# Patient Record
Sex: MALE | Race: WHITE | NOT HISPANIC OR LATINO | Employment: OTHER | URBAN - NONMETROPOLITAN AREA
[De-identification: names, ages, dates, MRNs, and addresses within clinical notes are randomized per-mention and may not be internally consistent; named-entity substitution may affect disease eponyms.]

---

## 2017-05-10 ENCOUNTER — OFFICE VISIT (OUTPATIENT)
Dept: OTOLARYNGOLOGY | Facility: CLINIC | Age: 72
End: 2017-05-10

## 2017-05-10 VITALS
HEIGHT: 68 IN | DIASTOLIC BLOOD PRESSURE: 87 MMHG | TEMPERATURE: 97.8 F | WEIGHT: 182.13 LBS | BODY MASS INDEX: 27.6 KG/M2 | SYSTOLIC BLOOD PRESSURE: 145 MMHG | HEART RATE: 80 BPM

## 2017-05-10 DIAGNOSIS — R53.83 OTHER FATIGUE: ICD-10-CM

## 2017-05-10 DIAGNOSIS — H61.22 IMPACTED CERUMEN OF LEFT EAR: ICD-10-CM

## 2017-05-10 DIAGNOSIS — G47.33 OSA (OBSTRUCTIVE SLEEP APNEA): ICD-10-CM

## 2017-05-10 DIAGNOSIS — R06.83 SNORING: Primary | ICD-10-CM

## 2017-05-10 DIAGNOSIS — G47.10 HYPERSOMNOLENCE: ICD-10-CM

## 2017-05-10 PROCEDURE — 99213 OFFICE O/P EST LOW 20 MIN: CPT | Performed by: NURSE PRACTITIONER

## 2017-05-10 RX ORDER — FLUOXETINE HYDROCHLORIDE 20 MG/1
20 CAPSULE ORAL DAILY
Refills: 0 | COMMUNITY
Start: 2017-03-31

## 2017-05-10 RX ORDER — DESONIDE 0.5 MG/G
1 CREAM TOPICAL DAILY PRN
Refills: 11 | COMMUNITY
Start: 2017-03-22

## 2017-05-10 RX ORDER — SIMVASTATIN 40 MG
TABLET ORAL
Refills: 1 | COMMUNITY
Start: 2017-02-27 | End: 2018-05-10

## 2017-05-10 RX ORDER — NEOMYCIN SULFATE, POLYMYXIN B SULFATE AND HYDROCORTISONE 10; 3.5; 1 MG/ML; MG/ML; [USP'U]/ML
SUSPENSION/ DROPS AURICULAR (OTIC)
Refills: 0 | COMMUNITY
Start: 2017-02-17 | End: 2018-05-10

## 2017-06-14 ENCOUNTER — HOSPITAL ENCOUNTER (OUTPATIENT)
Dept: SLEEP MEDICINE | Facility: HOSPITAL | Age: 72
Discharge: HOME | End: 2017-06-14
Admitting: NURSE PRACTITIONER

## 2017-06-14 DIAGNOSIS — R53.83 OTHER FATIGUE: ICD-10-CM

## 2017-06-14 DIAGNOSIS — G47.33 OSA (OBSTRUCTIVE SLEEP APNEA): ICD-10-CM

## 2017-06-14 DIAGNOSIS — G47.10 HYPERSOMNOLENCE: ICD-10-CM

## 2017-06-14 DIAGNOSIS — R06.83 SNORING: ICD-10-CM

## 2017-06-14 PROCEDURE — G0398 HOME SLEEP TEST/TYPE 2 PORTA: HCPCS

## 2017-06-14 PROCEDURE — 95806 SLEEP STUDY UNATT&RESP EFFT: CPT | Performed by: PSYCHIATRY & NEUROLOGY

## 2017-07-25 ENCOUNTER — OFFICE VISIT (OUTPATIENT)
Dept: NEUROLOGY | Facility: CLINIC | Age: 72
End: 2017-07-25

## 2017-07-25 VITALS
HEIGHT: 68 IN | SYSTOLIC BLOOD PRESSURE: 122 MMHG | HEART RATE: 78 BPM | DIASTOLIC BLOOD PRESSURE: 82 MMHG | BODY MASS INDEX: 27.28 KG/M2 | WEIGHT: 180 LBS

## 2017-07-25 DIAGNOSIS — G47.33 OSA (OBSTRUCTIVE SLEEP APNEA): Primary | ICD-10-CM

## 2017-07-25 DIAGNOSIS — R06.83 SNORING: ICD-10-CM

## 2017-07-25 PROCEDURE — 99213 OFFICE O/P EST LOW 20 MIN: CPT | Performed by: CLINICAL NURSE SPECIALIST

## 2017-07-25 NOTE — PATIENT INSTRUCTIONS
Sleep Apnea  Sleep apnea is a condition that affects breathing. People with sleep apnea have moments during sleep when their breathing pauses briefly or gets shallow. Sleep apnea can cause these symptoms:  · Trouble staying asleep.  · Sleepiness or tiredness during the day.  · Irritability.  · Loud snoring.  · Morning headaches.  · Trouble concentrating.  · Forgetting things.  · Less interest in sex.  · Being sleepy for no reason.  · Mood swings.  · Personality changes.  · Depression.  · Waking up a lot during the night to pee (urinate).  · Dry mouth.  · Sore throat.  HOME CARE  · Make any changes in your routine that your doctor recommends.  · Eat a healthy, well-balanced diet.  · Take over-the-counter and prescription medicines only as told by your doctor.  · Avoid using alcohol, calming medicines (sedatives), and narcotic medicines.  · Take steps to lose weight if you are overweight.  · If you were given a machine (device) to use while you sleep, use it only as told by your doctor.  · Do not use any tobacco products, such as cigarettes, chewing tobacco, and e-cigarettes. If you need help quitting, ask your doctor.  · Keep all follow-up visits as told by your doctor. This is important.  GET HELF IF:  · The machine that you were given to use during sleep is uncomfortable or does not seem to be working.  · Your symptoms do not get better.  · Your symptoms get worse.  GET HELP RIGHT AWAY IF:  · Your chest hurts.  · You have trouble breathing in enough air (shortness of breath).  · You have an uncomfortable feeling in your back, arms, or stomach.  · You have trouble talking.  · One side of your body feels weak.  · A part of your face is hanging down (drooping).  These symptoms may be an emergency. Do not wait to see if the symptoms will go away. Get medical help right away. Call your local emergency services (911 in the [place] Do not drive yourself to the hospital.     This information is not intended to replace  advice given to you by your health care provider. Make sure you discuss any questions you have with your health care provider.     Document Released: 09/26/2009 Document Revised: 04/10/2017 Document Reviewed: 09/26/2016  [employer] Interactive Patient Education ©2017 [employer]  BMI for Adults  Body mass index (BMI) is a number that is calculated from a person's weight and height. In most adults, the number is used to find how much of an adult's weight is made up of fat. BMI is not as accurate as a direct measure of body fat.  HOW IS BMI CALCULATED?  BMI is calculated by dividing weight in kilograms by height in meters squared. It can also be calculated by dividing weight in pounds by height in inches squared, then multiplying the resulting number by 703. Charts are available to help you find your BMI quickly and easily without doing this calculation.   HOW IS BMI INTERPRETED?  Health care professionals use BMI charts to identify whether an adult is underweight, at a normal weight, or overweight based on the following guidelines:  · Underweight: BMI less than 18.5.  · Normal weight: BMI between 18.5 and 24.9.  · Overweight: BMI between 25 and 29.9.  · Obese: BMI of 30 and above.  BMI is usually interpreted the same for males and females.  Weight includes both fat and muscle, so someone with a muscular build, such as an athlete, may have a BMI that is higher than 24.9. In cases like these, BMI may not accurately depict body fat. To determine if excess body fat is the cause of a BMI of 25 or higher, further assessments may need to be done by a health care provider.  WHY IS BMI A USEFUL TOOL?  BMI is used to identify a possible weight problem that may be related to a medical problem or may increase the risk for medical problems. BMI can also be used to promote changes to reach a healthy weight.     This information is not intended to replace advice given to you by your health care provider. Make sure you discuss any  questions you have with your health care provider.     Document Released: 08/29/2005 Document Revised: 01/08/2016 Document Reviewed: 05/15/2015  [employer] Interactive Patient Education ©2017 [employer]

## 2017-07-25 NOTE — PROGRESS NOTES
Subjective     Chief Complaint   Patient presents with   • Sleep Apnea     In house sleep study in Sept.        Justin Szymanski is a 72 y.o. male right handed  in [place], [place]. He is here today for follow up for ADITHYA afte in home sleep study. He has history of ADITHYA and has used CPAP in the past but stopped about 6 years ago as he would have problems with the mask as he is a side sleeper and would be awakened frequently because of leaks and then stopped wearing all together. He did have a home sleep study that did show ADITHYA and he is for titration study in September 2017. He does snore and also has daytime sleepiness. [place] and BANG score below.    Sleep Apnea   This is a chronic problem. The current episode started more than 1 year ago. The problem has been gradually worsening. Associated symptoms include fatigue. Pertinent negatives include no arthralgias, chest pain, myalgias, nausea, sore throat, vomiting or weakness. Associated symptoms comments: Hx of ADITHYA and wore CPAP in the past but stopped about 6 years ago as he is a side sleeper and could not tolerate mask, snoring, daytime sleepiness. Exacerbated by: allergies. Treatments tried: CPAP.        Current Outpatient Prescriptions   Medication Sig Dispense Refill   • desonide (DESOWEN) 0.05 % cream APPLY TO FACE QD  11   • dicyclomine (BENTYL) 20 MG tablet Take 20 mg by mouth Every 6 (Six) Hours As Needed.     • FLUoxetine (PROzac) 20 MG capsule TK 1 C PO QD  0   • fluticasone (FLONASE) 50 MCG/ACT nasal spray 2 sprays into each nostril Daily.     • levothyroxine (SYNTHROID, LEVOTHROID) 75 MCG tablet Take 75 mcg by mouth Daily.     • lisinopril-hydrochlorothiazide (PRINZIDE,ZESTORETIC) 20-12.5 MG per tablet Take 1 tablet by mouth Daily.     • neomycin-polymyxin-hydrocortisone (CORTISPORIN) 3.5-33850-3 otic suspension INSTILL 4 DROPS INTO BOTH EARS TID FOR 7 DAYS  0   • simvastatin (ZOCOR) 40 MG tablet TK 1 T PO QHS  1   • tadalafil (CIALIS) 20 MG tablet  "Take 20 mg by mouth Daily As Needed for erectile dysfunction.     • valACYclovir (VALTREX) 1000 MG tablet Take 1,000 mg by mouth 2 (Two) Times a Day As Needed.       No current facility-administered medications for this visit.        Past Medical History:   Diagnosis Date   • Allergic rhinitis    • GERD (gastroesophageal reflux disease)    • Hypertension    • Hypothyroidism    • Nasal polyposis    • Seasonal allergic rhinitis    • Snoring        Past Surgical History:   Procedure Laterality Date   • SINUS SURGERY      Dr. Griffith   • ULNAR NERVE REPAIR         family history includes Cancer in his father; Hyperlipidemia in his father and mother; Hypertension in his father.    Social History   Substance Use Topics   • Smoking status: Never Smoker   • Smokeless tobacco: Never Used   • Alcohol use 0.6 oz/week     1 Glasses of wine per week      Comment: nightly       Review of Systems   Constitutional: Positive for fatigue.   HENT: Positive for postnasal drip. Negative for rhinorrhea and sore throat.    Eyes: Negative.    Respiratory: Positive for shortness of breath.         Snores   Cardiovascular: Negative.  Negative for chest pain.   Gastrointestinal: Negative.  Negative for constipation, diarrhea, nausea and vomiting.   Endocrine: Negative.    Genitourinary: Negative.  Negative for dysuria and frequency.   Musculoskeletal: Negative for arthralgias, gait problem and myalgias.   Skin: Negative.    Allergic/Immunologic: Negative.    Neurological: Negative.  Negative for dizziness, speech difficulty, weakness and light-headedness.   Hematological: Negative.  Negative for adenopathy.   Psychiatric/Behavioral: Negative.  Negative for agitation, confusion and hallucinations.   All other systems reviewed and are negative.      Objective     /82  Pulse 78  Ht 68\" (172.7 cm)  Wt 180 lb (81.6 kg)  BMI 27.37 kg/m2, Body mass index is 27.37 kg/(m^2).    Physical Exam   Constitutional: He is oriented to person, place, " and time. He appears well-developed and well-nourished.   HENT:   Head: Normocephalic and atraumatic.   Right Ear: Tympanic membrane and external ear normal.   Left Ear: Tympanic membrane and external ear normal.   Nose: Nose normal.   Mouth/Throat: Oropharynx is clear and moist.   Eyes: Conjunctivae, EOM and lids are normal. Pupils are equal, round, and reactive to light.   Neck: Normal range of motion. Neck supple. Carotid bruit is not present.   Cardiovascular: Normal rate, regular rhythm, S1 normal, S2 normal and normal heart sounds.    Pulmonary/Chest: Effort normal and breath sounds normal.   Abdominal: Soft. Bowel sounds are normal.   Musculoskeletal: Normal range of motion.   Neurological: He is alert and oriented to person, place, and time. He has normal strength and normal reflexes. Tremors: voice tremor. No cranial nerve deficit or sensory deficit. He exhibits normal muscle tone. He displays a negative Romberg sign. Coordination and gait normal.   Skin: Skin is warm and dry.   Psychiatric: He has a normal mood and affect. His speech is normal and behavior is normal. Cognition and memory are normal.   Nursing note and vitals reviewed.      Results for orders placed or performed during the hospital encounter of 07/21/16   Converted Surgical Pathology   Result Value Ref Range    CONVERTED (HISTORICAL) CASE TYPE Surgical Pathology     CONVERTED (HISTORICAL) ACCESSION NUMBER [id number]     CONVERTED (HISTORICAL) RESULT STATUS FINAL     CONVERTED (HISTORICAL) SPECIMEN DESCRIPTION Colon, polyp at 30 cm&Colon, polyp at 70 cm       HOME SLEEP STUDY: FINDINGS ON STUDY:  Patient underwent one night home sleep tests using the Libr[employer] device.  By behavioral criteria patient slept for approximately 6.6 hours with sleep latency of 5 minutes and sleep efficiency of 94%.  AHI is 15.  RDI is 28.  Patient slept supine 70.9%.  Snoring occurred 35.8% of the time was 33.9% greater than left.  He pulse rate 68 bpm.      IMPRESSION:    Axis A 1: Obstructive sleep apnea G 47.33  Axis B 1: CPAP or BiPAP titration with oxygen protocol as indicated.  An in  attended study is preferred.  Axis C: Underlying medical problems or medication effects could be contributory.  Close follow-up with patient's family physician/ENT and emphasis on safety to be accomplished.       ASSESSMENT/PLAN    Diagnoses and all orders for this visit:    ADITHYA (obstructive sleep apnea)    Snoring      MEDICAL DECISION MAKIN. Patient to have in lab polysomnogram titration study in 2017.  [place] = 7, BANG= high risk.  2. Does not use tobacco.  3. elevatbed BMI      Patient given printed education with AVS for diet/exerise with AVS and encouraged to include 30 minutes of exercise 3-4 times weekly.  4. Counseled on strategies for compliance.       allergies and all known medications/prescriptions have been reviewed using resources available on this encounter.    Return in about 4 months (around 2017).        Isabella Felipe, APRN

## 2017-08-29 ENCOUNTER — TRANSCRIBE ORDERS (OUTPATIENT)
Dept: SLEEP MEDICINE | Facility: HOSPITAL | Age: 72
End: 2017-08-29

## 2017-08-29 DIAGNOSIS — G47.33 OBSTRUCTIVE SLEEP APNEA, ADULT: Primary | ICD-10-CM

## 2017-09-07 ENCOUNTER — HOSPITAL ENCOUNTER (OUTPATIENT)
Dept: SLEEP MEDICINE | Facility: HOSPITAL | Age: 72
Discharge: HOME | End: 2017-09-07
Admitting: PSYCHIATRY & NEUROLOGY

## 2017-09-07 VITALS
HEART RATE: 77 BPM | HEIGHT: 67 IN | SYSTOLIC BLOOD PRESSURE: 140 MMHG | BODY MASS INDEX: 28.25 KG/M2 | WEIGHT: 180 LBS | OXYGEN SATURATION: 97 % | DIASTOLIC BLOOD PRESSURE: 82 MMHG

## 2017-09-07 DIAGNOSIS — G47.33 OBSTRUCTIVE SLEEP APNEA, ADULT: ICD-10-CM

## 2017-09-07 PROCEDURE — 95811 POLYSOM 6/>YRS CPAP 4/> PARM: CPT | Performed by: PSYCHIATRY & NEUROLOGY

## 2017-09-07 PROCEDURE — 95811 POLYSOM 6/>YRS CPAP 4/> PARM: CPT

## 2017-11-13 ENCOUNTER — OFFICE VISIT (OUTPATIENT)
Dept: NEUROLOGY | Facility: CLINIC | Age: 72
End: 2017-11-13

## 2017-11-13 VITALS
WEIGHT: 181 LBS | HEART RATE: 72 BPM | DIASTOLIC BLOOD PRESSURE: 64 MMHG | BODY MASS INDEX: 28.41 KG/M2 | SYSTOLIC BLOOD PRESSURE: 118 MMHG | HEIGHT: 67 IN

## 2017-11-13 DIAGNOSIS — G47.33 OSA ON CPAP: Primary | ICD-10-CM

## 2017-11-13 DIAGNOSIS — Z99.89 OSA ON CPAP: Primary | ICD-10-CM

## 2017-11-13 PROCEDURE — 99213 OFFICE O/P EST LOW 20 MIN: CPT | Performed by: CLINICAL NURSE SPECIALIST

## 2017-11-13 RX ORDER — ZOLPIDEM TARTRATE 10 MG/1
10 TABLET ORAL NIGHTLY PRN
COMMUNITY
End: 2020-11-23

## 2017-11-13 NOTE — PROGRESS NOTES
Subjective     Chief Complaint   Patient presents with   • Sleep Apnea     Still has good days and bad days. He is becoming more used to the sleep device.          Justin Szymanski is a 72 y.o. male right handed  in [place], [place]. He is here today for follow up for ADITHYA . He was last seen 7/2017 and had titration study 9/2017 and has been using CPAP from sometime September to early October 2017. He does report restored sleep and improved daytime alertness. He wears full face mask. He is uncertain of DME company. [place] = 9, [place]BANG=HIGH. Patient has history of thyroid disorder, IBS, HTN.   As you recall,  He has history of ADITHYA and has used CPAP in the past but stopped about 6 years ago as he would have problems with the mask as he is a side sleeper and would be awakened frequently because of leaks and then stopped wearing all together. He did have a home sleep study that did show ADITHYA.    Sleep Apnea   This is a chronic problem. The current episode started more than 1 year ago. The problem has been gradually worsening. Associated symptoms include fatigue. Pertinent negatives include no arthralgias, chest pain, myalgias, nausea, sore throat, vomiting or weakness. Associated symptoms comments: Hx of ADITHYA and wore CPAP in the past but stopped about 6 years ago as he is a side sleeper and could not tolerate mask, snoring, daytime sleepiness. Exacerbated by: allergies. Treatments tried: CPAP.        Current Outpatient Prescriptions   Medication Sig Dispense Refill   • desonide (DESOWEN) 0.05 % cream APPLY TO FACE QD  11   • dicyclomine (BENTYL) 20 MG tablet Take 20 mg by mouth Every 6 (Six) Hours As Needed.     • FLUoxetine (PROzac) 20 MG capsule TK 1 C PO QD  0   • fluticasone (FLONASE) 50 MCG/ACT nasal spray 2 sprays into each nostril Daily.     • levothyroxine (SYNTHROID, LEVOTHROID) 75 MCG tablet Take 75 mcg by mouth Daily.     • lisinopril-hydrochlorothiazide (PRINZIDE,ZESTORETIC) 20-12.5 MG per tablet Take 1  tablet by mouth Daily.     • neomycin-polymyxin-hydrocortisone (CORTISPORIN) 3.5-55803-2 otic suspension INSTILL 4 DROPS INTO BOTH EARS TID FOR 7 DAYS  0   • simvastatin (ZOCOR) 40 MG tablet TK 1 T PO QHS  1   • tadalafil (CIALIS) 20 MG tablet Take 20 mg by mouth Daily As Needed for erectile dysfunction.     • valACYclovir (VALTREX) 1000 MG tablet Take 1,000 mg by mouth 2 (Two) Times a Day As Needed.     • zolpidem (AMBIEN) 10 MG tablet Take 10 mg by mouth At Night As Needed for Sleep.       No current facility-administered medications for this visit.        Past Medical History:   Diagnosis Date   • Allergic rhinitis    • GERD (gastroesophageal reflux disease)    • Hypertension    • Hypothyroidism    • Nasal polyposis    • Seasonal allergic rhinitis    • Snoring        Past Surgical History:   Procedure Laterality Date   • SINUS SURGERY      Dr. Griffith   • ULNAR NERVE REPAIR         family history includes Cancer in his father; Hyperlipidemia in his father and mother; Hypertension in his father.    Social History   Substance Use Topics   • Smoking status: Never Smoker   • Smokeless tobacco: Never Used   • Alcohol use 0.6 oz/week     1 Glasses of wine per week      Comment: nightly       Review of Systems   Constitutional: Positive for fatigue.   HENT: Positive for postnasal drip. Negative for rhinorrhea and sore throat.    Eyes: Negative.    Respiratory: Positive for shortness of breath.         Snores   Cardiovascular: Negative.  Negative for chest pain.   Gastrointestinal: Negative.  Negative for constipation, diarrhea, nausea and vomiting.   Endocrine: Negative.    Genitourinary: Negative.  Negative for dysuria and frequency.   Musculoskeletal: Negative for arthralgias, gait problem and myalgias.   Skin: Negative.    Allergic/Immunologic: Negative.    Neurological: Negative.  Negative for dizziness, speech difficulty, weakness and light-headedness.   Hematological: Negative.  Negative for adenopathy.  "  Psychiatric/Behavioral: Negative.  Negative for agitation, confusion and hallucinations.   All other systems reviewed and are negative.      Objective     /64  Pulse 72  Ht 67\" (170.2 cm)  Wt 181 lb (82.1 kg)  BMI 28.35 kg/m2, Body mass index is 28.35 kg/(m^2).    Physical Exam   Constitutional: He is oriented to person, place, and time. He appears well-developed and well-nourished.   HENT:   Head: Normocephalic and atraumatic.   Right Ear: Tympanic membrane and external ear normal.   Left Ear: Tympanic membrane and external ear normal.   Nose: Nose normal.   Mouth/Throat: Oropharynx is clear and moist.   Eyes: Conjunctivae, EOM and lids are normal. Pupils are equal, round, and reactive to light.   Neck: Normal range of motion. Neck supple. Carotid bruit is not present.   Cardiovascular: Normal rate, regular rhythm, S1 normal, S2 normal and normal heart sounds.    Pulmonary/Chest: Effort normal and breath sounds normal.   Abdominal: Soft. Bowel sounds are normal.   Musculoskeletal: Normal range of motion.   Neurological: He is alert and oriented to person, place, and time. He has normal strength and normal reflexes. Tremors: voice tremor. No cranial nerve deficit or sensory deficit. He exhibits normal muscle tone. He displays a negative Romberg sign. Coordination and gait normal.   Skin: Skin is warm and dry.   Psychiatric: He has a normal mood and affect. His speech is normal and behavior is normal. Cognition and memory are normal.   Nursing note and vitals reviewed.      Results for orders placed or performed during the hospital encounter of 07/21/16   Converted Surgical Pathology   Result Value Ref Range    CONVERTED (HISTORICAL) CASE TYPE Surgical Pathology     CONVERTED (HISTORICAL) ACCESSION NUMBER [id number]     CONVERTED (HISTORICAL) RESULT STATUS FINAL     CONVERTED (HISTORICAL) SPECIMEN DESCRIPTION Colon, polyp at 30 cm&Colon, polyp at 70 cm       Polysomnogram with titration: FINDINGS ON STUDY:  " Patient underwent one night home sleep tests using the [employer] device.  By behavioral criteria patient slept for approximately 6.6 hours with sleep latency of 5 minutes and sleep efficiency of 94%.  AHI is 15.  RDI is 28.  Patient slept supine 70.9%.  Snoring occurred 35.8% of the time was 33.9% greater than left.  He pulse rate 68 bpm.     IMPRESSION:    Axis A 1: Obstructive sleep apnea G 47.33  Axis B 1: CPAP or BiPAP titration with oxygen protocol as indicated.  An in  attended study is preferred.  Axis C: Underlying medical problems or medication effects could be contributory.  Close follow-up with patient's family physician/ENT and emphasis on safety to be accomplished.             ASSESSMENT/PLAN    Diagnoses and all orders for this visit:    ADITHYA on CPAP    Other orders  -     zolpidem (AMBIEN) 10 MG tablet; Take 10 mg by mouth At Night As Needed for Sleep.    MEDICAL DECISION MAKIN. Obtain compliance download  2. Counseled on strategies for compliance.  3. Does not use tobacco.  4. Elevated BMI -      Patient given printed education with AVS for diet/exerise with AVS and encouraged to include 30 minutes of exercise 3-4 times weekly.  5. If download does not show compliance will see patient in 60 days.      allergies and all known medications/prescriptions have been reviewed using resources available on this encounter.    Return in about 1 year (around 2018).        EFRA Lyman

## 2017-11-13 NOTE — PATIENT INSTRUCTIONS
Sleep Apnea  Sleep apnea is a condition that affects breathing. People with sleep apnea have moments during sleep when their breathing pauses briefly or gets shallow. Sleep apnea can cause these symptoms:  · Trouble staying asleep.  · Sleepiness or tiredness during the day.  · Irritability.  · Loud snoring.  · Morning headaches.  · Trouble concentrating.  · Forgetting things.  · Less interest in sex.  · Being sleepy for no reason.  · Mood swings.  · Personality changes.  · Depression.  · Waking up a lot during the night to pee (urinate).  · Dry mouth.  · Sore throat.  HOME CARE  · Make any changes in your routine that your doctor recommends.  · Eat a healthy, well-balanced diet.  · Take over-the-counter and prescription medicines only as told by your doctor.  · Avoid using alcohol, calming medicines (sedatives), and narcotic medicines.  · Take steps to lose weight if you are overweight.  · If you were given a machine (device) to use while you sleep, use it only as told by your doctor.  · Do not use any tobacco products, such as cigarettes, chewing tobacco, and e-cigarettes. If you need help quitting, ask your doctor.  · Keep all follow-up visits as told by your doctor. This is important.  GET HELF IF:  · The machine that you were given to use during sleep is uncomfortable or does not seem to be working.  · Your symptoms do not get better.  · Your symptoms get worse.  GET HELP RIGHT AWAY IF:  · Your chest hurts.  · You have trouble breathing in enough air (shortness of breath).  · You have an uncomfortable feeling in your back, arms, or stomach.  · You have trouble talking.  · One side of your body feels weak.  · A part of your face is hanging down (drooping).  These symptoms may be an emergency. Do not wait to see if the symptoms will go away. Get medical help right away. Call your local emergency services (911 in the [place] Do not drive yourself to the hospital.     This information is not intended to replace  advice given to you by your health care provider. Make sure you discuss any questions you have with your health care provider.     Document Released: 09/26/2009 Document Revised: 04/10/2017 Document Reviewed: 09/26/2016  [employer] Interactive Patient Education ©2017 [employer]  BMI for Adults  Body mass index (BMI) is a number that is calculated from a person's weight and height. In most adults, the number is used to find how much of an adult's weight is made up of fat. BMI is not as accurate as a direct measure of body fat.  HOW IS BMI CALCULATED?  BMI is calculated by dividing weight in kilograms by height in meters squared. It can also be calculated by dividing weight in pounds by height in inches squared, then multiplying the resulting number by 703. Charts are available to help you find your BMI quickly and easily without doing this calculation.   HOW IS BMI INTERPRETED?  Health care professionals use BMI charts to identify whether an adult is underweight, at a normal weight, or overweight based on the following guidelines:  · Underweight: BMI less than 18.5.  · Normal weight: BMI between 18.5 and 24.9.  · Overweight: BMI between 25 and 29.9.  · Obese: BMI of 30 and above.  BMI is usually interpreted the same for males and females.  Weight includes both fat and muscle, so someone with a muscular build, such as an athlete, may have a BMI that is higher than 24.9. In cases like these, BMI may not accurately depict body fat. To determine if excess body fat is the cause of a BMI of 25 or higher, further assessments may need to be done by a health care provider.  WHY IS BMI A USEFUL TOOL?  BMI is used to identify a possible weight problem that may be related to a medical problem or may increase the risk for medical problems. BMI can also be used to promote changes to reach a healthy weight.     This information is not intended to replace advice given to you by your health care provider. Make sure you discuss any  questions you have with your health care provider.     Document Released: 08/29/2005 Document Revised: 01/08/2016 Document Reviewed: 05/15/2015  [employer] Interactive Patient Education ©2017 [employer]

## 2017-12-15 ENCOUNTER — TELEPHONE (OUTPATIENT)
Dept: NEUROLOGY | Facility: CLINIC | Age: 72
End: 2017-12-15

## 2017-12-15 DIAGNOSIS — Z99.89 OSA ON CPAP: Primary | ICD-10-CM

## 2017-12-15 DIAGNOSIS — G47.33 OSA ON CPAP: Primary | ICD-10-CM

## 2017-12-15 NOTE — TELEPHONE ENCOUNTER
"Mr Szymanski called to report that he feels his CPAP is too high because he has a \"lot of excess gas\". He went 2 nights without his CPAP and that subsided. He would like to have an order to decrease his CPAP settings if possible.   "

## 2018-01-25 ENCOUNTER — OFFICE VISIT (OUTPATIENT)
Dept: GASTROENTEROLOGY | Facility: CLINIC | Age: 73
End: 2018-01-25

## 2018-01-25 VITALS
HEART RATE: 79 BPM | SYSTOLIC BLOOD PRESSURE: 120 MMHG | DIASTOLIC BLOOD PRESSURE: 74 MMHG | OXYGEN SATURATION: 98 % | TEMPERATURE: 98 F | BODY MASS INDEX: 28.88 KG/M2 | WEIGHT: 184 LBS | HEIGHT: 67 IN

## 2018-01-25 DIAGNOSIS — K30 INDIGESTION: Primary | ICD-10-CM

## 2018-01-25 PROCEDURE — 99214 OFFICE O/P EST MOD 30 MIN: CPT | Performed by: NURSE PRACTITIONER

## 2018-01-25 RX ORDER — SUCRALFATE 1 G/1
1 TABLET ORAL
COMMUNITY
End: 2018-05-10

## 2018-01-25 RX ORDER — OMEPRAZOLE 40 MG/1
20 CAPSULE, DELAYED RELEASE ORAL DAILY
COMMUNITY
End: 2021-03-18

## 2018-01-25 NOTE — PROGRESS NOTES
Chief Complaint   Patient presents with   • GI Problem     for about 1 month been having lots of problems burping       Subjective     HPI     Hx of GERD related sx over 5 yrs.  Increased indigestion over past month.  Worse at night when lying down. Associated periumbilical pressure type pain.  Bentyl provided relief to abdominal pain but did not stop indigestion.  Pepto Bismol provided some relief with indigestion.  No difficulty with bowels.  No diarrhea or constipation.  No BRBPR or melena.    CScope (Dr Mart) 2017 hyperplastic polyp 30 cm, tubular adenoma 70 cm  Endoscopy (Dr Mart) 2013 normal-neg SB bx    Past Medical History:   Diagnosis Date   • Allergic rhinitis    • GERD (gastroesophageal reflux disease)    • Hypertension    • Hypothyroidism    • Nasal polyposis    • Seasonal allergic rhinitis    • Snoring        Past Surgical History:   Procedure Laterality Date   • COLONOSCOPY  07/21/2016    two polyps ,both removed   • ENDOSCOPY  04/18/2013    normal   • SINUS SURGERY      Dr. Griffith   • ULNAR NERVE REPAIR         Outpatient Prescriptions Marked as Taking for the 1/25/18 encounter (Office Visit) with EFRA Dalton   Medication Sig Dispense Refill   • desonide (DESOWEN) 0.05 % cream APPLY TO FACE QD  11   • dicyclomine (BENTYL) 20 MG tablet Take 20 mg by mouth Every 6 (Six) Hours As Needed.     • FLUoxetine (PROzac) 20 MG capsule TK 1 C PO QD  0   • fluticasone (FLONASE) 50 MCG/ACT nasal spray 2 sprays into each nostril Daily.     • levothyroxine (SYNTHROID, LEVOTHROID) 75 MCG tablet Take 75 mcg by mouth Daily.     • lisinopril-hydrochlorothiazide (PRINZIDE,ZESTORETIC) 20-12.5 MG per tablet Take 1 tablet by mouth Daily.     • neomycin-polymyxin-hydrocortisone (CORTISPORIN) 3.5-98597-2 otic suspension INSTILL 4 DROPS INTO BOTH EARS TID FOR 7 DAYS  0   • omeprazole (priLOSEC) 40 MG capsule Take 40 mg by mouth Daily.     • simvastatin (ZOCOR) 40 MG tablet TK 1 T PO QHS  1   • sucralfate  (CARAFATE) 1 g tablet Take 1 g by mouth 2 (Two) Times a Day.     • tadalafil (CIALIS) 20 MG tablet Take 20 mg by mouth Daily As Needed for erectile dysfunction.     • valACYclovir (VALTREX) 1000 MG tablet Take 1,000 mg by mouth 2 (Two) Times a Day As Needed.     • zolpidem (AMBIEN) 10 MG tablet Take 10 mg by mouth At Night As Needed for Sleep.         No Known Allergies    Social History     Social History   • Marital status:      Spouse name: N/A   • Number of children: N/A   • Years of education: N/A     Occupational History   • Not on file.     Social History Main Topics   • Smoking status: Never Smoker   • Smokeless tobacco: Never Used   • Alcohol use 0.6 oz/week     1 Glasses of wine per week      Comment: nightly   • Drug use: No   • Sexual activity: Not on file     Other Topics Concern   • Not on file     Social History Narrative       Family History   Problem Relation Age of Onset   • Hyperlipidemia Mother    • Cancer Father    • Hypertension Father    • Hyperlipidemia Father    • Colon cancer Maternal Grandfather        Review of Systems   Constitutional: Negative for fatigue, fever and unexpected weight change.   HENT: Negative for hearing loss, sore throat and voice change.    Eyes: Negative for visual disturbance.   Respiratory: Negative for cough, shortness of breath and wheezing.    Cardiovascular: Negative for chest pain and palpitations.   Gastrointestinal: Negative for abdominal pain, blood in stool and vomiting.   Endocrine: Negative for polydipsia and polyuria.   Genitourinary: Negative for difficulty urinating, dysuria, hematuria and urgency.   Musculoskeletal: Negative for joint swelling and myalgias.   Skin: Negative for color change, rash and wound.   Neurological: Negative for dizziness, tremors, seizures and syncope.   Hematological: Does not bruise/bleed easily.   Psychiatric/Behavioral: Negative for agitation and confusion. The patient is not nervous/anxious.        Objective  "    Vitals:    01/25/18 1254   BP: 120/74   Pulse: 79   Temp: 98 °F (36.7 °C)   SpO2: 98%   Weight: 83.5 kg (184 lb)   Height: 170.2 cm (67\")     Body mass index is 28.82 kg/(m^2).    Physical Exam   Constitutional: He is oriented to person, place, and time. He appears well-developed and well-nourished.   HENT:   Head: Normocephalic and atraumatic.   Eyes:   Pink, Nonicteric   Neck:   Global Assessment- supple. No JVD or lymphadenopathy   Cardiovascular: Normal rate, regular rhythm and normal heart sounds.  Exam reveals no gallop and no friction rub.    No murmur heard.  Pulmonary/Chest: Effort normal and breath sounds normal. No respiratory distress. He has no wheezes. He has no rales.   Inspection: Movements-Symmetrical   Abdominal: Soft. Bowel sounds are normal. He exhibits no distension and no mass. There is no tenderness. There is no rebound and no guarding.   Neurological: He is alert and oriented to person, place, and time.   General Exam-Deemed a reliable historian, able to converse without difficulty and Able to move all extremities without difficulty       Imaging Results (most recent)     None          Assessment/Plan     Justin was seen today for gi problem.    Diagnoses and all orders for this visit:    Indigestion  -     Case Request; Standing  -     Implement Anesthesia Orders Day of Procedure; Standing  -     Obtain Informed Consent; Standing  -     Case Request        ESOPHAGOGASTRODUODENOSCOPY WITH ANESTHESIA (N/A)    Anticipate EGD tomorrow, will hold off on medication adjustment until after eval by   Dr Mart    There are no Patient Instructions on file for this visit.  The risk of the endoscopy were discussed in detail.  We discussed the risk of perforation (one out of 0401-4214, riskier with dilation), bleeding (one out of 500), and the rare risks of infection, adverse reaction to anesthesia, respiratory failure, cardiac failure including MI and adverse reaction to medications, etc.  We " discussed consequences that could occur if a risk were to develop such as the need for hospitalization, blood transfusion, surgical intervention, medications, pain and disability and death.  Alternatives include not doing anything, or pursuing an UGI series which only offers a diagnosis with potential less accuracy compared to egd.  The patient verbalizes understanding and agrees to proceed.

## 2018-01-26 ENCOUNTER — ANESTHESIA (OUTPATIENT)
Dept: GASTROENTEROLOGY | Facility: HOSPITAL | Age: 73
End: 2018-01-26

## 2018-01-26 ENCOUNTER — ANESTHESIA EVENT (OUTPATIENT)
Dept: GASTROENTEROLOGY | Facility: HOSPITAL | Age: 73
End: 2018-01-26

## 2018-01-26 ENCOUNTER — HOSPITAL ENCOUNTER (OUTPATIENT)
Facility: HOSPITAL | Age: 73
Setting detail: HOSPITAL OUTPATIENT SURGERY
Discharge: HOME | End: 2018-01-26
Attending: INTERNAL MEDICINE | Admitting: INTERNAL MEDICINE

## 2018-01-26 VITALS
BODY MASS INDEX: 28.19 KG/M2 | WEIGHT: 186 LBS | OXYGEN SATURATION: 97 % | DIASTOLIC BLOOD PRESSURE: 84 MMHG | HEART RATE: 66 BPM | HEIGHT: 68 IN | SYSTOLIC BLOOD PRESSURE: 127 MMHG | RESPIRATION RATE: 15 BRPM | TEMPERATURE: 97.8 F

## 2018-01-26 DIAGNOSIS — K30 INDIGESTION: ICD-10-CM

## 2018-01-26 PROCEDURE — 87081 CULTURE SCREEN ONLY: CPT | Performed by: INTERNAL MEDICINE

## 2018-01-26 PROCEDURE — 25010000002 PROPOFOL 10 MG/ML EMULSION: Performed by: NURSE ANESTHETIST, CERTIFIED REGISTERED

## 2018-01-26 PROCEDURE — 43239 EGD BIOPSY SINGLE/MULTIPLE: CPT | Performed by: INTERNAL MEDICINE

## 2018-01-26 RX ORDER — SODIUM CHLORIDE 0.9 % (FLUSH) 0.9 %
3 SYRINGE (ML) INJECTION AS NEEDED
Status: DISCONTINUED | OUTPATIENT
Start: 2018-01-26 | End: 2018-01-26 | Stop reason: HOSPADM

## 2018-01-26 RX ORDER — LIDOCAINE HYDROCHLORIDE 20 MG/ML
INJECTION, SOLUTION INFILTRATION; PERINEURAL AS NEEDED
Status: DISCONTINUED | OUTPATIENT
Start: 2018-01-26 | End: 2018-01-26 | Stop reason: SURG

## 2018-01-26 RX ORDER — SODIUM CHLORIDE 9 MG/ML
100 INJECTION, SOLUTION INTRAVENOUS CONTINUOUS
Status: CANCELLED | OUTPATIENT
Start: 2018-01-26

## 2018-01-26 RX ORDER — SODIUM CHLORIDE 0.9 % (FLUSH) 0.9 %
1-10 SYRINGE (ML) INJECTION AS NEEDED
Status: CANCELLED | OUTPATIENT
Start: 2018-01-26

## 2018-01-26 RX ORDER — SODIUM CHLORIDE 9 MG/ML
500 INJECTION, SOLUTION INTRAVENOUS CONTINUOUS PRN
Status: DISCONTINUED | OUTPATIENT
Start: 2018-01-26 | End: 2018-01-26 | Stop reason: HOSPADM

## 2018-01-26 RX ORDER — PROPOFOL 10 MG/ML
VIAL (ML) INTRAVENOUS AS NEEDED
Status: DISCONTINUED | OUTPATIENT
Start: 2018-01-26 | End: 2018-01-26 | Stop reason: SURG

## 2018-01-26 RX ADMIN — SODIUM CHLORIDE 500 ML: 0.9 INJECTION, SOLUTION INTRAVENOUS at 07:38

## 2018-01-26 RX ADMIN — LIDOCAINE HYDROCHLORIDE 100 MG: 20 INJECTION, SOLUTION INFILTRATION; PERINEURAL at 08:04

## 2018-01-26 RX ADMIN — PROPOFOL 20 MG: 10 INJECTION, EMULSION INTRAVENOUS at 08:06

## 2018-01-26 RX ADMIN — LIDOCAINE HYDROCHLORIDE 0.5 ML: 10 INJECTION, SOLUTION EPIDURAL; INFILTRATION; INTRACAUDAL; PERINEURAL at 07:39

## 2018-01-26 RX ADMIN — PROPOFOL 100 MG: 10 INJECTION, EMULSION INTRAVENOUS at 08:04

## 2018-01-26 NOTE — ANESTHESIA PREPROCEDURE EVALUATION
Anesthesia Evaluation     no history of anesthetic complications:  NPO Solid Status: > 8 hours  NPO Liquid Status: > 8 hours     Airway   Mallampati: III  TM distance: >3 FB  Neck ROM: full  Dental - normal exam     Pulmonary - normal exam    breath sounds clear to auscultation  (+) sleep apnea (Has not used it for past month),   (-) COPD, asthma, recent URI, not a smoker  Cardiovascular   Exercise tolerance: excellent (>7 METS)    Rhythm: regular  Rate: normal    (+) hypertension,   (-) pacemaker, past MI, angina, cardiac stents, CABG      Neuro/Psych  (-) seizures, TIA, CVA  GI/Hepatic/Renal/Endo    (+)  GERD, hypothyroidism,   (-) liver disease, no renal disease, diabetes, hyperthyroidism    Musculoskeletal     Abdominal    Substance History      OB/GYN          Other                                                Anesthesia Plan    ASA 2     general   total IV anesthesia  intravenous induction   Anesthetic plan and risks discussed with patient.

## 2018-01-26 NOTE — PLAN OF CARE
Problem: Patient Care Overview (Adult)  Goal: Plan of Care Review  Outcome: Outcome(s) achieved Date Met: 01/26/18 01/26/18 0813   Patient Care Overview   Progress improving   Coping/Psychosocial Response Interventions   Plan Of Care Reviewed With patient;family   Outcome Evaluation   Outcome Summary/Follow up Plan discharge criteria met

## 2018-01-26 NOTE — H&P (VIEW-ONLY)
Chief Complaint   Patient presents with   • GI Problem     for about 1 month been having lots of problems burping       Subjective     HPI     Hx of GERD related sx over 5 yrs.  Increased indigestion over past month.  Worse at night when lying down. Associated periumbilical pressure type pain.  Bentyl provided relief to abdominal pain but did not stop indigestion.  Pepto Bismol provided some relief with indigestion.  No difficulty with bowels.  No diarrhea or constipation.  No BRBPR or melena.    CScope (Dr Mart) 2017 hyperplastic polyp 30 cm, tubular adenoma 70 cm  Endoscopy (Dr Mart) 2013 normal-neg SB bx    Past Medical History:   Diagnosis Date   • Allergic rhinitis    • GERD (gastroesophageal reflux disease)    • Hypertension    • Hypothyroidism    • Nasal polyposis    • Seasonal allergic rhinitis    • Snoring        Past Surgical History:   Procedure Laterality Date   • COLONOSCOPY  07/21/2016    two polyps ,both removed   • ENDOSCOPY  04/18/2013    normal   • SINUS SURGERY      Dr. Griffith   • ULNAR NERVE REPAIR         Outpatient Prescriptions Marked as Taking for the 1/25/18 encounter (Office Visit) with EFRA Dalton   Medication Sig Dispense Refill   • desonide (DESOWEN) 0.05 % cream APPLY TO FACE QD  11   • dicyclomine (BENTYL) 20 MG tablet Take 20 mg by mouth Every 6 (Six) Hours As Needed.     • FLUoxetine (PROzac) 20 MG capsule TK 1 C PO QD  0   • fluticasone (FLONASE) 50 MCG/ACT nasal spray 2 sprays into each nostril Daily.     • levothyroxine (SYNTHROID, LEVOTHROID) 75 MCG tablet Take 75 mcg by mouth Daily.     • lisinopril-hydrochlorothiazide (PRINZIDE,ZESTORETIC) 20-12.5 MG per tablet Take 1 tablet by mouth Daily.     • neomycin-polymyxin-hydrocortisone (CORTISPORIN) 3.5-45962-7 otic suspension INSTILL 4 DROPS INTO BOTH EARS TID FOR 7 DAYS  0   • omeprazole (priLOSEC) 40 MG capsule Take 40 mg by mouth Daily.     • simvastatin (ZOCOR) 40 MG tablet TK 1 T PO QHS  1   • sucralfate  (CARAFATE) 1 g tablet Take 1 g by mouth 2 (Two) Times a Day.     • tadalafil (CIALIS) 20 MG tablet Take 20 mg by mouth Daily As Needed for erectile dysfunction.     • valACYclovir (VALTREX) 1000 MG tablet Take 1,000 mg by mouth 2 (Two) Times a Day As Needed.     • zolpidem (AMBIEN) 10 MG tablet Take 10 mg by mouth At Night As Needed for Sleep.         No Known Allergies    Social History     Social History   • Marital status:      Spouse name: N/A   • Number of children: N/A   • Years of education: N/A     Occupational History   • Not on file.     Social History Main Topics   • Smoking status: Never Smoker   • Smokeless tobacco: Never Used   • Alcohol use 0.6 oz/week     1 Glasses of wine per week      Comment: nightly   • Drug use: No   • Sexual activity: Not on file     Other Topics Concern   • Not on file     Social History Narrative       Family History   Problem Relation Age of Onset   • Hyperlipidemia Mother    • Cancer Father    • Hypertension Father    • Hyperlipidemia Father    • Colon cancer Maternal Grandfather        Review of Systems   Constitutional: Negative for fatigue, fever and unexpected weight change.   HENT: Negative for hearing loss, sore throat and voice change.    Eyes: Negative for visual disturbance.   Respiratory: Negative for cough, shortness of breath and wheezing.    Cardiovascular: Negative for chest pain and palpitations.   Gastrointestinal: Negative for abdominal pain, blood in stool and vomiting.   Endocrine: Negative for polydipsia and polyuria.   Genitourinary: Negative for difficulty urinating, dysuria, hematuria and urgency.   Musculoskeletal: Negative for joint swelling and myalgias.   Skin: Negative for color change, rash and wound.   Neurological: Negative for dizziness, tremors, seizures and syncope.   Hematological: Does not bruise/bleed easily.   Psychiatric/Behavioral: Negative for agitation and confusion. The patient is not nervous/anxious.        Objective  "    Vitals:    01/25/18 1254   BP: 120/74   Pulse: 79   Temp: 98 °F (36.7 °C)   SpO2: 98%   Weight: 83.5 kg (184 lb)   Height: 170.2 cm (67\")     Body mass index is 28.82 kg/(m^2).    Physical Exam   Constitutional: He is oriented to person, place, and time. He appears well-developed and well-nourished.   HENT:   Head: Normocephalic and atraumatic.   Eyes:   Pink, Nonicteric   Neck:   Global Assessment- supple. No JVD or lymphadenopathy   Cardiovascular: Normal rate, regular rhythm and normal heart sounds.  Exam reveals no gallop and no friction rub.    No murmur heard.  Pulmonary/Chest: Effort normal and breath sounds normal. No respiratory distress. He has no wheezes. He has no rales.   Inspection: Movements-Symmetrical   Abdominal: Soft. Bowel sounds are normal. He exhibits no distension and no mass. There is no tenderness. There is no rebound and no guarding.   Neurological: He is alert and oriented to person, place, and time.   General Exam-Deemed a reliable historian, able to converse without difficulty and Able to move all extremities without difficulty       Imaging Results (most recent)     None          Assessment/Plan     Justin was seen today for gi problem.    Diagnoses and all orders for this visit:    Indigestion  -     Case Request; Standing  -     Implement Anesthesia Orders Day of Procedure; Standing  -     Obtain Informed Consent; Standing  -     Case Request        ESOPHAGOGASTRODUODENOSCOPY WITH ANESTHESIA (N/A)    Anticipate EGD tomorrow, will hold off on medication adjustment until after eval by   Dr Mart    There are no Patient Instructions on file for this visit.  The risk of the endoscopy were discussed in detail.  We discussed the risk of perforation (one out of 5369-6660, riskier with dilation), bleeding (one out of 500), and the rare risks of infection, adverse reaction to anesthesia, respiratory failure, cardiac failure including MI and adverse reaction to medications, etc.  We " discussed consequences that could occur if a risk were to develop such as the need for hospitalization, blood transfusion, surgical intervention, medications, pain and disability and death.  Alternatives include not doing anything, or pursuing an UGI series which only offers a diagnosis with potential less accuracy compared to egd.  The patient verbalizes understanding and agrees to proceed.

## 2018-01-26 NOTE — ANESTHESIA POSTPROCEDURE EVALUATION
Patient: Justin Szymanski    Procedure Summary     Date Anesthesia Start Anesthesia Stop Room / Location    01/26/18 0757 0812 [facility] ENDOSCOPY 5 / [facility] ENDOSCOPY       Procedure Diagnosis Surgeon Provider    ESOPHAGOGASTRODUODENOSCOPY WITH ANESTHESIA (N/A Esophagus) Indigestion  (Indigestion [K30]) DO Orlando Barton CRNA          Anesthesia Type: general  Last vitals  BP   130/73 (01/26/18 0726)   Temp   97.8 °F (36.6 °C) (01/26/18 0726)   Pulse   72 (01/26/18 0726)   Resp   20 (01/26/18 0726)     SpO2   99 % (01/26/18 0726)     Post Anesthesia Care and Evaluation    Patient location during evaluation: PHASE II  Patient participation: complete - patient participated  Level of consciousness: sleepy but conscious  Pain score: 0  Pain management: adequate  Airway patency: patent  Anesthetic complications: No anesthetic complications    Cardiovascular status: acceptable and blood pressure returned to baseline  Respiratory status: acceptable  Hydration status: acceptable

## 2018-01-26 NOTE — PLAN OF CARE
Problem: Patient Care Overview (Adult)  Goal: Plan of Care Review  Outcome: Ongoing (interventions implemented as appropriate)   01/26/18 0806   Patient Care Overview   Progress improving   Coping/Psychosocial Response Interventions   Plan Of Care Reviewed With patient   Outcome Evaluation   Outcome Summary/Follow up Plan no noted problems

## 2018-01-26 NOTE — PLAN OF CARE
Problem: GI Endoscopy (Adult)  Goal: Signs and Symptoms of Listed Potential Problems Will be Absent or Manageable (GI Endoscopy)  Outcome: Outcome(s) achieved Date Met: 01/26/18 01/26/18 0813   GI Endoscopy   Problems Assessed (GI Endoscopy) all   Problems Present (GI Endoscopy) none

## 2018-01-27 LAB — UREASE TISS QL: NEGATIVE

## 2018-05-10 ENCOUNTER — OFFICE VISIT (OUTPATIENT)
Dept: CARDIOLOGY | Facility: CLINIC | Age: 73
End: 2018-05-10

## 2018-05-10 VITALS
HEART RATE: 88 BPM | WEIGHT: 187 LBS | HEIGHT: 68 IN | SYSTOLIC BLOOD PRESSURE: 118 MMHG | BODY MASS INDEX: 28.34 KG/M2 | DIASTOLIC BLOOD PRESSURE: 70 MMHG

## 2018-05-10 DIAGNOSIS — Z99.89 OSA ON CPAP: ICD-10-CM

## 2018-05-10 DIAGNOSIS — G47.33 OSA ON CPAP: ICD-10-CM

## 2018-05-10 DIAGNOSIS — R01.1 HEART MURMUR: Primary | ICD-10-CM

## 2018-05-10 DIAGNOSIS — I10 ESSENTIAL HYPERTENSION: ICD-10-CM

## 2018-05-10 PROCEDURE — 99204 OFFICE O/P NEW MOD 45 MIN: CPT | Performed by: INTERNAL MEDICINE

## 2018-05-10 PROCEDURE — 93000 ELECTROCARDIOGRAM COMPLETE: CPT | Performed by: INTERNAL MEDICINE

## 2018-05-10 RX ORDER — LISINOPRIL 20 MG/1
20 TABLET ORAL DAILY
Status: ON HOLD | COMMUNITY
End: 2020-04-30 | Stop reason: SDUPTHER

## 2018-05-10 NOTE — PROGRESS NOTES
P - CARDIOLOGY  New Patient Initial Outpatient Evaulation    Primary Care Physician: EFRA Vallejo    Subjective     Chief Complaint: murmur      Mr. Szymanski is a pleasant 72-year-old male kindly referred to me by EFRA Salinas, for evaluation of a murmur.  Duration-  decades.  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  Aggravating/alleviating factors: None.  Symptom progression: stable. No associated symptoms other than fatigue (which he attributes to either allergies or sleep apnea).  Characterized by lack of energy. No associated edema, orthopnea, or PND. No chest pain or syncope (other than one episode many years ago that sounds vasovagal in nature).           Review of Systems   Constitution: Positive for malaise/fatigue.   HENT: Negative.  Negative for nosebleeds.    Eyes: Negative.    Cardiovascular: Negative.  Negative for chest pain, claudication, dyspnea on exertion, irregular heartbeat, leg swelling, near-syncope, orthopnea, palpitations, paroxysmal nocturnal dyspnea and syncope.   Respiratory: Negative.  Negative for cough, shortness of breath and wheezing.    Endocrine: Negative.    Hematologic/Lymphatic: Negative.  Negative for bleeding problem. Does not bruise/bleed easily.   Skin: Negative.    Musculoskeletal: Negative.    Gastrointestinal: Negative.  Negative for dysphagia, hematemesis, hematochezia and melena.   Genitourinary: Negative.  Negative for hematuria and non-menstrual bleeding.   Neurological: Negative.    Psychiatric/Behavioral: Negative.    Allergic/Immunologic: Negative.         Otherwise complete ROS reviewed and negative except as mentioned in the HPI.      Past Medical History:   Past Medical History:   Diagnosis Date   • Allergic rhinitis    • GERD (gastroesophageal reflux disease)    • Heart murmur    •  Hyperlipidemia    • Hypertension    • Hypothyroidism    • Nasal polyposis    • Seasonal allergic rhinitis    • Sleep apnea    • Snoring    • Valvular disease        Past Surgical History:  Past Surgical History:   Procedure Laterality Date   • COLONOSCOPY  07/21/2016    two polyps ,both removed   • ENDOSCOPY  04/18/2013    normal   • ENDOSCOPY N/A 1/26/2018    Procedure: ESOPHAGOGASTRODUODENOSCOPY WITH ANESTHESIA;  Surgeon: Marc Mart DO;  Location: [facility] ENDOSCOPY;  Service:    • SINUS SURGERY      Dr. Griffith   • ULNAR NERVE REPAIR         Family History: family history includes Cancer in his father; Colon cancer in his maternal grandfather; Hyperlipidemia in his father and mother; Hypertension in his father.    Social History:  reports that he has never smoked. He has never used smokeless tobacco. He reports that he drinks about 0.6 oz of alcohol per week . He reports that he does not use drugs.    Medications:  Prior to Admission medications    Medication Sig Start Date End Date Taking? Authorizing Provider   desonide (DESOWEN) 0.05 % cream APPLY TO FACE QD 3/22/17   Historical Provider, MD   dicyclomine (BENTYL) 20 MG tablet Take 20 mg by mouth Every 6 (Six) Hours As Needed.    Historical Provider, MD   FLUoxetine (PROzac) 20 MG capsule TK 1 C PO QD 3/31/17   Historical Provider, MD   fluticasone (FLONASE) 50 MCG/ACT nasal spray 2 sprays into each nostril Daily.    Historical Provider, MD   levothyroxine (SYNTHROID, LEVOTHROID) 75 MCG tablet Take 75 mcg by mouth Daily.    Historical Provider, MD   lisinopril-hydrochlorothiazide (PRINZIDE,ZESTORETIC) 20-12.5 MG per tablet Take 1 tablet by mouth Daily.    Historical Provider, MD   neomycin-polymyxin-hydrocortisone (CORTISPORIN) 3.5-91878-0 otic suspension INSTILL 4 DROPS INTO BOTH EARS TID FOR 7 DAYS 2/17/17   Historical Provider, MD   omeprazole (priLOSEC) 40 MG capsule Take 40 mg by mouth Daily.    Historical Provider, MD   simvastatin (ZOCOR) 40 MG  "tablet TK 1 T PO QHS 2/27/17   Historical Provider, MD   sucralfate (CARAFATE) 1 g tablet Take 1 g by mouth 2 (Two) Times a Day.    Historical Provider, MD   tadalafil (CIALIS) 20 MG tablet Take 20 mg by mouth Daily As Needed for erectile dysfunction.    Historical Provider, MD   valACYclovir (VALTREX) 1000 MG tablet Take 1,000 mg by mouth 2 (Two) Times a Day As Needed.    Historical Provider, MD   zolpidem (AMBIEN) 10 MG tablet Take 10 mg by mouth At Night As Needed for Sleep.    Historical Provider, MD     Allergies:  No Known Allergies    Objective     Vital Signs: /70 (BP Location: Right arm, Patient Position: Sitting)   Pulse 88   Ht 172.7 cm (68\")   Wt 84.8 kg (187 lb)   BMI 28.43 kg/m²     Physical Exam   Constitutional: No distress.   HENT:   Mouth/Throat: Oropharynx is clear. Pharynx is normal.   Neck: Normal range of motion and thyroid normal. Neck supple. No JVD present. No thyromegaly present.   Cardiovascular: Normal rate, regular rhythm, S1 normal, S2 normal, intact distal pulses and normal pulses.   No extrasystoles are present. PMI is not displaced.    Murmur heard.   Blowing systolic murmur is present with a grade of 2/6  at the upper right sternal border, upper left sternal border, lower left sternal border The intensity does not change with valsalva and intensity does not change with respiration.  Pulmonary/Chest: Effort normal and breath sounds normal.   Abdominal: Soft. Bowel sounds are normal. He exhibits no distension. There is no splenomegaly or hepatomegaly. There is no tenderness.   Musculoskeletal: He exhibits no edema or tenderness.   Neurological: He is alert and oriented to person, place, and time.   Skin: Skin is warm and dry.       Results Reviewed:      ECG 12 Lead  Date/Time: 5/10/2018 4:56 PM  Performed by: KATHERYN ALBERTS  Authorized by: KATHERYN ALBERTS   Rhythm: sinus rhythm  BPM: 88  QRS axis: right  Clinical impression: non-specific ECG              Assessment / Plan "        Problem List Items Addressed This Visit        Cardiovascular and Mediastinum    Heart murmur - Primary    Relevant Orders    Adult Transthoracic Echo Complete W/ Cont if Necessary Per Protocol    Hypertension    Current Assessment & Plan     Well-controlled; continue lisinopril 20 mg daily.         Relevant Medications    lisinopril (PRINIVIL,ZESTRIL) 20 MG tablet       Respiratory    ADITHYA on CPAP     F/u TBD on basis of echo results      Amarjit Patterson MD   05/10/18   4:57 PM

## 2018-05-23 ENCOUNTER — HOSPITAL ENCOUNTER (OUTPATIENT)
Dept: CARDIOLOGY | Facility: HOSPITAL | Age: 73
Discharge: HOME | End: 2018-05-23
Attending: INTERNAL MEDICINE | Admitting: INTERNAL MEDICINE

## 2018-05-23 VITALS
HEIGHT: 68 IN | SYSTOLIC BLOOD PRESSURE: 129 MMHG | DIASTOLIC BLOOD PRESSURE: 73 MMHG | BODY MASS INDEX: 28.34 KG/M2 | WEIGHT: 187 LBS

## 2018-05-23 DIAGNOSIS — R01.1 HEART MURMUR: ICD-10-CM

## 2018-05-23 PROCEDURE — 93306 TTE W/DOPPLER COMPLETE: CPT

## 2018-05-23 PROCEDURE — 93306 TTE W/DOPPLER COMPLETE: CPT | Performed by: INTERNAL MEDICINE

## 2018-05-25 LAB
BH CV ECHO MEAS - AO MAX PG (FULL): 0.4 MMHG
BH CV ECHO MEAS - AO MAX PG: 6.4 MMHG
BH CV ECHO MEAS - AO MEAN PG (FULL): 1 MMHG
BH CV ECHO MEAS - AO MEAN PG: 4 MMHG
BH CV ECHO MEAS - AO ROOT AREA (BSA CORRECTED): 1.8
BH CV ECHO MEAS - AO ROOT AREA: 10.2 CM^2
BH CV ECHO MEAS - AO ROOT DIAM: 3.6 CM
BH CV ECHO MEAS - AO V2 MAX: 126 CM/SEC
BH CV ECHO MEAS - AO V2 MEAN: 98.9 CM/SEC
BH CV ECHO MEAS - AO V2 VTI: 24 CM
BH CV ECHO MEAS - AVA(I,A): 2.9 CM^2
BH CV ECHO MEAS - AVA(I,D): 2.9 CM^2
BH CV ECHO MEAS - AVA(V,A): 3 CM^2
BH CV ECHO MEAS - AVA(V,D): 3 CM^2
BH CV ECHO MEAS - BSA(HAYCOCK): 2 M^2
BH CV ECHO MEAS - BSA: 2 M^2
BH CV ECHO MEAS - BZI_BMI: 28.4 KILOGRAMS/M^2
BH CV ECHO MEAS - BZI_METRIC_HEIGHT: 172.7 CM
BH CV ECHO MEAS - BZI_METRIC_WEIGHT: 84.8 KG
BH CV ECHO MEAS - CONTRAST EF 4CH: 65.7 ML/M^2
BH CV ECHO MEAS - EDV(CUBED): 58.4 ML
BH CV ECHO MEAS - EDV(MOD-SP4): 53 ML
BH CV ECHO MEAS - EDV(TEICH): 65.1 ML
BH CV ECHO MEAS - EF(CUBED): 73.2 %
BH CV ECHO MEAS - EF(MOD-SP4): 65.7 %
BH CV ECHO MEAS - EF(TEICH): 65.7 %
BH CV ECHO MEAS - ESV(CUBED): 15.6 ML
BH CV ECHO MEAS - ESV(MOD-SP4): 18.2 ML
BH CV ECHO MEAS - ESV(TEICH): 22.3 ML
BH CV ECHO MEAS - FS: 35.6 %
BH CV ECHO MEAS - IVS/LVPW: 1.2
BH CV ECHO MEAS - IVSD: 1.1 CM
BH CV ECHO MEAS - LA DIMENSION: 2.9 CM
BH CV ECHO MEAS - LA/AO: 0.81
BH CV ECHO MEAS - LAT PEAK E' VEL: 9 CM/SEC
BH CV ECHO MEAS - LV DIASTOLIC VOL/BSA (35-75): 26.7 ML/M^2
BH CV ECHO MEAS - LV MASS(C)D: 116.9 GRAMS
BH CV ECHO MEAS - LV MASS(C)DI: 58.8 GRAMS/M^2
BH CV ECHO MEAS - LV MAX PG: 6 MMHG
BH CV ECHO MEAS - LV MEAN PG: 3 MMHG
BH CV ECHO MEAS - LV SYSTOLIC VOL/BSA (12-30): 9.2 ML/M^2
BH CV ECHO MEAS - LV V1 MAX: 122 CM/SEC
BH CV ECHO MEAS - LV V1 MEAN: 81.2 CM/SEC
BH CV ECHO MEAS - LV V1 VTI: 22.5 CM
BH CV ECHO MEAS - LVIDD: 3.9 CM
BH CV ECHO MEAS - LVIDS: 2.5 CM
BH CV ECHO MEAS - LVLD AP4: 7.2 CM
BH CV ECHO MEAS - LVLS AP4: 5.8 CM
BH CV ECHO MEAS - LVOT AREA (M): 3.1 CM^2
BH CV ECHO MEAS - LVOT AREA: 3.1 CM^2
BH CV ECHO MEAS - LVOT DIAM: 2 CM
BH CV ECHO MEAS - LVPWD: 0.87 CM
BH CV ECHO MEAS - MED PEAK E' VEL: 4.46 CM/SEC
BH CV ECHO MEAS - MR MAX PG: 89.9 MMHG
BH CV ECHO MEAS - MR MAX VEL: 474 CM/SEC
BH CV ECHO MEAS - MR MEAN PG: 63 MMHG
BH CV ECHO MEAS - MR MEAN VEL: 380 CM/SEC
BH CV ECHO MEAS - MR VTI: 111 CM
BH CV ECHO MEAS - MV A MAX VEL: 116 CM/SEC
BH CV ECHO MEAS - MV DEC TIME: 0.23 SEC
BH CV ECHO MEAS - MV E MAX VEL: 79.1 CM/SEC
BH CV ECHO MEAS - MV E/A: 0.68
BH CV ECHO MEAS - PA MAX PG: 4.8 MMHG
BH CV ECHO MEAS - PA V2 MAX: 110 CM/SEC
BH CV ECHO MEAS - PI END-D VEL: 128 CM/SEC
BH CV ECHO MEAS - RAP SYSTOLE: 5 MMHG
BH CV ECHO MEAS - RVSP: 25.6 MMHG
BH CV ECHO MEAS - SI(AO): 123 ML/M^2
BH CV ECHO MEAS - SI(CUBED): 21.5 ML/M^2
BH CV ECHO MEAS - SI(LVOT): 35.6 ML/M^2
BH CV ECHO MEAS - SI(MOD-SP4): 17.5 ML/M^2
BH CV ECHO MEAS - SI(TEICH): 21.5 ML/M^2
BH CV ECHO MEAS - SV(AO): 244.3 ML
BH CV ECHO MEAS - SV(CUBED): 42.8 ML
BH CV ECHO MEAS - SV(LVOT): 70.7 ML
BH CV ECHO MEAS - SV(MOD-SP4): 34.8 ML
BH CV ECHO MEAS - SV(TEICH): 42.8 ML
BH CV ECHO MEAS - TR MAX VEL: 227 CM/SEC
BH CV ECHO MEASUREMENTS AVERAGE E/E' RATIO: 11.75
LEFT ATRIUM VOLUME INDEX: 28.7 ML/M2
LEFT ATRIUM VOLUME: 57.1 CM3
MAXIMAL PREDICTED HEART RATE: 150 BPM
STRESS TARGET HR: 128 BPM

## 2018-08-30 ENCOUNTER — OFFICE VISIT (OUTPATIENT)
Dept: VASCULAR SURGERY | Facility: CLINIC | Age: 73
End: 2018-08-30

## 2018-08-30 VITALS
HEART RATE: 85 BPM | OXYGEN SATURATION: 96 % | DIASTOLIC BLOOD PRESSURE: 76 MMHG | BODY MASS INDEX: 27.28 KG/M2 | SYSTOLIC BLOOD PRESSURE: 134 MMHG | HEIGHT: 68 IN | WEIGHT: 180 LBS

## 2018-08-30 DIAGNOSIS — E78.5 HYPERLIPIDEMIA, UNSPECIFIED HYPERLIPIDEMIA TYPE: ICD-10-CM

## 2018-08-30 DIAGNOSIS — I10 ESSENTIAL HYPERTENSION: ICD-10-CM

## 2018-08-30 DIAGNOSIS — I65.23 BILATERAL CAROTID ARTERY STENOSIS: Primary | ICD-10-CM

## 2018-08-30 PROCEDURE — 99213 OFFICE O/P EST LOW 20 MIN: CPT | Performed by: NURSE PRACTITIONER

## 2018-08-30 RX ORDER — ASPIRIN 81 MG/1
81 TABLET, CHEWABLE ORAL DAILY
COMMUNITY

## 2018-09-12 ENCOUNTER — TELEPHONE (OUTPATIENT)
Dept: VASCULAR SURGERY | Facility: CLINIC | Age: 73
End: 2018-09-12

## 2018-09-12 NOTE — TELEPHONE ENCOUNTER
Spoke with Mrs Szymanski reminding her of Mr Szymanski's appointments for Thursday, September 13th, 2018. Reminded Mrs Szymanski to have Mr Szymanski to arrive at the [facility] [place] at 915 am for a CTA and then follow up afterwards at 1030 am with Ana KRAUS. Mrs Szymanski confirmed Mr Szymanski would be here.

## 2018-09-13 ENCOUNTER — HOSPITAL ENCOUNTER (OUTPATIENT)
Dept: CT IMAGING | Facility: HOSPITAL | Age: 73
Discharge: HOME | End: 2018-09-13
Admitting: NURSE PRACTITIONER

## 2018-09-13 ENCOUNTER — OFFICE VISIT (OUTPATIENT)
Dept: VASCULAR SURGERY | Facility: CLINIC | Age: 73
End: 2018-09-13

## 2018-09-13 VITALS
DIASTOLIC BLOOD PRESSURE: 78 MMHG | WEIGHT: 180 LBS | OXYGEN SATURATION: 98 % | SYSTOLIC BLOOD PRESSURE: 126 MMHG | HEART RATE: 73 BPM | HEIGHT: 68 IN | BODY MASS INDEX: 27.28 KG/M2

## 2018-09-13 DIAGNOSIS — I10 ESSENTIAL HYPERTENSION: ICD-10-CM

## 2018-09-13 DIAGNOSIS — I65.23 BILATERAL CAROTID ARTERY STENOSIS: Primary | ICD-10-CM

## 2018-09-13 DIAGNOSIS — E78.5 HYPERLIPIDEMIA, UNSPECIFIED HYPERLIPIDEMIA TYPE: ICD-10-CM

## 2018-09-13 DIAGNOSIS — I65.23 BILATERAL CAROTID ARTERY STENOSIS: ICD-10-CM

## 2018-09-13 LAB — CREAT BLDA-MCNC: 1.1 MG/DL (ref 0.6–1.3)

## 2018-09-13 PROCEDURE — 99213 OFFICE O/P EST LOW 20 MIN: CPT | Performed by: NURSE PRACTITIONER

## 2018-09-13 PROCEDURE — 70498 CT ANGIOGRAPHY NECK: CPT

## 2018-09-13 PROCEDURE — 82565 ASSAY OF CREATININE: CPT

## 2018-09-13 PROCEDURE — 0 IOPAMIDOL PER 1 ML: Performed by: NURSE PRACTITIONER

## 2018-09-13 RX ORDER — ATORVASTATIN CALCIUM 10 MG/1
10 TABLET, FILM COATED ORAL DAILY
COMMUNITY
End: 2020-04-30 | Stop reason: HOSPADM

## 2018-09-13 RX ADMIN — IOPAMIDOL 100 ML: 755 INJECTION, SOLUTION INTRAVENOUS at 09:39

## 2018-11-13 ENCOUNTER — TELEPHONE (OUTPATIENT)
Dept: NEUROLOGY | Facility: CLINIC | Age: 73
End: 2018-11-13

## 2019-03-12 ENCOUNTER — TELEPHONE (OUTPATIENT)
Dept: VASCULAR SURGERY | Facility: CLINIC | Age: 74
End: 2019-03-12

## 2019-03-13 ENCOUNTER — OFFICE VISIT (OUTPATIENT)
Dept: VASCULAR SURGERY | Facility: CLINIC | Age: 74
End: 2019-03-13

## 2019-03-13 ENCOUNTER — HOSPITAL ENCOUNTER (OUTPATIENT)
Dept: ULTRASOUND IMAGING | Facility: HOSPITAL | Age: 74
Discharge: HOME | End: 2019-03-13
Admitting: NURSE PRACTITIONER

## 2019-03-13 VITALS
HEIGHT: 68 IN | HEART RATE: 80 BPM | SYSTOLIC BLOOD PRESSURE: 112 MMHG | OXYGEN SATURATION: 98 % | WEIGHT: 180 LBS | DIASTOLIC BLOOD PRESSURE: 70 MMHG | BODY MASS INDEX: 27.28 KG/M2

## 2019-03-13 DIAGNOSIS — I65.23 BILATERAL CAROTID ARTERY STENOSIS: Primary | ICD-10-CM

## 2019-03-13 DIAGNOSIS — I65.23 BILATERAL CAROTID ARTERY STENOSIS: ICD-10-CM

## 2019-03-13 DIAGNOSIS — E78.5 HYPERLIPIDEMIA, UNSPECIFIED HYPERLIPIDEMIA TYPE: ICD-10-CM

## 2019-03-13 DIAGNOSIS — I10 ESSENTIAL HYPERTENSION: ICD-10-CM

## 2019-03-13 PROCEDURE — 99214 OFFICE O/P EST MOD 30 MIN: CPT | Performed by: NURSE PRACTITIONER

## 2019-03-13 PROCEDURE — 93880 EXTRACRANIAL BILAT STUDY: CPT | Performed by: SURGERY

## 2019-03-13 PROCEDURE — 93880 EXTRACRANIAL BILAT STUDY: CPT

## 2019-03-13 NOTE — PROGRESS NOTES
"3/13/2019       Alma Mathias, APRN  [address] [place] [address]    Justin Szymanski  1945    Chief Complaint   Patient presents with   • Follow-up     6 Month Follow Up For Bilateral Carotid Artery Stenosis. Test 03/13/2019 US pad carotid bilateral. Patient denies any stroke like symptoms.        Dear Alma Mathias, APRN       HPI  I had the pleasure of seeing your patient Justin Szymanski in the office today.    As you recall, Justin Szymanski is a 73 y.o.  male who you are currently following for routine health maintenance.  He did recently have a lifeline screening, showing significant stenosis on the left carotid.  His ABIs and us aorta was normal.  He denies any strokelike symptoms.  He is maintained on aspirin and Lipitor.  He did have noninvasive testing performed today,which I did review in office.        Review of Systems   Constitutional: Negative.    HENT: Negative.    Eyes: Negative.    Respiratory: Negative.    Cardiovascular: Negative.    Gastrointestinal: Negative.    Endocrine: Negative.    Genitourinary: Negative.    Musculoskeletal: Negative.    Skin: Negative.    Allergic/Immunologic: Negative.    Neurological: Negative.    Hematological: Negative.    Psychiatric/Behavioral: Negative.    All other systems reviewed and are negative.      /70 (BP Location: Right arm, Patient Position: Sitting, Cuff Size: Adult)   Pulse 80   Ht 172.7 cm (68\")   Wt 81.6 kg (180 lb)   SpO2 98%   BMI 27.37 kg/m²   Physical Exam   Constitutional: He is oriented to person, place, and time. He appears well-developed and well-nourished.   HENT:   Head: Normocephalic and atraumatic.   Eyes: Pupils are equal, round, and reactive to light. No scleral icterus.   Neck: Normal range of motion. Neck supple. No thyromegaly present.   Cardiovascular: Normal rate, regular rhythm and intact distal pulses.   Murmur heard.   Systolic murmur is present.  Pulmonary/Chest: Effort normal and breath sounds normal.   Abdominal: " Soft. Bowel sounds are normal.   Musculoskeletal: Normal range of motion.   Neurological: He is alert and oriented to person, place, and time.   Skin: Skin is warm and dry.   Psychiatric: He has a normal mood and affect. His behavior is normal. Judgment and thought content normal.   Nursing note and vitals reviewed.         Us Carotid Bilateral    Result Date: 3/13/2019  Narrative: History: Carotid occlusive disease      Impression: Impression: 1. There is less than 50% stenosis of the right internal carotid artery. 2. There is 50-69% stenosis of the left internal carotid artery. 3. Antegrade flow is demonstrated in bilateral vertebral arteries.  Comments: Bilateral carotid vertebral arterial duplex scan was performed.  Grayscale imaging shows intimal thickening and calcified elements at the carotid bifurcation. The right internal carotid artery peak systolic velocity is 83.8 cm/sec. The end-diastolic velocity is 31.1 cm/sec. The right ICA/CCA ratio is approximately 0.75 . These findings correlate with less than 50% stenosis of the right internal carotid artery.  Grayscale imaging shows intimal thickening and calcified elements at the carotid bifurcation. The left internal carotid artery peak systolic velocity is 267 cm/sec. The end-diastolic velocity is 70.7 cm/sec. The left ICA/CCA ratio is approximately 2.85 . These findings correlate with 50-69% stenosis of the left internal carotid artery.  Antegrade flow is demonstrated in bilateral vertebral arteries. Greater than 50% stenosis of the proximal left external carotid artery. This report was finalized on 03/13/2019 14:47 by Dr. Tobias Vieira MD.      Patient Active Problem List   Diagnosis   • Allergic rhinitis due to pollen   • Snoring   • Nasal polyposis - hx of   • ADITHYA (obstructive sleep apnea)   • ADITHYA on CPAP   • Indigestion   • Heart murmur   • Hypertension   • Hyperlipidemia         ICD-10-CM ICD-9-CM   1. Bilateral carotid artery stenosis I65.23 433.10      433.30   2. Essential hypertension I10 401.9   3. Hyperlipidemia, unspecified hyperlipidemia type E78.5 272.4       Plan: After thoroughly evaluating Justin Szymanski, I believe the best course of action is to remain conservative from a vascular standpoint.  I did review his testing and is essentially unchanged from previous.  We will see him back in 6 months with repeat noninvasive testing for continued surveillance, including a carotid duplex.  His lifeline screening showed no evidence of arterial disease or abdominal aortic aneurysm.  Body mass index is 27.37 kg/m².  The patient can continue taking her current medication regimen as previously planned.  This was all discussed in full with complete understanding.    Thank you for allowing me to participate in the care of your patient.  Please do not hesitate with any questions or concerns.  I will keep you aware of any further encounters with Justin Szymanski.        Sincerely yours,         EFRA Ellington Mariah L, APRN

## 2019-03-25 NOTE — PROGRESS NOTES
"09/13/2018       Alma Mathias, APRN  [address] [place] [address]    Justin Szymanski  1945    Chief Complaint   Patient presents with   • Follow-up     2 Week Follow Up. Test 09/13/18 CT pad angiogram neck w wo. Patient denies any stroke like symptoms.    • other     Patient states he can feel the artery there some on left side of the neck but besides that all is good.        Dear Alma Mathias, APRN       HPI  I had the pleasure of seeing your patient Justin Szymanski in the office today.    As you recall, Justin Szymanski is a 73 y.o.  male who you are currently following for routine health maintenance.  He did recently have a lifeline screening, showing significant stenosis on the left carotid.  His ABIs and us aorta was normal.  He denies any strokelike symptoms.  He is maintained on aspirin and Lipitor.  He did have noninvasive testing performed today,which I did review in office.      Review of Systems   Constitutional: Negative.    HENT: Negative.    Eyes: Negative.    Respiratory: Negative.    Cardiovascular: Negative.    Gastrointestinal: Negative.    Endocrine: Negative.    Genitourinary: Negative.    Musculoskeletal: Negative.    Skin: Negative.    Allergic/Immunologic: Negative.    Neurological: Negative.    Hematological: Negative.    Psychiatric/Behavioral: Negative.    All other systems reviewed and are negative.      /78 (BP Location: Left arm, Patient Position: Sitting, Cuff Size: Adult)   Pulse 73   Ht 172.7 cm (68\")   Wt 81.6 kg (180 lb)   SpO2 98%   BMI 27.37 kg/m²   Physical Exam   Constitutional: He is oriented to person, place, and time. He appears well-developed and well-nourished.   HENT:   Head: Normocephalic and atraumatic.   Eyes: Pupils are equal, round, and reactive to light. No scleral icterus.   Neck: Normal range of motion. Neck supple. No thyromegaly present.   Cardiovascular: Normal rate, regular rhythm and intact distal pulses.    Murmur heard.   Systolic murmur is " present   Pulmonary/Chest: Effort normal and breath sounds normal.   Abdominal: Soft. Bowel sounds are normal.   Musculoskeletal: Normal range of motion.   Neurological: He is alert and oriented to person, place, and time.   Skin: Skin is warm and dry.   Psychiatric: He has a normal mood and affect. His behavior is normal. Judgment and thought content normal.   Nursing note and vitals reviewed.         Ct Angiogram Neck With & Without Contrast    Result Date: 9/13/2018  Narrative: EXAMINATION: CT angiogram of the neck with contrast 9/13/2018  HISTORY: Bilateral carotid artery stenosis.  DOSE: 321 mGycm. Automated exposure control was utilized to diminish patient radiation dose..  FINDINGS: Multiple contiguous axial images are obtained from the aortic arch through the skull base at 2 mm intervals findings contrast administration with reformatted images obtained in sagittal and coronal projections from the original dataset as well as MIPS.  The lung apices are clear. The thyroid gland is mildly heterogeneous but without evidence of focal mass. No evidence of lymphadenopathy within the supraclavicular fossa. The level of the true and false cords is unremarkable. No enlarged cervical chain lymphadenopathy is present. The submandibular and parotid glands are unremarkable. The nasopharynx and parapharyngeal spaces are symmetric with no mass or mass effect. Mucoperiosteal thickening is noted of the right half of the sphenoid sinus as well as of the ethmoid air cells posteriorly.  The aortic arch is ectatic with the proximal aortic arch measuring 3.7 cm in transverse dimension. There is mild calcific plaquing. There is mixed plaquing involving the proximal segment of the left subclavian artery with mild associated stenosis. The vertebral artery origins are unremarkable.  Both vertebral arteries are widely patent to the level of the skull base. There is minimal plaquing involving the proximal intracranial aspect of both  vertebral arteries without evidence of associated rate limiting stenosis. The proximal basilar artery is unremarkable.  The right common carotid artery is remarkable for some mild calcific plaquing within its midportion without evidence of rate limiting stenosis. There is mixed plaquing involving the carotid bulb and proximal aspect of the ICA involving the posterior wall. This results in mild stenosis at the level of the proximal right ICA with a less than 25% cross-sectional stenosis. The more distal right ICA is widely patent.  Examination of the left common carotid artery demonstrates the origin of the left common carotid as well as the remainder of the common carotid artery to be remarkable for only minimal mixed plaquing involving the distal CCA just below the bifurcation. There is dense calcific plaquing at the level of the left carotid bifurcation extending along the posterior wall of the proximal ICA. This results in approximate 45% cross-sectional stenosis at the origin of the left ICA. The more distal left ICA is widely patent.      Impression: 1.. The origin of the great vessels are unremarkable except for some mild calcific plaquing and stenosis involving the proximal left subclavian artery. Both vertebral artery origins are widely patent. 2. Both vertebral arteries are widely patent throughout their course to the level of the basilar artery. There is minimal calcific plaquing involving the proximal intracranial aspect of both vertebral arteries with no associated stenosis. 3. There is calcific plaquing involving the carotid bulb and proximal aspect of the ICA bilaterally (left greater than right). Eccentric calcific plaquing within the left carotid bulb and proximal aspect of the ICA does result in a slightly less than 50% cross-sectional stenosis. There is a less than 25% cross-sectional stenosis on the right. The more distal ICAs are widely patent. This report was finalized on 09/13/2018 11:05 by   Rico Zacarias MD.      Patient Active Problem List   Diagnosis   • Allergic rhinitis due to pollen   • Snoring   • Nasal polyposis - hx of   • ADITHYA (obstructive sleep apnea)   • ADITHYA on CPAP   • Indigestion   • Heart murmur   • Hypertension   • Hyperlipidemia         ICD-10-CM ICD-9-CM   1. Bilateral carotid artery stenosis I65.23 433.10     433.30   2. Essential hypertension I10 401.9   3. Hyperlipidemia, unspecified hyperlipidemia type E78.5 272.4       Plan: After thoroughly evaluating Justin Szymanski, I believe the best course of action is to remain conservative from a vascular standpoint.  I did review his CTA of the neck.  We will see him back in 6 months with repeat noninvasive testing, including a CTA of the neck< for continued surveillance.    His lifeline screening showed no evidence of arterial disease or abdominal aortic aneurysm.  Body mass index is 27.37 kg/m².  The patient can continue taking her current medication regimen as previously planned.  This was all discussed in full with complete understanding.    Thank you for allowing me to participate in the care of your patient.  Please do not hesitate with any questions or concerns.  I will keep you aware of any further encounters with Justin Szymanski.        Sincerely yours,         Ana Lerma, EFRA Mathias, EFRA Hassan                No

## 2019-09-11 ENCOUNTER — TELEPHONE (OUTPATIENT)
Dept: VASCULAR SURGERY | Facility: CLINIC | Age: 74
End: 2019-09-11

## 2019-09-11 NOTE — TELEPHONE ENCOUNTER
Left message reminding Mr Szymanski of his appointments for Thursday, September 12th, 2019. Reminded Mr Szymanski to arrive at the [facility] at 730 am for testing and follow up at 945 am with Dr Hunter. Also advised if he had any questions or needed to reschedule to please call the office at [phone].   No Exposure

## 2019-09-12 ENCOUNTER — HOSPITAL ENCOUNTER (OUTPATIENT)
Dept: ULTRASOUND IMAGING | Facility: HOSPITAL | Age: 74
Discharge: HOME | End: 2019-09-12
Admitting: NURSE PRACTITIONER

## 2019-09-12 ENCOUNTER — OFFICE VISIT (OUTPATIENT)
Dept: VASCULAR SURGERY | Facility: CLINIC | Age: 74
End: 2019-09-12

## 2019-09-12 VITALS
OXYGEN SATURATION: 98 % | WEIGHT: 179.6 LBS | BODY MASS INDEX: 27.22 KG/M2 | HEIGHT: 68 IN | SYSTOLIC BLOOD PRESSURE: 130 MMHG | DIASTOLIC BLOOD PRESSURE: 76 MMHG | HEART RATE: 79 BPM

## 2019-09-12 DIAGNOSIS — I10 ESSENTIAL HYPERTENSION: ICD-10-CM

## 2019-09-12 DIAGNOSIS — E78.5 HYPERLIPIDEMIA, UNSPECIFIED HYPERLIPIDEMIA TYPE: ICD-10-CM

## 2019-09-12 DIAGNOSIS — I65.23 BILATERAL CAROTID ARTERY STENOSIS: ICD-10-CM

## 2019-09-12 DIAGNOSIS — I65.23 BILATERAL CAROTID ARTERY STENOSIS: Primary | ICD-10-CM

## 2019-09-12 PROCEDURE — 93880 EXTRACRANIAL BILAT STUDY: CPT

## 2019-09-12 PROCEDURE — 99214 OFFICE O/P EST MOD 30 MIN: CPT | Performed by: NURSE PRACTITIONER

## 2019-09-12 PROCEDURE — 93880 EXTRACRANIAL BILAT STUDY: CPT | Performed by: SURGERY

## 2019-09-12 RX ORDER — ROPINIROLE 2 MG/1
2 TABLET, FILM COATED ORAL DAILY
Refills: 1 | COMMUNITY
Start: 2019-09-04 | End: 2020-03-10

## 2019-09-12 RX ORDER — SIMVASTATIN 40 MG
40 TABLET ORAL DAILY
COMMUNITY
End: 2020-03-10

## 2019-09-12 NOTE — PROGRESS NOTES
"9/12/2019       Alma Mathias, APRN  [address] [place] [address]    Justin Szymanski  1945    Chief Complaint   Patient presents with   • Follow-up     6 Month Follow UP For Bilateral Carotid Artery Stenosis. Test 197047 US pad carotid bilateral. Patient denies any stroke like symptoms.        Dear Alma Mathias, APRN       HPI  I had the pleasure of seeing your patient Justin Szymanski in the office today.    As you recall, Justin Szymanski is a 74 y.o.  male who you are currently following for routine health maintenance.  He did recently have a lifeline screening, showing significant stenosis on the left carotid.  His ABIs and us aorta were normal.  He denies any strokelike symptoms.  He is maintained on aspirin and Lipitor.  He did have noninvasive testing performed today,which I did review in office.        Review of Systems   Constitutional: Negative.    HENT: Negative.    Eyes: Negative.    Respiratory: Negative.    Cardiovascular: Negative.    Gastrointestinal: Negative.    Endocrine: Negative.    Genitourinary: Negative.    Musculoskeletal: Negative.    Skin: Negative.    Allergic/Immunologic: Negative.    Neurological: Negative.    Hematological: Negative.    Psychiatric/Behavioral: Negative.    All other systems reviewed and are negative.      /76 (BP Location: Left arm, Patient Position: Sitting, Cuff Size: Adult)   Pulse 79   Ht 172.7 cm (68\")   Wt 81.5 kg (179 lb 9.6 oz)   SpO2 98%   BMI 27.31 kg/m²   Physical Exam   Constitutional: He is oriented to person, place, and time. Vital signs are normal. He appears well-developed and well-nourished.   HENT:   Head: Normocephalic and atraumatic.   Eyes: Pupils are equal, round, and reactive to light. No scleral icterus.   Neck: Normal range of motion. Neck supple. No thyromegaly present.   Cardiovascular: Normal rate, regular rhythm and intact distal pulses.   Murmur heard.   Systolic murmur is present.  Pulmonary/Chest: Effort normal and breath " sounds normal.   Abdominal: Soft. Bowel sounds are normal.   Musculoskeletal: Normal range of motion.   Neurological: He is alert and oriented to person, place, and time.   Skin: Skin is warm and dry.   Psychiatric: He has a normal mood and affect. His behavior is normal. Judgment and thought content normal.   Nursing note and vitals reviewed.       Diagnostic Data:  Noninvasive testing including a carotid duplex shows less than 50% stenosis bilaterally with significant plaque burden on the left carotid.      Patient Active Problem List   Diagnosis   • Allergic rhinitis due to pollen   • Snoring   • Nasal polyposis - hx of   • ADITHYA (obstructive sleep apnea)   • ADITHYA on CPAP   • Indigestion   • Heart murmur   • Hypertension   • Hyperlipidemia         ICD-10-CM ICD-9-CM   1. Bilateral carotid artery stenosis I65.23 433.10     433.30   2. Essential hypertension I10 401.9   3. Hyperlipidemia, unspecified hyperlipidemia type E78.5 272.4       Plan: After thoroughly evaluating Justin Szymanski, I believe the best course of action is to remain conservative from a vascular standpoint.  I did review his testing and is essentially unchanged from previous.  He does have significant plaque burden on the left carotid.    We will see him back in 6 months with repeat noninvasive testing for continued surveillance, including a CTA of the neck.  His lifeline screening showed no evidence of arterial disease or abdominal aortic aneurysm.  Body mass index is 27.31 kg/m².  I did discuss vascular risk factors as they pertain to the progression of vascular disease including controlling his hypertension and hyperlipidemia, which are stable. The patient can continue taking her current medication regimen as previously planned.  This was all discussed in full with complete understanding.    Thank you for allowing me to participate in the care of your patient.  Please do not hesitate with any questions or concerns.  I will keep you aware of any  further encounters with Justin Szymanski.        Sincerely yours,         Ana Lerma, Alma Gillis APRN

## 2020-03-09 ENCOUNTER — TELEPHONE (OUTPATIENT)
Dept: VASCULAR SURGERY | Facility: CLINIC | Age: 75
End: 2020-03-09

## 2020-03-10 ENCOUNTER — OFFICE VISIT (OUTPATIENT)
Dept: VASCULAR SURGERY | Facility: CLINIC | Age: 75
End: 2020-03-10

## 2020-03-10 ENCOUNTER — HOSPITAL ENCOUNTER (OUTPATIENT)
Dept: ULTRASOUND IMAGING | Facility: HOSPITAL | Age: 75
Discharge: HOME | End: 2020-03-10
Admitting: NURSE PRACTITIONER

## 2020-03-10 VITALS
HEIGHT: 68 IN | DIASTOLIC BLOOD PRESSURE: 80 MMHG | OXYGEN SATURATION: 96 % | HEART RATE: 73 BPM | WEIGHT: 182 LBS | SYSTOLIC BLOOD PRESSURE: 130 MMHG | BODY MASS INDEX: 27.58 KG/M2

## 2020-03-10 DIAGNOSIS — I10 ESSENTIAL HYPERTENSION: ICD-10-CM

## 2020-03-10 DIAGNOSIS — E78.5 HYPERLIPIDEMIA, UNSPECIFIED HYPERLIPIDEMIA TYPE: ICD-10-CM

## 2020-03-10 DIAGNOSIS — I65.23 BILATERAL CAROTID ARTERY STENOSIS: Primary | ICD-10-CM

## 2020-03-10 DIAGNOSIS — I65.23 BILATERAL CAROTID ARTERY STENOSIS: ICD-10-CM

## 2020-03-10 PROCEDURE — 93880 EXTRACRANIAL BILAT STUDY: CPT | Performed by: SURGERY

## 2020-03-10 PROCEDURE — 93880 EXTRACRANIAL BILAT STUDY: CPT

## 2020-03-10 PROCEDURE — 99214 OFFICE O/P EST MOD 30 MIN: CPT | Performed by: SURGERY

## 2020-03-10 NOTE — PROGRESS NOTES
"3/10/2020       Alma Mathias, APRN  [address] [place] [address]    Justin Szymanski  1945    Chief Complaint   Patient presents with   • Follow-up     6 Month Follow Up For Bilateral Carotid Artery Stenosis. Test [id number] US pad carotid bilateral. Patient denies any stroke like symptoms.        Dear Alma Mathias, APRN       HPI  I had the pleasure of seeing your patient Justin Szymanski in the office today.    As you recall, Justin Szymanski is a 74 y.o.  male who we are following for asymptomatic carotid disease.  He was initially sent following a lifeline screening. His ABIs and us aorta were normal.  He denies any strokelike symptoms.  He is maintained on aspirin and Lipitor.  He did have noninvasive testing performed today,which I did review in office.        Review of Systems   Constitutional: Negative.    HENT: Negative.         Allergies   Eyes: Negative.    Respiratory: Negative.    Cardiovascular: Negative.    Gastrointestinal: Negative.    Endocrine: Negative.    Genitourinary: Negative.    Musculoskeletal: Negative.    Skin: Negative.    Allergic/Immunologic: Negative.    Neurological: Negative.    Hematological: Negative.    Psychiatric/Behavioral: Negative.    All other systems reviewed and are negative.      /80 (BP Location: Left arm, Patient Position: Sitting, Cuff Size: Adult)   Pulse 73   Ht 172.7 cm (68\")   Wt 82.6 kg (182 lb)   SpO2 96%   BMI 27.67 kg/m²   Physical Exam   Constitutional: He is oriented to person, place, and time. Vital signs are normal. He appears well-developed and well-nourished.   HENT:   Head: Normocephalic and atraumatic.   Eyes: Pupils are equal, round, and reactive to light. No scleral icterus.   Neck: Normal range of motion. Neck supple. No thyromegaly present.   Cardiovascular: Normal rate, regular rhythm and intact distal pulses.   Murmur heard.   Systolic murmur is present.  Pulmonary/Chest: Effort normal and breath sounds normal.   Abdominal: Soft. Bowel " sounds are normal.   Musculoskeletal: Normal range of motion.   Neurological: He is alert and oriented to person, place, and time.   Skin: Skin is warm and dry.   Psychiatric: He has a normal mood and affect. His behavior is normal. Judgment and thought content normal.   Nursing note and vitals reviewed.       Diagnostic Data:  Noninvasive testing including a carotid duplex shows less than 50% right carotid stenosis and 70-99% left carotid stenosis with bilateral antegrade vertebral flow.  There is significant left carotid plaque.       Patient Active Problem List   Diagnosis   • Allergic rhinitis due to pollen   • Snoring   • Nasal polyposis - hx of   • ADITHYA (obstructive sleep apnea)   • ADITHYA on CPAP   • Indigestion   • Heart murmur   • Hypertension   • Hyperlipidemia         ICD-10-CM ICD-9-CM   1. Bilateral carotid artery stenosis I65.23 433.10     433.30   2. Essential hypertension I10 401.9   3. Hyperlipidemia, unspecified hyperlipidemia type E78.5 272.4       Plan: After thoroughly evaluating Justin Szymanski, I believe the best course of action is to proceed with further imaging including a CTA of the neck to further evaluate his carotid disease.  I did review his testing showing heavy plaque burden to the left carotid.  We will see him back in 1-2 weeks to review his testing and discuss if anything further is needed.    Body mass index is 27.67 kg/m².  I did discuss vascular risk factors as they pertain to the progression of vascular disease including controlling his hypertension and hyperlipidemia, which are stable. The patient can continue taking her current medication regimen as previously planned.  This was all discussed in full with complete understanding.    Thank you for allowing me to participate in the care of your patient.  Please do not hesitate with any questions or concerns.  I will keep you aware of any further encounters with Justin Szymanski.        Sincerely yours,         Ana Lerma,  EFRA Mathias, EFRA Hassan

## 2020-03-23 ENCOUNTER — TELEPHONE (OUTPATIENT)
Dept: VASCULAR SURGERY | Facility: CLINIC | Age: 75
End: 2020-03-23

## 2020-03-23 NOTE — TELEPHONE ENCOUNTER
Spoke to pt about appt 915am and testing at [facility] at 8am. Pt expressed understanding. Informed pt npo after midnight pt expressed understanding.

## 2020-03-24 ENCOUNTER — HOSPITAL ENCOUNTER (OUTPATIENT)
Dept: CT IMAGING | Facility: HOSPITAL | Age: 75
Discharge: HOME | End: 2020-03-24
Admitting: NURSE PRACTITIONER

## 2020-03-24 ENCOUNTER — OFFICE VISIT (OUTPATIENT)
Dept: VASCULAR SURGERY | Facility: CLINIC | Age: 75
End: 2020-03-24

## 2020-03-24 ENCOUNTER — APPOINTMENT (OUTPATIENT)
Dept: CT IMAGING | Facility: HOSPITAL | Age: 75
End: 2020-03-24

## 2020-03-24 VITALS
SYSTOLIC BLOOD PRESSURE: 136 MMHG | BODY MASS INDEX: 26.83 KG/M2 | DIASTOLIC BLOOD PRESSURE: 86 MMHG | OXYGEN SATURATION: 97 % | HEART RATE: 83 BPM | WEIGHT: 177 LBS | HEIGHT: 68 IN

## 2020-03-24 DIAGNOSIS — I71.21 ASCENDING AORTIC ANEURYSM (HCC): ICD-10-CM

## 2020-03-24 DIAGNOSIS — I65.23 BILATERAL CAROTID ARTERY STENOSIS: Primary | ICD-10-CM

## 2020-03-24 DIAGNOSIS — I65.23 BILATERAL CAROTID ARTERY STENOSIS: ICD-10-CM

## 2020-03-24 DIAGNOSIS — E78.5 HYPERLIPIDEMIA, UNSPECIFIED HYPERLIPIDEMIA TYPE: ICD-10-CM

## 2020-03-24 DIAGNOSIS — I10 ESSENTIAL HYPERTENSION: ICD-10-CM

## 2020-03-24 LAB — CREAT BLDA-MCNC: 1.3 MG/DL (ref 0.6–1.3)

## 2020-03-24 PROCEDURE — 99214 OFFICE O/P EST MOD 30 MIN: CPT | Performed by: SURGERY

## 2020-03-24 PROCEDURE — 70498 CT ANGIOGRAPHY NECK: CPT

## 2020-03-24 PROCEDURE — 82565 ASSAY OF CREATININE: CPT

## 2020-03-24 PROCEDURE — 0 IOPAMIDOL PER 1 ML: Performed by: NURSE PRACTITIONER

## 2020-03-24 RX ADMIN — IOPAMIDOL 100 ML: 755 INJECTION, SOLUTION INTRAVENOUS at 08:36

## 2020-03-24 NOTE — PATIENT INSTRUCTIONS
Carotid Endarterectomy  A carotid endarterectomy is a surgery to remove a blockage in the carotid arteries. The carotid arteries are the large blood vessels on both sides of the neck that supply blood to the brain. Carotid artery disease, also called carotid artery stenosis, is the narrowing or blockage of one or both carotid arteries. Carotid artery disease is usually caused by atherosclerosis, which is a buildup of fat and plaque in the arteries. Some buildup of plaque normally occurs with aging. The plaque may partially or totally block blood flow or cause a clot to form in the carotid arteries. This may cause a stroke.  Tell a health care provider about:  · Any allergies you have.  · All medicines you are taking, including vitamins, herbs, eye drops, creams, and over-the-counter medicines.  · Any problems you or family members have had with anesthetic medicines.  · Any blood disorders you have.  · Any surgeries you have had.  · Any medical conditions you have, or have had, including diabetes, kidney problems, and infections.  · Whether you are pregnant or may be pregnant.  What are the risks?  Generally, this is a safe procedure. However, problems may occur, including:  · Infection.  · Bleeding.  · Blood clots.  · Allergic reactions to medicines.  · Damage to nerves near the carotid arteries. This can cause a hoarse voice or weakness of muscles in your face.  · Stroke.  · Seizures.  · Heart attack (myocardial infarction).  · Narrowing of the opened blood vessel (restenosis). This may require another surgery.  What happens before the procedure?  Staying hydrated  Follow instructions from your health care provider about hydration, which may include:  · Up to 2 hours before the procedure - you may continue to drink clear liquids, such as water, clear fruit juice, black coffee, and plain tea.    Eating and drinking restrictions  Follow instructions from your health care provider about eating and drinking, which may  include:  · 8 hours before the procedure - stop eating heavy meals or foods, such as meat, fried foods, or fatty foods.  · 6 hours before the procedure - stop eating light meals or foods, such as toast or cereal.  · 6 hours before the procedure - stop drinking milk or drinks that contain milk.  · 2 hours before the procedure - stop drinking clear liquids.  Medicines  · Ask your health care provider about:  ? Changing or stopping your regular medicines. This is especially important if you are taking diabetes medicines or blood thinners.  ? Taking medicines such as aspirin and ibuprofen. These medicines can thin your blood. Do not take these medicines unless your health care provider tells you to take them.  ? Taking over-the-counter medicines, vitamins, herbs, and supplements.  General instructions  · Do not use any products that contain nicotine or tobacco for at least 4-6 weeks, or as soon as possible, before the procedure. These products include cigarettes, e-cigarettes, and chewing tobacco. If you need help quitting, ask your health care provider.  · You may need to have blood tests, a test to check heart rhythm (electrocardiogram), or a test to check blood flow (angiogram).  · Plan to have someone take you home from the hospital or clinic.  · Ask your health care provider:  ? How your surgery site will be marked.  ? What steps will be taken to help prevent infection. These may include:  § Removing hair at the surgery site.  § Washing skin with a germ-killing soap.  § Taking antibiotic medicine.  What happens during the procedure?    · An IV will be inserted into one of your veins.  · You will be given one or more of the following:  ? A medicine to help you relax (sedative).  ? A medicine to make you fall asleep (general anesthetic).  · The surgeon will make a small incision in your neck to expose the carotid artery.  · A tube may be inserted into the carotid artery above and below the blockage. This tube will  allow blood to flow around the blockage during the surgery.  · An incision will be made in the carotid artery at the location of the blockage.  · The blockage will be removed. In some cases, a section of the carotid artery may be removed and a graft patch may be used to repair the artery.  · The carotid artery will be closed with stitches (sutures).  · If a tube was inserted into the artery to allow blood flow around the blockage during surgery, the tube will be removed. Once the tube is removed, blood flow through the carotid artery will be restored.  · The incision in the neck will be closed with sutures.  · A bandage (dressing) will be placed over your incision.  The procedure may vary among health care providers and hospitals.  What happens after the procedure?  · Your blood pressure, heart rate, breathing rate, and blood oxygen level will be monitored until you leave the hospital or clinic.  · You may have some pain or an ache in your neck for about 2 weeks. This is normal.  · Do not drive for 24 hours if you were given a sedative during your procedure.  Summary  · A carotid endarterectomy is a surgery to remove a blockage in the carotid arteries.  · The carotid arteries are the large blood vessels on both sides of the neck that supply blood to the brain.  · Before the procedure, ask your health care provider about changing or stopping your regular medicines.  · Follow instructions from your health care provider about eating and drinking before the procedure.  · After the procedure, do not drive for 24 hours if you were given a sedative.  This information is not intended to replace advice given to you by your health care provider. Make sure you discuss any questions you have with your health care provider.  Document Released: 08/20/2014 Document Revised: 08/27/2019 Document Reviewed: 08/27/2019  [employer] Interactive Patient Education © 2020 [employer]

## 2020-03-24 NOTE — PROGRESS NOTES
"3/24/2020       Alma Mathias, APRN  [address] [place] [address]    Justin Szymanski  1945    Chief Complaint   Patient presents with   • Follow-up     2 Week Follow Up For Bilateral Carotid Artery Stenosis. Test [id number] CT pad angiogram neck w wo. Patient denies any stroke like symptoms.        Dear Alma Mathias, APRN       HPI  I had the pleasure of seeing your patient Justin Szymanski in the office today.    As you recall, Justin Szymanski is a 74 y.o.  male who we are following for asymptomatic carotid disease.  He was initially sent following a [facility] screening. His ABIs and us aorta were normal.  He denies any strokelike symptoms.  He is maintained on aspirin and Lipitor.  Testing does show he has aneurysm of the ascending aorta 4.3 cm.  He did have noninvasive testing performed today, which I did review in office.          Review of Systems   Constitutional: Negative.    HENT: Negative.         Allergies   Eyes: Negative.    Respiratory: Negative.    Cardiovascular: Negative.    Gastrointestinal: Negative.    Endocrine: Negative.    Genitourinary: Negative.    Musculoskeletal: Negative.    Skin: Negative.    Allergic/Immunologic: Negative.    Neurological: Negative.    Hematological: Negative.    Psychiatric/Behavioral: Negative.    All other systems reviewed and are negative.      /86   Pulse 83   Ht 172.7 cm (68\")   Wt 80.3 kg (177 lb)   SpO2 97%   BMI 26.91 kg/m²   Physical Exam   Constitutional: He is oriented to person, place, and time. Vital signs are normal. He appears well-developed and well-nourished.   HENT:   Head: Normocephalic and atraumatic.   Eyes: Pupils are equal, round, and reactive to light. No scleral icterus.   Neck: Normal range of motion. Neck supple. No thyromegaly present.   Cardiovascular: Normal rate, regular rhythm and intact distal pulses.   Murmur heard.   Systolic murmur is present.  Pulmonary/Chest: Effort normal and breath sounds normal.   Abdominal: Soft. " Bowel sounds are normal.   Musculoskeletal: Normal range of motion.   Neurological: He is alert and oriented to person, place, and time.   Skin: Skin is warm and dry.   Psychiatric: He has a normal mood and affect. His behavior is normal. Judgment and thought content normal.   Nursing note and vitals reviewed.       Diagnostic Data:     Ct Angiogram Neck    Result Date: 3/24/2020  Narrative: EXAMINATION: CT ANGIOGRAM NECK- 3/24/2020 9:03 AM CDT  HISTORY: Carotid stenosis, known or suspected; I65.23-Occlusion and stenosis of bilateral carotid arteries  DOSE: 240 mGycm (Automatic exposure control technique was implemented in an effort to keep the radiation dose as low as possible without compromising image quality)  REPORT: Spiral CT of the neck was performed after administration of intravenous contrast using CTA protocol, which includes reconstructed coronal, sagittal, oblique and 3-D images.  COMPARISON: CTA neck 09/13/2018.  Review of lung windows demonstrates nonconfluent densities in the upper lung zones most likely related to atelectasis. The visualized ascending aorta is dilated up to 4.3 cm, compared with 4.2 cm before. No aortic dissection is seen. There is a small amount calcified plaque within the aortic arch. There is a small amount calcified plaque at the origins of the subclavian arteries and left common carotid artery.  The right common carotid artery is patent, normal caliber, without evidence of dissection. There is a small amount of calcified atherosclerotic plaque at the carotid bulb and proximal right ICA, with less than 50% stenosis based on 2-D lumen measurements. The smallest right ICA caliber measures 4.5 mm, more distally, the right ICA caliber measures 4.6 mm. The distal right ICA is patent, there is a small amount calcified plaque within the cavernous portion of the vessel.  The left common carotid artery is patent, normal caliber, without evidence of dissection. There is extensive calcified  plaque at the carotid bulb and a small amount of torsion of the left ICA. The smallest patent caliber of the proximal left ICA measures 4.2 mm, more distally, the ICA on the left has a diameter of 4 mm. This corresponds with less than 50% vessel stenosis. The distal left ICA is patent, there is a small amount calcified plaque within the cavernous portion of the vessel. This is stable.  Both vertebral arteries are patent, the right vertebral artery is dominant compared to the left. No vertebral dissection is identified. Intracranially, the basilar artery and branch vessels in the posterior circulation appear patent. At the [facility], the middle and anterior cerebral arteries appear patent.  Soft tissue windows show no evidence of cervical lymphadenopathy. The airway is patent. The thyroid gland is small and slightly heterogeneous. There are surgical defects in the medial wall of both maxillary sinuses. There is mucosal thickening at the base of both frontal sinuses compatible with chronic sinusitis.      Impression: 1. Calcified atherosclerotic plaque seen within both proximal internal carotid arteries and at the carotid bifurcations, left greater than right, with less than 50% stenosis of the carotid arteries bilaterally. There is no carotid dissection. 2. Patency of the vertebral arteries, with no significant stenosis and no evidence of dissection. 3. Aneurysmal dilation of the distal descending aorta, with a maximum diameter of 4.3 cm, compared with 4.2 cm on the previous CTA. 4. Chronic sinusitis at the bases of the frontal sinuses and evidence of previous sinonasal surgery involving the medial walls of both maxillary sinuses.   This report was finalized on 03/24/2020 09:16 by Dr. Hugo Fernandez MD.    Us Carotid Bilateral    Result Date: 3/10/2020  Narrative: History: Carotid occlusive disease      Impression: Impression: 1. There is less than 50% stenosis of the right internal carotid artery. 2. There  is 50-69% stenosis of the left internal carotid artery. 3. Antegrade flow is demonstrated in bilateral vertebral arteries.  Comments: Bilateral carotid vertebral arterial duplex scan was performed.  Grayscale imaging shows intimal thickening and calcified elements at the carotid bifurcation. The right internal carotid artery peak systolic velocity is 87.4 cm/sec. The end-diastolic velocity is 31.1 cm/sec. The right ICA/CCA ratio is approximately 0.93 . These findings correlate with less than 50% stenosis of the right internal carotid artery.  Grayscale imaging shows intimal thickening and calcified elements at the carotid bifurcation. The left internal carotid artery peak systolic velocity is 283 cm/sec. The end-diastolic velocity is 67.6 cm/sec. The left ICA/CCA ratio is approximately 2.62 . These findings correlate with 50-69% stenosis of the left internal carotid artery.  Antegrade flow is demonstrated in bilateral vertebral arteries. Greater than 50% stenosis of the proximal left external carotid artery. This report was finalized on 03/10/2020 10:01 by Dr. Tobias Vieira MD.       Patient Active Problem List   Diagnosis   • Allergic rhinitis due to pollen   • Snoring   • Nasal polyposis - hx of   • ADITHYA (obstructive sleep apnea)   • ADITHYA on CPAP   • Indigestion   • Heart murmur   • Hypertension   • Hyperlipidemia   • Ascending aortic aneurysm (CMS/HCC)         ICD-10-CM ICD-9-CM   1. Bilateral carotid artery stenosis I65.23 433.10     433.30   2. Ascending aortic aneurysm (CMS/HCC) I71.2 441.2   3. Essential hypertension I10 401.9   4. Hyperlipidemia, unspecified hyperlipidemia type E78.5 272.4       Plan: After thoroughly evaluating Justin Szymanski, I believe the best course of action is to proceed with a left carotid endarterectomy for stroke risk reduction.  Risks of carotid endarterectomy include, but are not limited to, bleeding, infection, vessel damage, nerve damage, MI, stroke, and death.  The patient  understands these risks and would like to proceed with the procedure.  I did review his testing which shows about 90% left carotid stenosis.  We will arrange for cardiac clearance.  Once we receive clearance, we will schedule his procedure accordingly.  He also has an ascending aortic aneurysm.  We will continue to follow.  Body mass index is 26.91 kg/m².  I did discuss vascular risk factors as they pertain to the progression of vascular disease including controlling his hypertension and hyperlipidemia, which are stable. The patient can continue taking her current medication regimen as previously planned.  This was all discussed in full with complete understanding.    Thank you for allowing me to participate in the care of your patient.  Please do not hesitate with any questions or concerns.  I will keep you aware of any further encounters with Justin Szymanski.        Sincerely yours,         EFRA Ellington Mariah L, APRN

## 2020-04-01 ENCOUNTER — OFFICE VISIT (OUTPATIENT)
Dept: CARDIOLOGY | Facility: CLINIC | Age: 75
End: 2020-04-01

## 2020-04-01 VITALS
DIASTOLIC BLOOD PRESSURE: 80 MMHG | BODY MASS INDEX: 26.98 KG/M2 | HEART RATE: 87 BPM | OXYGEN SATURATION: 98 % | SYSTOLIC BLOOD PRESSURE: 130 MMHG | HEIGHT: 68 IN | TEMPERATURE: 97.4 F | WEIGHT: 178 LBS

## 2020-04-01 DIAGNOSIS — E78.5 HYPERLIPIDEMIA, UNSPECIFIED HYPERLIPIDEMIA TYPE: ICD-10-CM

## 2020-04-01 DIAGNOSIS — R01.1 HEART MURMUR: ICD-10-CM

## 2020-04-01 DIAGNOSIS — I71.21 ASCENDING AORTIC ANEURYSM (HCC): Primary | ICD-10-CM

## 2020-04-01 DIAGNOSIS — I73.9 PAD (PERIPHERAL ARTERY DISEASE) (HCC): ICD-10-CM

## 2020-04-01 DIAGNOSIS — Z99.89 OSA ON CPAP: ICD-10-CM

## 2020-04-01 DIAGNOSIS — I10 ESSENTIAL HYPERTENSION: ICD-10-CM

## 2020-04-01 DIAGNOSIS — G47.33 OSA ON CPAP: ICD-10-CM

## 2020-04-01 PROCEDURE — 93000 ELECTROCARDIOGRAM COMPLETE: CPT | Performed by: NURSE PRACTITIONER

## 2020-04-01 PROCEDURE — 99213 OFFICE O/P EST LOW 20 MIN: CPT | Performed by: NURSE PRACTITIONER

## 2020-04-01 NOTE — PATIENT INSTRUCTIONS
Advance Care Planning and Advance Directives     You make decisions on a daily basis - decisions about where you want to live, your career, your home, your life. Perhaps one of the most important decisions you face is your choice for future medical care. Take time to talk with your family and your healthcare team and start planning today.  Advance Care Planning is a process that can help you:  · Understand possible future healthcare decisions in light of your own experiences  · Reflect on those decision in light of your goals and values  · Discuss your decisions with those closest to you and the healthcare professionals that care for you  · Make a plan by creating a document that reflects your wishes    Surrogate Decision Maker  In the event of a medical emergency, which has left you unable to communicate or to make your own decisions, you would need someone to make decisions for you.  It is important to discuss your preferences for medical treatment with this person while you are in good health.     Qualities of a surrogate decision maker:  • Willing to take on this role and responsibility  • Knows what you want for future medical care  • Willing to follow your wishes even if they don't agree with them  • Able to make difficult medical decisions under stressful circumstances    Advance Directives  These are legal documents you can create that will guide your healthcare team and decision maker(s) when needed. These documents can be stored in the electronic medical record.    · Living Will - a legal document to guide your care if you have a terminal condition or a serious illness and are unable to communicate. States vary by statute in document names/types, but most forms may include one or more of the following:        -  Directions regarding life-prolonging treatments        -  Directions regarding artificially provided nutrition/hydration        -  Choosing a healthcare decision maker        -  Direction  regarding organ/tissue donation    · Durable Power of  for Healthcare - this document names an -in-fact to make medical decisions for you, but it may also allow this person to make personal and financial decisions for you. Please seek the advice of an  if you need this type of document.    **Advance Directives are not required and no one may discriminate against you if you do not sign one.    Medical Orders  Many states allow specific forms/orders signed by your physician to record your wishes for medical treatment in your current state of health. This form, signed in personal communication with your physician, addresses resuscitation and other medical interventions that you may or may not want.      For more information or to schedule a time with a [facility] Advance Care Planning Facilitator contact: [email or web address] or call [phone] and someone will contact you directly.

## 2020-04-01 NOTE — PROGRESS NOTES
Subjective:     Encounter Date:04/01/2020      Patient ID: Justin Szymanski is a 74 y.o. male. He presents today requesting cardiac risk assessment for planned left carotid endarterectomy.  He has a history of a heart murmur, hypertension, hyperlipidemia a sending aortic aneurysm, obstructive sleep apnea on CPAP and peripheral vascular disease.  Patient denies chest pain, shortness of breath, palpitations, dizziness, syncope, orthopnea, PND, edema or decreased stamina.  Patient denies any signs of bleeding.    Chief Complaint: pt needs cardiac risk assessment for left carotid endarterectomy  Hypertension   This is a chronic problem. The current episode started more than 1 year ago. The problem is controlled. Pertinent negatives include no anxiety, blurred vision, chest pain, headaches, malaise/fatigue, neck pain, orthopnea, palpitations, peripheral edema, PND, shortness of breath or sweats. Risk factors for coronary artery disease include male gender. Current antihypertension treatment includes ACE inhibitors. The current treatment provides significant improvement. Hypertensive end-organ damage includes PVD.   Hyperlipidemia   This is a chronic problem. The current episode started more than 1 year ago. Pertinent negatives include no chest pain, myalgias or shortness of breath. Current antihyperlipidemic treatment includes statins. Risk factors for coronary artery disease include dyslipidemia, hypertension and male sex.   Heart Murmur   This is a chronic problem. The current episode started more than 1 year ago. The problem occurs constantly. The problem has been unchanged. Pertinent negatives include no abdominal pain, anorexia, arthralgias, change in bowel habit, chest pain, chills, congestion, coughing, diaphoresis, fatigue, fever, headaches, joint swelling, myalgias, nausea, neck pain, numbness, rash, sore throat, swollen glands, urinary symptoms, vertigo, visual change, vomiting or weakness. Nothing aggravates  the symptoms. He has tried nothing for the symptoms.       The following portions of the patient's history were reviewed and updated as appropriate: allergies, current medications, past family history, past medical history, past social history, past surgical history and problem list.     No Known Allergies    Current Outpatient Medications:   •  aspirin 81 MG chewable tablet, Chew 81 mg Daily., Disp: , Rfl:   •  atorvastatin (LIPITOR) 10 MG tablet, Take 10 mg by mouth Daily., Disp: , Rfl:   •  desonide (DESOWEN) 0.05 % cream, APPLY TO FACE QD, Disp: , Rfl: 11  •  dicyclomine (BENTYL) 20 MG tablet, Take 20 mg by mouth Every 6 (Six) Hours As Needed., Disp: , Rfl:   •  FLUoxetine (PROzac) 20 MG capsule, TK 1 C PO QD, Disp: , Rfl: 0  •  fluticasone (FLONASE) 50 MCG/ACT nasal spray, 2 sprays into each nostril Daily., Disp: , Rfl:   •  levothyroxine (SYNTHROID, LEVOTHROID) 75 MCG tablet, Take 75 mcg by mouth Daily., Disp: , Rfl:   •  lisinopril (PRINIVIL,ZESTRIL) 20 MG tablet, Take 20 mg by mouth Daily., Disp: , Rfl:   •  omeprazole (priLOSEC) 40 MG capsule, Take 40 mg by mouth Daily., Disp: , Rfl:   •  tadalafil (CIALIS) 20 MG tablet, Take 20 mg by mouth Daily As Needed for erectile dysfunction., Disp: , Rfl:   •  valACYclovir (VALTREX) 1000 MG tablet, Take 1,000 mg by mouth 2 (Two) Times a Day As Needed., Disp: , Rfl:   •  zolpidem (AMBIEN) 10 MG tablet, Take 10 mg by mouth At Night As Needed for Sleep., Disp: , Rfl:   Past Medical History:   Diagnosis Date   • Allergic rhinitis    • Carotid artery disease (CMS/HCC)    • GERD (gastroesophageal reflux disease)    • Heart murmur    • Hyperlipidemia    • Hypertension    • Hypothyroidism    • Nasal polyposis    • Seasonal allergic rhinitis    • Sleep apnea    • Snoring    • Valvular disease      Past Surgical History:   Procedure Laterality Date   • COLONOSCOPY  07/21/2016    two polyps ,both removed   • ENDOSCOPY  04/18/2013    normal   • ENDOSCOPY N/A 1/26/2018     Procedure: ESOPHAGOGASTRODUODENOSCOPY WITH ANESTHESIA;  Surgeon: Marc Mart DO;  Location: [facility] ENDOSCOPY;  Service:    • SINUS SURGERY      Dr. Griffith   • ULNAR NERVE REPAIR       Family History   Problem Relation Age of Onset   • Hyperlipidemia Mother    • Cancer Father    • Hypertension Father    • Hyperlipidemia Father    • Colon cancer Maternal Grandfather      Social History     Socioeconomic History   • Marital status:      Spouse name: Not on file   • Number of children: Not on file   • Years of education: Not on file   • Highest education level: Not on file   Tobacco Use   • Smoking status: Never Smoker   • Smokeless tobacco: Never Used   Substance and Sexual Activity   • Alcohol use: Yes     Alcohol/week: 1.0 standard drinks     Types: 1 Glasses of wine per week     Comment: nightly - red wine    • Drug use: No   • Sexual activity: Yes     Partners: Female         Review of Systems   Constitution: Negative for chills, decreased appetite, diaphoresis, fatigue, fever, malaise/fatigue, night sweats, weight gain and weight loss.   HENT: Negative for congestion, nosebleeds and sore throat.    Eyes: Negative for blurred vision and visual disturbance.   Cardiovascular: Negative for chest pain, dyspnea on exertion, leg swelling, near-syncope, orthopnea, palpitations, paroxysmal nocturnal dyspnea and syncope.   Respiratory: Negative for cough, hemoptysis, shortness of breath, snoring and wheezing.    Endocrine: Negative for cold intolerance and heat intolerance.   Hematologic/Lymphatic: Does not bruise/bleed easily.   Skin: Negative for rash.   Musculoskeletal: Negative for arthralgias, back pain, falls, joint swelling, myalgias and neck pain.   Gastrointestinal: Negative for abdominal pain, anorexia, change in bowel habit, constipation, diarrhea, dysphagia, heartburn, nausea and vomiting.   Genitourinary: Negative for hematuria.   Neurological: Negative for dizziness, headaches, light-headedness,  numbness, vertigo and weakness.   Psychiatric/Behavioral: Negative for altered mental status.   Allergic/Immunologic: Negative for persistent infections.         ECG 12 Lead  Date/Time: 4/1/2020 12:38 PM  Performed by: Balbina Landaverde APRN  Authorized by: Balbina Landaverde APRN   Comparison: compared with previous ECG from 5/10/2018  Similar to previous ECG  Rhythm: sinus rhythm  Rate: normal  BPM: 87  QRS axis: normal    Clinical impression: normal ECG               Objective:     Physical Exam   Constitutional: He is oriented to person, place, and time. Vital signs are normal. He appears well-developed and well-nourished. No distress.   HENT:   Head: Normocephalic and atraumatic.   Right Ear: External ear normal.   Left Ear: External ear normal.   Eyes: Pupils are equal, round, and reactive to light. Conjunctivae are normal. Right eye exhibits no discharge. Left eye exhibits no discharge.   Neck: Normal range of motion. Neck supple. No JVD present. Carotid bruit is not present. No thyromegaly present.   Cardiovascular: Normal rate, regular rhythm and intact distal pulses. PMI is not displaced. Exam reveals no gallop, no friction rub and no decreased pulses.   Murmur heard.  Pulses:       Radial pulses are 2+ on the right side, and 2+ on the left side.        Dorsalis pedis pulses are 2+ on the right side, and 2+ on the left side.        Posterior tibial pulses are 2+ on the right side, and 2+ on the left side.   Pulmonary/Chest: Effort normal and breath sounds normal. No respiratory distress. He has no decreased breath sounds. He has no wheezes. He has no rhonchi. He has no rales. He exhibits no tenderness.   Abdominal: Soft. Bowel sounds are normal. He exhibits no distension. There is no tenderness.   Musculoskeletal: Normal range of motion. He exhibits no edema.   Neurological: He is alert and oriented to person, place, and time.   Skin: Skin is warm and dry. No rash noted. He is not diaphoretic. No erythema. No  "pallor.   Psychiatric: He has a normal mood and affect. His behavior is normal. Judgment and thought content normal.   Vitals reviewed.  /80   Pulse 87   Temp 97.4 °F (36.3 °C)   Ht 172.7 cm (68\")   Wt 80.7 kg (178 lb)   SpO2 98%   BMI 27.06 kg/m²         Assessment:          Diagnosis Plan   1. Ascending aortic aneurysm (CMS/[facility])  Follows with Dr. Hunter with vascular surgery.    2. PAD (peripheral artery disease) (CMS/[facility])  Plan for left carotid endarterectomy.    3. Essential hypertension  Managed by PCP. Well controlled.    4. Hyperlipidemia, unspecified hyperlipidemia type  Managed by PCP. On statin. Reported to be well controlled.    5. Heart murmur  Chronic. No significant valvular disease on echo 5/23/18.   6. ADITHYA on CPAP  Pt reports intermittent compliance. Stressed routine compliance with CPAP to prevent future adverse cardiac effects.           Plan:       1. Continue medications as previously prescribed.  2. Report any worsening symptoms.  3. Report any signs of bleeding.  4. Continue heart healthy diet and regular exercise as tolerated.   5. Follow up with PCP for blood pressure and cholesterol management and routine lab work.  6. Follow up with Dr. Patterson as needed.     Risk assessment- from a revised cardiac risk index standpoint, patient is low risk for a major cardiac event with carotid endarterectomy surgery. This is primarily because he has no known history of coronary/ischemic heart disease, congestive heart failure, cerebrovascular disease, insulin-dependent diabetes mellitus, or creatinine greater than 2. Though the risk is low (0.9%), it is not zero. However, this is non-modifiable because he has no signs or symptoms of the following \"active cardiac conditions\"- acute coronary syndrome, acutely decompensated congestive heart failure, uncontrolled arrhythmias, or significant valvular disease. Therefore, recommend proceeding with the planned surgery without further " delay.        Advance Care Planning   ACP discussion was held with the patient during this visit. Patient has an advance directive (not in EMR), copy requested.

## 2020-04-29 ENCOUNTER — HOSPITAL ENCOUNTER (OUTPATIENT)
Facility: HOSPITAL | Age: 75
Setting detail: OBSERVATION
Discharge: HOME | End: 2020-04-30
Attending: FAMILY MEDICINE | Admitting: INTERNAL MEDICINE

## 2020-04-29 ENCOUNTER — APPOINTMENT (OUTPATIENT)
Dept: CT IMAGING | Facility: HOSPITAL | Age: 75
End: 2020-04-29

## 2020-04-29 ENCOUNTER — APPOINTMENT (OUTPATIENT)
Dept: GENERAL RADIOLOGY | Facility: HOSPITAL | Age: 75
End: 2020-04-29

## 2020-04-29 DIAGNOSIS — R20.0 RIGHT ARM NUMBNESS: ICD-10-CM

## 2020-04-29 DIAGNOSIS — R25.3 FASCICULATIONS: ICD-10-CM

## 2020-04-29 DIAGNOSIS — I65.23 BILATERAL CAROTID ARTERY STENOSIS: ICD-10-CM

## 2020-04-29 DIAGNOSIS — R29.898 UPPER EXTREMITY WEAKNESS: Primary | ICD-10-CM

## 2020-04-29 PROBLEM — I63.9 STROKE (CEREBRUM): Status: ACTIVE | Noted: 2020-04-29

## 2020-04-29 LAB
ABO GROUP BLD: NORMAL
ALBUMIN SERPL-MCNC: 4.6 G/DL (ref 3.5–5.2)
ALBUMIN/GLOB SERPL: 1.6 G/DL
ALP SERPL-CCNC: 66 U/L (ref 39–117)
ALT SERPL W P-5'-P-CCNC: 16 U/L (ref 1–41)
ANION GAP SERPL CALCULATED.3IONS-SCNC: 14 MMOL/L (ref 5–15)
AST SERPL-CCNC: 20 U/L (ref 1–40)
BASOPHILS # BLD AUTO: 0.06 10*3/MM3 (ref 0–0.2)
BASOPHILS NFR BLD AUTO: 0.8 % (ref 0–1.5)
BILIRUB SERPL-MCNC: 0.2 MG/DL (ref 0.2–1.2)
BLD GP AB SCN SERPL QL: NEGATIVE
BUN BLD-MCNC: 21 MG/DL (ref 8–23)
BUN/CREAT SERPL: 20.2 (ref 7–25)
CALCIUM SPEC-SCNC: 9.1 MG/DL (ref 8.6–10.5)
CHLORIDE SERPL-SCNC: 100 MMOL/L (ref 98–107)
CO2 SERPL-SCNC: 27 MMOL/L (ref 22–29)
CREAT BLD-MCNC: 1.04 MG/DL (ref 0.76–1.27)
DEPRECATED RDW RBC AUTO: 44.7 FL (ref 37–54)
EOSINOPHIL # BLD AUTO: 0.26 10*3/MM3 (ref 0–0.4)
EOSINOPHIL NFR BLD AUTO: 3.6 % (ref 0.3–6.2)
ERYTHROCYTE [DISTWIDTH] IN BLOOD BY AUTOMATED COUNT: 13 % (ref 12.3–15.4)
GFR SERPL CREATININE-BSD FRML MDRD: 70 ML/MIN/1.73
GLOBULIN UR ELPH-MCNC: 2.8 GM/DL
GLUCOSE BLD-MCNC: 97 MG/DL (ref 65–99)
GLUCOSE BLDC GLUCOMTR-MCNC: 88 MG/DL (ref 70–130)
GLUCOSE BLDC GLUCOMTR-MCNC: 98 MG/DL (ref 70–130)
HCT VFR BLD AUTO: 42.2 % (ref 37.5–51)
HGB BLD-MCNC: 14.1 G/DL (ref 13–17.7)
HOLD SPECIMEN: NORMAL
HOLD SPECIMEN: NORMAL
IMM GRANULOCYTES # BLD AUTO: 0.02 10*3/MM3 (ref 0–0.05)
IMM GRANULOCYTES NFR BLD AUTO: 0.3 % (ref 0–0.5)
INR PPP: 0.99 (ref 0.91–1.09)
LYMPHOCYTES # BLD AUTO: 1.11 10*3/MM3 (ref 0.7–3.1)
LYMPHOCYTES NFR BLD AUTO: 15.2 % (ref 19.6–45.3)
MCH RBC QN AUTO: 31.5 PG (ref 26.6–33)
MCHC RBC AUTO-ENTMCNC: 33.4 G/DL (ref 31.5–35.7)
MCV RBC AUTO: 94.4 FL (ref 79–97)
MONOCYTES # BLD AUTO: 0.59 10*3/MM3 (ref 0.1–0.9)
MONOCYTES NFR BLD AUTO: 8.1 % (ref 5–12)
NEUTROPHILS # BLD AUTO: 5.27 10*3/MM3 (ref 1.7–7)
NEUTROPHILS NFR BLD AUTO: 72 % (ref 42.7–76)
NRBC BLD AUTO-RTO: 0 /100 WBC (ref 0–0.2)
PLATELET # BLD AUTO: 244 10*3/MM3 (ref 140–450)
PMV BLD AUTO: 9.3 FL (ref 6–12)
POTASSIUM BLD-SCNC: 4 MMOL/L (ref 3.5–5.2)
PROT SERPL-MCNC: 7.4 G/DL (ref 6–8.5)
PROTHROMBIN TIME: 12.7 SECONDS (ref 11.9–14.6)
RBC # BLD AUTO: 4.47 10*6/MM3 (ref 4.14–5.8)
RH BLD: POSITIVE
SODIUM BLD-SCNC: 141 MMOL/L (ref 136–145)
T&S EXPIRATION DATE: NORMAL
WBC NRBC COR # BLD: 7.31 10*3/MM3 (ref 3.4–10.8)
WHOLE BLOOD HOLD SPECIMEN: NORMAL
WHOLE BLOOD HOLD SPECIMEN: NORMAL

## 2020-04-29 PROCEDURE — 70450 CT HEAD/BRAIN W/O DYE: CPT

## 2020-04-29 PROCEDURE — 99284 EMERGENCY DEPT VISIT MOD MDM: CPT

## 2020-04-29 PROCEDURE — 93010 ELECTROCARDIOGRAM REPORT: CPT | Performed by: INTERNAL MEDICINE

## 2020-04-29 PROCEDURE — 82607 VITAMIN B-12: CPT | Performed by: CLINICAL NURSE SPECIALIST

## 2020-04-29 PROCEDURE — 86900 BLOOD TYPING SEROLOGIC ABO: CPT | Performed by: FAMILY MEDICINE

## 2020-04-29 PROCEDURE — 82962 GLUCOSE BLOOD TEST: CPT

## 2020-04-29 PROCEDURE — 82746 ASSAY OF FOLIC ACID SERUM: CPT | Performed by: CLINICAL NURSE SPECIALIST

## 2020-04-29 PROCEDURE — 86901 BLOOD TYPING SEROLOGIC RH(D): CPT | Performed by: FAMILY MEDICINE

## 2020-04-29 PROCEDURE — 93005 ELECTROCARDIOGRAM TRACING: CPT | Performed by: FAMILY MEDICINE

## 2020-04-29 PROCEDURE — 85025 COMPLETE CBC W/AUTO DIFF WBC: CPT | Performed by: FAMILY MEDICINE

## 2020-04-29 PROCEDURE — G0378 HOSPITAL OBSERVATION PER HR: HCPCS

## 2020-04-29 PROCEDURE — 80053 COMPREHEN METABOLIC PANEL: CPT | Performed by: FAMILY MEDICINE

## 2020-04-29 PROCEDURE — 86850 RBC ANTIBODY SCREEN: CPT | Performed by: FAMILY MEDICINE

## 2020-04-29 PROCEDURE — 71045 X-RAY EXAM CHEST 1 VIEW: CPT

## 2020-04-29 PROCEDURE — 85610 PROTHROMBIN TIME: CPT | Performed by: FAMILY MEDICINE

## 2020-04-29 RX ORDER — LISINOPRIL 20 MG/1
20 TABLET ORAL DAILY
Status: DISCONTINUED | OUTPATIENT
Start: 2020-04-29 | End: 2020-04-29

## 2020-04-29 RX ORDER — ASPIRIN 81 MG/1
81 TABLET, CHEWABLE ORAL DAILY
Status: DISCONTINUED | OUTPATIENT
Start: 2020-04-29 | End: 2020-04-30 | Stop reason: HOSPADM

## 2020-04-29 RX ORDER — FLUTICASONE PROPIONATE 50 MCG
2 SPRAY, SUSPENSION (ML) NASAL DAILY
Status: DISCONTINUED | OUTPATIENT
Start: 2020-04-29 | End: 2020-04-30 | Stop reason: HOSPADM

## 2020-04-29 RX ORDER — SODIUM CHLORIDE 0.9 % (FLUSH) 0.9 %
10 SYRINGE (ML) INJECTION AS NEEDED
Status: DISCONTINUED | OUTPATIENT
Start: 2020-04-29 | End: 2020-04-30 | Stop reason: HOSPADM

## 2020-04-29 RX ORDER — SODIUM CHLORIDE 0.9 % (FLUSH) 0.9 %
10 SYRINGE (ML) INJECTION EVERY 12 HOURS SCHEDULED
Status: DISCONTINUED | OUTPATIENT
Start: 2020-04-29 | End: 2020-04-30 | Stop reason: HOSPADM

## 2020-04-29 RX ORDER — LEVOTHYROXINE SODIUM 0.07 MG/1
75 TABLET ORAL
Status: DISCONTINUED | OUTPATIENT
Start: 2020-04-30 | End: 2020-04-30 | Stop reason: HOSPADM

## 2020-04-29 RX ORDER — ACETAMINOPHEN 650 MG/1
650 SUPPOSITORY RECTAL EVERY 4 HOURS PRN
Status: DISCONTINUED | OUTPATIENT
Start: 2020-04-29 | End: 2020-04-30 | Stop reason: HOSPADM

## 2020-04-29 RX ORDER — ONDANSETRON 2 MG/ML
4 INJECTION INTRAMUSCULAR; INTRAVENOUS EVERY 6 HOURS PRN
Status: DISCONTINUED | OUTPATIENT
Start: 2020-04-29 | End: 2020-04-30 | Stop reason: HOSPADM

## 2020-04-29 RX ORDER — ALPRAZOLAM 0.25 MG/1
0.25 TABLET ORAL 2 TIMES DAILY PRN
Status: DISCONTINUED | OUTPATIENT
Start: 2020-04-29 | End: 2020-04-30 | Stop reason: HOSPADM

## 2020-04-29 RX ORDER — PANTOPRAZOLE SODIUM 40 MG/1
40 TABLET, DELAYED RELEASE ORAL
Status: DISCONTINUED | OUTPATIENT
Start: 2020-04-30 | End: 2020-04-30 | Stop reason: HOSPADM

## 2020-04-29 RX ORDER — LABETALOL HYDROCHLORIDE 5 MG/ML
10 INJECTION, SOLUTION INTRAVENOUS ONCE
Status: DISCONTINUED | OUTPATIENT
Start: 2020-04-29 | End: 2020-04-29

## 2020-04-29 RX ORDER — ZOLPIDEM TARTRATE 5 MG/1
10 TABLET ORAL NIGHTLY PRN
Status: DISCONTINUED | OUTPATIENT
Start: 2020-04-29 | End: 2020-04-30 | Stop reason: HOSPADM

## 2020-04-29 RX ORDER — FLUOXETINE HYDROCHLORIDE 20 MG/1
20 CAPSULE ORAL DAILY
Status: DISCONTINUED | OUTPATIENT
Start: 2020-04-29 | End: 2020-04-30 | Stop reason: HOSPADM

## 2020-04-29 RX ORDER — ACETAMINOPHEN 160 MG/5ML
650 SOLUTION ORAL EVERY 4 HOURS PRN
Status: DISCONTINUED | OUTPATIENT
Start: 2020-04-29 | End: 2020-04-30 | Stop reason: HOSPADM

## 2020-04-29 RX ORDER — LABETALOL HYDROCHLORIDE 5 MG/ML
10 INJECTION, SOLUTION INTRAVENOUS EVERY 4 HOURS PRN
Status: DISCONTINUED | OUTPATIENT
Start: 2020-04-29 | End: 2020-04-30 | Stop reason: HOSPADM

## 2020-04-29 RX ORDER — ATORVASTATIN CALCIUM 40 MG/1
80 TABLET, FILM COATED ORAL NIGHTLY
Status: DISCONTINUED | OUTPATIENT
Start: 2020-04-29 | End: 2020-04-30 | Stop reason: HOSPADM

## 2020-04-29 RX ORDER — ALPRAZOLAM 0.5 MG/1
0.25 TABLET ORAL 2 TIMES DAILY PRN
COMMUNITY
End: 2020-11-23

## 2020-04-29 RX ORDER — ACETAMINOPHEN 325 MG/1
650 TABLET ORAL EVERY 4 HOURS PRN
Status: DISCONTINUED | OUTPATIENT
Start: 2020-04-29 | End: 2020-04-30 | Stop reason: HOSPADM

## 2020-04-29 RX ORDER — ONDANSETRON 4 MG/1
4 TABLET, FILM COATED ORAL EVERY 6 HOURS PRN
Status: DISCONTINUED | OUTPATIENT
Start: 2020-04-29 | End: 2020-04-30 | Stop reason: HOSPADM

## 2020-04-29 RX ADMIN — SODIUM CHLORIDE, PRESERVATIVE FREE 10 ML: 5 INJECTION INTRAVENOUS at 22:37

## 2020-04-30 ENCOUNTER — APPOINTMENT (OUTPATIENT)
Dept: MRI IMAGING | Facility: HOSPITAL | Age: 75
End: 2020-04-30

## 2020-04-30 ENCOUNTER — TELEPHONE (OUTPATIENT)
Dept: NEUROLOGY | Facility: CLINIC | Age: 75
End: 2020-04-30

## 2020-04-30 ENCOUNTER — APPOINTMENT (OUTPATIENT)
Dept: CARDIOLOGY | Facility: HOSPITAL | Age: 75
End: 2020-04-30

## 2020-04-30 VITALS
HEART RATE: 74 BPM | SYSTOLIC BLOOD PRESSURE: 129 MMHG | BODY MASS INDEX: 26.67 KG/M2 | OXYGEN SATURATION: 97 % | RESPIRATION RATE: 16 BRPM | DIASTOLIC BLOOD PRESSURE: 78 MMHG | WEIGHT: 176 LBS | TEMPERATURE: 98.4 F | HEIGHT: 68 IN

## 2020-04-30 PROBLEM — R20.2 NUMBNESS AND TINGLING OF RIGHT UPPER EXTREMITY: Status: ACTIVE | Noted: 2020-04-30

## 2020-04-30 PROBLEM — R20.0 NUMBNESS AND TINGLING OF RIGHT UPPER EXTREMITY: Status: ACTIVE | Noted: 2020-04-30

## 2020-04-30 PROBLEM — R25.3 MUSCLE FASCICULATION: Status: ACTIVE | Noted: 2020-04-30

## 2020-04-30 LAB
ANION GAP SERPL CALCULATED.3IONS-SCNC: 11 MMOL/L (ref 5–15)
BH CV ECHO MEAS - AO MAX PG (FULL): 6.5 MMHG
BH CV ECHO MEAS - AO MAX PG: 10.8 MMHG
BH CV ECHO MEAS - AO MEAN PG (FULL): 4 MMHG
BH CV ECHO MEAS - AO MEAN PG: 7 MMHG
BH CV ECHO MEAS - AO ROOT AREA (BSA CORRECTED): 2
BH CV ECHO MEAS - AO ROOT AREA: 11.9 CM^2
BH CV ECHO MEAS - AO ROOT DIAM: 3.9 CM
BH CV ECHO MEAS - AO V2 MAX: 164 CM/SEC
BH CV ECHO MEAS - AO V2 MEAN: 127 CM/SEC
BH CV ECHO MEAS - AO V2 VTI: 35.8 CM
BH CV ECHO MEAS - AVA(I,A): 2.4 CM^2
BH CV ECHO MEAS - AVA(I,D): 2.4 CM^2
BH CV ECHO MEAS - AVA(V,A): 2 CM^2
BH CV ECHO MEAS - AVA(V,D): 2 CM^2
BH CV ECHO MEAS - BSA(HAYCOCK): 2 M^2
BH CV ECHO MEAS - BSA: 1.9 M^2
BH CV ECHO MEAS - BZI_BMI: 26.8 KILOGRAMS/M^2
BH CV ECHO MEAS - BZI_METRIC_HEIGHT: 172.7 CM
BH CV ECHO MEAS - BZI_METRIC_WEIGHT: 79.8 KG
BH CV ECHO MEAS - EDV(CUBED): 64 ML
BH CV ECHO MEAS - EDV(MOD-SP4): 75.4 ML
BH CV ECHO MEAS - EDV(TEICH): 70 ML
BH CV ECHO MEAS - EF(CUBED): 75 %
BH CV ECHO MEAS - EF(MOD-SP4): 60.5 %
BH CV ECHO MEAS - EF(TEICH): 67.5 %
BH CV ECHO MEAS - ESV(CUBED): 16 ML
BH CV ECHO MEAS - ESV(MOD-SP4): 29.8 ML
BH CV ECHO MEAS - ESV(TEICH): 22.8 ML
BH CV ECHO MEAS - FS: 37 %
BH CV ECHO MEAS - IVS/LVPW: 0.9
BH CV ECHO MEAS - IVSD: 1 CM
BH CV ECHO MEAS - LA DIMENSION: 3.9 CM
BH CV ECHO MEAS - LA/AO: 1
BH CV ECHO MEAS - LAT PEAK E' VEL: 6 CM/SEC
BH CV ECHO MEAS - LV DIASTOLIC VOL/BSA (35-75): 38.9 ML/M^2
BH CV ECHO MEAS - LV MASS(C)D: 139 GRAMS
BH CV ECHO MEAS - LV MASS(C)DI: 71.8 GRAMS/M^2
BH CV ECHO MEAS - LV MAX PG: 4.2 MMHG
BH CV ECHO MEAS - LV MEAN PG: 3 MMHG
BH CV ECHO MEAS - LV SYSTOLIC VOL/BSA (12-30): 15.4 ML/M^2
BH CV ECHO MEAS - LV V1 MAX: 103 CM/SEC
BH CV ECHO MEAS - LV V1 MEAN: 82.3 CM/SEC
BH CV ECHO MEAS - LV V1 VTI: 26.9 CM
BH CV ECHO MEAS - LVIDD: 4 CM
BH CV ECHO MEAS - LVIDS: 2.5 CM
BH CV ECHO MEAS - LVLD AP4: 7.3 CM
BH CV ECHO MEAS - LVLS AP4: 5.9 CM
BH CV ECHO MEAS - LVOT AREA (M): 3.1 CM^2
BH CV ECHO MEAS - LVOT AREA: 3.1 CM^2
BH CV ECHO MEAS - LVOT DIAM: 2 CM
BH CV ECHO MEAS - LVPWD: 1.1 CM
BH CV ECHO MEAS - MED PEAK E' VEL: 4.5 CM/SEC
BH CV ECHO MEAS - MV A MAX VEL: 115 CM/SEC
BH CV ECHO MEAS - MV DEC SLOPE: 313 CM/SEC^2
BH CV ECHO MEAS - MV DEC TIME: 0.3 SEC
BH CV ECHO MEAS - MV E MAX VEL: 87.8 CM/SEC
BH CV ECHO MEAS - MV E/A: 0.76
BH CV ECHO MEAS - MV P1/2T MAX VEL: 100 CM/SEC
BH CV ECHO MEAS - MV P1/2T: 93.6 MSEC
BH CV ECHO MEAS - MVA P1/2T LCG: 2.2 CM^2
BH CV ECHO MEAS - MVA(P1/2T): 2.4 CM^2
BH CV ECHO MEAS - RAP SYSTOLE: 5 MMHG
BH CV ECHO MEAS - RVSP: 31 MMHG
BH CV ECHO MEAS - SI(AO): 220.9 ML/M^2
BH CV ECHO MEAS - SI(CUBED): 24.8 ML/M^2
BH CV ECHO MEAS - SI(LVOT): 43.7 ML/M^2
BH CV ECHO MEAS - SI(MOD-SP4): 23.6 ML/M^2
BH CV ECHO MEAS - SI(TEICH): 24.4 ML/M^2
BH CV ECHO MEAS - SV(AO): 427.7 ML
BH CV ECHO MEAS - SV(CUBED): 48 ML
BH CV ECHO MEAS - SV(LVOT): 84.5 ML
BH CV ECHO MEAS - SV(MOD-SP4): 45.6 ML
BH CV ECHO MEAS - SV(TEICH): 47.2 ML
BH CV ECHO MEAS - TR MAX VEL: 255 CM/SEC
BH CV ECHO MEASUREMENTS AVERAGE E/E' RATIO: 16.72
BUN BLD-MCNC: 21 MG/DL (ref 8–23)
BUN/CREAT SERPL: 18.8 (ref 7–25)
CALCIUM SPEC-SCNC: 9.1 MG/DL (ref 8.6–10.5)
CHLORIDE SERPL-SCNC: 102 MMOL/L (ref 98–107)
CHOLEST SERPL-MCNC: 185 MG/DL (ref 0–200)
CO2 SERPL-SCNC: 27 MMOL/L (ref 22–29)
CREAT BLD-MCNC: 1.12 MG/DL (ref 0.76–1.27)
DEPRECATED RDW RBC AUTO: 44.9 FL (ref 37–54)
ERYTHROCYTE [DISTWIDTH] IN BLOOD BY AUTOMATED COUNT: 13.1 % (ref 12.3–15.4)
FOLATE SERPL-MCNC: >20 NG/ML (ref 4.78–24.2)
GFR SERPL CREATININE-BSD FRML MDRD: 64 ML/MIN/1.73
GLUCOSE BLD-MCNC: 95 MG/DL (ref 65–99)
GLUCOSE BLDC GLUCOMTR-MCNC: 89 MG/DL (ref 70–130)
HBA1C MFR BLD: 6 % (ref 4.8–5.6)
HCT VFR BLD AUTO: 40.1 % (ref 37.5–51)
HDLC SERPL-MCNC: 50 MG/DL (ref 40–60)
HGB BLD-MCNC: 13.4 G/DL (ref 13–17.7)
LDLC SERPL CALC-MCNC: 100 MG/DL (ref 0–100)
LDLC/HDLC SERPL: 2.01 {RATIO}
LEFT ATRIUM VOLUME INDEX: 28.8 ML/M2
MAGNESIUM SERPL-MCNC: 2.2 MG/DL (ref 1.6–2.4)
MAXIMAL PREDICTED HEART RATE: 148 BPM
MCH RBC QN AUTO: 31.5 PG (ref 26.6–33)
MCHC RBC AUTO-ENTMCNC: 33.4 G/DL (ref 31.5–35.7)
MCV RBC AUTO: 94.1 FL (ref 79–97)
PLATELET # BLD AUTO: 237 10*3/MM3 (ref 140–450)
PMV BLD AUTO: 9.5 FL (ref 6–12)
POTASSIUM BLD-SCNC: 4.2 MMOL/L (ref 3.5–5.2)
RBC # BLD AUTO: 4.26 10*6/MM3 (ref 4.14–5.8)
SODIUM BLD-SCNC: 140 MMOL/L (ref 136–145)
STRESS TARGET HR: 126 BPM
TRIGL SERPL-MCNC: 173 MG/DL (ref 0–150)
TSH SERPL DL<=0.05 MIU/L-ACNC: 2.94 UIU/ML (ref 0.27–4.2)
VIT B12 BLD-MCNC: 435 PG/ML (ref 211–946)
VLDLC SERPL-MCNC: 34.6 MG/DL
WBC NRBC COR # BLD: 6.24 10*3/MM3 (ref 3.4–10.8)

## 2020-04-30 PROCEDURE — 84443 ASSAY THYROID STIM HORMONE: CPT | Performed by: NURSE PRACTITIONER

## 2020-04-30 PROCEDURE — 72141 MRI NECK SPINE W/O DYE: CPT

## 2020-04-30 PROCEDURE — 85027 COMPLETE CBC AUTOMATED: CPT | Performed by: NURSE PRACTITIONER

## 2020-04-30 PROCEDURE — 83735 ASSAY OF MAGNESIUM: CPT | Performed by: CLINICAL NURSE SPECIALIST

## 2020-04-30 PROCEDURE — 80061 LIPID PANEL: CPT | Performed by: NURSE PRACTITIONER

## 2020-04-30 PROCEDURE — 82962 GLUCOSE BLOOD TEST: CPT

## 2020-04-30 PROCEDURE — 97161 PT EVAL LOW COMPLEX 20 MIN: CPT

## 2020-04-30 PROCEDURE — 70551 MRI BRAIN STEM W/O DYE: CPT

## 2020-04-30 PROCEDURE — 97165 OT EVAL LOW COMPLEX 30 MIN: CPT

## 2020-04-30 PROCEDURE — G0378 HOSPITAL OBSERVATION PER HR: HCPCS

## 2020-04-30 PROCEDURE — 83036 HEMOGLOBIN GLYCOSYLATED A1C: CPT | Performed by: NURSE PRACTITIONER

## 2020-04-30 PROCEDURE — 93306 TTE W/DOPPLER COMPLETE: CPT

## 2020-04-30 PROCEDURE — 99215 OFFICE O/P EST HI 40 MIN: CPT | Performed by: PSYCHIATRY & NEUROLOGY

## 2020-04-30 PROCEDURE — 99214 OFFICE O/P EST MOD 30 MIN: CPT | Performed by: SURGERY

## 2020-04-30 PROCEDURE — 93306 TTE W/DOPPLER COMPLETE: CPT | Performed by: INTERNAL MEDICINE

## 2020-04-30 PROCEDURE — 80048 BASIC METABOLIC PNL TOTAL CA: CPT | Performed by: NURSE PRACTITIONER

## 2020-04-30 RX ORDER — ATORVASTATIN CALCIUM 80 MG/1
80 TABLET, FILM COATED ORAL NIGHTLY
Qty: 30 TABLET | Refills: 0 | Status: SHIPPED | OUTPATIENT
Start: 2020-04-30 | End: 2023-04-05

## 2020-04-30 RX ORDER — LISINOPRIL 5 MG/1
5 TABLET ORAL DAILY
Qty: 30 TABLET | Refills: 0 | Status: SHIPPED | OUTPATIENT
Start: 2020-04-30 | End: 2020-05-22

## 2020-04-30 RX ADMIN — ASPIRIN 81 MG: 81 TABLET, CHEWABLE ORAL at 10:45

## 2020-04-30 RX ADMIN — LEVOTHYROXINE SODIUM 75 MCG: 75 TABLET ORAL at 06:59

## 2020-04-30 RX ADMIN — FLUOXETINE HYDROCHLORIDE 20 MG: 20 CAPSULE ORAL at 10:45

## 2020-04-30 RX ADMIN — PANTOPRAZOLE SODIUM 40 MG: 40 TABLET, DELAYED RELEASE ORAL at 06:59

## 2020-04-30 RX ADMIN — SODIUM CHLORIDE, PRESERVATIVE FREE 10 ML: 5 INJECTION INTRAVENOUS at 11:11

## 2020-05-01 ENCOUNTER — TELEPHONE (OUTPATIENT)
Dept: VASCULAR SURGERY | Facility: CLINIC | Age: 75
End: 2020-05-01

## 2020-05-01 ENCOUNTER — TRANSCRIBE ORDERS (OUTPATIENT)
Dept: ADMINISTRATIVE | Facility: HOSPITAL | Age: 75
End: 2020-05-01

## 2020-05-01 ENCOUNTER — PREP FOR SURGERY (OUTPATIENT)
Dept: OTHER | Facility: HOSPITAL | Age: 75
End: 2020-05-01

## 2020-05-01 DIAGNOSIS — Z51.81 ENCOUNTER FOR MONITORING ANTIPLATELET THERAPY: ICD-10-CM

## 2020-05-01 DIAGNOSIS — I65.23 BILATERAL CAROTID ARTERY STENOSIS: Primary | ICD-10-CM

## 2020-05-01 DIAGNOSIS — Z01.818 PREOP TESTING: ICD-10-CM

## 2020-05-01 DIAGNOSIS — Z79.02 ENCOUNTER FOR MONITORING ANTIPLATELET THERAPY: ICD-10-CM

## 2020-05-01 DIAGNOSIS — Z01.818 PRE-OP TESTING: Primary | ICD-10-CM

## 2020-05-01 RX ORDER — CEFAZOLIN SODIUM 2 G/50ML
2 SOLUTION INTRAVENOUS ONCE
Status: CANCELLED | OUTPATIENT
Start: 2020-05-01 | End: 2020-05-01

## 2020-05-01 NOTE — TELEPHONE ENCOUNTER
Spoke with patient and advised of upcoming procedure.  Patient pre work is scheduled for 5/4/2020 at 145 pm.  Patient procedure is scheduled for 5/6/2020 at 700 am.  Patient advised of mask and visitor protocol.  Advised of Covid 19 testing and the quarantine after the fact.  Patient advised of location time and prep.  Patient expressed understanding for all that was discussed.

## 2020-05-04 ENCOUNTER — APPOINTMENT (OUTPATIENT)
Dept: PREADMISSION TESTING | Facility: HOSPITAL | Age: 75
End: 2020-05-04

## 2020-05-04 ENCOUNTER — APPOINTMENT (OUTPATIENT)
Dept: LAB | Facility: HOSPITAL | Age: 75
End: 2020-05-04

## 2020-05-04 ENCOUNTER — TELEPHONE (OUTPATIENT)
Dept: VASCULAR SURGERY | Facility: CLINIC | Age: 75
End: 2020-05-04

## 2020-05-04 VITALS
RESPIRATION RATE: 16 BRPM | HEIGHT: 68 IN | BODY MASS INDEX: 26.7 KG/M2 | WEIGHT: 176.15 LBS | HEART RATE: 74 BPM | SYSTOLIC BLOOD PRESSURE: 132 MMHG | OXYGEN SATURATION: 95 % | DIASTOLIC BLOOD PRESSURE: 73 MMHG

## 2020-05-04 DIAGNOSIS — Z79.02 ENCOUNTER FOR MONITORING ANTIPLATELET THERAPY: ICD-10-CM

## 2020-05-04 DIAGNOSIS — Z51.81 ENCOUNTER FOR MONITORING ANTIPLATELET THERAPY: ICD-10-CM

## 2020-05-04 DIAGNOSIS — I65.23 BILATERAL CAROTID ARTERY STENOSIS: ICD-10-CM

## 2020-05-04 DIAGNOSIS — Z01.818 PREOP TESTING: ICD-10-CM

## 2020-05-04 LAB
ANION GAP SERPL CALCULATED.3IONS-SCNC: 11 MMOL/L (ref 5–15)
APTT PPP: 31.1 SECONDS (ref 24.1–35)
BASOPHILS # BLD AUTO: 0.04 10*3/MM3 (ref 0–0.2)
BASOPHILS NFR BLD AUTO: 0.6 % (ref 0–1.5)
BUN BLD-MCNC: 21 MG/DL (ref 8–23)
BUN/CREAT SERPL: 21.2 (ref 7–25)
CALCIUM SPEC-SCNC: 9.3 MG/DL (ref 8.6–10.5)
CHLORIDE SERPL-SCNC: 104 MMOL/L (ref 98–107)
CO2 SERPL-SCNC: 25 MMOL/L (ref 22–29)
CREAT BLD-MCNC: 0.99 MG/DL (ref 0.76–1.27)
DEPRECATED RDW RBC AUTO: 44 FL (ref 37–54)
EOSINOPHIL # BLD AUTO: 0.33 10*3/MM3 (ref 0–0.4)
EOSINOPHIL NFR BLD AUTO: 5.2 % (ref 0.3–6.2)
ERYTHROCYTE [DISTWIDTH] IN BLOOD BY AUTOMATED COUNT: 13.1 % (ref 12.3–15.4)
GFR SERPL CREATININE-BSD FRML MDRD: 74 ML/MIN/1.73
GLUCOSE BLD-MCNC: 112 MG/DL (ref 65–99)
HCT VFR BLD AUTO: 39.6 % (ref 37.5–51)
HGB BLD-MCNC: 13.5 G/DL (ref 13–17.7)
IMM GRANULOCYTES # BLD AUTO: 0.01 10*3/MM3 (ref 0–0.05)
IMM GRANULOCYTES NFR BLD AUTO: 0.2 % (ref 0–0.5)
INR PPP: 0.96 (ref 0.91–1.09)
LYMPHOCYTES # BLD AUTO: 0.91 10*3/MM3 (ref 0.7–3.1)
LYMPHOCYTES NFR BLD AUTO: 14.4 % (ref 19.6–45.3)
MCH RBC QN AUTO: 31.7 PG (ref 26.6–33)
MCHC RBC AUTO-ENTMCNC: 34.1 G/DL (ref 31.5–35.7)
MCV RBC AUTO: 93 FL (ref 79–97)
MONOCYTES # BLD AUTO: 0.6 10*3/MM3 (ref 0.1–0.9)
MONOCYTES NFR BLD AUTO: 9.5 % (ref 5–12)
NEUTROPHILS # BLD AUTO: 4.41 10*3/MM3 (ref 1.7–7)
NEUTROPHILS NFR BLD AUTO: 70.1 % (ref 42.7–76)
NRBC BLD AUTO-RTO: 0 /100 WBC (ref 0–0.2)
PLATELET # BLD AUTO: 231 10*3/MM3 (ref 140–450)
PMV BLD AUTO: 9.3 FL (ref 6–12)
POTASSIUM BLD-SCNC: 4.8 MMOL/L (ref 3.5–5.2)
PROTHROMBIN TIME: 12.4 SECONDS (ref 11.9–14.6)
RBC # BLD AUTO: 4.26 10*6/MM3 (ref 4.14–5.8)
SARS-COV-2 RNA RESP QL NAA+PROBE: NOT DETECTED
SODIUM BLD-SCNC: 140 MMOL/L (ref 136–145)
WBC NRBC COR # BLD: 6.3 10*3/MM3 (ref 3.4–10.8)

## 2020-05-04 PROCEDURE — 80048 BASIC METABOLIC PNL TOTAL CA: CPT | Performed by: NURSE PRACTITIONER

## 2020-05-04 PROCEDURE — 36415 COLL VENOUS BLD VENIPUNCTURE: CPT

## 2020-05-04 PROCEDURE — 85730 THROMBOPLASTIN TIME PARTIAL: CPT | Performed by: NURSE PRACTITIONER

## 2020-05-04 PROCEDURE — 85025 COMPLETE CBC W/AUTO DIFF WBC: CPT | Performed by: NURSE PRACTITIONER

## 2020-05-04 PROCEDURE — 85610 PROTHROMBIN TIME: CPT | Performed by: NURSE PRACTITIONER

## 2020-05-04 PROCEDURE — 87635 SARS-COV-2 COVID-19 AMP PRB: CPT | Performed by: SURGERY

## 2020-05-04 RX ORDER — MULTIPLE VITAMINS W/ MINERALS TAB 9MG-400MCG
1 TAB ORAL DAILY
COMMUNITY

## 2020-05-04 NOTE — TELEPHONE ENCOUNTER
Spoke with patient and advised that his arrival time had been moved to 600 am due to a cancellation. Patient expressed understanding for all that had been discussed.

## 2020-05-04 NOTE — H&P
5/3/2020         Alma Mathias, APRN  [address] [place] [address]     Justin Szymanski  1945          Chief Complaint   Patient presents with   • Follow-up       2 Week Follow Up For Bilateral Carotid Artery Stenosis. Test [id number] CT pad angiogram neck w wo. Patient denies any stroke like symptoms.          Dear Alma Mathias, APRN         HPI  I had the pleasure of seeing your patient Justin Szymanski in the office today.    As you recall, Justin Szymanski is a 74 y.o.  male who we are following for asymptomatic carotid disease.  He was initially sent following a [facility] screening. His ABIs and us aorta were normal.  He denies any strokelike symptoms.  He is maintained on aspirin and Lipitor.  Testing does show he has aneurysm of the ascending aorta 4.3 cm.  He was recently hospitalized with complaints of right arm numbness and increased tremor.        Past Medical History:   Diagnosis Date   • Allergic rhinitis    • Carotid artery disease (CMS/HCC)    • GERD (gastroesophageal reflux disease)    • Heart murmur    • Hyperlipidemia    • Hypertension    • Hypothyroidism    • Nasal polyposis    • Seasonal allergic rhinitis    • Sleep apnea    • Snoring    • Stroke (cerebrum) (CMS/HCC) 4/29/2020   • Valvular disease      Past Surgical History:   Procedure Laterality Date   • COLONOSCOPY  07/21/2016    two polyps ,both removed   • ENDOSCOPY  04/18/2013    normal   • ENDOSCOPY N/A 1/26/2018    Procedure: ESOPHAGOGASTRODUODENOSCOPY WITH ANESTHESIA;  Surgeon: Marc Mart DO;  Location: [facility] ENDOSCOPY;  Service:    • SINUS SURGERY      Dr. Griffith   • ULNAR NERVE REPAIR       Family History   Problem Relation Age of Onset   • Hyperlipidemia Mother    • Cancer Father    • Hypertension Father    • Hyperlipidemia Father    • Colon cancer Maternal Grandfather      Social History     Tobacco Use   • Smoking status: Never Smoker   • Smokeless tobacco: Never Used   Substance Use Topics   • Alcohol use: Yes      Alcohol/week: 1.0 standard drinks     Types: 1 Glasses of wine per week     Comment: nightly - red wine    • Drug use: No     No Known Allergies  No current facility-administered medications for this encounter.     Current Outpatient Medications:   •  ALPRAZolam (XANAX) 0.5 MG tablet, Take 0.25 mg by mouth 2 (Two) Times a Day As Needed for Anxiety., Disp: , Rfl:   •  aspirin 81 MG chewable tablet, Chew 81 mg Daily., Disp: , Rfl:   •  atorvastatin (LIPITOR) 80 MG tablet, Take 1 tablet by mouth Every Night., Disp: 30 tablet, Rfl: 0  •  desonide (DESOWEN) 0.05 % cream, APPLY TO FACE QD, Disp: , Rfl: 11  •  dicyclomine (BENTYL) 20 MG tablet, Take 20 mg by mouth Every 6 (Six) Hours As Needed., Disp: , Rfl:   •  FLUoxetine (PROzac) 20 MG capsule, TK 1 C PO QD, Disp: , Rfl: 0  •  fluticasone (FLONASE) 50 MCG/ACT nasal spray, 2 sprays into each nostril Daily., Disp: , Rfl:   •  levothyroxine (SYNTHROID, LEVOTHROID) 75 MCG tablet, Take 75 mcg by mouth Daily., Disp: , Rfl:   •  lisinopril (PRINIVIL,ZESTRIL) 5 MG tablet, Take 1 tablet by mouth Daily., Disp: 30 tablet, Rfl: 0  •  omeprazole (priLOSEC) 40 MG capsule, Take 40 mg by mouth Daily., Disp: , Rfl:   •  tadalafil (CIALIS) 20 MG tablet, Take 20 mg by mouth Daily As Needed for erectile dysfunction., Disp: , Rfl:   •  valACYclovir (VALTREX) 1000 MG tablet, Take 1,000 mg by mouth 2 (Two) Times a Day As Needed., Disp: , Rfl:   •  zolpidem (AMBIEN) 10 MG tablet, Take 10 mg by mouth At Night As Needed for Sleep., Disp: , Rfl:         Review of Systems   Constitutional: Negative.    HENT: Negative.         Allergies   Eyes: Negative.    Respiratory: Negative.    Cardiovascular: Negative.    Gastrointestinal: Negative.    Endocrine: Negative.    Genitourinary: Negative.    Musculoskeletal: Negative.    Skin: Negative.    Allergic/Immunologic: Negative.    Neurological: Negative.    Hematological: Negative.    Psychiatric/Behavioral: Negative.    All other systems reviewed and  "are negative.        /86   Pulse 83   Ht 172.7 cm (68\")   Wt 80.3 kg (177 lb)   SpO2 97%   BMI 26.91 kg/m²   Physical Exam   Constitutional: He is oriented to person, place, and time. Vital signs are normal. He appears well-developed and well-nourished.   HENT:   Head: Normocephalic and atraumatic.   Eyes: Pupils are equal, round, and reactive to light. No scleral icterus.   Neck: Normal range of motion. Neck supple. No thyromegaly present.   Cardiovascular: Normal rate, regular rhythm and intact distal pulses.   Murmur heard.   Systolic murmur is present.  Pulmonary/Chest: Effort normal and breath sounds normal.   Abdominal: Soft. Bowel sounds are normal.   Musculoskeletal: Normal range of motion.   Neurological: He is alert and oriented to person, place, and time.   Skin: Skin is warm and dry.   Psychiatric: He has a normal mood and affect. His behavior is normal. Judgment and thought content normal.   Nursing note and vitals reviewed.        Diagnostic Data:      Ct Angiogram Neck     Result Date: 3/24/2020  Narrative: EXAMINATION: CT ANGIOGRAM NECK- 3/24/2020 9:03 AM CDT  HISTORY: Carotid stenosis, known or suspected; I65.23-Occlusion and stenosis of bilateral carotid arteries  DOSE: 240 mGycm (Automatic exposure control technique was implemented in an effort to keep the radiation dose as low as possible without compromising image quality)  REPORT: Spiral CT of the neck was performed after administration of intravenous contrast using CTA protocol, which includes reconstructed coronal, sagittal, oblique and 3-D images.  COMPARISON: CTA neck 09/13/2018.  Review of lung windows demonstrates nonconfluent densities in the upper lung zones most likely related to atelectasis. The visualized ascending aorta is dilated up to 4.3 cm, compared with 4.2 cm before. No aortic dissection is seen. There is a small amount calcified plaque within the aortic arch. There is a small amount calcified plaque at the origins " of the subclavian arteries and left common carotid artery.  The right common carotid artery is patent, normal caliber, without evidence of dissection. There is a small amount of calcified atherosclerotic plaque at the carotid bulb and proximal right ICA, with less than 50% stenosis based on 2-D lumen measurements. The smallest right ICA caliber measures 4.5 mm, more distally, the right ICA caliber measures 4.6 mm. The distal right ICA is patent, there is a small amount calcified plaque within the cavernous portion of the vessel.  The left common carotid artery is patent, normal caliber, without evidence of dissection. There is extensive calcified plaque at the carotid bulb and a small amount of torsion of the left ICA. The smallest patent caliber of the proximal left ICA measures 4.2 mm, more distally, the ICA on the left has a diameter of 4 mm. This corresponds with less than 50% vessel stenosis. The distal left ICA is patent, there is a small amount calcified plaque within the cavernous portion of the vessel. This is stable.  Both vertebral arteries are patent, the right vertebral artery is dominant compared to the left. No vertebral dissection is identified. Intracranially, the basilar artery and branch vessels in the posterior circulation appear patent. At the [place], the middle and anterior cerebral arteries appear patent.  Soft tissue windows show no evidence of cervical lymphadenopathy. The airway is patent. The thyroid gland is small and slightly heterogeneous. There are surgical defects in the medial wall of both maxillary sinuses. There is mucosal thickening at the base of both frontal sinuses compatible with chronic sinusitis.       Impression: 1. Calcified atherosclerotic plaque seen within both proximal internal carotid arteries and at the carotid bifurcations, left greater than right, with less than 50% stenosis of the carotid arteries bilaterally. There is no carotid dissection. 2. Patency  of the vertebral arteries, with no significant stenosis and no evidence of dissection. 3. Aneurysmal dilation of the distal descending aorta, with a maximum diameter of 4.3 cm, compared with 4.2 cm on the previous CTA. 4. Chronic sinusitis at the bases of the frontal sinuses and evidence of previous sinonasal surgery involving the medial walls of both maxillary sinuses.   This report was finalized on 03/24/2020 09:16 by Dr. Hugo Fernandez MD.     Us Carotid Bilateral     Result Date: 3/10/2020  Narrative: History: Carotid occlusive disease       Impression: Impression: 1. There is less than 50% stenosis of the right internal carotid artery. 2. There is 50-69% stenosis of the left internal carotid artery. 3. Antegrade flow is demonstrated in bilateral vertebral arteries.  Comments: Bilateral carotid vertebral arterial duplex scan was performed.  Grayscale imaging shows intimal thickening and calcified elements at the carotid bifurcation. The right internal carotid artery peak systolic velocity is 87.4 cm/sec. The end-diastolic velocity is 31.1 cm/sec. The right ICA/CCA ratio is approximately 0.93 . These findings correlate with less than 50% stenosis of the right internal carotid artery.  Grayscale imaging shows intimal thickening and calcified elements at the carotid bifurcation. The left internal carotid artery peak systolic velocity is 283 cm/sec. The end-diastolic velocity is 67.6 cm/sec. The left ICA/CCA ratio is approximately 2.62 . These findings correlate with 50-69% stenosis of the left internal carotid artery.  Antegrade flow is demonstrated in bilateral vertebral arteries. Greater than 50% stenosis of the proximal left external carotid artery. This report was finalized on 03/10/2020 10:01 by Dr. Tobias Vieira MD.            Patient Active Problem List   Diagnosis   • Allergic rhinitis due to pollen   • Snoring   • Nasal polyposis - hx of   • ADITHYA (obstructive sleep apnea)   • ADITHYA on CPAP   • Indigestion    • Heart murmur   • Hypertension   • Hyperlipidemia   • Ascending aortic aneurysm (CMS/HCC)             ICD-10-CM ICD-9-CM   1. Bilateral carotid artery stenosis I65.23 433.10       433.30   2. Ascending aortic aneurysm (CMS/HCC) I71.2 441.2   3. Essential hypertension I10 401.9   4. Hyperlipidemia, unspecified hyperlipidemia type E78.5 272.4         Plan: After thoroughly evaluating Justin Szymanski, I believe the best course of action is to proceed with a left carotid endarterectomy for stroke risk reduction.  Risks of carotid endarterectomy include, but are not limited to, bleeding, infection, vessel damage, nerve damage, MI, stroke, and death.  The patient understands these risks and would like to proceed with the procedure.  I did review his testing which shows about 90% left carotid stenosis.  We will arrange for cardiac clearance.  Once we receive clearance, we will schedule his procedure accordingly.  He also has an ascending aortic aneurysm.  We will continue to follow.  Body mass index is 26.91 kg/m².  I did discuss vascular risk factors as they pertain to the progression of vascular disease including controlling his hypertension and hyperlipidemia, which are stable. The patient can continue taking her current medication regimen as previously planned.  This was all discussed in full with complete understanding.     Thank you for allowing me to participate in the care of your patient.  Please do not hesitate with any questions or concerns.  I will keep you aware of any further encounters with Justin Szymanski.           Sincerely yours,           DO Mey Regalado Mariah L, APRN

## 2020-05-04 NOTE — DISCHARGE INSTRUCTIONS
DAY OF SURGERY INSTRUCTIONS        YOUR SURGEON: Sb Hunter     PROCEDURE: Left Carotid Endarterectomy with Electroencephalogram     DATE OF SURGERY: May 6, 2020     ARRIVAL TIME: AS DIRECTED BY OFFICE    YOU MAY TAKE THE FOLLOWING MEDICATION(S) THE MORNING OF SURGERY WITH A SIP OF WATER: none       ALL OTHER HOME MEDICATION CHECK WITH YOUR PHYSICIAN      DO NOT TAKE ANY ERECTILE DYSFUNCTION MEDICATIONS (EX: CIALIS, VIAGRA) 24 HOURS PRIOR TO SURGERY                      MANAGING PAIN AFTER SURGERY    We know you are probably wondering what your pain will be like after surgery.  Following surgery it is unrealistic to expect you will not have pain.   Pain is how our bodies let us know that something is wrong or cautions us to be careful.  That said, our goal is to make your pain tolerable.    Methods we may use to treat your pain include (oral or IV medications, PCAs, epidurals, nerve blocks, etc.)   While some procedures require IV pain medications for a short time after surgery, transitioning to pain medications by mouth allows for better management of pain.   Your nurse will encourage you to take oral pain medications whenever possible.  IV medications work almost immediately, but only last a short while.  Taking medications by mouth allows for a more constant level of medication in your blood stream for a longer period of time.      Once your pain is out of control it is harder to get back under control.  It is important you are aware when your next dose of pain medication is due.  If you are admitted, your nurse may write the time of your next dose on the white board in your room to help you remember.      We are interested in your pain and encourage you to inform us about aggravating factors during your visit.   Many times a simple repositioning every few hours can make a big difference.    If your physician says it is okay, do not let your pain prevent you from getting out of bed. Be sure to call your  nurse for assistance prior to getting up so you do not fall.      Before surgery, please decide your tolerable pain goal.  These faces help describe the pain ratings we use on a 0-10 scale.   Be prepared to tell us your goal and whether or not you take pain or anxiety medications at home.          BEFORE YOU COME TO THE HOSPITAL  (Pre-op instructions)  • Do not eat, drink, smoke or chew gum after midnight the night before surgery.  This also includes no mints.  • Morning of surgery take only the medicines you have been instructed with a sip of water unless otherwise instructed  by your physician.  • Do not shave, wear makeup or dark nail polish.  • Remove all jewelry including rings.  • Leave anything you consider valuable at home.  • Leave your suitcase in the car until after your surgery.  • Bring the following with you if applicable:  o Picture ID and insurance, Medicare or Medicaid cards  o Co-pay/deductible required by insurance (cash, check, credit card)  o Copy of advance directive, living will or power-of- documents if not brought to PAT  o CPAP or BIPAP mask and tubing  o Relaxation aids ( book, magazine), etc.  o Hearing aids                        ON THE DAY OF SURGERY  · On the day of surgery check in at registration located at the main entrance of the hospital.   ? You will be registered and given a beeper with instructions where to wait in the main lobby.  ? When your beeper lights up and vibrates a member of the Outpatient Surgery staff will meet you at the double doors under the stair steps and escort you to your preoperative room.   · You may have cloth compression devices placed on your legs. These help to prevent blood clots and reduce swelling in your legs.  · An IV may be inserted into one of your veins.  · In the operating room, you may be given one or more of the following:  ? A medicine to help you relax (sedative).  ? A medicine to numb the area (local anesthetic).  ? A medicine to  "make you fall asleep (general anesthetic).  ? A medicine that is injected into an area of your body to numb everything below the injection site (regional anesthetic).  · Your surgical site will be marked or identified.  · You may be given an antibiotic through your IV to help prevent infection.  Contact a health care provider if you:  · Develop a fever of more than 100.4°F (38°C) or other feelings of illness during the 48 hours before your surgery.  · Have symptoms that get worse.  Have questions or concerns about your surgery    General Anesthesia/Surgery, Adult  General anesthesia is the use of medicines to make a person \"go to sleep\" (unconscious) for a medical procedure. General anesthesia must be used for certain procedures, and is often recommended for procedures that:  · Last a long time.  · Require you to be still or in an unusual position.  · Are major and can cause blood loss.  The medicines used for general anesthesia are called general anesthetics. As well as making you unconscious for a certain amount of time, these medicines:  · Prevent pain.  · Control your blood pressure.  · Relax your muscles.  Tell a health care provider about:  · Any allergies you have.  · All medicines you are taking, including vitamins, herbs, eye drops, creams, and over-the-counter medicines.  · Any problems you or family members have had with anesthetic medicines.  · Types of anesthetics you have had in the past.  · Any blood disorders you have.  · Any surgeries you have had.  · Any medical conditions you have.  · Any recent upper respiratory, chest, or ear infections.  · Any history of:  ? Heart or lung conditions, such as heart failure, sleep apnea, asthma, or chronic obstructive pulmonary disease (COPD).  ?  service.  ? Depression or anxiety.  · Any tobacco or drug use, including marijuana or alcohol use.  · Whether you are pregnant or may be pregnant.  What are the risks?  Generally, this is a safe procedure. " However, problems may occur, including:  · Allergic reaction.  · Lung and heart problems.  · Inhaling food or liquid from the stomach into the lungs (aspiration).  · Nerve injury.  · Air in the bloodstream, which can lead to stroke.  · Extreme agitation or confusion (delirium) when you wake up from the anesthetic.  · Waking up during your procedure and being unable to move. This is rare.  These problems are more likely to develop if you are having a major surgery or if you have an advanced or serious medical condition. You can prevent some of these complications by answering all of your health care provider's questions thoroughly and by following all instructions before your procedure.  General anesthesia can cause side effects, including:  · Nausea or vomiting.  · A sore throat from the breathing tube.  · Hoarseness.  · Wheezing or coughing.  · Shaking chills.  · Tiredness.  · Body aches.  · Anxiety.  · Sleepiness or drowsiness.  · Confusion or agitation.  RISKS AND COMPLICATIONS OF SURGERY  Your health care provider will discuss possible risks and complications with you before surgery. Common risks and complications include:    · Problems due to the use of anesthetics.  · Blood loss and replacement (does not apply to minor surgical procedures).  · Temporary increase in pain due to surgery.  · Uncorrected pain or problems that the surgery was meant to correct.  · Infection.  · New damage.    What happens before the procedure?    Medicines  Ask your health care provider about:  · Changing or stopping your regular medicines. This is especially important if you are taking diabetes medicines or blood thinners.  · Taking medicines such as aspirin and ibuprofen. These medicines can thin your blood. Do not take these medicines unless your health care provider tells you to take them.  · Taking over-the-counter medicines, vitamins, herbs, and supplements. Do not take these during the week before your procedure unless your  health care provider approves them.  General instructions  · Starting 3-6 weeks before the procedure, do not use any products that contain nicotine or tobacco, such as cigarettes and e-cigarettes. If you need help quitting, ask your health care provider.  · If you brush your teeth on the morning of the procedure, make sure to spit out all of the toothpaste.  · Tell your health care provider if you become ill or develop a cold, cough, or fever.  · If instructed by your health care provider, bring your sleep apnea device with you on the day of your surgery (if applicable).  · Ask your health care provider if you will be going home the same day, the following day, or after a longer hospital stay.  ? Plan to have someone take you home from the hospital or clinic.  ? Plan to have a responsible adult care for you for at least 24 hours after you leave the hospital or clinic. This is important.  What happens during the procedure?  · You will be given anesthetics through both of the following:  ? A mask placed over your nose and mouth.  ? An IV in one of your veins.  · You may receive a medicine to help you relax (sedative).  · After you are unconscious, a breathing tube may be inserted down your throat to help you breathe. This will be removed before you wake up.  · An anesthesia specialist will stay with you throughout your procedure. He or she will:  ? Keep you comfortable and safe by continuing to give you medicines and adjusting the amount of medicine that you get.  ? Monitor your blood pressure, pulse, and oxygen levels to make sure that the anesthetics do not cause any problems.  The procedure may vary among health care providers and hospitals.  What happens after the procedure?  · Your blood pressure, temperature, heart rate, breathing rate, and blood oxygen level will be monitored until the medicines you were given have worn off.  · You will wake up in a recovery area. You may wake up slowly.  · If you feel anxious  or agitated, you may be given medicine to help you calm down.  · If you will be going home the same day, your health care provider may check to make sure you can walk, drink, and urinate.  · Your health care provider will treat any pain or side effects you have before you go home.  · Do not drive for 24 hours if you were given a sedative.  Summary  · General anesthesia is used to keep you still and prevent pain during a procedure.  · It is important to tell your healthcare provider about your medical history and any surgeries you have had, and previous experience with anesthesia.  · Follow your healthcare provider’s instructions about when to stop eating, drinking, or taking certain medicines before your procedure.  · Plan to have someone take you home from the hospital or clinic.  This information is not intended to replace advice given to you by your health care provider. Make sure you discuss any questions you have with your health care provider.  Document Released: 03/26/2009 Document Revised: 08/03/2018 Document Reviewed: 08/03/2018  [employer] Interactive Patient Education © 2019 [employer]       Fall Prevention in Hospitals, Adult  As a hospital patient, your condition and the treatments you receive can increase your risk for falls. Some additional risk factors for falls in a hospital include:  · Being in an unfamiliar environment.  · Being on bed rest.  · Your surgery.  · Taking certain medicines.  · Your tubing requirements, such as intravenous (IV) therapy or catheters.  It is important that you learn how to decrease fall risks while at the hospital. Below are important tips that can help prevent falls.  SAFETY TIPS FOR PREVENTING FALLS  Talk about your risk of falling.  · Ask your health care provider why you are at risk for falling. Is it your medicine, illness, tubing placement, or something else?  · Make a plan with your health care provider to keep you safe from falls.  · Ask your health care  provider or pharmacist about side effects of your medicines. Some medicines can make you dizzy or affect your coordination.  Ask for help.  · Ask for help before getting out of bed. You may need to press your call button.  · Ask for assistance in getting safely to the toilet.  · Ask for a walker or cane to be put at your bedside. Ask that most of the side rails on your bed be placed up before your health care provider leaves the room.  · Ask family or friends to sit with you.  · Ask for things that are out of your reach, such as your glasses, hearing aids, telephone, bedside table, or call button.  Follow these tips to avoid falling:  · Stay lying or seated, rather than standing, while waiting for help.  · Wear rubber-soled slippers or shoes whenever you walk in the hospital.  · Avoid quick, sudden movements.  ¨ Change positions slowly.  ¨ Sit on the side of your bed before standing.  ¨ Stand up slowly and wait before you start to walk.  · Let your health care provider know if there is a spill on the floor.  · Pay careful attention to the medical equipment, electrical cords, and tubes around you.  · When you need help, use your call button by your bed or in the bathroom. Wait for one of your health care providers to help you.  · If you feel dizzy or unsure of your footing, return to bed and wait for assistance.  · Avoid being distracted by the TV, telephone, or another person in your room.  · Do not lean or support yourself on rolling objects, such as IV poles or bedside tables.     This information is not intended to replace advice given to you by your health care provider. Make sure you discuss any questions you have with your health care provider.     Document Released: 12/15/2001 Document Revised: 01/08/2016 Document Reviewed: 08/25/2013  [employer] Interactive Patient Education ©2016 [employer]       [facility] 4% Patient Instruction Sheet    Chlorhexidine Before Surgery  Chlorhexidine  gluconate (CHG) is a germ-killing (antiseptic) solution that is used to clean the skin. It gets rid of the bacteria that normally live on the skin. Cleaning your skin with CHG before surgery helps lower the risk for infection after surgery.    How to use CHG solution  · You will take 2 showers, one shower the night before surgery, the second shower the morning of surgery before coming to the hospital.  · Use CHG only as told by your health care provider, and follow the instructions on the label.  · Use CHG solution while taking a shower. Follow these steps when using CHG solution (unless your health care provider gives you different instructions):  1. Start the shower.  2. Use your normal soap and shampoo to wash your face and hair.  3. Turn off the shower or move out of the shower stream.  4. Pour the CHG onto a clean washcloth. Do not use any type of brush or rough-edged sponge.  5. Starting at your neck, lather your body down to your toes. Make sure you:  6. Pay special attention to the part of your body where you will be having surgery. Scrub this area for at least 1 minute.  7. Use the full amount of CHG as directed. Usually, this is one half bottle for each shower.  8. Do not use CHG on your head or face. If the solution gets into your ears or eyes, rinse them well with water.  9. Avoid your genital area.  10. Avoid any areas of skin that have broken skin, cuts, or scrapes.  11. Scrub your back and under your arms. Make sure to wash skin folds.  12. Let the lather sit on your skin for 1-2 minutes or as long as told by your health care  provider.  13. Thoroughly rinse your entire body in the shower. Make sure that all body creases and crevices are rinsed well.  14. Dry off with a clean towel. Do not put any substances on your body afterward, such as powder, lotion, or perfume.  15. Put on clean clothes or pajamas.  16. If it is the night before your surgery, sleep in clean sheets.    What are the risks?  Risks  of using CHG include:  · A skin reaction.  · Hearing loss, if CHG gets in your ears.  · Eye injury, if CHG gets in your eyes and is not rinsed out.  · The CHG product catching fire.  Make sure that you avoid smoking and flames after applying CHG to your skin.  Do not use CHG:  · If you have a chlorhexidine allergy or have previously reacted to chlorhexidine.  · On babies younger than 2 months of age.      On the day of surgery, when you are taken to your room in Outpatient Surgery you will be given a CHG prepackaged cloth to wipe the site for your surgery.  How to use CHG prepackaged cloths  · Follow the instructions on the label.  · Use the CHG cloth on clean, dry skin. Follow these steps when using a CHG cloth (unless your health care provider gives you different instructions):  1. Using the CHG cloth, vigorously scrub the part of your body where you will be having surgery. Scrub using a back-and-forth motion for 3 minutes. The area on your body should be completely wet with CHG when you are finished scrubbing.  2. Do not rinse. Discard the cloth and let the area air-dry for 1 minute. Do not put any substances on your body afterward, such as powder, lotion, or perfume.  Contact a health care provider if:  · Your skin gets irritated after scrubbing.  · You have questions about using your solution or cloth.  Get help right away if:  · Your eyes become very red or swollen.  · Your eyes itch badly.  · Your skin itches badly and is red or swollen.  · Your hearing changes.  · You have trouble seeing.  · You have swelling or tingling in your mouth or throat.  · You have trouble breathing.  · You swallow any chlorhexidine.  Summary  · Chlorhexidine gluconate (CHG) is a germ-killing (antiseptic) solution that is used to clean the skin. Cleaning your skin with CHG before surgery helps lower the risk for infection after surgery.  · You may be given CHG to use at home. It may be in a bottle or in a prepackaged cloth to use on  your skin. Carefully follow your health care provider's instructions and the instructions on the product label.  · Do not use CHG if you have a chlorhexidine allergy.  · Contact your health care provider if your skin gets irritated after scrubbing.  This information is not intended to replace advice given to you by your health care provider. Make sure you discuss any questions you have with your health care provider.  Document Released: 09/11/2013 Document Revised: 11/15/2018 Document Reviewed: 11/15/2018  [employer] Interactive Patient Education © 2019 [employer]          PATIENT/FAMILY/RESPONSIBLE PARTY VERBALIZES UNDERSTANDING OF ABOVE EDUCATION.  COPY OF PAIN SCALE GIVEN AND REVIEWED WITH VERBALIZED UNDERSTANDING.

## 2020-05-05 ENCOUNTER — TELEPHONE (OUTPATIENT)
Dept: VASCULAR SURGERY | Facility: CLINIC | Age: 75
End: 2020-05-05

## 2020-05-05 NOTE — TELEPHONE ENCOUNTER
Patient called and confirmed his arrival time tomorrow of 600 am for his procedure with Dr. Hunter.

## 2020-05-06 ENCOUNTER — HOSPITAL ENCOUNTER (INPATIENT)
Facility: HOSPITAL | Age: 75
LOS: 1 days | Discharge: HOME | End: 2020-05-07
Attending: SURGERY | Admitting: SURGERY

## 2020-05-06 ENCOUNTER — ANESTHESIA (OUTPATIENT)
Dept: PERIOP | Facility: HOSPITAL | Age: 75
End: 2020-05-06

## 2020-05-06 ENCOUNTER — ANESTHESIA EVENT (OUTPATIENT)
Dept: PERIOP | Facility: HOSPITAL | Age: 75
End: 2020-05-06

## 2020-05-06 ENCOUNTER — APPOINTMENT (OUTPATIENT)
Dept: ULTRASOUND IMAGING | Facility: HOSPITAL | Age: 75
End: 2020-05-06

## 2020-05-06 DIAGNOSIS — Z98.890 S/P CAROTID ENDARTERECTOMY: Primary | ICD-10-CM

## 2020-05-06 DIAGNOSIS — Z01.818 PREOP TESTING: ICD-10-CM

## 2020-05-06 DIAGNOSIS — I65.23 BILATERAL CAROTID ARTERY STENOSIS: ICD-10-CM

## 2020-05-06 PROBLEM — I65.29 CAROTID STENOSIS: Status: ACTIVE | Noted: 2020-05-06

## 2020-05-06 LAB
ABO GROUP BLD: NORMAL
BLD GP AB SCN SERPL QL: NEGATIVE
RH BLD: POSITIVE
T&S EXPIRATION DATE: NORMAL

## 2020-05-06 PROCEDURE — 25010000002 NEOSTIGMINE 10 MG/10ML SOLUTION: Performed by: NURSE ANESTHETIST, CERTIFIED REGISTERED

## 2020-05-06 PROCEDURE — 93882 EXTRACRANIAL UNI/LTD STUDY: CPT

## 2020-05-06 PROCEDURE — 86900 BLOOD TYPING SEROLOGIC ABO: CPT | Performed by: NURSE PRACTITIONER

## 2020-05-06 PROCEDURE — 03UL0KZ SUPPLEMENT LEFT INTERNAL CAROTID ARTERY WITH NONAUTOLOGOUS TISSUE SUBSTITUTE, OPEN APPROACH: ICD-10-PCS | Performed by: SURGERY

## 2020-05-06 PROCEDURE — 25010000002 PROTAMINE SULFATE PER 10 MG: Performed by: NURSE ANESTHETIST, CERTIFIED REGISTERED

## 2020-05-06 PROCEDURE — 25010000003 CEFAZOLIN SODIUM-DEXTROSE 2-3 GM-%(50ML) RECONSTITUTED SOLUTION: Performed by: NURSE PRACTITIONER

## 2020-05-06 PROCEDURE — 25010000002 FENTANYL CITRATE (PF) 100 MCG/2ML SOLUTION: Performed by: ANESTHESIOLOGY

## 2020-05-06 PROCEDURE — 94799 UNLISTED PULMONARY SVC/PX: CPT

## 2020-05-06 PROCEDURE — 86850 RBC ANTIBODY SCREEN: CPT | Performed by: NURSE PRACTITIONER

## 2020-05-06 PROCEDURE — 93882 EXTRACRANIAL UNI/LTD STUDY: CPT | Performed by: SURGERY

## 2020-05-06 PROCEDURE — 25010000003 LIDOCAINE 1 % SOLUTION: Performed by: SURGERY

## 2020-05-06 PROCEDURE — 86901 BLOOD TYPING SEROLOGIC RH(D): CPT | Performed by: NURSE PRACTITIONER

## 2020-05-06 PROCEDURE — 25010000002 FENTANYL CITRATE (PF) 100 MCG/2ML SOLUTION: Performed by: NURSE ANESTHETIST, CERTIFIED REGISTERED

## 2020-05-06 PROCEDURE — 25010000002 PROPOFOL 10 MG/ML EMULSION: Performed by: NURSE ANESTHETIST, CERTIFIED REGISTERED

## 2020-05-06 PROCEDURE — 25010000002 HEPARIN (PORCINE) PER 1000 UNITS: Performed by: NURSE ANESTHETIST, CERTIFIED REGISTERED

## 2020-05-06 PROCEDURE — 25010000002 ONDANSETRON PER 1 MG: Performed by: NURSE ANESTHETIST, CERTIFIED REGISTERED

## 2020-05-06 PROCEDURE — 88304 TISSUE EXAM BY PATHOLOGIST: CPT | Performed by: SURGERY

## 2020-05-06 PROCEDURE — 25010000003 CEFAZOLIN SODIUM-DEXTROSE 2-3 GM-%(50ML) RECONSTITUTED SOLUTION: Performed by: SURGERY

## 2020-05-06 PROCEDURE — 03CL0ZZ EXTIRPATION OF MATTER FROM LEFT INTERNAL CAROTID ARTERY, OPEN APPROACH: ICD-10-PCS | Performed by: SURGERY

## 2020-05-06 PROCEDURE — C1768 GRAFT, VASCULAR: HCPCS | Performed by: SURGERY

## 2020-05-06 PROCEDURE — 35301 RECHANNELING OF ARTERY: CPT | Performed by: SURGERY

## 2020-05-06 PROCEDURE — 25010000002 DEXAMETHASONE PER 1 MG: Performed by: NURSE ANESTHETIST, CERTIFIED REGISTERED

## 2020-05-06 PROCEDURE — 88311 DECALCIFY TISSUE: CPT | Performed by: SURGERY

## 2020-05-06 PROCEDURE — 25010000002 SUCCINYLCHOLINE PER 20 MG: Performed by: NURSE ANESTHETIST, CERTIFIED REGISTERED

## 2020-05-06 PROCEDURE — 25010000002 PHENYLEPHRINE 10 MG/ML SOLUTION 5 ML VIAL: Performed by: NURSE ANESTHETIST, CERTIFIED REGISTERED

## 2020-05-06 PROCEDURE — 25010000003 CEFAZOLIN PER 500 MG: Performed by: SURGERY

## 2020-05-06 DEVICE — PTCH VASC XENOSURE BIO 0.8X8CM: Type: IMPLANTABLE DEVICE | Site: CAROTID | Status: FUNCTIONAL

## 2020-05-06 DEVICE — IMPLANTABLE DEVICE: Type: IMPLANTABLE DEVICE | Site: CAROTID | Status: FUNCTIONAL

## 2020-05-06 RX ORDER — ALPRAZOLAM 0.25 MG/1
0.25 TABLET ORAL 2 TIMES DAILY PRN
Status: DISCONTINUED | OUTPATIENT
Start: 2020-05-06 | End: 2020-05-07 | Stop reason: HOSPADM

## 2020-05-06 RX ORDER — LEVOTHYROXINE SODIUM 0.07 MG/1
75 TABLET ORAL
Status: DISCONTINUED | OUTPATIENT
Start: 2020-05-07 | End: 2020-05-07 | Stop reason: HOSPADM

## 2020-05-06 RX ORDER — DICYCLOMINE HCL 20 MG
20 TABLET ORAL EVERY 6 HOURS PRN
Status: DISCONTINUED | OUTPATIENT
Start: 2020-05-06 | End: 2020-05-07 | Stop reason: HOSPADM

## 2020-05-06 RX ORDER — LISINOPRIL 5 MG/1
5 TABLET ORAL DAILY
Status: DISCONTINUED | OUTPATIENT
Start: 2020-05-07 | End: 2020-05-07 | Stop reason: HOSPADM

## 2020-05-06 RX ORDER — CEFAZOLIN SODIUM 2 G/50ML
2 SOLUTION INTRAVENOUS EVERY 8 HOURS
Status: COMPLETED | OUTPATIENT
Start: 2020-05-06 | End: 2020-05-07

## 2020-05-06 RX ORDER — FENTANYL CITRATE 50 UG/ML
INJECTION, SOLUTION INTRAMUSCULAR; INTRAVENOUS AS NEEDED
Status: DISCONTINUED | OUTPATIENT
Start: 2020-05-06 | End: 2020-05-06 | Stop reason: SURG

## 2020-05-06 RX ORDER — OXYCODONE AND ACETAMINOPHEN 7.5; 325 MG/1; MG/1
2 TABLET ORAL ONCE AS NEEDED
Status: DISCONTINUED | OUTPATIENT
Start: 2020-05-06 | End: 2020-05-06 | Stop reason: HOSPADM

## 2020-05-06 RX ORDER — MORPHINE SULFATE 2 MG/ML
2 INJECTION, SOLUTION INTRAMUSCULAR; INTRAVENOUS
Status: DISCONTINUED | OUTPATIENT
Start: 2020-05-06 | End: 2020-05-06 | Stop reason: HOSPADM

## 2020-05-06 RX ORDER — FENTANYL CITRATE 50 UG/ML
25 INJECTION, SOLUTION INTRAMUSCULAR; INTRAVENOUS
Status: DISCONTINUED | OUTPATIENT
Start: 2020-05-06 | End: 2020-05-06 | Stop reason: HOSPADM

## 2020-05-06 RX ORDER — ONDANSETRON 4 MG/1
4 TABLET, FILM COATED ORAL EVERY 6 HOURS PRN
Status: DISCONTINUED | OUTPATIENT
Start: 2020-05-06 | End: 2020-05-07 | Stop reason: HOSPADM

## 2020-05-06 RX ORDER — NICARDIPINE HYDROCHLORIDE 2.5 MG/ML
INJECTION INTRAVENOUS AS NEEDED
Status: DISCONTINUED | OUTPATIENT
Start: 2020-05-06 | End: 2020-05-06 | Stop reason: SURG

## 2020-05-06 RX ORDER — ONDANSETRON 2 MG/ML
4 INJECTION INTRAMUSCULAR; INTRAVENOUS AS NEEDED
Status: DISCONTINUED | OUTPATIENT
Start: 2020-05-06 | End: 2020-05-06 | Stop reason: HOSPADM

## 2020-05-06 RX ORDER — PANTOPRAZOLE SODIUM 40 MG/1
40 TABLET, DELAYED RELEASE ORAL EVERY MORNING
Status: DISCONTINUED | OUTPATIENT
Start: 2020-05-07 | End: 2020-05-07 | Stop reason: HOSPADM

## 2020-05-06 RX ORDER — FLUOXETINE 10 MG/1
10 CAPSULE ORAL DAILY
Status: DISCONTINUED | OUTPATIENT
Start: 2020-05-06 | End: 2020-05-07 | Stop reason: HOSPADM

## 2020-05-06 RX ORDER — MORPHINE SULFATE 2 MG/ML
2 INJECTION, SOLUTION INTRAMUSCULAR; INTRAVENOUS
Status: DISCONTINUED | OUTPATIENT
Start: 2020-05-06 | End: 2020-05-07 | Stop reason: HOSPADM

## 2020-05-06 RX ORDER — ONDANSETRON 2 MG/ML
INJECTION INTRAMUSCULAR; INTRAVENOUS AS NEEDED
Status: DISCONTINUED | OUTPATIENT
Start: 2020-05-06 | End: 2020-05-06 | Stop reason: SURG

## 2020-05-06 RX ORDER — FLUTICASONE PROPIONATE 50 MCG
2 SPRAY, SUSPENSION (ML) NASAL DAILY PRN
Status: DISCONTINUED | OUTPATIENT
Start: 2020-05-06 | End: 2020-05-07 | Stop reason: HOSPADM

## 2020-05-06 RX ORDER — SODIUM CHLORIDE 0.9 % (FLUSH) 0.9 %
10 SYRINGE (ML) INJECTION AS NEEDED
Status: DISCONTINUED | OUTPATIENT
Start: 2020-05-06 | End: 2020-05-07 | Stop reason: HOSPADM

## 2020-05-06 RX ORDER — SODIUM CHLORIDE 0.9 % (FLUSH) 0.9 %
3 SYRINGE (ML) INJECTION AS NEEDED
Status: DISCONTINUED | OUTPATIENT
Start: 2020-05-06 | End: 2020-05-06 | Stop reason: HOSPADM

## 2020-05-06 RX ORDER — LABETALOL HYDROCHLORIDE 5 MG/ML
5 INJECTION, SOLUTION INTRAVENOUS
Status: DISCONTINUED | OUTPATIENT
Start: 2020-05-06 | End: 2020-05-06 | Stop reason: HOSPADM

## 2020-05-06 RX ORDER — DEXTROSE MONOHYDRATE 25 G/50ML
12.5 INJECTION, SOLUTION INTRAVENOUS AS NEEDED
Status: DISCONTINUED | OUTPATIENT
Start: 2020-05-06 | End: 2020-05-06 | Stop reason: HOSPADM

## 2020-05-06 RX ORDER — BUPIVACAINE HYDROCHLORIDE 5 MG/ML
INJECTION, SOLUTION PERINEURAL AS NEEDED
Status: DISCONTINUED | OUTPATIENT
Start: 2020-05-06 | End: 2020-05-06 | Stop reason: HOSPADM

## 2020-05-06 RX ORDER — CLOPIDOGREL BISULFATE 75 MG/1
75 TABLET ORAL DAILY
Status: DISCONTINUED | OUTPATIENT
Start: 2020-05-06 | End: 2020-05-07 | Stop reason: HOSPADM

## 2020-05-06 RX ORDER — ONDANSETRON 2 MG/ML
4 INJECTION INTRAMUSCULAR; INTRAVENOUS EVERY 6 HOURS PRN
Status: DISCONTINUED | OUTPATIENT
Start: 2020-05-06 | End: 2020-05-07 | Stop reason: HOSPADM

## 2020-05-06 RX ORDER — SODIUM CHLORIDE, SODIUM LACTATE, POTASSIUM CHLORIDE, CALCIUM CHLORIDE 600; 310; 30; 20 MG/100ML; MG/100ML; MG/100ML; MG/100ML
1000 INJECTION, SOLUTION INTRAVENOUS CONTINUOUS
Status: DISCONTINUED | OUTPATIENT
Start: 2020-05-06 | End: 2020-05-06 | Stop reason: HOSPADM

## 2020-05-06 RX ORDER — FLUMAZENIL 0.1 MG/ML
0.2 INJECTION INTRAVENOUS AS NEEDED
Status: DISCONTINUED | OUTPATIENT
Start: 2020-05-06 | End: 2020-05-06 | Stop reason: HOSPADM

## 2020-05-06 RX ORDER — HEPARIN SODIUM 1000 [USP'U]/ML
INJECTION, SOLUTION INTRAVENOUS; SUBCUTANEOUS AS NEEDED
Status: DISCONTINUED | OUTPATIENT
Start: 2020-05-06 | End: 2020-05-06 | Stop reason: SURG

## 2020-05-06 RX ORDER — SODIUM CHLORIDE 0.9 % (FLUSH) 0.9 %
10 SYRINGE (ML) INJECTION EVERY 12 HOURS SCHEDULED
Status: DISCONTINUED | OUTPATIENT
Start: 2020-05-06 | End: 2020-05-06 | Stop reason: HOSPADM

## 2020-05-06 RX ORDER — SUCCINYLCHOLINE CHLORIDE 20 MG/ML
INJECTION INTRAMUSCULAR; INTRAVENOUS AS NEEDED
Status: DISCONTINUED | OUTPATIENT
Start: 2020-05-06 | End: 2020-05-06 | Stop reason: SURG

## 2020-05-06 RX ORDER — OXYCODONE AND ACETAMINOPHEN 10; 325 MG/1; MG/1
1 TABLET ORAL ONCE AS NEEDED
Status: DISCONTINUED | OUTPATIENT
Start: 2020-05-06 | End: 2020-05-06 | Stop reason: HOSPADM

## 2020-05-06 RX ORDER — ATORVASTATIN CALCIUM 40 MG/1
80 TABLET, FILM COATED ORAL NIGHTLY
Status: DISCONTINUED | OUTPATIENT
Start: 2020-05-06 | End: 2020-05-07 | Stop reason: HOSPADM

## 2020-05-06 RX ORDER — LIDOCAINE HYDROCHLORIDE 10 MG/ML
INJECTION, SOLUTION INFILTRATION; PERINEURAL AS NEEDED
Status: DISCONTINUED | OUTPATIENT
Start: 2020-05-06 | End: 2020-05-06 | Stop reason: HOSPADM

## 2020-05-06 RX ORDER — SODIUM CHLORIDE 0.9 % (FLUSH) 0.9 %
10 SYRINGE (ML) INJECTION AS NEEDED
Status: DISCONTINUED | OUTPATIENT
Start: 2020-05-06 | End: 2020-05-06 | Stop reason: HOSPADM

## 2020-05-06 RX ORDER — PROTAMINE SULFATE 10 MG/ML
INJECTION, SOLUTION INTRAVENOUS AS NEEDED
Status: DISCONTINUED | OUTPATIENT
Start: 2020-05-06 | End: 2020-05-06 | Stop reason: SURG

## 2020-05-06 RX ORDER — SODIUM CHLORIDE 9 MG/ML
75 INJECTION, SOLUTION INTRAVENOUS CONTINUOUS
Status: DISCONTINUED | OUTPATIENT
Start: 2020-05-06 | End: 2020-05-07 | Stop reason: HOSPADM

## 2020-05-06 RX ORDER — PROPOFOL 10 MG/ML
VIAL (ML) INTRAVENOUS AS NEEDED
Status: DISCONTINUED | OUTPATIENT
Start: 2020-05-06 | End: 2020-05-06 | Stop reason: SURG

## 2020-05-06 RX ORDER — CEFAZOLIN SODIUM 2 G/50ML
2 SOLUTION INTRAVENOUS ONCE
Status: COMPLETED | OUTPATIENT
Start: 2020-05-06 | End: 2020-05-06

## 2020-05-06 RX ORDER — SODIUM CHLORIDE, SODIUM LACTATE, POTASSIUM CHLORIDE, CALCIUM CHLORIDE 600; 310; 30; 20 MG/100ML; MG/100ML; MG/100ML; MG/100ML
9 INJECTION, SOLUTION INTRAVENOUS CONTINUOUS
Status: DISCONTINUED | OUTPATIENT
Start: 2020-05-06 | End: 2020-05-06 | Stop reason: HOSPADM

## 2020-05-06 RX ORDER — ACETAMINOPHEN 325 MG/1
650 TABLET ORAL EVERY 4 HOURS PRN
Status: DISCONTINUED | OUTPATIENT
Start: 2020-05-06 | End: 2020-05-07 | Stop reason: HOSPADM

## 2020-05-06 RX ORDER — ASPIRIN 81 MG/1
81 TABLET, CHEWABLE ORAL DAILY
Status: DISCONTINUED | OUTPATIENT
Start: 2020-05-06 | End: 2020-05-07 | Stop reason: HOSPADM

## 2020-05-06 RX ORDER — ROCURONIUM BROMIDE 10 MG/ML
INJECTION, SOLUTION INTRAVENOUS AS NEEDED
Status: DISCONTINUED | OUTPATIENT
Start: 2020-05-06 | End: 2020-05-06 | Stop reason: SURG

## 2020-05-06 RX ORDER — LIDOCAINE HYDROCHLORIDE 10 MG/ML
0.5 INJECTION, SOLUTION EPIDURAL; INFILTRATION; INTRACAUDAL; PERINEURAL ONCE AS NEEDED
Status: DISCONTINUED | OUTPATIENT
Start: 2020-05-06 | End: 2020-05-06 | Stop reason: HOSPADM

## 2020-05-06 RX ORDER — NALOXONE HCL 0.4 MG/ML
0.04 VIAL (ML) INJECTION AS NEEDED
Status: DISCONTINUED | OUTPATIENT
Start: 2020-05-06 | End: 2020-05-06 | Stop reason: HOSPADM

## 2020-05-06 RX ORDER — NEOSTIGMINE METHYLSULFATE 1 MG/ML
INJECTION, SOLUTION INTRAVENOUS AS NEEDED
Status: DISCONTINUED | OUTPATIENT
Start: 2020-05-06 | End: 2020-05-06 | Stop reason: SURG

## 2020-05-06 RX ORDER — CALCIUM CHLORIDE 100 MG/ML
INJECTION INTRAVENOUS; INTRAVENTRICULAR AS NEEDED
Status: DISCONTINUED | OUTPATIENT
Start: 2020-05-06 | End: 2020-05-06 | Stop reason: SURG

## 2020-05-06 RX ORDER — SODIUM CHLORIDE 0.9 % (FLUSH) 0.9 %
3 SYRINGE (ML) INJECTION EVERY 12 HOURS SCHEDULED
Status: DISCONTINUED | OUTPATIENT
Start: 2020-05-06 | End: 2020-05-07 | Stop reason: HOSPADM

## 2020-05-06 RX ORDER — LABETALOL HYDROCHLORIDE 5 MG/ML
20 INJECTION, SOLUTION INTRAVENOUS
Status: DISCONTINUED | OUTPATIENT
Start: 2020-05-06 | End: 2020-05-07 | Stop reason: HOSPADM

## 2020-05-06 RX ORDER — VITS A,C,E/LUTEIN/MINERALS 300MCG-200
1 TABLET ORAL DAILY
Status: DISCONTINUED | OUTPATIENT
Start: 2020-05-06 | End: 2020-05-07 | Stop reason: HOSPADM

## 2020-05-06 RX ORDER — HYDROCODONE BITARTRATE AND ACETAMINOPHEN 5; 325 MG/1; MG/1
1 TABLET ORAL EVERY 4 HOURS PRN
Status: DISCONTINUED | OUTPATIENT
Start: 2020-05-06 | End: 2020-05-07 | Stop reason: HOSPADM

## 2020-05-06 RX ORDER — DEXAMETHASONE SODIUM PHOSPHATE 4 MG/ML
INJECTION, SOLUTION INTRA-ARTICULAR; INTRALESIONAL; INTRAMUSCULAR; INTRAVENOUS; SOFT TISSUE AS NEEDED
Status: DISCONTINUED | OUTPATIENT
Start: 2020-05-06 | End: 2020-05-06 | Stop reason: SURG

## 2020-05-06 RX ORDER — ZOLPIDEM TARTRATE 5 MG/1
10 TABLET ORAL NIGHTLY PRN
Status: DISCONTINUED | OUTPATIENT
Start: 2020-05-06 | End: 2020-05-07 | Stop reason: HOSPADM

## 2020-05-06 RX ORDER — EPHEDRINE SULFATE 50 MG/ML
INJECTION INTRAVENOUS AS NEEDED
Status: DISCONTINUED | OUTPATIENT
Start: 2020-05-06 | End: 2020-05-06 | Stop reason: SURG

## 2020-05-06 RX ORDER — NALOXONE HCL 0.4 MG/ML
0.4 VIAL (ML) INJECTION
Status: DISCONTINUED | OUTPATIENT
Start: 2020-05-06 | End: 2020-05-07 | Stop reason: HOSPADM

## 2020-05-06 RX ADMIN — PROTAMINE SULFATE 10 MG: 10 INJECTION, SOLUTION INTRAVENOUS at 11:23

## 2020-05-06 RX ADMIN — PROPOFOL 150 MG: 10 INJECTION, EMULSION INTRAVENOUS at 09:43

## 2020-05-06 RX ADMIN — EPHEDRINE SULFATE 10 MG: 50 INJECTION INTRAVENOUS at 10:22

## 2020-05-06 RX ADMIN — CALCIUM CHLORIDE 300 MG: 100 INJECTION, SOLUTION INTRAVENOUS; INTRAVENTRICULAR at 10:17

## 2020-05-06 RX ADMIN — ROCURONIUM BROMIDE 50 MG: 10 INJECTION INTRAVENOUS at 09:43

## 2020-05-06 RX ADMIN — NEOSTIGMINE METHYLSULFATE 3 MG: 1 INJECTION INTRAVENOUS at 11:26

## 2020-05-06 RX ADMIN — VASOPRESSIN 1 ML: 20 INJECTION INTRAVENOUS at 10:17

## 2020-05-06 RX ADMIN — FENTANYL CITRATE 25 MCG: 50 INJECTION INTRAMUSCULAR; INTRAVENOUS at 12:05

## 2020-05-06 RX ADMIN — FENTANYL CITRATE 100 MCG: 50 INJECTION, SOLUTION INTRAMUSCULAR; INTRAVENOUS at 09:43

## 2020-05-06 RX ADMIN — ZOLPIDEM TARTRATE 10 MG: 5 TABLET ORAL at 22:15

## 2020-05-06 RX ADMIN — VASOPRESSIN 1 ML: 20 INJECTION INTRAVENOUS at 09:53

## 2020-05-06 RX ADMIN — ONDANSETRON HYDROCHLORIDE 4 MG: 2 SOLUTION INTRAMUSCULAR; INTRAVENOUS at 09:43

## 2020-05-06 RX ADMIN — CLOPIDOGREL 75 MG: 75 TABLET, FILM COATED ORAL at 14:11

## 2020-05-06 RX ADMIN — HEPARIN SODIUM 7000 UNITS: 1000 INJECTION, SOLUTION INTRAVENOUS; SUBCUTANEOUS at 10:16

## 2020-05-06 RX ADMIN — LIDOCAINE HYDROCHLORIDE 100 MG: 20 INJECTION, SOLUTION INTRAVENOUS at 09:43

## 2020-05-06 RX ADMIN — CALCIUM CHLORIDE 300 MG: 100 INJECTION, SOLUTION INTRAVENOUS; INTRAVENTRICULAR at 10:24

## 2020-05-06 RX ADMIN — ATORVASTATIN CALCIUM 80 MG: 40 TABLET, FILM COATED ORAL at 21:47

## 2020-05-06 RX ADMIN — VASOPRESSIN 1 ML: 20 INJECTION INTRAVENOUS at 10:11

## 2020-05-06 RX ADMIN — PHENYLEPHRINE HYDROCHLORIDE 0.4 MCG/KG/MIN: 10 INJECTION INTRAVENOUS at 10:00

## 2020-05-06 RX ADMIN — CEFAZOLIN SODIUM 2 G: 2 SOLUTION INTRAVENOUS at 09:46

## 2020-05-06 RX ADMIN — FENTANYL CITRATE 25 MCG: 50 INJECTION INTRAMUSCULAR; INTRAVENOUS at 12:10

## 2020-05-06 RX ADMIN — SODIUM CHLORIDE, POTASSIUM CHLORIDE, SODIUM LACTATE AND CALCIUM CHLORIDE: 600; 310; 30; 20 INJECTION, SOLUTION INTRAVENOUS at 10:48

## 2020-05-06 RX ADMIN — SODIUM CHLORIDE, POTASSIUM CHLORIDE, SODIUM LACTATE AND CALCIUM CHLORIDE 1000 ML: 600; 310; 30; 20 INJECTION, SOLUTION INTRAVENOUS at 06:52

## 2020-05-06 RX ADMIN — SODIUM CHLORIDE 75 ML/HR: 9 INJECTION, SOLUTION INTRAVENOUS at 17:13

## 2020-05-06 RX ADMIN — VASOPRESSIN 1 ML: 20 INJECTION INTRAVENOUS at 10:20

## 2020-05-06 RX ADMIN — NICARDIPINE HYDROCHLORIDE 250 MCG: 25 INJECTION INTRAVENOUS at 11:33

## 2020-05-06 RX ADMIN — SUCCINYLCHOLINE CHLORIDE 100 MG: 20 INJECTION, SOLUTION INTRAMUSCULAR; INTRAVENOUS at 09:43

## 2020-05-06 RX ADMIN — GLYCOPYRROLATE 0.2 MG: 0.2 INJECTION, SOLUTION INTRAMUSCULAR; INTRAVENOUS at 11:26

## 2020-05-06 RX ADMIN — DEXAMETHASONE SODIUM PHOSPHATE 8 MG: 4 INJECTION, SOLUTION INTRAMUSCULAR; INTRAVENOUS at 09:43

## 2020-05-06 RX ADMIN — CEFAZOLIN SODIUM 2 G: 2 SOLUTION INTRAVENOUS at 17:13

## 2020-05-06 RX ADMIN — GLYCOPYRROLATE 0.2 MG: 0.2 INJECTION, SOLUTION INTRAMUSCULAR; INTRAVENOUS at 09:43

## 2020-05-06 RX ADMIN — SODIUM CHLORIDE, POTASSIUM CHLORIDE, SODIUM LACTATE AND CALCIUM CHLORIDE 1000 ML: 600; 310; 30; 20 INJECTION, SOLUTION INTRAVENOUS at 06:50

## 2020-05-06 RX ADMIN — VASOPRESSIN 1 ML: 20 INJECTION INTRAVENOUS at 10:00

## 2020-05-06 RX ADMIN — ASPIRIN 81 MG: 81 TABLET, CHEWABLE ORAL at 14:11

## 2020-05-06 NOTE — ANESTHESIA PREPROCEDURE EVALUATION
Anesthesia Evaluation     Patient summary reviewed   no history of anesthetic complications:  NPO Solid Status: > 8 hours             Airway   Mallampati: II  TM distance: >3 FB  Neck ROM: full  Dental      Pulmonary    (+) sleep apnea on CPAP,   (-) COPD, asthma, not a smoker  Cardiovascular   Exercise tolerance: good (4-7 METS)    ECG reviewed    (+) hypertension, hyperlipidemia,  carotid artery disease  (-) pacemaker, past MI, angina, CHF, cardiac stents      Neuro/Psych  (-) seizures, TIA, CVA  GI/Hepatic/Renal/Endo    (+)  GERD,  thyroid problem hypothyroidism  (-) liver disease, no renal disease, diabetes    Musculoskeletal     Abdominal    Substance History      OB/GYN          Other                        Anesthesia Plan    ASA 3     general     intravenous induction     Anesthetic plan, all risks, benefits, and alternatives have been provided, discussed and informed consent has been obtained with: patient.

## 2020-05-06 NOTE — PLAN OF CARE
Problem: Patient Care Overview  Goal: Plan of Care Review  Outcome: Ongoing (interventions implemented as appropriate)  Flowsheets  Taken 5/6/2020 1225 by Bibi Sarmineto, RN  Plan of Care Reviewed With: patient  Taken 5/6/2020 1542 by Gertrude Johnson, RN  Outcome Summary: pt transferred from PACU to 361 after surgery neuros intact vss no complaints of pain at this time [id number] dressing c/d/i

## 2020-05-06 NOTE — ANESTHESIA PROCEDURE NOTES
Airway  Urgency: elective    Date/Time: 5/6/2020 9:45 AM  Airway not difficult    General Information and Staff    Patient location during procedure: OR  CRNA: Hugo Bush CRNA    Indications and Patient Condition  Indications for airway management: airway protection    Preoxygenated: yes  Mask difficulty assessment: 0 - not attempted    Final Airway Details  Final airway type: endotracheal airway      Successful airway: ETT  Cuffed: yes   Successful intubation technique: video laryngoscopy and RSI  Blade: Denis  Blade size: 4  ETT size (mm): 7.0  Cormack-Lehane Classification: grade I - full view of glottis  Placement verified by: chest auscultation and capnometry   Cuff volume (mL): 8  Measured from: lips  ETT/EBT  to lips (cm): 22  Number of attempts at approach: 1  Assessment: lips, teeth, and gum same as pre-op and atraumatic intubation

## 2020-05-06 NOTE — OP NOTE
Justin Szymanski  5/6/2020     PREOPERATIVE DIAGNOSIS: Bilateral carotid artery stenosis [I65.23]  Preop testing [Z01.818]     POSTOPERATIVE DIAGNOSIS: Post-Op Diagnosis Codes:     * Bilateral carotid artery stenosis [I65.23]     * Preop testing [Z01.818]     PROCEDURE PERFORMED:   1.  Left carotid endarterectomy with bovine patch angioplasty  2.  Continuous EEG monitoring  3.  Completion arterial duplex     SURGEON: Sb Hunter DO   Assistant: Tobias Vieira MD     ANESTHESIA: General.    PREPARATION: Routine.    STAFF: Circulator: Ngozi Coulter RN; Ron Alexander RN  Scrub Person: Blane Best Melissa K  Vendor Representative: Lamont Forrester    Estimated Blood Loss: 200ml    SPECIMENS: Carotid plaque    COMPLICATIONS: None    INDICATIONS: Justin Szymanski is a 74 y.o. male who we are following for asymptomatic carotid disease.  He was initially sent following a lifeline screening. His ABIs and us aorta were normal.  He denies any strokelike symptoms.  He is maintained on aspirin and Lipitor.  Testing does show he has aneurysm of the ascending aorta 4.3 cm.  He was recently hospitalized with complaints of right arm numbness and increased tremor.  He is here today for urgent left carotid endarterectomy for stroke risk reduction. The indications, risks, and possible complications of the procedure were explained to the patient, who voiced understanding and wished to proceed with surgery.     PROCEDURE IN DETAIL:   The patient was taken to the operating room and placed on the operating table in a supine position. After general anesthesia was obtained, the left neck and chest was prepped and draped in a sterile manner.  A longitudinal incision was then made along the anterior border the sternocleidomastoid muscle.  Careful dissection was made down through the subcutaneous tissues using the Bovie cautery to ensure hemostasis.  Any crossing veins were ligated with 3-0 silk suture and hemoclips.  The  sternocleidomastoid muscle was retracted laterally.  The carotid sheath was entered over the common carotid artery.  The Metzenbaum scissors were used to free up the common carotid artery from its local attachments.  A large vessel loop was placed for proximal vascular control.  Dissection was then carried out distally along the carotid artery encountering the facial vein which was ligated with 2-0 silk suture.  This gave rise to the bifurcation.  The superior thyroid artery was identified and encircled with a 2-0 silk suture for vascular control.  The external carotid artery was dissected free and a large vessel loop was placed for vascular control.  The very distal internal carotid artery was carefully dissected free and a small vessel loop was placed for vascular control.  At this point full exposure was established.  The ansa cervicalis, hypoglossal, vagus nerves were all identified.  The patient was given 7000 units of intravenous heparin.  After 3 minutes the distal internal carotid artery was test clamped.  After 2 minutes there were no EEG monitor changes.  The common and external were then clamped in succession.  Using 11 blade an arteriotomy was made in the common carotid artery.  It was extended up along the distal internal carotid artery with the Navarrete scissors.  There was a significant amount of heavy plaque burden in the proximal internal carotid artery.  Using the [place] elevator the standard endarterectomy was performed removing all the plaque burden.  Heparinized saline was used to irrigate the wound bed and all loose debris was picked clean.  The bovine pericardial patch was brought to the field and starting distally on the internal carotid artery the patch anastomosis was performed with a 6-0 Prolene in a running fashion.  Both sutures were brought down to the midline.  The patch was then appropriately fashioned proximally.  Starting with 5-0 Prolene the patch anastomosis was continued in a running  fashion to meet in the midline.  Prior to completion of the patch anastomosis the appropriate flushing maneuvers were performed and anastomosis was completed.  Flow was reestablished.  Hemostasis was observed.  A completion arterial duplex was performed which showed adequate flow velocities in the common, internal, and external carotid arteries.  There was no evidence of flap formation, debris, or thrombosis.  At this point I felt the result was excellent and no further intervention was warranted.  A separate stab incision was made on the inferior part of the neck and a 15 Citizen of [place] round LUIS drain was placed.  It was secured to skin with a 2-0 nylon.  Gelfoam with thrombin was used to ensure hemostasis.  Antibiotic saline was used to irrigate the wound bed.  The platysma muscle was then reapproximated using a 3-0 Vicryl in a running fashion.  The skin was then anesthetized using 18 mL of 0.5% Marcaine plain.  The skin was then reapproximated using a 4-0 Monocryl in a subcuticular fashion.  The wound was then cleaned.   Sterile dressings were applied. The patient tolerated the procedure well. Sponge and needle counts were correct. The patient was then awakened and extubated in the operating room and taken to the recovery room in good condition.The patient awoke from anesthesia neurologically intact and there were no EEG monitor changes throughout.      Sb Hunter, DO  Date: 5/6/2020 Time: 11:34    CC:Alma Mathias, EFRA

## 2020-05-06 NOTE — ANESTHESIA POSTPROCEDURE EVALUATION
Patient: Justin Szymanski    Procedure Summary     Date:  05/06/20 Room / Location:  [facility] OR  / [facility] HYBRID OR 12    Anesthesia Start:  0934 Anesthesia Stop:  1141    Procedure:  LEFT CAROTID ENDARTERECTOMY WITH BOVINE PATCH ANGIOPLASTY, EEG MONITORING, AND COMPLETION DUPLEX VESSEL ULTRASOUND (Left Neck) Diagnosis:       Bilateral carotid artery stenosis      Preop testing      (Bilateral carotid artery stenosis [I65.23])      (Preop testing [Z01.818])    Surgeon:  Sb Hunter DO Provider:  Hugo Bush CRNA    Anesthesia Type:  general ASA Status:  3          Anesthesia Type: general    Vitals  Vitals Value Taken Time   BP 97/65 5/6/2020 12:40 PM   Temp 99 °F (37.2 °C) 5/6/2020 12:40 PM   Pulse 87 5/6/2020 12:40 PM   Resp 16 5/6/2020 12:40 PM   SpO2 96 % 5/6/2020 12:40 PM           Post Anesthesia Care and Evaluation    Patient location during evaluation: PACU  Level of consciousness: awake  Pain management: adequate  Airway patency: patent  Anesthetic complications: No anesthetic complications  PONV Status: none  Cardiovascular status: acceptable  Respiratory status: acceptable  Hydration status: acceptable

## 2020-05-07 ENCOUNTER — READMISSION MANAGEMENT (OUTPATIENT)
Dept: CALL CENTER | Facility: HOSPITAL | Age: 75
End: 2020-05-07

## 2020-05-07 VITALS
TEMPERATURE: 98.1 F | HEART RATE: 72 BPM | DIASTOLIC BLOOD PRESSURE: 59 MMHG | WEIGHT: 176.15 LBS | OXYGEN SATURATION: 97 % | BODY MASS INDEX: 26.7 KG/M2 | SYSTOLIC BLOOD PRESSURE: 107 MMHG | RESPIRATION RATE: 14 BRPM | HEIGHT: 68 IN

## 2020-05-07 PROCEDURE — 25010000003 CEFAZOLIN SODIUM-DEXTROSE 2-3 GM-%(50ML) RECONSTITUTED SOLUTION: Performed by: SURGERY

## 2020-05-07 PROCEDURE — 99024 POSTOP FOLLOW-UP VISIT: CPT | Performed by: NURSE PRACTITIONER

## 2020-05-07 RX ORDER — HYDROCODONE BITARTRATE AND ACETAMINOPHEN 5; 325 MG/1; MG/1
1 TABLET ORAL EVERY 4 HOURS PRN
Qty: 20 TABLET | Refills: 0 | Status: SHIPPED | OUTPATIENT
Start: 2020-05-07 | End: 2020-05-16

## 2020-05-07 RX ORDER — CLOPIDOGREL BISULFATE 75 MG/1
75 TABLET ORAL DAILY
Qty: 30 TABLET | Refills: 5 | Status: SHIPPED | OUTPATIENT
Start: 2020-05-08

## 2020-05-07 RX ADMIN — FLUOXETINE 10 MG: 10 CAPSULE ORAL at 09:20

## 2020-05-07 RX ADMIN — CEFAZOLIN SODIUM 2 G: 2 SOLUTION INTRAVENOUS at 03:00

## 2020-05-07 RX ADMIN — ASPIRIN 81 MG: 81 TABLET, CHEWABLE ORAL at 09:20

## 2020-05-07 RX ADMIN — PANTOPRAZOLE SODIUM 40 MG: 40 TABLET, DELAYED RELEASE ORAL at 09:20

## 2020-05-07 RX ADMIN — LEVOTHYROXINE SODIUM 75 MCG: 75 TABLET ORAL at 06:40

## 2020-05-07 RX ADMIN — CLOPIDOGREL 75 MG: 75 TABLET, FILM COATED ORAL at 09:20

## 2020-05-07 RX ADMIN — Medication 1 TABLET: at 09:20

## 2020-05-07 RX ADMIN — HYDROCODONE BITARTRATE AND ACETAMINOPHEN 1 TABLET: 5; 325 TABLET ORAL at 11:54

## 2020-05-07 RX ADMIN — HYDROCODONE BITARTRATE AND ACETAMINOPHEN 1 TABLET: 5; 325 TABLET ORAL at 00:17

## 2020-05-07 NOTE — PLAN OF CARE
Problem: Patient Care Overview  Goal: Plan of Care Review  Outcome: Ongoing (interventions implemented as appropriate)  Flowsheets (Taken 5/7/2020 0335)  Progress: improving  Plan of Care Reviewed With: patient  Outcome Summary:     Pt with c/o pain x1 this shift, pt states just uncomfortably mostly; medicated with po pain meds x1. G/t remain in place to L neck c/d/i. LUIS x1 with output. IVF and IV abx infused per orders. Voiding per urinal. BP improving this shift. Neuros intact. L facial numbness improving. VSS. Resting between care. Will cont to monitor.

## 2020-05-07 NOTE — PAYOR COMM NOTE
"Srinivas Szymanski (74 y.o. Male) [id number]    [facility] phone    Fax      Plan d/c today       Date of Birth Social Security Number Address Home Phone MRN    1945  [id number] [facility] [id number] [phone] [id number]    Anabaptism Marital Status          Faith        Admission Date Admission Type Admitting Provider Attending Provider Department, Room/Bed    5/6/20 Elective Sb Hunter, Sb Head,  [facility] 3C, 361/1    Discharge Date Discharge Disposition Discharge Destination         Home or Self Care              Attending Provider:  Sb Hunter DO    Allergies:  No Known Allergies    Isolation:  None   Infection:  None   Code Status:  CPR    Ht:  173 cm (68.11\")   Wt:  79.9 kg (176 lb 2.4 oz)    Admission Cmt:  None   Principal Problem:  Bilateral carotid artery stenosis [I65.23]                 Active Insurance as of 5/6/2020     Primary Coverage     Payor Plan Insurance Group Employer/Plan Group    AETNA MEDICARE REPLACEMENT AETNA MEDICARE REPLACEMENT [id number]     Payor Plan Address Payor Plan Phone Number Payor Plan Fax Number Effective Dates    [address] [phone]  1/1/2019 - None Entered    [facility] [id number]       Subscriber Name Subscriber Birth Date Member ID       SRINIVSA SZYMANSKI 1945 [id number]                 Emergency Contacts      (Rel.) Home Phone Work Phone Mobile Phone    Chelsy Szymanski (Spouse) [phone] -- [phone]               Discharge Summary      Ana Lerma APRN at 05/07/20 1224            Date of Discharge:  5/7/2020    Discharge Diagnosis: Bilateral carotid artery stenosis [I65.23]    Presenting Problem/History of Present Illness  Bilateral carotid artery stenosis [I65.23]  Preop testing [Z01.818]  Bilateral carotid artery stenosis [I65.23]  Carotid stenosis [I65.29]       Hospital Course  Patient is a 74 y.o. male who we " are following for asymptomatic carotid disease.  He was initially sent following a lifeline screening. His ABIs and us aorta were normal.  He denies any strokelike symptoms.  He is maintained on aspirin and Lipitor.  Testing does show he has aneurysm of the ascending aorta 4.3 cm.  He was recently hospitalized with complaints of right arm numbness and increased tremor.  He did undergo a left carotid endarterectomy on 5/6/2020 without incident.   He was transferred to  for continued care.  Overnight, he has done well.  His vitals have remained stable, left neck soft without hematoma, and he remains neurologically intact.  He is stable and ready for discharge.  We will see him back in 2 weeks for post operative follow up.  I did remove his Anish Rios drain.  His medications will stay the same with the addition of Plavix and postoperative pain medication.  Written and verbal instructions were given.  This all was discussed in full with complete understanding.       Procedures Performed  Procedure(s):  LEFT CAROTID ENDARTERECTOMY WITH BOVINE PATCH ANGIOPLASTY, EEG MONITORING, AND COMPLETION DUPLEX VESSEL ULTRASOUND       Consults:   Consults     Date and Time Order Name Status Description    4/30/2020 0030 Inpatient Neurology Consult Stroke Completed     4/29/2020 1651 Inpatient Vascular Surgery Consult Completed             Condition on Discharge: Stable    Discharge Medications     Discharge Medications      New Medications      Instructions Start Date   clopidogrel 75 MG tablet  Commonly known as:  PLAVIX   75 mg, Oral, Daily   Start Date:  May 8, 2020     HYDROcodone-acetaminophen 5-325 MG per tablet  Commonly known as:  NORCO   1 tablet, Oral, Every 4 Hours PRN         Continue These Medications      Instructions Start Date   ALPRAZolam 0.5 MG tablet  Commonly known as:  XANAX   0.25 mg, Oral, 2 Times Daily PRN      Ambien 10 MG tablet  Generic drug:  zolpidem   10 mg, Oral, Nightly PRN      aspirin 81 MG  chewable tablet   81 mg, Oral, Daily      atorvastatin 80 MG tablet  Commonly known as:  LIPITOR   80 mg, Oral, Nightly      desonide 0.05 % cream  Commonly known as:  DESOWEN   1 application, Topical, Daily PRN      dicyclomine 20 MG tablet  Commonly known as:  BENTYL   20 mg, Oral, Every 6 Hours PRN      FLUoxetine 20 MG capsule  Commonly known as:  PROzac   TK 1 C PO QD      fluticasone 50 MCG/ACT nasal spray  Commonly known as:  FLONASE   2 sprays, Nasal, Daily PRN      levothyroxine 75 MCG tablet  Commonly known as:  SYNTHROID, LEVOTHROID   75 mcg, Oral, Daily      lisinopril 5 MG tablet  Commonly known as:  PRINIVIL,ZESTRIL   5 mg, Oral, Daily      multivitamin with minerals tablet tablet   1 tablet, Oral, Daily      omeprazole 40 MG capsule  Commonly known as:  priLOSEC   40 mg, Oral, Daily      tadalafil 20 MG tablet  Commonly known as:  CIALIS   20 mg, Oral, Daily PRN      Valtrex 1000 MG tablet  Generic drug:  valACYclovir   1,000 mg, Oral, 2 Times Daily PRN             Discharge Diet:   Diet Instructions     Diet: Regular; Thin      Discharge Diet:  Regular    Fluid Consistency:  Thin          Activity at Discharge:   Activity Instructions     Bathing Restrictions      Type of Restriction:  Bathing    Bathing Restrictions:  Other    Explain Bathing Restrictions:  may shower tomorrow    Driving Restrictions      Type of Restriction:  Driving    Driving Restrictions:  No Driving (Time Limited)    Length:  1 Week    Lifting Restrictions      Type of Restriction:  Lifting    Lifting Restrictions:  Lifting Restriction (Indicate Limit)    Weight Limit (Pounds):  10    Length of Lifting Restriction:  1 week    Other Activity Restrictions      Type of Restriction:  Other    Explain Other Restrictions:  no bending, stooping, or straining    Work Restrictions      Type of Restriction:  Work    May Return to Work:  After Next Appointment    With / Without Restrictions:  Without Restrictions          Follow-up  Appointments  Future Appointments   Date Time Provider Department Center   6/8/2020  1:00 PM Justin Beasley MD [facility]     Additional Instructions for the Follow-ups that You Need to Schedule     Discharge Follow-up with Specialty: Ana Jha; 2 Weeks   As directed      Specialty:  Ana Jha    Follow Up:  2 Weeks    Follow Up Details:  post op carotid                I did spend more than 30 minutes reviewing the chart, face to face encounter, and organizing discharge.    EFRA [place]  05/07/20  12:24        Electronically signed by Ana Lerma APRN at 05/07/20 1246       Discharge Order (From admission, onward)     Start     Ordered    05/07/20 1216  Discharge patient  Once     Expected Discharge Date:  05/07/20    Discharge Disposition:  Home or Self Care    Physician of Record for Attribution - Please select from Treatment Team:  MILTON DAMIAN [[id number]]    Review needed by CMO to determine Physician of Record:  No       Question Answer Comment   Physician of Record for Attribution - Please select from Treatment Team MILTON DAMIAN    Review needed by CMO to determine Physician of Record No        05/07/20 1223

## 2020-05-07 NOTE — DISCHARGE SUMMARY
Date of Discharge:  5/7/2020    Discharge Diagnosis: Bilateral carotid artery stenosis [I65.23]    Presenting Problem/History of Present Illness  Bilateral carotid artery stenosis [I65.23]  Preop testing [Z01.818]  Bilateral carotid artery stenosis [I65.23]  Carotid stenosis [I65.29]       Hospital Course  Patient is a 74 y.o. male who we are following for asymptomatic carotid disease.  He was initially sent following a lifeline screening. His ABIs and us aorta were normal.  He denies any strokelike symptoms.  He is maintained on aspirin and Lipitor.  Testing does show he has aneurysm of the ascending aorta 4.3 cm.  He was recently hospitalized with complaints of right arm numbness and increased tremor.  He did undergo a left carotid endarterectomy on 5/6/2020 without incident.   He was transferred to  for continued care.  Overnight, he has done well.  His vitals have remained stable, left neck soft without hematoma, and he remains neurologically intact.  He is stable and ready for discharge.  We will see him back in 2 weeks for post operative follow up.  I did remove his Anish Rios drain.  His medications will stay the same with the addition of Plavix and postoperative pain medication.  Written and verbal instructions were given.  This all was discussed in full with complete understanding.       Procedures Performed  Procedure(s):  LEFT CAROTID ENDARTERECTOMY WITH BOVINE PATCH ANGIOPLASTY, EEG MONITORING, AND COMPLETION DUPLEX VESSEL ULTRASOUND       Consults:   Consults     Date and Time Order Name Status Description    4/30/2020 0030 Inpatient Neurology Consult Stroke Completed     4/29/2020 1651 Inpatient Vascular Surgery Consult Completed             Condition on Discharge: Stable    Discharge Medications     Discharge Medications      New Medications      Instructions Start Date   clopidogrel 75 MG tablet  Commonly known as:  PLAVIX   75 mg, Oral, Daily   Start Date:  May 8, 2020      HYDROcodone-acetaminophen 5-325 MG per tablet  Commonly known as:  NORCO   1 tablet, Oral, Every 4 Hours PRN         Continue These Medications      Instructions Start Date   ALPRAZolam 0.5 MG tablet  Commonly known as:  XANAX   0.25 mg, Oral, 2 Times Daily PRN      Ambien 10 MG tablet  Generic drug:  zolpidem   10 mg, Oral, Nightly PRN      aspirin 81 MG chewable tablet   81 mg, Oral, Daily      atorvastatin 80 MG tablet  Commonly known as:  LIPITOR   80 mg, Oral, Nightly      desonide 0.05 % cream  Commonly known as:  DESOWEN   1 application, Topical, Daily PRN      dicyclomine 20 MG tablet  Commonly known as:  BENTYL   20 mg, Oral, Every 6 Hours PRN      FLUoxetine 20 MG capsule  Commonly known as:  PROzac   TK 1 C PO QD      fluticasone 50 MCG/ACT nasal spray  Commonly known as:  FLONASE   2 sprays, Nasal, Daily PRN      levothyroxine 75 MCG tablet  Commonly known as:  SYNTHROID, LEVOTHROID   75 mcg, Oral, Daily      lisinopril 5 MG tablet  Commonly known as:  PRINIVIL,ZESTRIL   5 mg, Oral, Daily      multivitamin with minerals tablet tablet   1 tablet, Oral, Daily      omeprazole 40 MG capsule  Commonly known as:  priLOSEC   40 mg, Oral, Daily      tadalafil 20 MG tablet  Commonly known as:  CIALIS   20 mg, Oral, Daily PRN      Valtrex 1000 MG tablet  Generic drug:  valACYclovir   1,000 mg, Oral, 2 Times Daily PRN             Discharge Diet:   Diet Instructions     Diet: Regular; Thin      Discharge Diet:  Regular    Fluid Consistency:  Thin          Activity at Discharge:   Activity Instructions     Bathing Restrictions      Type of Restriction:  Bathing    Bathing Restrictions:  Other    Explain Bathing Restrictions:  may shower tomorrow    Driving Restrictions      Type of Restriction:  Driving    Driving Restrictions:  No Driving (Time Limited)    Length:  1 Week    Lifting Restrictions      Type of Restriction:  Lifting    Lifting Restrictions:  Lifting Restriction (Indicate Limit)    Weight Limit  (Pounds):  10    Length of Lifting Restriction:  1 week    Other Activity Restrictions      Type of Restriction:  Other    Explain Other Restrictions:  no bending, stooping, or straining    Work Restrictions      Type of Restriction:  Work    May Return to Work:  After Next Appointment    With / Without Restrictions:  Without Restrictions          Follow-up Appointments  Future Appointments   Date Time Provider Department Center   6/8/2020  1:00 PM Justin Beasley MD [facility]     Additional Instructions for the Follow-ups that You Need to Schedule     Discharge Follow-up with Specialty: Ana Jha; 2 Weeks   As directed      Specialty:  Ana Jha    Follow Up:  2 Weeks    Follow Up Details:  post op carotid                I did spend more than 30 minutes reviewing the chart, face to face encounter, and organizing discharge.    EFRA [place]  05/07/20  12:24

## 2020-05-08 ENCOUNTER — READMISSION MANAGEMENT (OUTPATIENT)
Dept: CALL CENTER | Facility: HOSPITAL | Age: 75
End: 2020-05-08

## 2020-05-08 LAB
CYTO UR: NORMAL
LAB AP CASE REPORT: NORMAL
PATH REPORT.FINAL DX SPEC: NORMAL
PATH REPORT.GROSS SPEC: NORMAL

## 2020-05-08 NOTE — OUTREACH NOTE
General Surgery Week 1 Survey      Responses   [facility] patient discharged from?  [place]   Does the patient have one of the following disease processes/diagnoses(primary or secondary)?  General Surgery   Is there a successful TCM telephone encounter documented?  No   Week 1 attempt successful?  Yes   Call start time  1307   Call end time  1312   Discharge diagnosis  left carotid endarterectomy   Is patient permission given to speak with other caregiver?  Yes   List who call center can speak with  Wife   Person spoke with today (if not patient) and relationship  Wife   Meds reviewed with patient/caregiver?  Yes   Is the patient having any side effects they believe may be caused by any medication additions or changes?  Yes   Side effects comments   constipation   Does the patient have all medications related to this admission filled (includes all antibiotics, pain medications, etc.)  Yes   Is the patient taking all medications as directed (includes completed medication regime)?  Yes   Does the patient have a follow up appointment scheduled with their surgeon?  Yes   Has the patient kept scheduled appointments due by today?  N/A   Comments  Pt to ED last night with HTN--given PO and IV meds for b/p control then sent home   Has home health visited the patient within 72 hours of discharge?  N/A   Psychosocial issues?  No   Did the patient receive a copy of their discharge instructions?  Yes   Nursing interventions  Reviewed instructions with patient   What is the patient's perception of their health status since discharge?  New symptoms unrelated to diagnosis   Nursing interventions  Nurse provided patient education   Is the patient /caregiver able to teach back basic post-op care?  Drive as instructed by MD in discharge instructions, Take showers only when approved by MD-sponge bathe until then, No tub bath, swimming, or hot tub until instructed by MD, Keep incision areas clean,dry and protected, Lifting as  instructed by MD in discharge instructions   Is the patient/caregiver able to teach back signs and symptoms of incisional infection?  Increased redness, swelling or pain at the incisonal site, Increased drainage or bleeding, Incisional warmth, Pus or odor from incision, Fever   Is the patient/caregiver able to teach back steps to recovery at home?  Set small, achievable goals for return to baseline health, Rest and rebuild strength, gradually increase activity   Is the patient/caregiver able to teach back the hierarchy of who to call/visit for symptoms/problems? PCP, Specialist, Home health nurse, Urgent Care, ED, 911  Yes   Additional teach back comments  reviewed FAST with pt's wife and she is aware of HTN and what to look for regarding this.   Week 1 call completed?  Yes          Linda Coronel RN

## 2020-05-08 NOTE — OUTREACH NOTE
Prep Survey      Responses   [facility]?  [facility]   Is LACE score < 7 ?  No   Eligibility  Readm Mgmt   Discharge diagnosis  left carotid endarterectomy   COVID-19 Test Status  Negative   Does the patient have one of the following disease processes/diagnoses(primary or secondary)?  General Surgery   Does the patient have Home health ordered?  No   Is there a DME ordered?  No   Prep survey completed?  Yes          Hilda Dinh RN

## 2020-05-15 ENCOUNTER — READMISSION MANAGEMENT (OUTPATIENT)
Dept: CALL CENTER | Facility: HOSPITAL | Age: 75
End: 2020-05-15

## 2020-05-15 NOTE — OUTREACH NOTE
General Surgery Week 2 Survey      Responses   [facility] patient discharged from?  [place]   Does the patient have one of the following disease processes/diagnoses(primary or secondary)?  General Surgery   Week 2 attempt successful?  No   Unsuccessful attempts  Attempt 1          Phylicia Wu RN

## 2020-05-19 ENCOUNTER — READMISSION MANAGEMENT (OUTPATIENT)
Dept: CALL CENTER | Facility: HOSPITAL | Age: 75
End: 2020-05-19

## 2020-05-19 NOTE — OUTREACH NOTE
General Surgery Week 2 Survey      Responses   [facility] patient discharged from?  [place]   Does the patient have one of the following disease processes/diagnoses(primary or secondary)?  General Surgery   Week 2 attempt successful?  Yes   Call start time  1407   Call end time  1415   Discharge diagnosis  left carotid endarterectomy   Meds reviewed with patient/caregiver?  Yes   Is the patient having any side effects they believe may be caused by any medication additions or changes?  No   Does the patient have all medications related to this admission filled (includes all antibiotics, pain medications, etc.)  Yes   Prescription comments  BP meds  normal dosage resumed   Is the patient taking all medications as directed (includes completed medication regime)?  Yes   Does the patient have a follow up appointment scheduled with their surgeon?  Yes   Has the patient kept scheduled appointments due by today?  Yes   Has home health visited the patient within 72 hours of discharge?  N/A   Psychosocial issues?  No   Did the patient receive a copy of their discharge instructions?  Yes   Nursing interventions  Reviewed instructions with patient   What is the patient's perception of their health status since discharge?  Improving   Nursing interventions  Nurse provided patient education   Is the patient /caregiver able to teach back basic post-op care?  Drive as instructed by MD in discharge instructions, Take showers only when approved by MD-sponge bathe until then, No tub bath, swimming, or hot tub until instructed by MD, Keep incision areas clean,dry and protected, Do not remove steri-strips, Lifting as instructed by MD in discharge instructions   Is the patient/caregiver able to teach back signs and symptoms of incisional infection?  Increased redness, swelling or pain at the incisonal site, Increased drainage or bleeding, Incisional warmth, Pus or odor from incision   Is the patient/caregiver able to teach back  steps to recovery at home?  Set small, achievable goals for return to baseline health, Rest and rebuild strength, gradually increase activity   Is the patient/caregiver able to teach back the hierarchy of who to call/visit for symptoms/problems? PCP, Specialist, Home health nurse, Urgent Care, ED, 911  Yes   Additional teach back comments  pt c/o severe gas and bloating, causing constant belching, unrelieved by OTC meds, diet changes not effective, pt [facility], advised pt to contact pharmacist for more assistance on solutions to problem, pt agreed with plan   Week 2 call completed?  Yes          Prerna Montilla RN

## 2020-05-21 ENCOUNTER — TELEPHONE (OUTPATIENT)
Dept: VASCULAR SURGERY | Facility: CLINIC | Age: 75
End: 2020-05-21

## 2020-05-21 NOTE — TELEPHONE ENCOUNTER
Spoke with  Szymanski reminding him of his appointment for Friday, May 22nd, 2020 at 1045 am with Ana KRAUS. Mr Szymanski confirmed he would be here.

## 2020-05-22 ENCOUNTER — OFFICE VISIT (OUTPATIENT)
Dept: VASCULAR SURGERY | Facility: CLINIC | Age: 75
End: 2020-05-22

## 2020-05-22 VITALS
OXYGEN SATURATION: 97 % | BODY MASS INDEX: 23.64 KG/M2 | WEIGHT: 156 LBS | SYSTOLIC BLOOD PRESSURE: 126 MMHG | HEIGHT: 68 IN | HEART RATE: 84 BPM | DIASTOLIC BLOOD PRESSURE: 72 MMHG

## 2020-05-22 DIAGNOSIS — I65.23 BILATERAL CAROTID ARTERY STENOSIS: Primary | ICD-10-CM

## 2020-05-22 PROCEDURE — 99024 POSTOP FOLLOW-UP VISIT: CPT | Performed by: NURSE PRACTITIONER

## 2020-05-22 RX ORDER — LISINOPRIL AND HYDROCHLOROTHIAZIDE 12.5; 1 MG/1; MG/1
1 TABLET ORAL DAILY
COMMUNITY
Start: 2020-05-18

## 2020-05-22 RX ORDER — LORAZEPAM 1 MG/1
TABLET ORAL
COMMUNITY
Start: 2020-05-08 | End: 2020-06-15

## 2020-05-22 NOTE — PROGRESS NOTES
"5/22/2020       Alma Mathias, APRN  [address] [place] [address]    Justin Szymanski  1945    Chief Complaint   Patient presents with   • Follow-up     2 Week Post-Op Follow Up For LEFT CAROTID ENDARTERECTOMY WITH BOVINE PATCH ANGIOPLASTY, EEG MONITORING, AND COMPLETION DUPLEX VESSEL ULTRASOUND. Patient denies any stroke like symptoms.    • other     Patient states still has some numbness and bites his lip when trying to chew. Patient also states has had some trouble getting BP under control.        Dear Alma Mathias, APRAGUS       HPI  I had the pleasure of seeing your patient Justin Szymanski in the office today.    As you recall, Justin Szymanski is a 74 y.o.  male who we are following for asymptomatic carotid disease.  He was initially sent following a lif[facility] screening. His ABIs and us aorta were normal.  He denies any strokelike symptoms.  He is maintained on aspirin and Lipitor.  Testing does show he has aneurysm of the ascending aorta 4.3 cm.  He did undergo a left carotid endarterectomy on 5/6/2020 and is now back for follow-up.  Overall he is doing well.  Left neck incision has healed.  He does have some numbness which is improving.  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  He has bit his lip couple of times while eating.       Review of Systems   Constitutional: Negative.    HENT: Negative.         Allergies   Eyes: Negative.    Respiratory: Negative.    Cardiovascular: Negative.    Gastrointestinal: Negative.    Endocrine: Negative.    Genitourinary: Negative.    Musculoskeletal: Negative.    Skin: Negative.    Allergic/Immunologic: Negative.    Neurological: Negative.    Hematological: Negative.    Psychiatric/Behavioral: Negative.    All other systems reviewed and are negative.      /72 (BP Location: Right arm, Patient Position: Sitting, Cuff Size: Adult)   Pulse 84   Ht 172.7 cm (68\")   Wt 70.8 kg (156 lb)   SpO2 97%   BMI 23.72 kg/m²   Physical Exam   Constitutional: He is " oriented to person, place, and time. Vital signs are normal. He appears well-developed and well-nourished.   HENT:   Head: Normocephalic and atraumatic.   Eyes: Pupils are equal, round, and reactive to light. No scleral icterus.   Neck: Normal range of motion. Neck supple. No thyromegaly present.   Left neck incision healed   Cardiovascular: Normal rate, regular rhythm and intact distal pulses.   Murmur heard.   Systolic murmur is present.  Pulmonary/Chest: Effort normal and breath sounds normal.   Abdominal: Soft. Bowel sounds are normal.   Musculoskeletal: Normal range of motion.   Neurological: He is alert and oriented to person, place, and time.   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   Psychiatric: He has a normal mood and affect. His behavior is normal. Judgment and thought content normal.   Nursing note and vitals reviewed.       Diagnostic Data:     Mri Brain Without Contrast    Result Date: 4/30/2020  Narrative: MRI brain without contrast  Indication: Stroke; R29.898-Other symptoms and signs involving the musculoskeletal system  Comparison: CT head 4/29/2020  Findings:  No abnormal diffusion restriction. No increased susceptibility to suggest hemorrhage. Major physiologic flow voids are maintained.  Multiple bilateral T2 FLAIR hyperintense lesions in the periventricular and subcortical white matter are nonspecific but likely sequela of chronic microvascular ischemia. Tiny hyperintense lesion in the medulla is likely related to the same process. Mild global cerebral volume loss. Tiny areas encephalomalacia in the RIGHT cerebellum may also represent sequela of remote ischemia. No midline shift or mass effect. The lateral ventricles are nondilated. Sella turcica and its contents are unremarkable.  Prior cataract surgery. No acute orbital finding. Mild mucosal thickening in the LEFT maxillary sinus. Paranasal sinuses are otherwise clear. Mastoid air cells are clear.  T2 high-resolution  small field-of-view imaging through the brain stem was performed. No CP angle mass identified. The bilateral vestibulocochlear nerves, oculomotor nerves, trigeminal nerves, and abducens nerves appear unremarkable in their visualized courses.      Impression:  1.  No evidence of acute stroke. 2.  Global cerebral volume loss and presumed chronic microvascular ischemic white matter change. This report was finalized on 04/30/2020 10:13 by Dr. Dhruv Maldonado MD.    Mri Cervical Spine Without Contrast    Result Date: 4/30/2020  Narrative: MRI CERVICAL SPINE WO CONTRAST- 4/30/2020 4:38 PM CDT  HISTORY: left arm numbness, tremor; R29.898-Other symptoms and signs involving the musculoskeletal system; I65.23-Occlusion and stenosis of bilateral carotid arteries; R20.0-Anesthesia of skin; R25.3-Fasciculation  COMPARISON: None  TECHNIQUE: Multiplanar, multisequence MRI of the cervical spine was obtained without contrast.  FINDINGS: Imaged portions of the cerebellum and brainstem are unremarkable.  Alignment: Spine is imaged from the skull base through T3. Normal cervical lordosis is maintained. There is no evidence of listhesis or subluxation.  Marrow signal: No pathologic marrow infiltrate is demonstrated. The vertebral body heights and posterior elements are maintained.  Cord: The spinal cord is normal in signal and morphology.  Soft tissues: The surrounding soft tissues are unremarkable. Synovitis with capsular distention at the right C3-C4 and left C5-C6 facet joints.  Levels: C2-C3: No significant disc bulge or focal herniation. No significant spinal stenosis. There is advanced facet arthropathy on the left which results in a moderate neuroforaminal narrowing. No significant narrowing in the right.  C3-C4: No significant disc bulge or focal herniation. No significant spinal stenosis. Bilateral facet arthropathy resulting in a moderate bilateral neuroforaminal narrowing.  C4-C5: No significant disc bulge or focal herniation.  No significant spinal stenosis. Bilateral advanced facet arthropathy resulting in a moderate bilateral neuroforaminal narrowing.  C5-C6: Minimal disc bulging. No significant spinal stenosis. There is bilateral facet arthropathy resulting in a mild bilateral neuroforaminal narrowing.  C6-C7: Loss of intervertebral disc height with mild disc bulging. There is a tiny right paracentral disc protrusion. No significant spinal stenosis. Facet arthropathy results in a mild bilateral neuroforaminal narrowing.  C7-T1: No significant disc bulge or focal herniation. No significant spinal stenosis. Bilateral facet arthropathy resulting in a mild bilateral neuroforaminal narrowing.      Impression: 1. Cervical lordosis is maintained. No listhesis. 2. No significant spinal stenosis. The cervical cord is normal in signal and caliber. 3. Multilevel facet arthropathy with resultant neuroforaminal narrowing, as described above. There is synovitis with capsular distention at the right C3-C4 and left C5-C6 facet joints, thought secondary to osteoarthritis.  This report was finalized on 04/30/2020 17:20 by Dr Javid Schilling, .    Xr Chest 1 View    Result Date: 4/29/2020  Narrative: Exam:   XR CHEST 1 VW-   Date:  4/29/2020  History:  Male, age  74 years;Stroke Protocol (Onset > 12 hrs)  COMPARISON:  None.  Findings :  The heart and mediastinum are normal in size. Lungs are without focal infiltrate, mass or effusions.  The bones show no acute pathology.       Impression: Impression:  No acute cardiopulmonary disease.  This report was finalized on 04/29/2020 15:55 by Dr. Pili Mares MD.     Carotid Limited Intraop    Result Date: 5/6/2020  Narrative: History: Left carotid stenosis.  Comments: Gray scale imaging as well as color flow duplex were used to evaluate the left carotid system intraoperatively during carotid endarterectomy.  The peak systolic velocity in the common carotid artery measured 69.1 cm/s. In the internal carotid  artery measured 47.9 cm/s. In the external carotid artery measured 67 cm/s. There is no evidence of flap formation, debris, or thrombosis.      Impression: Successful left carotid endarterectomy. This report was finalized on 05/06/2020 13:39 by Dr. Sb Hunter MD.    Ct Head Without Contrast Stroke Protocol    Result Date: 4/29/2020  Narrative: EXAM: CT OF THE HEAD WITHOUT IV CONTRAST 4/29/2020  COMPARISON: None  INDICATION: Male, 74 years-old. Stroke  PROCEDURE: Non contrast enhanced head CT was performed.  Radiation dose equals  mGy-cm.  Automated exposure control dose reduction technique was implemented.  FINDINGS: Ventricles and cerebrospinal fluid spaces are normal in size and configuration for the patient's age. There is no evidence of mass-effect or midline shift. The gray-white differentiation is preserved.  There is no evidence of intracranial contusion, hemorrhage, or skull fracture. Mastoid air cells are clear. The paranasal sinuses are free of obstructive mucosal disease.      Impression: 1.   No acute intracranial abnormality. This report was finalized on 04/29/2020 15:17 by Dr. Pili Mares MD.       Patient Active Problem List   Diagnosis   • Allergic rhinitis due to pollen   • Snoring   • Nasal polyposis - hx of   • ADITHYA (obstructive sleep apnea)   • ADITHYA on CPAP   • Indigestion   • Heart murmur   • Hypertension   • Hyperlipidemia   • Ascending aortic aneurysm (CMS/HCC)   • PAD (peripheral artery disease) (CMS/[facility])   • Upper extremity weakness   • Bilateral carotid artery stenosis   • Numbness and tingling of right upper extremity   • Muscle fasciculation   • Preop testing   • Carotid stenosis         ICD-10-CM ICD-9-CM   1. Bilateral carotid artery stenosis I65.23 433.10     433.30       Plan: After thoroughly evaluating Justin Szymanski, I am pleased to report he is doing well status post left carotid endarterectomy.  His left neck incision has healed.  He does have some numbness with a  left lower lip neuropraxia.  I did explain that this should continue to improve with time but that there is a chance that it may not.  I did give him a list of exercises to complete and encourage as normal function as possible.  If he continues to have difficulties, I asked him to let me know and could possibly refer to Dr. Rodírguez.  He also has an ascending aortic aneurysm.  We will continue to follow.  Body mass index is 23.72 kg/m².  I did discuss vascular risk factors as they pertain to the progression of vascular disease including controlling his hypertension and hyperlipidemia, which are stable on his current medication regimen. The patient can continue taking her current medication regimen as previously planned.  This was all discussed in full with complete understanding.    Thank you for allowing me to participate in the care of your patient.  Please do not hesitate with any questions or concerns.  I will keep you aware of any further encounters with Justin Szymanski.        Sincerely yours,         EFRA Ellington Mariah L, APRN

## 2020-05-26 ENCOUNTER — READMISSION MANAGEMENT (OUTPATIENT)
Dept: CALL CENTER | Facility: HOSPITAL | Age: 75
End: 2020-05-26

## 2020-05-26 NOTE — OUTREACH NOTE
General Surgery Week 3 Survey      Responses   [facility] [place] patient discharged from?  [place]   Does the patient have one of the following disease processes/diagnoses(primary or secondary)?  General Surgery   Week 3 attempt successful?  Yes   Call start time  1643   Call end time  1645   Discharge diagnosis  left carotid endarterectomy   Meds reviewed with patient/caregiver?  Yes   Is the patient taking all medications as directed (includes completed medication regime)?  Yes   Has the patient kept scheduled appointments due by today?  Yes   What is the patient's perception of their health status since discharge?  Improving   Nursing interventions  Nurse provided patient education   Is the patient /caregiver able to teach back basic post-op care?  Keep incision areas clean,dry and protected   Is the patient/caregiver able to teach back signs and symptoms of incisional infection?  Increased redness, swelling or pain at the incisonal site   Is the patient/caregiver able to teach back steps to recovery at home?  Rest and rebuild strength, gradually increase activity, Eat a well-balance diet   Week 3 call completed?  Yes          Phylicia Wu RN

## 2020-05-29 ENCOUNTER — HOSPITAL ENCOUNTER (OUTPATIENT)
Dept: GENERAL RADIOLOGY | Facility: HOSPITAL | Age: 75
Discharge: HOME | End: 2020-05-29

## 2020-05-29 ENCOUNTER — TRANSCRIBE ORDERS (OUTPATIENT)
Dept: ADMINISTRATIVE | Facility: HOSPITAL | Age: 75
End: 2020-05-29

## 2020-05-29 DIAGNOSIS — R10.84 GENERALIZED ABDOMINAL PAIN: Primary | ICD-10-CM

## 2020-06-01 ENCOUNTER — HOSPITAL ENCOUNTER (OUTPATIENT)
Dept: ULTRASOUND IMAGING | Facility: HOSPITAL | Age: 75
Discharge: HOME | End: 2020-06-01
Admitting: NURSE PRACTITIONER

## 2020-06-01 PROCEDURE — 76705 ECHO EXAM OF ABDOMEN: CPT

## 2020-06-02 ENCOUNTER — TRANSCRIBE ORDERS (OUTPATIENT)
Dept: ADMINISTRATIVE | Facility: HOSPITAL | Age: 75
End: 2020-06-02

## 2020-06-02 DIAGNOSIS — R10.9 ABDOMINAL PAIN, UNSPECIFIED ABDOMINAL LOCATION: Primary | ICD-10-CM

## 2020-06-04 ENCOUNTER — READMISSION MANAGEMENT (OUTPATIENT)
Dept: CALL CENTER | Facility: HOSPITAL | Age: 75
End: 2020-06-04

## 2020-06-04 NOTE — OUTREACH NOTE
General Surgery Week 4 Survey      Responses   [facility] [place] patient discharged from?  [place]   Does the patient have one of the following disease processes/diagnoses(primary or secondary)?  General Surgery   Week 4 attempt successful?  Yes   Call start time  1605   Call end time  1609   Discharge diagnosis  left carotid endarterectomy   Is the patient taking all medications as directed (includes completed medication regime)?  Yes   Has the patient kept scheduled appointments due by today?  Yes   Is the patient still receiving Home Health Services?  N/A   What is the patient's perception of their health status since discharge?  Improving   Week 4 call completed?  Yes   Would the patient like one additional call?  No   Graduated  Yes   Did the patient feel the follow up calls were helpful during their recovery period?  Yes   Was the number of calls appropriate?  Yes          Phylicia Wu RN

## 2020-06-05 ENCOUNTER — HOSPITAL ENCOUNTER (OUTPATIENT)
Dept: NUCLEAR MEDICINE | Facility: HOSPITAL | Age: 75
Discharge: HOME | End: 2020-06-05

## 2020-06-05 DIAGNOSIS — R10.9 ABDOMINAL PAIN, UNSPECIFIED ABDOMINAL LOCATION: ICD-10-CM

## 2020-06-05 PROCEDURE — 0 TECHNETIUM TC 99M MEBROFENIN KIT: Performed by: NURSE PRACTITIONER

## 2020-06-05 PROCEDURE — 78226 HEPATOBILIARY SYSTEM IMAGING: CPT

## 2020-06-05 PROCEDURE — A9537 TC99M MEBROFENIN: HCPCS | Performed by: NURSE PRACTITIONER

## 2020-06-05 RX ORDER — KIT FOR THE PREPARATION OF TECHNETIUM TC 99M MEBROFENIN 45 MG/10ML
1 INJECTION, POWDER, LYOPHILIZED, FOR SOLUTION INTRAVENOUS
Status: COMPLETED | OUTPATIENT
Start: 2020-06-05 | End: 2020-06-05

## 2020-06-05 RX ADMIN — MEBROFENIN 1 DOSE: 45 INJECTION, POWDER, LYOPHILIZED, FOR SOLUTION INTRAVENOUS at 09:34

## 2020-06-08 ENCOUNTER — OFFICE VISIT (OUTPATIENT)
Dept: NEUROLOGY | Facility: CLINIC | Age: 75
End: 2020-06-08

## 2020-06-08 VITALS
OXYGEN SATURATION: 99 % | SYSTOLIC BLOOD PRESSURE: 117 MMHG | WEIGHT: 156 LBS | HEART RATE: 77 BPM | BODY MASS INDEX: 23.64 KG/M2 | DIASTOLIC BLOOD PRESSURE: 68 MMHG | HEIGHT: 68 IN

## 2020-06-08 DIAGNOSIS — G47.33 OBSTRUCTIVE SLEEP APNEA: ICD-10-CM

## 2020-06-08 DIAGNOSIS — M47.812 CERVICAL SPONDYLOSIS: ICD-10-CM

## 2020-06-08 DIAGNOSIS — G45.9 TIA (TRANSIENT ISCHEMIC ATTACK): Primary | ICD-10-CM

## 2020-06-08 DIAGNOSIS — R25.1 TREMOR: ICD-10-CM

## 2020-06-08 PROCEDURE — 99215 OFFICE O/P EST HI 40 MIN: CPT | Performed by: PSYCHIATRY & NEUROLOGY

## 2020-06-08 NOTE — PROGRESS NOTES
Subjective   Justin Szymanski, 1945, is a male who is being seen today for   Chief Complaint   Patient presents with   • Sleep Apnea       HISTORY OF PRESENT ILLNESS: Extended follow-up.  Patient seen for episodes of right arm numbness and weakness and tremor.  Patient went in the hospital had left carotid surgery planned and subsequently did have left carotid endarterectomy.  These symptoms have improved since the left carotid endarterectomy except for the fact that he has bitten his lip a few times because it is numb and feels left lower part of the face somewhat weak.  Patient is not having trouble swallowing and no significant numbness of the tongue.  Patient has long history of tremor with positive family history.  He also has had some family history of head and neck tremor.  Patient has ADITHYA and is trying to use his CPAP again.  He uses a full facemask and uses [email or web address] and we are trying to get a compliance report.  Patient says he was having about a lot of burping with the Pap device but thinks this newest mask that he has somewhat improved.  Patient is supposed to have gallbladder surgery per Dr. Oconnell and is to get with Dr. Anish Hunter about the Plavix situation and whether he is going to need Lovenox injections.  Patient had previous left ulnar transposition.  Patient had MRI brain noncontributory for last hospitalization but MRI of the cervical spine show synovitis and capsular distention right C3/C4 and left C5/C6.  There was a question of deltoid fasciculation on the right and he is getting EMG set up.  Patient has stop bang of 5.  Patient has Perry Sleepiness Scale 8.  Echocardiogram showed mild dilation of the aortic root and aortic valve regurgitation    REVIEW OF SYSTEMS:   GENERAL: Blood pressure today 117/68 sitting in same standing with pulse 77  PULMONARY: As above  CVS: No acute chest pain or palpitation  GASTROINTESTINAL: As above  GENITOURINARY: No acute  distress  GYN: Not  applicable  MUSCULOSKELETAL: As above.  Patient has had a left ulnar transposition  HEENT: No acute vision or hearing change  ENDOCRINE: Not diabetic  PSYCHIATRIC: No acute psychiatric symptoms  HEMATOLOGY: Not anemic  SKIN: No acute skin changes  Family history as above  Social history: Patient denies smoking or drug use.  Patient does use alcohol      PHYSICAL EXAMINATION:    GENERAL: No acute distress.  Patient has obvious intentional tremor and some cogwheeling and rigidity but no rest tremor  CRANIUM: Normal cephalic/atraumatic  HEENT:       EYES: No acute fundic abnormalities.  Pupils equal round reactive to light.  Fields full to confrontation.  EOMs intact without nystagmus.       EARS: Tympanic membranes normal and hears tuning fork bilaterally       THROAT: I was small ulcer on the left lower lip where he had bitten it.  Tongue questionably deviates slightly to the right but difficult as this is inconsistent.       NECK: No bruits/no lymphadenopathy  CHEST: No acute cardiopulmonary abnormalities by auscultation  ABDOMEN: Nondistended  EXTREMITIES: Negative Tinel's wrist and elbow negative Adson's maneuvers  NEURO: Patient alert and follows commands without difficulty  SPEECH: Normal    CRANIAL NERVES: Question of slight weakness in the mandibular face on the left and definite decrease in pin sensation left lower maxillary and mandibular versus right as well as decreased pin sensation over the area of the previous parotid surgery  EXTREMITIES: Dorsalis pedis pulse symmetrical  MOTOR STRENGTH: Motor strength upper and lower extremities normal.  I could not see any definite fasciculations in the muscles upper or lower extremity  STATION AND GAIT: Gait normal/Romberg negative  CEREBELLAR: Finger-nose and heel-to-shin normal  SENSORY: Patient may have slight hyperesthesia in the left pinky finger versus the right and decrease in pin and vibration distal proximal lower extremities and ankles  bilaterally  REFLEXES: Present reflexes knee jerk and biceps and decreased to the ankle jerk bilaterally without clonus or Babinski      ASSESSMENT AND PLAN: Patient with a history of TIA and right arm numbness with also cervical spondylosis and intentional tremor with some cogwheeling or rigidity.  Patient is to get EMG and nerve conduction and is to continue to attempt improvement in use of his Pap device.  Patient is to get with his general surgeon and vascular surgeon about the Plavix/Lovenox as above.  Patient to get to emergency room immediately if stroke symptoms occur.  I spent 40 minutes with this patient with 30 minutes counseling.      Justin was seen today for sleep apnea.    Diagnoses and all orders for this visit:    TIA (transient ischemic attack)    Tremor    Cervical spondylosis    Obstructive sleep apnea        Dictated utilizing Dragon voice recognition software

## 2020-06-08 NOTE — PATIENT INSTRUCTIONS
Patient to get with Dr. Hunter and Dr. Oconnell about Plavix situation around the time of the patient's gallbladder surgery.  Patient to get to emergency room immediately if stroke symptoms occur.

## 2020-06-09 ENCOUNTER — HOSPITAL ENCOUNTER (OUTPATIENT)
Dept: NEUROLOGY | Facility: HOSPITAL | Age: 75
Discharge: HOME | End: 2020-06-09
Admitting: CLINICAL NURSE SPECIALIST

## 2020-06-09 DIAGNOSIS — R25.3 FASCICULATIONS: ICD-10-CM

## 2020-06-09 DIAGNOSIS — R20.0 RIGHT ARM NUMBNESS: ICD-10-CM

## 2020-06-09 PROCEDURE — 95910 NRV CNDJ TEST 7-8 STUDIES: CPT

## 2020-06-09 PROCEDURE — 95886 MUSC TEST DONE W/N TEST COMP: CPT | Performed by: PSYCHIATRY & NEUROLOGY

## 2020-06-09 PROCEDURE — 95910 NRV CNDJ TEST 7-8 STUDIES: CPT | Performed by: PSYCHIATRY & NEUROLOGY

## 2020-06-09 PROCEDURE — 95886 MUSC TEST DONE W/N TEST COMP: CPT

## 2020-06-11 ENCOUNTER — TRANSCRIBE ORDERS (OUTPATIENT)
Dept: ADMINISTRATIVE | Facility: HOSPITAL | Age: 75
End: 2020-06-11

## 2020-06-11 DIAGNOSIS — Z01.818 PRE-OP TESTING: Primary | ICD-10-CM

## 2020-06-15 ENCOUNTER — LAB (OUTPATIENT)
Dept: LAB | Facility: HOSPITAL | Age: 75
End: 2020-06-15

## 2020-06-15 ENCOUNTER — APPOINTMENT (OUTPATIENT)
Dept: PREADMISSION TESTING | Facility: HOSPITAL | Age: 75
End: 2020-06-15

## 2020-06-15 VITALS
BODY MASS INDEX: 25.53 KG/M2 | SYSTOLIC BLOOD PRESSURE: 123 MMHG | HEART RATE: 84 BPM | DIASTOLIC BLOOD PRESSURE: 72 MMHG | OXYGEN SATURATION: 98 % | WEIGHT: 168.43 LBS | HEIGHT: 68 IN

## 2020-06-15 LAB
ALBUMIN SERPL-MCNC: 4.5 G/DL (ref 3.5–5.2)
ALBUMIN/GLOB SERPL: 1.7 G/DL
ALP SERPL-CCNC: 70 U/L (ref 39–117)
ALT SERPL W P-5'-P-CCNC: 16 U/L (ref 1–41)
AMYLASE SERPL-CCNC: 82 U/L (ref 28–100)
ANION GAP SERPL CALCULATED.3IONS-SCNC: 11 MMOL/L (ref 5–15)
AST SERPL-CCNC: 26 U/L (ref 1–40)
BASOPHILS # BLD AUTO: 0.08 10*3/MM3 (ref 0–0.2)
BASOPHILS NFR BLD AUTO: 1.1 % (ref 0–1.5)
BILIRUB SERPL-MCNC: 0.4 MG/DL (ref 0.2–1.2)
BUN BLD-MCNC: 21 MG/DL (ref 8–23)
BUN/CREAT SERPL: 19.6 (ref 7–25)
CALCIUM SPEC-SCNC: 9 MG/DL (ref 8.6–10.5)
CHLORIDE SERPL-SCNC: 103 MMOL/L (ref 98–107)
CO2 SERPL-SCNC: 26 MMOL/L (ref 22–29)
CREAT BLD-MCNC: 1.07 MG/DL (ref 0.76–1.27)
DEPRECATED RDW RBC AUTO: 46.9 FL (ref 37–54)
EOSINOPHIL # BLD AUTO: 0.44 10*3/MM3 (ref 0–0.4)
EOSINOPHIL NFR BLD AUTO: 6 % (ref 0.3–6.2)
ERYTHROCYTE [DISTWIDTH] IN BLOOD BY AUTOMATED COUNT: 13.2 % (ref 12.3–15.4)
GFR SERPL CREATININE-BSD FRML MDRD: 68 ML/MIN/1.73
GLOBULIN UR ELPH-MCNC: 2.6 GM/DL
GLUCOSE BLD-MCNC: 116 MG/DL (ref 65–99)
HCT VFR BLD AUTO: 38.2 % (ref 37.5–51)
HGB BLD-MCNC: 12.6 G/DL (ref 13–17.7)
IMM GRANULOCYTES # BLD AUTO: 0.01 10*3/MM3 (ref 0–0.05)
IMM GRANULOCYTES NFR BLD AUTO: 0.1 % (ref 0–0.5)
LIPASE SERPL-CCNC: 23 U/L (ref 13–60)
LYMPHOCYTES # BLD AUTO: 1.41 10*3/MM3 (ref 0.7–3.1)
LYMPHOCYTES NFR BLD AUTO: 19.1 % (ref 19.6–45.3)
MCH RBC QN AUTO: 31.7 PG (ref 26.6–33)
MCHC RBC AUTO-ENTMCNC: 33 G/DL (ref 31.5–35.7)
MCV RBC AUTO: 96.2 FL (ref 79–97)
MONOCYTES # BLD AUTO: 0.72 10*3/MM3 (ref 0.1–0.9)
MONOCYTES NFR BLD AUTO: 9.8 % (ref 5–12)
NEUTROPHILS # BLD AUTO: 4.72 10*3/MM3 (ref 1.7–7)
NEUTROPHILS NFR BLD AUTO: 63.9 % (ref 42.7–76)
NRBC BLD AUTO-RTO: 0 /100 WBC (ref 0–0.2)
PLATELET # BLD AUTO: 270 10*3/MM3 (ref 140–450)
PMV BLD AUTO: 9.3 FL (ref 6–12)
POTASSIUM BLD-SCNC: 4.4 MMOL/L (ref 3.5–5.2)
PROT SERPL-MCNC: 7.1 G/DL (ref 6–8.5)
RBC # BLD AUTO: 3.97 10*6/MM3 (ref 4.14–5.8)
SODIUM BLD-SCNC: 140 MMOL/L (ref 136–145)
WBC NRBC COR # BLD: 7.38 10*3/MM3 (ref 3.4–10.8)

## 2020-06-15 PROCEDURE — 82150 ASSAY OF AMYLASE: CPT | Performed by: SPECIALIST

## 2020-06-15 PROCEDURE — 83690 ASSAY OF LIPASE: CPT | Performed by: SPECIALIST

## 2020-06-15 PROCEDURE — C9803 HOPD COVID-19 SPEC COLLECT: HCPCS

## 2020-06-15 PROCEDURE — 36415 COLL VENOUS BLD VENIPUNCTURE: CPT

## 2020-06-15 PROCEDURE — U0003 INFECTIOUS AGENT DETECTION BY NUCLEIC ACID (DNA OR RNA); SEVERE ACUTE RESPIRATORY SYNDROME CORONAVIRUS 2 (SARS-COV-2) (CORONAVIRUS DISEASE [COVID-19]), AMPLIFIED PROBE TECHNIQUE, MAKING USE OF HIGH THROUGHPUT TECHNOLOGIES AS DESCRIBED BY CMS-2020-01-R: HCPCS | Performed by: SPECIALIST

## 2020-06-15 PROCEDURE — 80053 COMPREHEN METABOLIC PANEL: CPT | Performed by: SPECIALIST

## 2020-06-15 PROCEDURE — 85025 COMPLETE CBC W/AUTO DIFF WBC: CPT | Performed by: SPECIALIST

## 2020-06-15 NOTE — DISCHARGE INSTRUCTIONS
DAY OF SURGERY INSTRUCTIONS        YOUR SURGEON: ***    PROCEDURE: ***cholecystectomy laparoscopic with intraoperative cholangiogram    DATE OF SURGERY: ***6/18/2020    ARRIVAL TIME: AS DIRECTED BY OFFICE    YOU MAY TAKE THE FOLLOWING MEDICATION(S) THE MORNING OF SURGERY WITH A SIP OF WATER: ***as directed by your doctor (do not take lisinopril 24 hours prior to surgery per anesthesia)      ALL OTHER HOME MEDICATION CHECK WITH YOUR PHYSICIAN      DO NOT TAKE ANY ERECTILE DYSFUNCTION MEDICATIONS (EX: CIALIS, VIAGRA) 24 HOURS PRIOR TO SURGERY                      MANAGING PAIN AFTER SURGERY    We know you are probably wondering what your pain will be like after surgery.  Following surgery it is unrealistic to expect you will not have pain.   Pain is how our bodies let us know that something is wrong or cautions us to be careful.  That said, our goal is to make your pain tolerable.    Methods we may use to treat your pain include (oral or IV medications, PCAs, epidurals, nerve blocks, etc.)   While some procedures require IV pain medications for a short time after surgery, transitioning to pain medications by mouth allows for better management of pain.   Your nurse will encourage you to take oral pain medications whenever possible.  IV medications work almost immediately, but only last a short while.  Taking medications by mouth allows for a more constant level of medication in your blood stream for a longer period of time.      Once your pain is out of control it is harder to get back under control.  It is important you are aware when your next dose of pain medication is due.  If you are admitted, your nurse may write the time of your next dose on the white board in your room to help you remember.      We are interested in your pain and encourage you to inform us about aggravating factors during your visit.   Many times a simple repositioning every few hours can make a big difference.    If your physician  says it is okay, do not let your pain prevent you from getting out of bed. Be sure to call your nurse for assistance prior to getting up so you do not fall.      Before surgery, please decide your tolerable pain goal.  These faces help describe the pain ratings we use on a 0-10 scale.   Be prepared to tell us your goal and whether or not you take pain or anxiety medications at home.          BEFORE YOU COME TO THE HOSPITAL  (Pre-op instructions)  • Do not eat, drink, smoke or chew gum after midnight the night before surgery.  This also includes no mints.  • Morning of surgery take only the medicines you have been instructed with a sip of water unless otherwise instructed  by your physician.  • Do not shave, wear makeup or dark nail polish.  • Remove all jewelry including rings.  • Leave anything you consider valuable at home.  • Leave your suitcase in the car until after your surgery.  • Bring the following with you if applicable:  o Picture ID and insurance, Medicare or Medicaid cards  o Co-pay/deductible required by insurance (cash, check, credit card)  o Copy of advance directive, living will or power-of- documents if not brought to PAT  o CPAP or BIPAP mask and tubing  o Relaxation aids ( book, magazine), etc.  o Hearing aids                        ON THE DAY OF SURGERY  · On the day of surgery check in at registration located at the main entrance of the hospital.   ? You will be registered and given a beeper with instructions where to wait in the main lobby.  ? When your beeper lights up and vibrates a member of the Outpatient Surgery staff will meet you at the double doors under the stair steps and escort you to your preoperative room.   · You may have cloth compression devices placed on your legs. These help to prevent blood clots and reduce swelling in your legs.  · An IV may be inserted into one of your veins.  · In the operating room, you may be given one or more of the following:  ? A medicine to  "help you relax (sedative).  ? A medicine to numb the area (local anesthetic).  ? A medicine to make you fall asleep (general anesthetic).  ? A medicine that is injected into an area of your body to numb everything below the injection site (regional anesthetic).  · Your surgical site will be marked or identified.  · You may be given an antibiotic through your IV to help prevent infection.  Contact a health care provider if you:  · Develop a fever of more than 100.4°F (38°C) or other feelings of illness during the 48 hours before your surgery.  · Have symptoms that get worse.  Have questions or concerns about your surgery    General Anesthesia/Surgery, Adult  General anesthesia is the use of medicines to make a person \"go to sleep\" (unconscious) for a medical procedure. General anesthesia must be used for certain procedures, and is often recommended for procedures that:  · Last a long time.  · Require you to be still or in an unusual position.  · Are major and can cause blood loss.  The medicines used for general anesthesia are called general anesthetics. As well as making you unconscious for a certain amount of time, these medicines:  · Prevent pain.  · Control your blood pressure.  · Relax your muscles.  Tell a health care provider about:  · Any allergies you have.  · All medicines you are taking, including vitamins, herbs, eye drops, creams, and over-the-counter medicines.  · Any problems you or family members have had with anesthetic medicines.  · Types of anesthetics you have had in the past.  · Any blood disorders you have.  · Any surgeries you have had.  · Any medical conditions you have.  · Any recent upper respiratory, chest, or ear infections.  · Any history of:  ? Heart or lung conditions, such as heart failure, sleep apnea, asthma, or chronic obstructive pulmonary disease (COPD).  ?  service.  ? Depression or anxiety.  · Any tobacco or drug use, including marijuana or alcohol use.  · Whether you " are pregnant or may be pregnant.  What are the risks?  Generally, this is a safe procedure. However, problems may occur, including:  · Allergic reaction.  · Lung and heart problems.  · Inhaling food or liquid from the stomach into the lungs (aspiration).  · Nerve injury.  · Air in the bloodstream, which can lead to stroke.  · Extreme agitation or confusion (delirium) when you wake up from the anesthetic.  · Waking up during your procedure and being unable to move. This is rare.  These problems are more likely to develop if you are having a major surgery or if you have an advanced or serious medical condition. You can prevent some of these complications by answering all of your health care provider's questions thoroughly and by following all instructions before your procedure.  General anesthesia can cause side effects, including:  · Nausea or vomiting.  · A sore throat from the breathing tube.  · Hoarseness.  · Wheezing or coughing.  · Shaking chills.  · Tiredness.  · Body aches.  · Anxiety.  · Sleepiness or drowsiness.  · Confusion or agitation.  RISKS AND COMPLICATIONS OF SURGERY  Your health care provider will discuss possible risks and complications with you before surgery. Common risks and complications include:    · Problems due to the use of anesthetics.  · Blood loss and replacement (does not apply to minor surgical procedures).  · Temporary increase in pain due to surgery.  · Uncorrected pain or problems that the surgery was meant to correct.  · Infection.  · New damage.    What happens before the procedure?    Medicines  Ask your health care provider about:  · Changing or stopping your regular medicines. This is especially important if you are taking diabetes medicines or blood thinners.  · Taking medicines such as aspirin and ibuprofen. These medicines can thin your blood. Do not take these medicines unless your health care provider tells you to take them.  · Taking over-the-counter medicines, vitamins,  herbs, and supplements. Do not take these during the week before your procedure unless your health care provider approves them.  General instructions  · Starting 3-6 weeks before the procedure, do not use any products that contain nicotine or tobacco, such as cigarettes and e-cigarettes. If you need help quitting, ask your health care provider.  · If you brush your teeth on the morning of the procedure, make sure to spit out all of the toothpaste.  · Tell your health care provider if you become ill or develop a cold, cough, or fever.  · If instructed by your health care provider, bring your sleep apnea device with you on the day of your surgery (if applicable).  · Ask your health care provider if you will be going home the same day, the following day, or after a longer hospital stay.  ? Plan to have someone take you home from the hospital or clinic.  ? Plan to have a responsible adult care for you for at least 24 hours after you leave the hospital or clinic. This is important.  What happens during the procedure?  · You will be given anesthetics through both of the following:  ? A mask placed over your nose and mouth.  ? An IV in one of your veins.  · You may receive a medicine to help you relax (sedative).  · After you are unconscious, a breathing tube may be inserted down your throat to help you breathe. This will be removed before you wake up.  · An anesthesia specialist will stay with you throughout your procedure. He or she will:  ? Keep you comfortable and safe by continuing to give you medicines and adjusting the amount of medicine that you get.  ? Monitor your blood pressure, pulse, and oxygen levels to make sure that the anesthetics do not cause any problems.  The procedure may vary among health care providers and hospitals.  What happens after the procedure?  · Your blood pressure, temperature, heart rate, breathing rate, and blood oxygen level will be monitored until the medicines you were given have worn  off.  · You will wake up in a recovery area. You may wake up slowly.  · If you feel anxious or agitated, you may be given medicine to help you calm down.  · If you will be going home the same day, your health care provider may check to make sure you can walk, drink, and urinate.  · Your health care provider will treat any pain or side effects you have before you go home.  · Do not drive for 24 hours if you were given a sedative.  Summary  · General anesthesia is used to keep you still and prevent pain during a procedure.  · It is important to tell your healthcare provider about your medical history and any surgeries you have had, and previous experience with anesthesia.  · Follow your healthcare provider’s instructions about when to stop eating, drinking, or taking certain medicines before your procedure.  · Plan to have someone take you home from the hospital or clinic.  This information is not intended to replace advice given to you by your health care provider. Make sure you discuss any questions you have with your health care provider.  Document Released: 03/26/2009 Document Revised: 08/03/2018 Document Reviewed: 08/03/2018  [employer] Interactive Patient Education © 2019 [employer]       Fall Prevention in Hospitals, Adult  As a hospital patient, your condition and the treatments you receive can increase your risk for falls. Some additional risk factors for falls in a hospital include:  · Being in an unfamiliar environment.  · Being on bed rest.  · Your surgery.  · Taking certain medicines.  · Your tubing requirements, such as intravenous (IV) therapy or catheters.  It is important that you learn how to decrease fall risks while at the hospital. Below are important tips that can help prevent falls.  SAFETY TIPS FOR PREVENTING FALLS  Talk about your risk of falling.  · Ask your health care provider why you are at risk for falling. Is it your medicine, illness, tubing placement, or something else?  · Make a plan  with your health care provider to keep you safe from falls.  · Ask your health care provider or pharmacist about side effects of your medicines. Some medicines can make you dizzy or affect your coordination.  Ask for help.  · Ask for help before getting out of bed. You may need to press your call button.  · Ask for assistance in getting safely to the toilet.  · Ask for a walker or cane to be put at your bedside. Ask that most of the side rails on your bed be placed up before your health care provider leaves the room.  · Ask family or friends to sit with you.  · Ask for things that are out of your reach, such as your glasses, hearing aids, telephone, bedside table, or call button.  Follow these tips to avoid falling:  · Stay lying or seated, rather than standing, while waiting for help.  · Wear rubber-soled slippers or shoes whenever you walk in the hospital.  · Avoid quick, sudden movements.  ¨ Change positions slowly.  ¨ Sit on the side of your bed before standing.  ¨ Stand up slowly and wait before you start to walk.  · Let your health care provider know if there is a spill on the floor.  · Pay careful attention to the medical equipment, electrical cords, and tubes around you.  · When you need help, use your call button by your bed or in the bathroom. Wait for one of your health care providers to help you.  · If you feel dizzy or unsure of your footing, return to bed and wait for assistance.  · Avoid being distracted by the TV, telephone, or another person in your room.  · Do not lean or support yourself on rolling objects, such as IV poles or bedside tables.     This information is not intended to replace advice given to you by your health care provider. Make sure you discuss any questions you have with your health care provider.     Document Released: 12/15/2001 Document Revised: 01/08/2016 Document Reviewed: 08/25/2013  [employer] Interactive Patient Education ©2016 [employer]       [facility]  4% Patient Instruction Sheet    Chlorhexidine Before Surgery  Chlorhexidine gluconate (CHG) is a germ-killing (antiseptic) solution that is used to clean the skin. It gets rid of the bacteria that normally live on the skin. Cleaning your skin with CHG before surgery helps lower the risk for infection after surgery.    How to use CHG solution  · You will take 2 showers, one shower the night before surgery, the second shower the morning of surgery before coming to the hospital.  · Use CHG only as told by your health care provider, and follow the instructions on the label.  · Use CHG solution while taking a shower. Follow these steps when using CHG solution (unless your health care provider gives you different instructions):  1. Start the shower.  2. Use your normal soap and shampoo to wash your face and hair.  3. Turn off the shower or move out of the shower stream.  4. Pour the CHG onto a clean washcloth. Do not use any type of brush or rough-edged sponge.  5. Starting at your neck, lather your body down to your toes. Make sure you:  6. Pay special attention to the part of your body where you will be having surgery. Scrub this area for at least 1 minute.  7. Use the full amount of CHG as directed. Usually, this is one half bottle for each shower.  8. Do not use CHG on your head or face. If the solution gets into your ears or eyes, rinse them well with water.  9. Avoid your genital area.  10. Avoid any areas of skin that have broken skin, cuts, or scrapes.  11. Scrub your back and under your arms. Make sure to wash skin folds.  12. Let the lather sit on your skin for 1-2 minutes or as long as told by your health care  provider.  13. Thoroughly rinse your entire body in the shower. Make sure that all body creases and crevices are rinsed well.  14. Dry off with a clean towel. Do not put any substances on your body afterward, such as powder, lotion, or perfume.  15. Put on clean clothes or pajamas.  16. If it is the  night before your surgery, sleep in clean sheets.    What are the risks?  Risks of using CHG include:  · A skin reaction.  · Hearing loss, if CHG gets in your ears.  · Eye injury, if CHG gets in your eyes and is not rinsed out.  · The CHG product catching fire.  Make sure that you avoid smoking and flames after applying CHG to your skin.  Do not use CHG:  · If you have a chlorhexidine allergy or have previously reacted to chlorhexidine.  · On babies younger than 2 months of age.      On the day of surgery, when you are taken to your room in Outpatient Surgery you will be given a CHG prepackaged cloth to wipe the site for your surgery.  How to use CHG prepackaged cloths  · Follow the instructions on the label.  · Use the CHG cloth on clean, dry skin. Follow these steps when using a CHG cloth (unless your health care provider gives you different instructions):  1. Using the CHG cloth, vigorously scrub the part of your body where you will be having surgery. Scrub using a back-and-forth motion for 3 minutes. The area on your body should be completely wet with CHG when you are finished scrubbing.  2. Do not rinse. Discard the cloth and let the area air-dry for 1 minute. Do not put any substances on your body afterward, such as powder, lotion, or perfume.  Contact a health care provider if:  · Your skin gets irritated after scrubbing.  · You have questions about using your solution or cloth.  Get help right away if:  · Your eyes become very red or swollen.  · Your eyes itch badly.  · Your skin itches badly and is red or swollen.  · Your hearing changes.  · You have trouble seeing.  · You have swelling or tingling in your mouth or throat.  · You have trouble breathing.  · You swallow any chlorhexidine.  Summary  · Chlorhexidine gluconate (CHG) is a germ-killing (antiseptic) solution that is used to clean the skin. Cleaning your skin with CHG before surgery helps lower the risk for infection after surgery.  · You may be  given CHG to use at home. It may be in a bottle or in a prepackaged cloth to use on your skin. Carefully follow your health care provider's instructions and the instructions on the product label.  · Do not use CHG if you have a chlorhexidine allergy.  · Contact your health care provider if your skin gets irritated after scrubbing.  This information is not intended to replace advice given to you by your health care provider. Make sure you discuss any questions you have with your health care provider.  Document Released: 09/11/2013 Document Revised: 11/15/2018 Document Reviewed: 11/15/2018  [employer] Interactive Patient Education © 2019 [employer]          PATIENT/FAMILY/RESPONSIBLE PARTY VERBALIZES UNDERSTANDING OF ABOVE EDUCATION.  COPY OF PAIN SCALE GIVEN AND REVIEWED WITH VERBALIZED UNDERSTANDING.

## 2020-06-16 LAB
COVID LABCORP PRIORITY: NORMAL
SARS-COV-2 RNA RESP QL NAA+PROBE: NOT DETECTED

## 2020-06-18 ENCOUNTER — ANESTHESIA (OUTPATIENT)
Dept: PERIOP | Facility: HOSPITAL | Age: 75
End: 2020-06-18

## 2020-06-18 ENCOUNTER — HOSPITAL ENCOUNTER (OUTPATIENT)
Facility: HOSPITAL | Age: 75
Setting detail: HOSPITAL OUTPATIENT SURGERY
Discharge: HOME | End: 2020-06-18
Attending: SPECIALIST | Admitting: SPECIALIST

## 2020-06-18 ENCOUNTER — ANESTHESIA EVENT (OUTPATIENT)
Dept: PERIOP | Facility: HOSPITAL | Age: 75
End: 2020-06-18

## 2020-06-18 VITALS
TEMPERATURE: 98 F | OXYGEN SATURATION: 95 % | HEART RATE: 87 BPM | RESPIRATION RATE: 16 BRPM | SYSTOLIC BLOOD PRESSURE: 115 MMHG | DIASTOLIC BLOOD PRESSURE: 71 MMHG

## 2020-06-18 DIAGNOSIS — K81.9 CHOLECYSTITIS: ICD-10-CM

## 2020-06-18 PROCEDURE — 25010000002 PROPOFOL 10 MG/ML EMULSION: Performed by: NURSE ANESTHETIST, CERTIFIED REGISTERED

## 2020-06-18 PROCEDURE — 25010000002 ONDANSETRON PER 1 MG: Performed by: ANESTHESIOLOGY

## 2020-06-18 PROCEDURE — 88304 TISSUE EXAM BY PATHOLOGIST: CPT | Performed by: SPECIALIST

## 2020-06-18 PROCEDURE — 25010000002 DEXAMETHASONE PER 1 MG: Performed by: NURSE ANESTHETIST, CERTIFIED REGISTERED

## 2020-06-18 PROCEDURE — 25010000002 ONDANSETRON PER 1 MG: Performed by: NURSE ANESTHETIST, CERTIFIED REGISTERED

## 2020-06-18 PROCEDURE — 25010000002 FENTANYL CITRATE (PF) 100 MCG/2ML SOLUTION: Performed by: NURSE ANESTHETIST, CERTIFIED REGISTERED

## 2020-06-18 PROCEDURE — 25010000002 NEOSTIGMINE 10 MG/10ML SOLUTION: Performed by: NURSE ANESTHETIST, CERTIFIED REGISTERED

## 2020-06-18 RX ORDER — FLUMAZENIL 0.1 MG/ML
0.2 INJECTION INTRAVENOUS AS NEEDED
Status: DISCONTINUED | OUTPATIENT
Start: 2020-06-18 | End: 2020-06-18 | Stop reason: HOSPADM

## 2020-06-18 RX ORDER — DEXAMETHASONE SODIUM PHOSPHATE 4 MG/ML
INJECTION, SOLUTION INTRA-ARTICULAR; INTRALESIONAL; INTRAMUSCULAR; INTRAVENOUS; SOFT TISSUE AS NEEDED
Status: DISCONTINUED | OUTPATIENT
Start: 2020-06-18 | End: 2020-06-18 | Stop reason: SURG

## 2020-06-18 RX ORDER — FENTANYL CITRATE 50 UG/ML
25 INJECTION, SOLUTION INTRAMUSCULAR; INTRAVENOUS
Status: DISCONTINUED | OUTPATIENT
Start: 2020-06-18 | End: 2020-06-18 | Stop reason: HOSPADM

## 2020-06-18 RX ORDER — OXYCODONE AND ACETAMINOPHEN 7.5; 325 MG/1; MG/1
2 TABLET ORAL EVERY 4 HOURS PRN
Status: DISCONTINUED | OUTPATIENT
Start: 2020-06-18 | End: 2020-06-18 | Stop reason: HOSPADM

## 2020-06-18 RX ORDER — NALOXONE HCL 0.4 MG/ML
0.4 VIAL (ML) INJECTION AS NEEDED
Status: DISCONTINUED | OUTPATIENT
Start: 2020-06-18 | End: 2020-06-18 | Stop reason: HOSPADM

## 2020-06-18 RX ORDER — LIDOCAINE HYDROCHLORIDE 10 MG/ML
0.5 INJECTION, SOLUTION EPIDURAL; INFILTRATION; INTRACAUDAL; PERINEURAL ONCE AS NEEDED
Status: DISCONTINUED | OUTPATIENT
Start: 2020-06-18 | End: 2020-06-18 | Stop reason: HOSPADM

## 2020-06-18 RX ORDER — SODIUM CHLORIDE 0.9 % (FLUSH) 0.9 %
3 SYRINGE (ML) INJECTION AS NEEDED
Status: DISCONTINUED | OUTPATIENT
Start: 2020-06-18 | End: 2020-06-18 | Stop reason: HOSPADM

## 2020-06-18 RX ORDER — ONDANSETRON 2 MG/ML
INJECTION INTRAMUSCULAR; INTRAVENOUS AS NEEDED
Status: DISCONTINUED | OUTPATIENT
Start: 2020-06-18 | End: 2020-06-18 | Stop reason: SURG

## 2020-06-18 RX ORDER — SODIUM CHLORIDE 0.9 % (FLUSH) 0.9 %
10 SYRINGE (ML) INJECTION EVERY 12 HOURS SCHEDULED
Status: DISCONTINUED | OUTPATIENT
Start: 2020-06-18 | End: 2020-06-18 | Stop reason: HOSPADM

## 2020-06-18 RX ORDER — SODIUM CHLORIDE 0.9 % (FLUSH) 0.9 %
10 SYRINGE (ML) INJECTION AS NEEDED
Status: DISCONTINUED | OUTPATIENT
Start: 2020-06-18 | End: 2020-06-18 | Stop reason: HOSPADM

## 2020-06-18 RX ORDER — FENTANYL CITRATE 50 UG/ML
INJECTION, SOLUTION INTRAMUSCULAR; INTRAVENOUS AS NEEDED
Status: DISCONTINUED | OUTPATIENT
Start: 2020-06-18 | End: 2020-06-18 | Stop reason: SURG

## 2020-06-18 RX ORDER — MAGNESIUM HYDROXIDE 1200 MG/15ML
LIQUID ORAL AS NEEDED
Status: DISCONTINUED | OUTPATIENT
Start: 2020-06-18 | End: 2020-06-18 | Stop reason: HOSPADM

## 2020-06-18 RX ORDER — ROCURONIUM BROMIDE 10 MG/ML
INJECTION, SOLUTION INTRAVENOUS AS NEEDED
Status: DISCONTINUED | OUTPATIENT
Start: 2020-06-18 | End: 2020-06-18 | Stop reason: SURG

## 2020-06-18 RX ORDER — SODIUM CHLORIDE 9 MG/ML
INJECTION, SOLUTION INTRAVENOUS AS NEEDED
Status: DISCONTINUED | OUTPATIENT
Start: 2020-06-18 | End: 2020-06-18 | Stop reason: HOSPADM

## 2020-06-18 RX ORDER — NEOSTIGMINE METHYLSULFATE 1 MG/ML
INJECTION, SOLUTION INTRAVENOUS AS NEEDED
Status: DISCONTINUED | OUTPATIENT
Start: 2020-06-18 | End: 2020-06-18 | Stop reason: SURG

## 2020-06-18 RX ORDER — EPHEDRINE SULFATE 50 MG/ML
INJECTION INTRAVENOUS AS NEEDED
Status: DISCONTINUED | OUTPATIENT
Start: 2020-06-18 | End: 2020-06-18 | Stop reason: SURG

## 2020-06-18 RX ORDER — IBUPROFEN 600 MG/1
600 TABLET ORAL ONCE AS NEEDED
Status: DISCONTINUED | OUTPATIENT
Start: 2020-06-18 | End: 2020-06-18 | Stop reason: HOSPADM

## 2020-06-18 RX ORDER — OXYCODONE AND ACETAMINOPHEN 10; 325 MG/1; MG/1
1 TABLET ORAL ONCE AS NEEDED
Status: COMPLETED | OUTPATIENT
Start: 2020-06-18 | End: 2020-06-18

## 2020-06-18 RX ORDER — ONDANSETRON 2 MG/ML
4 INJECTION INTRAMUSCULAR; INTRAVENOUS ONCE AS NEEDED
Status: COMPLETED | OUTPATIENT
Start: 2020-06-18 | End: 2020-06-18

## 2020-06-18 RX ORDER — BUPIVACAINE HYDROCHLORIDE AND EPINEPHRINE 5; 5 MG/ML; UG/ML
INJECTION, SOLUTION PERINEURAL AS NEEDED
Status: DISCONTINUED | OUTPATIENT
Start: 2020-06-18 | End: 2020-06-18 | Stop reason: HOSPADM

## 2020-06-18 RX ORDER — DEXTROSE MONOHYDRATE 25 G/50ML
12.5 INJECTION, SOLUTION INTRAVENOUS AS NEEDED
Status: DISCONTINUED | OUTPATIENT
Start: 2020-06-18 | End: 2020-06-18 | Stop reason: HOSPADM

## 2020-06-18 RX ORDER — SODIUM CHLORIDE, SODIUM LACTATE, POTASSIUM CHLORIDE, CALCIUM CHLORIDE 600; 310; 30; 20 MG/100ML; MG/100ML; MG/100ML; MG/100ML
1000 INJECTION, SOLUTION INTRAVENOUS CONTINUOUS
Status: DISCONTINUED | OUTPATIENT
Start: 2020-06-18 | End: 2020-06-18 | Stop reason: HOSPADM

## 2020-06-18 RX ORDER — LIDOCAINE HYDROCHLORIDE 20 MG/ML
INJECTION, SOLUTION INFILTRATION; PERINEURAL AS NEEDED
Status: DISCONTINUED | OUTPATIENT
Start: 2020-06-18 | End: 2020-06-18 | Stop reason: SURG

## 2020-06-18 RX ORDER — PROPOFOL 10 MG/ML
VIAL (ML) INTRAVENOUS AS NEEDED
Status: DISCONTINUED | OUTPATIENT
Start: 2020-06-18 | End: 2020-06-18 | Stop reason: SURG

## 2020-06-18 RX ORDER — LABETALOL HYDROCHLORIDE 5 MG/ML
5 INJECTION, SOLUTION INTRAVENOUS
Status: DISCONTINUED | OUTPATIENT
Start: 2020-06-18 | End: 2020-06-18 | Stop reason: HOSPADM

## 2020-06-18 RX ORDER — SODIUM CHLORIDE, SODIUM LACTATE, POTASSIUM CHLORIDE, CALCIUM CHLORIDE 600; 310; 30; 20 MG/100ML; MG/100ML; MG/100ML; MG/100ML
9 INJECTION, SOLUTION INTRAVENOUS CONTINUOUS
Status: DISCONTINUED | OUTPATIENT
Start: 2020-06-18 | End: 2020-06-18 | Stop reason: HOSPADM

## 2020-06-18 RX ORDER — HYDROCODONE BITARTRATE AND ACETAMINOPHEN 7.5; 325 MG/1; MG/1
1-2 TABLET ORAL EVERY 4 HOURS PRN
Qty: 30 TABLET | Refills: 0 | Status: SHIPPED | OUTPATIENT
Start: 2020-06-18 | End: 2020-08-10

## 2020-06-18 RX ADMIN — ONDANSETRON HYDROCHLORIDE 4 MG: 2 SOLUTION INTRAMUSCULAR; INTRAVENOUS at 13:15

## 2020-06-18 RX ADMIN — SODIUM CHLORIDE, POTASSIUM CHLORIDE, SODIUM LACTATE AND CALCIUM CHLORIDE 1000 ML: 600; 310; 30; 20 INJECTION, SOLUTION INTRAVENOUS at 12:14

## 2020-06-18 RX ADMIN — ROCURONIUM BROMIDE 30 MG: 10 INJECTION INTRAVENOUS at 13:04

## 2020-06-18 RX ADMIN — EPHEDRINE SULFATE 10 MG: 50 INJECTION INTRAVENOUS at 13:28

## 2020-06-18 RX ADMIN — CEFAZOLIN 1 G: 1 INJECTION, POWDER, FOR SOLUTION INTRAMUSCULAR; INTRAVENOUS; PARENTERAL at 13:04

## 2020-06-18 RX ADMIN — PROPOFOL 150 MG: 10 INJECTION, EMULSION INTRAVENOUS at 13:04

## 2020-06-18 RX ADMIN — DEXAMETHASONE SODIUM PHOSPHATE 4 MG: 4 INJECTION, SOLUTION INTRAMUSCULAR; INTRAVENOUS at 13:15

## 2020-06-18 RX ADMIN — NEOSTIGMINE METHYLSULFATE 3 MG: 1 INJECTION INTRAVENOUS at 13:52

## 2020-06-18 RX ADMIN — FENTANYL CITRATE 100 MCG: 50 INJECTION, SOLUTION INTRAMUSCULAR; INTRAVENOUS at 13:04

## 2020-06-18 RX ADMIN — LIDOCAINE HYDROCHLORIDE 100 MG: 20 INJECTION, SOLUTION INFILTRATION; PERINEURAL at 13:04

## 2020-06-18 RX ADMIN — GLYCOPYRROLATE 0.4 MG: 0.2 INJECTION, SOLUTION INTRAMUSCULAR; INTRAVENOUS at 13:52

## 2020-06-18 RX ADMIN — OXYCODONE HYDROCHLORIDE AND ACETAMINOPHEN 1 TABLET: 10; 325 TABLET ORAL at 14:39

## 2020-06-18 RX ADMIN — FENTANYL CITRATE 100 MCG: 50 INJECTION, SOLUTION INTRAMUSCULAR; INTRAVENOUS at 13:49

## 2020-06-18 RX ADMIN — ONDANSETRON HYDROCHLORIDE 4 MG: 2 SOLUTION INTRAMUSCULAR; INTRAVENOUS at 14:39

## 2020-06-18 NOTE — OP NOTE
CHOLECYSTECTOMY LAPAROSCOPIC INTRAOPERATIVE CHOLANGIOGRAM  Procedure Note    Justin Szymanski  6/18/2020    Pre-op Diagnosis:   BILIARY DYSKINESIA    Post-op Diagnosis:     same    Procedure/CPT® Codes:  Laparoscopic cholecystectomy    Procedure(s):  CHOLECYSTECTOMY LAPAROSCOPIC INTRAOPERATIVE CHOLANGIOGRAM    Surgeon(s):  Maame Del Rio MD    Anesthesia: General    Staff:   Circulator: Hiwot Waller RN; Ngozi Coulter RN  Scrub Person: Brayan Clemens; Roma Stoddard    Estimated Blood Loss:minimal    Specimens:                Specimens     ID Source Type Tests Collected By Collected At Frozen?      A Gallbladder Tissue · TISSUE PATHOLOGY EXAM   Maame Del Rio MD 6/18/20 1238 No     Description: Gallbladder and Contents            Drains: * No LDAs found *    Findings: Chronic cholecystitis    Complications: none          Date: 6/18/2020  Time: 14:41        The patient was brought to the operating room and placed in supine position. After induction of anesthesia and infusion of antibiotics, the patient was prepped and draped in the usual sterile fashion. Versed needle technique was used. Standard 4 ports were placed. The gallbladder was grasped and retracted superiorly. The infundibulum was grasped and retracted laterally.  Adhesions of omentum were taken down from the gallbladder with cautery the cystic duct and cystic artery were identified, isolated, divided between clips.  Endoloop was placed on the duct stump the gallbladder was removed from its bed using electrocautery, placed in Endo bag and removed through the epigastric port. Irrigation was done until all clear. All ports were removed. Fascia at the 10 mm port site was closed with Vicryl. The skin was reapproximated with #4-0 subcuticular. Then 0.5% Marcaine injected for local anesthesia. Dressing was placed. The patient was awakened and transferred to the recovery room in stable condition. He tolerated the procedure well. At the end of the  procedure, all counts were correct.    Maame Del Rio MD

## 2020-06-18 NOTE — ANESTHESIA POSTPROCEDURE EVALUATION
Patient: Justin Szymanski    Procedure Summary     Date:  06/18/20 Room / Location:   [facility] OR  /  [facility]    Anesthesia Start:  1302 Anesthesia Stop:  1402    Procedure:  CHOLECYSTECTOMY LAPAROSCOPIC INTRAOPERATIVE CHOLANGIOGRAM (N/A Abdomen) Diagnosis:  (BILIARY DYSKINESIA)    Surgeon:  Maame Del Rio MD Provider:  JAMIA Bernal CRNA    Anesthesia Type:  general ASA Status:  3          Anesthesia Type: general    Vitals  Vitals Value Taken Time   /71 6/18/2020  2:45 PM   Temp 98 °F (36.7 °C) 6/18/2020  2:55 PM   Pulse 80 6/18/2020  2:55 PM   Resp 14 6/18/2020  2:55 PM   SpO2 94 % 6/18/2020  2:55 PM           Post Anesthesia Care and Evaluation    Patient location during evaluation: PACU  Patient participation: complete - patient participated  Level of consciousness: awake and alert  Pain score: 0  Pain management: adequate  Airway patency: patent  Anesthetic complications: No anesthetic complications    Cardiovascular status: acceptable and stable  Respiratory status: acceptable and unassisted  Hydration status: acceptable

## 2020-06-18 NOTE — ANESTHESIA PROCEDURE NOTES
Airway  Urgency: elective    Date/Time: 6/18/2020 1:06 PM  Airway not difficult    General Information and Staff    Patient location during procedure: OR  CRNA: JAMIA Bernal CRNA    Indications and Patient Condition  Indications for airway management: airway protection    Preoxygenated: yes  MILS maintained throughout  Mask difficulty assessment: 1 - vent by mask    Final Airway Details  Final airway type: endotracheal airway      Successful airway: ETT  Cuffed: yes   Successful intubation technique: direct laryngoscopy and video laryngoscopy  Facilitating devices/methods: intubating stylet  Endotracheal tube insertion site: oral  Blade: Denis  Blade size: 3  ETT size (mm): 7.5  Cormack-Lehane Classification: grade I - full view of glottis  Placement verified by: chest auscultation and capnometry   Cuff volume (mL): 6  Measured from: lips  ETT/EBT  to lips (cm): 21  Number of attempts at approach: 1  Assessment: lips, teeth, and gum same as pre-op and atraumatic intubation

## 2020-06-18 NOTE — DISCHARGE INSTRUCTIONS

## 2020-06-22 LAB
LAB AP CASE REPORT: NORMAL
PATH REPORT.FINAL DX SPEC: NORMAL
PATH REPORT.GROSS SPEC: NORMAL

## 2020-08-10 ENCOUNTER — OFFICE VISIT (OUTPATIENT)
Dept: NEUROLOGY | Facility: CLINIC | Age: 75
End: 2020-08-10

## 2020-08-10 VITALS
RESPIRATION RATE: 18 BRPM | DIASTOLIC BLOOD PRESSURE: 80 MMHG | WEIGHT: 173.6 LBS | BODY MASS INDEX: 26.31 KG/M2 | SYSTOLIC BLOOD PRESSURE: 130 MMHG | HEART RATE: 80 BPM | HEIGHT: 68 IN

## 2020-08-10 DIAGNOSIS — G56.22 ULNAR NEUROPATHY OF LEFT UPPER EXTREMITY: Primary | ICD-10-CM

## 2020-08-10 DIAGNOSIS — R29.810 FACIAL WEAKNESS: ICD-10-CM

## 2020-08-10 DIAGNOSIS — G47.33 OBSTRUCTIVE SLEEP APNEA: ICD-10-CM

## 2020-08-10 DIAGNOSIS — M47.812 CERVICAL SPONDYLOSIS: ICD-10-CM

## 2020-08-10 DIAGNOSIS — G45.9 TIA (TRANSIENT ISCHEMIC ATTACK): ICD-10-CM

## 2020-08-10 PROCEDURE — 99214 OFFICE O/P EST MOD 30 MIN: CPT | Performed by: PSYCHIATRY & NEUROLOGY

## 2020-08-10 NOTE — PROGRESS NOTES
Subjective   Justin Szymanski, 1945, is a male who is being seen today for   Chief Complaint   Patient presents with   • Stroke   • Sleep Apnea       HISTORY OF PRESENT ILLNESS: Extended follow-up.  Patient seen for previous TIA with no TIAs since left carotid endarterectomy.  Patient still has some left facial weakness especially prominent lower face.  Patient had EMG and nerve conduction which suggested generalized neuropathy in the lower extremities and in the upper extremity possible mild median ulnar sensory irritation at the wrist.  EMG showed possible C5/C6 nerve root irritation in the right neck area and patient does have some cervical spondylosis with osteoarthritis there.  Patient has ADITHYA and not able to use of Pap device through [employer] because of the burping problems even still after gallbladder surgery.  Patient seeing his PCP about that.  Patient stop bang is 6 and Nesbit Sleepiness Scale was 9.  Neck circumference is 17 inches.  Patient continues aspirin/Plavix and statin    REVIEW OF SYSTEMS:   GENERAL: Blood pressure 130/80 left arm seated in same standing with pulse 80  PULMONARY: As above  CVS: No acute chest pain or palpitation  GASTROINTESTINAL: As above  GENITOURINARY: No acute  distress  GYN: Not applicable  MUSCULOSKELETAL: No acute musculoskeletal symptoms  HEENT: No acute vision or hearing change  ENDOCRINE: No acute endocrine symptoms  PSYCHIATRIC: No acute psychiatric symptoms  HEMATOLOGY: Borderline anemia  SKIN: No acute skin changes  Family history reviewed and otherwise noncontributory to this problem  Social history: Patient denies smoking or drug use.  Patient does use alcohol.    PHYSICAL EXAMINATION:    GENERAL: Patient has some fasciculations noted in the ulnar innervated intrinsic hand muscles  CRANIUM: Normal cephalic/atraumatic  HEENT:       EYES: EOMs intact without nystagmus and fields full to confrontation.  Pupils equal round reactive to light.  No acute fundic  abnormalities.       EARS: Tympanic membranes obscured by wax bilaterally but hears tuning fork bilaterally       THROAT: No acute oropharynx abnormalities       NECK: No bruits/no lymphadenopathy  CHEST: No acute cardiopulmonary abnormalities by auscultation  ABDOMEN: Nondistended  EXTREMITIES: Dorsalis pedis pulses symmetrical  NEURO: Patient alert and follows commands without difficulty  SPEECH: Normal    CRANIAL NERVES: Motor shows some weakness left face especially prominent left nasolabial fold and slight left ptosis/ pin sensation also slightly decreased left face versus right especially maxillary mandibular    MOTOR STRENGTH: Motor strength shows 3-4/ 5 strength in the left intrinsic hand muscles ulnar innervated.  Otherwise symmetric motor strength  STATION AND GAIT: Gait normal/Romberg negative  CEREBELLAR: Finger-nose and heel-to-shin normal with tremor noted intentionally  SENSORY: Decreased pin sensation distal to proximal in the lower extremities ankles bilaterally and slight decrease in pin sensation right ulnar versus left  REFLEXES: Reflexes decreased at the ankle jerk versus knee jerk and biceps bilaterally without clonus or Babinski      ASSESSMENT AND PLAN: Patient with TIA in stable.  The left ulnar intrinsic hand muscle weakness and to repeat EMG and nerve conduction left upper extremity and do physical therapy on the left intrinsic hand muscles and left face.  Patient is to work on ADITHYA with Pap device.  I spent 25 minutes with this patient with 15 minutes counseling.Patient's Body mass index is 26.4 kg/m². BMI is within normal parameters. No follow-up required..      Justin was seen today for stroke and sleep apnea.    Diagnoses and all orders for this visit:    Ulnar neuropathy of left upper extremity  -     Ambulatory Referral to Physical Therapy Evaluate and treat  -     EMG & Nerve Conduction Test; Future    Cervical spondylosis  -     EMG & Nerve Conduction Test; Future    Facial  weakness  -     Ambulatory Referral to Physical Therapy Evaluate and treat        Dictated utilizing Dragon voice recognition software

## 2020-08-18 ENCOUNTER — HOSPITAL ENCOUNTER (OUTPATIENT)
Dept: NEUROLOGY | Facility: HOSPITAL | Age: 75
Discharge: HOME | End: 2020-08-18
Admitting: PSYCHIATRY & NEUROLOGY

## 2020-08-18 DIAGNOSIS — M47.812 CERVICAL SPONDYLOSIS: ICD-10-CM

## 2020-08-18 DIAGNOSIS — G56.22 ULNAR NEUROPATHY OF LEFT UPPER EXTREMITY: ICD-10-CM

## 2020-08-18 PROCEDURE — 95909 NRV CNDJ TST 5-6 STUDIES: CPT

## 2020-08-18 PROCEDURE — 95886 MUSC TEST DONE W/N TEST COMP: CPT | Performed by: PSYCHIATRY & NEUROLOGY

## 2020-08-18 PROCEDURE — 95909 NRV CNDJ TST 5-6 STUDIES: CPT | Performed by: PSYCHIATRY & NEUROLOGY

## 2020-08-18 PROCEDURE — 95886 MUSC TEST DONE W/N TEST COMP: CPT

## 2020-10-05 ENCOUNTER — TELEPHONE (OUTPATIENT)
Dept: VASCULAR SURGERY | Facility: CLINIC | Age: 75
End: 2020-10-05

## 2020-11-23 ENCOUNTER — HOSPITAL ENCOUNTER (OUTPATIENT)
Dept: ULTRASOUND IMAGING | Facility: HOSPITAL | Age: 75
Discharge: HOME | End: 2020-11-23
Admitting: NURSE PRACTITIONER

## 2020-11-23 ENCOUNTER — APPOINTMENT (OUTPATIENT)
Dept: ULTRASOUND IMAGING | Facility: HOSPITAL | Age: 75
End: 2020-11-23

## 2020-11-23 ENCOUNTER — OFFICE VISIT (OUTPATIENT)
Dept: VASCULAR SURGERY | Facility: CLINIC | Age: 75
End: 2020-11-23

## 2020-11-23 VITALS
BODY MASS INDEX: 26.67 KG/M2 | WEIGHT: 176 LBS | OXYGEN SATURATION: 96 % | HEIGHT: 68 IN | SYSTOLIC BLOOD PRESSURE: 118 MMHG | DIASTOLIC BLOOD PRESSURE: 78 MMHG | HEART RATE: 78 BPM

## 2020-11-23 DIAGNOSIS — I10 ESSENTIAL HYPERTENSION: ICD-10-CM

## 2020-11-23 DIAGNOSIS — I65.23 BILATERAL CAROTID ARTERY STENOSIS: ICD-10-CM

## 2020-11-23 DIAGNOSIS — I65.23 BILATERAL CAROTID ARTERY STENOSIS: Primary | ICD-10-CM

## 2020-11-23 DIAGNOSIS — E78.5 HYPERLIPIDEMIA, UNSPECIFIED HYPERLIPIDEMIA TYPE: ICD-10-CM

## 2020-11-23 PROCEDURE — 99214 OFFICE O/P EST MOD 30 MIN: CPT | Performed by: NURSE PRACTITIONER

## 2020-11-23 PROCEDURE — 93880 EXTRACRANIAL BILAT STUDY: CPT

## 2020-11-23 PROCEDURE — 93880 EXTRACRANIAL BILAT STUDY: CPT | Performed by: SURGERY

## 2020-11-30 ENCOUNTER — OFFICE VISIT (OUTPATIENT)
Dept: GASTROENTEROLOGY | Facility: CLINIC | Age: 75
End: 2020-11-30

## 2020-11-30 VITALS
OXYGEN SATURATION: 97 % | WEIGHT: 175 LBS | SYSTOLIC BLOOD PRESSURE: 134 MMHG | TEMPERATURE: 97.4 F | DIASTOLIC BLOOD PRESSURE: 74 MMHG | BODY MASS INDEX: 26.52 KG/M2 | HEIGHT: 68 IN | HEART RATE: 72 BPM

## 2020-11-30 DIAGNOSIS — K21.9 GASTROESOPHAGEAL REFLUX DISEASE, UNSPECIFIED WHETHER ESOPHAGITIS PRESENT: Primary | ICD-10-CM

## 2020-11-30 PROCEDURE — 99212 OFFICE O/P EST SF 10 MIN: CPT | Performed by: INTERNAL MEDICINE

## 2020-11-30 NOTE — PROGRESS NOTES
Chief Complaint   Patient presents with   • GI Problem     lots of burping and gas slight stomach pain had gallbladder surgury 5 months ago       PCP: Alma Mathias APRN  REFER: No ref. provider found    Subjective     HPI    Justin Szymanski complains of increased indigestion.  Bentyl did not provide relief.  Carafate has not provided relief.  Cholecystectomy did not provide relief.  He is compliant with daily Prilosec 40 mg.  inidgestion varies through day.  He avoids drinking through straw, no consumption of soda.  Stopping all alcohol did not provide relief.  No dysphagia.  No weight  Loss.  Bowels move without difficulty.  No bright red blood per rectum, no melena.     Past Medical History:   Diagnosis Date   • Allergic rhinitis    • Carotid artery disease (CMS/[facility])    • GERD (gastroesophageal reflux disease)    • Heart murmur    • Hyperlipidemia    • Hypertension    • Hypothyroidism    • IBS (irritable bowel syndrome)    • Nasal polyposis    • Seasonal allergic rhinitis    • Sleep apnea     intermittently uses cpap   • Snoring    • Stroke (cerebrum) (CMS/[facility]) 4/29/2020    numbness in right arm, the patinet states his symptom may be due to neck injury, following with neurology    • Valvular disease        Past Surgical History:   Procedure Laterality Date   • CAROTID ENDARTERECTOMY Left 5/6/2020    Procedure: LEFT CAROTID ENDARTERECTOMY WITH BOVINE PATCH ANGIOPLASTY, EEG MONITORING, AND COMPLETION DUPLEX VESSEL ULTRASOUND;  Surgeon: Sb Hunter DO;  Location: [facility] OR 12;  Service: Vascular;  Laterality: Left;   • CATARACT EXTRACTION Bilateral    • CHOLECYSTECTOMY     • CHOLECYSTECTOMY WITH INTRAOPERATIVE CHOLANGIOGRAM N/A 6/18/2020    Procedure: CHOLECYSTECTOMY LAPAROSCOPIC INTRAOPERATIVE CHOLANGIOGRAM;  Surgeon: Maame Del Rio MD;  Location: [facility] OR;  Service: General;  Laterality: N/A;   • COLONOSCOPY  07/21/2016    two polyps ,both removed   • ENDOSCOPY  04/18/2013    normal   • ENDOSCOPY  N/A 1/26/2018    Procedure: ESOPHAGOGASTRODUODENOSCOPY WITH ANESTHESIA;  Surgeon: Marc Mart DO;  Location: [facility] ENDOSCOPY;  Service:    • SINUS SURGERY      Dr. Griffith   • TONSILLECTOMY     • ULNAR NERVE REPAIR         Outpatient Medications Marked as Taking for the 11/30/20 encounter (Office Visit) with Marc Mart DO   Medication Sig Dispense Refill   • aspirin 81 MG chewable tablet Chew 81 mg Daily.     • atorvastatin (LIPITOR) 80 MG tablet Take 1 tablet by mouth Every Night. 30 tablet 0   • clopidogrel (PLAVIX) 75 MG tablet Take 1 tablet by mouth Daily. 30 tablet 5   • desonide (DESOWEN) 0.05 % cream Apply 1 application topically to the appropriate area as directed Daily As Needed for Irritation.  11   • FLUoxetine (PROzac) 20 MG capsule TK 1 C PO QD  0   • fluticasone (FLONASE) 50 MCG/ACT nasal spray 2 sprays into the nostril(s) as directed by provider Daily As Needed for Rhinitis or Allergies.     • levothyroxine (SYNTHROID, LEVOTHROID) 88 MCG tablet Take 88 mcg by mouth Daily.     • lisinopril-hydrochlorothiazide (PRINZIDE,ZESTORETIC) 10-12.5 MG per tablet Take 1 tablet by mouth Daily.     • Multiple Vitamins-Minerals (MULTIVITAMIN WITH MINERALS) tablet tablet Take 1 tablet by mouth Daily.     • omeprazole (priLOSEC) 40 MG capsule Take 40 mg by mouth Daily.     • tadalafil (CIALIS) 20 MG tablet Take 20 mg by mouth Daily As Needed for erectile dysfunction.     • valACYclovir (VALTREX) 1000 MG tablet Take 1,000 mg by mouth 2 (Two) Times a Day As Needed.         No Known Allergies    Social History     Socioeconomic History   • Marital status:      Spouse name: Not on file   • Number of children: Not on file   • Years of education: Not on file   • Highest education level: Not on file   Tobacco Use   • Smoking status: Never Smoker   • Smokeless tobacco: Never Used   Substance and Sexual Activity   • Alcohol use: Yes     Alcohol/week: 1.0 standard drinks     Types: 1 Glasses of wine per  "week     Comment: nightly - red wine    • Drug use: No   • Sexual activity: Yes     Partners: Female       Family History   Problem Relation Age of Onset   • Hyperlipidemia Mother    • Cancer Father    • Hypertension Father    • Hyperlipidemia Father    • Colon cancer Maternal Grandfather        Review of Systems   Constitutional: Negative for fatigue, fever and unexpected weight change.   HENT: Negative for hearing loss, sore throat and voice change.    Eyes: Negative for visual disturbance.   Respiratory: Negative for cough, shortness of breath and wheezing.    Cardiovascular: Negative for chest pain and palpitations.   Gastrointestinal: Negative for abdominal pain, blood in stool and vomiting.   Endocrine: Negative for polydipsia and polyuria.   Genitourinary: Negative for difficulty urinating, dysuria, hematuria and urgency.   Musculoskeletal: Negative for joint swelling and myalgias.   Skin: Negative for color change, rash and wound.   Neurological: Negative for dizziness, tremors, seizures and syncope.   Hematological: Does not bruise/bleed easily.   Psychiatric/Behavioral: Negative for agitation and confusion. The patient is not nervous/anxious.        Objective     Vitals:    11/30/20 1430   BP: 134/74   Pulse: 72   Temp: 97.4 °F (36.3 °C)   SpO2: 97%   Weight: 79.4 kg (175 lb)   Height: 172.7 cm (68\")     Body mass index is 26.61 kg/m².    Physical Exam  Constitutional:       Appearance: He is well-developed.   HENT:      Head: Normocephalic and atraumatic.   Eyes:      Comments: Pink, Nonicteric   Neck:      Comments: Global Assessment- supple. No JVD or lymphadenopathy  Cardiovascular:      Rate and Rhythm: Normal rate and regular rhythm.      Heart sounds: Normal heart sounds. No murmur. No friction rub. No gallop.    Pulmonary:      Effort: Pulmonary effort is normal. No respiratory distress.      Breath sounds: Normal breath sounds. No wheezing or rales.   Abdominal:      General: Bowel sounds are " normal. There is no distension.      Palpations: Abdomen is soft. There is no mass.      Tenderness: There is no abdominal tenderness. There is no guarding or rebound.   Neurological:      Mental Status: He is alert and oriented to person, place, and time.      Comments: General Exam-Deemed a reliable historian, able to converse without difficulty and Able to move all extremities without difficulty         Imaging Results (Most Recent)     None          Body mass index is 26.61 kg/m².    Assessment/Plan     Diagnoses and all orders for this visit:    1. Gastroesophageal reflux disease, unspecified whether esophagitis present (Primary)        * Surgery not found *    No plans on repeat EGD  Continue current GERD treatment  Take PPI 30 min prior to breakfast   Decrease caffeine, nicotine, etoh- all contribute to acid reflux  Do not eat 2-3 hours within lying down, elevated HOB 4-6 inches  Gaviscon prn  Colonoscopy is up to date, procedure can be performed earlier if clinically indicated    Precautions are currently being put in place due to COVID-19.  I have explained to Justin LUIS Stephon they will be required to undergo COVID testing prior to their procedure.  Justin Szymanski verbalized understanding and was willing to proceed.    Patient's Body mass index is 26.61 kg/m². BMI is above normal parameters. Recommendations include: no follow up.       Marc Mart, DO  11/30/20          There are no Patient Instructions on file for this visit.

## 2020-12-11 NOTE — PROGRESS NOTES
"11/23/2020       Alma Mathias, APRN  [address] [place] [address]    Justin Szymanski  1945    Chief Complaint   Patient presents with   • Follow-up     6 Month FOllow Up For Bilateral Carotid Artery Stenosis. Test [id number] US pad carotid bilateral. Patient denies any stroke like symptoms.    • Non-Smoker     Patient is a Non-Smoker   • Med Management     Verified medications from list patient brought in    • other     Patient states since starting on Lipitor and the surgery he has a problem with burping a lot and didnt have that before hand.        Dear Alma Mathias, APRN       HPI  I had the pleasure of seeing your patient Justin Szymanski in the office today.    As you recall, Justin Szymanski is a 75 y.o.  male who we are following for asymptomatic carotid disease.  He was initially sent following a lifeline screening. His ABIs and us aorta were normal.  He denies any strokelike symptoms.  He is maintained on aspirin and Lipitor.  Testing does show he has aneurysm of the ascending aorta 4.3 cm.  He did undergo a left carotid endarterectomy on 5/6/2020.  He is doing well and denies any strokelike symptoms.  He does report belching with Lipitor.  He did have noninvasive testing performed today, which I did review in office.      Review of Systems   Constitutional: Negative.    HENT: Negative.         Allergies   Eyes: Negative.    Respiratory: Negative.    Cardiovascular: Negative.    Gastrointestinal: Negative.    Endocrine: Negative.    Genitourinary: Negative.    Musculoskeletal: Negative.    Skin: Negative.    Allergic/Immunologic: Negative.    Neurological: Negative.    Hematological: Negative.    Psychiatric/Behavioral: Negative.    All other systems reviewed and are negative.      /78 (BP Location: Right arm, Patient Position: Sitting, Cuff Size: Adult)   Pulse 78   Ht 172.7 cm (68\")   Wt 79.8 kg (176 lb)   SpO2 96%   BMI 26.76 kg/m²       Physical Exam  Vitals signs and nursing note reviewed. "   Constitutional:       Appearance: Normal appearance. He is well-developed and normal weight.   HENT:      Head: Normocephalic and atraumatic.   Eyes:      General: No scleral icterus.     Pupils: Pupils are equal, round, and reactive to light.   Neck:      Musculoskeletal: Normal range of motion and neck supple.      Thyroid: No thyromegaly.   Cardiovascular:      Rate and Rhythm: Normal rate and regular rhythm.      Heart sounds: Murmur present. Systolic murmur present.   Pulmonary:      Effort: Pulmonary effort is normal.      Breath sounds: Normal breath sounds.   Abdominal:      General: Bowel sounds are normal.      Palpations: Abdomen is soft.   Musculoskeletal: Normal range of motion.   Skin:     General: Skin is warm and dry.   Neurological:      General: No focal deficit present.      Mental Status: He is alert and oriented to person, place, and time.      Comments: Neuropraxia to his left lower lip   Psychiatric:         Mood and Affect: Mood normal.         Behavior: Behavior normal.         Thought Content: Thought content normal.         Judgment: Judgment normal.         Diagnostic Data:     Us Carotid Bilateral    Result Date: 11/23/2020  Narrative: History: Carotid occlusive disease      Impression: Impression: 1. There is less than 50% stenosis of the right internal carotid artery. 2. There is 50-69% stenosis of the left internal carotid artery. 3. Antegrade flow is demonstrated in bilateral vertebral arteries.  Comments: Bilateral carotid vertebral arterial duplex scan was performed.  Grayscale imaging shows intimal thickening and calcified elements at the carotid bifurcation. The right internal carotid artery peak systolic velocity is 95.6 cm/sec. The end-diastolic velocity is 28.6 cm/sec. The right ICA/CCA ratio is approximately 0.98 . These findings correlate with less than 50% stenosis of the right internal carotid artery.  Grayscale imaging shows intimal thickening and calcified elements at  the carotid bifurcation. The left internal carotid artery peak systolic velocity is 158 cm/sec. The end-diastolic velocity is 54.7 cm/sec. The left ICA/CCA ratio is approximately 1.5 . These findings correlate with 50-69% stenosis of the left internal carotid artery.  Antegrade flow is demonstrated in bilateral vertebral arteries.  This report was finalized on 11/23/2020 17:34 by Dr. Sb Hunter MD.       Patient Active Problem List   Diagnosis   • Allergic rhinitis due to pollen   • Snoring   • Nasal polyposis - hx of   • ADITHYA (obstructive sleep apnea)   • ADITHYA on CPAP   • Indigestion   • Heart murmur   • Hypertension   • Hyperlipidemia   • Ascending aortic aneurysm (CMS/HCC)   • PAD (peripheral artery disease) (CMS/HCC)   • Upper extremity weakness   • Bilateral carotid artery stenosis   • Numbness and tingling of right upper extremity   • Muscle fasciculation   • Preop testing   • Carotid stenosis         ICD-10-CM ICD-9-CM   1. Bilateral carotid artery stenosis  I65.23 433.10     433.30   2. Essential hypertension  I10 401.9   3. Hyperlipidemia, unspecified hyperlipidemia type  E78.5 272.4       Plan: After thoroughly evaluating Justin Szymanski, I believe the best course of action is to remain conservative from vascular surgery standpoint.  Currently, he is doing well and denies any strokelike symptoms.  I did review his testing which showed less than 50% right carotid stenosis and 50 to 69% left carotid stenosis.  His neuropraxia is improving.  He was referred to physical therapy by Dr. Beasley.  If he continues to have difficulties, I asked him to let me know and could possibly refer to Dr. Rodríguez.  We will see him back in 6 months with repeat noninvasive testing for continued surveillance, including a carotid duplex.  He also has an ascending aortic aneurysm.  We will continue to follow.  Body mass index is 26.76 kg/m².  I did discuss vascular risk factors as they pertain to the progression of vascular  disease including controlling his hypertension and hyperlipidemia, which are stable on his current medication regimen. The patient can continue taking her current medication regimen as previously planned.  This was all discussed in full with complete understanding.    Thank you for allowing me to participate in the care of your patient.  Please do not hesitate with any questions or concerns.  I will keep you aware of any further encounters with Justin Szymanski.        Sincerely yours,         EFRA Ellington Mariah L, APRN

## 2021-01-21 ENCOUNTER — HOSPITAL ENCOUNTER (OUTPATIENT)
Dept: GENERAL RADIOLOGY | Facility: HOSPITAL | Age: 76
Discharge: HOME | End: 2021-01-21
Admitting: NURSE PRACTITIONER

## 2021-01-21 ENCOUNTER — TRANSCRIBE ORDERS (OUTPATIENT)
Dept: ADMINISTRATIVE | Facility: HOSPITAL | Age: 76
End: 2021-01-21

## 2021-01-21 DIAGNOSIS — R09.81 SINUS CONGESTION: Primary | ICD-10-CM

## 2021-01-21 DIAGNOSIS — R09.81 SINUS CONGESTION: ICD-10-CM

## 2021-01-21 PROCEDURE — 70220 X-RAY EXAM OF SINUSES: CPT

## 2021-01-29 ENCOUNTER — TRANSCRIBE ORDERS (OUTPATIENT)
Dept: ADMINISTRATIVE | Facility: HOSPITAL | Age: 76
End: 2021-01-29

## 2021-01-29 DIAGNOSIS — J34.89 SINUS PRESSURE: Primary | ICD-10-CM

## 2021-02-04 ENCOUNTER — HOSPITAL ENCOUNTER (OUTPATIENT)
Dept: CT IMAGING | Facility: HOSPITAL | Age: 76
Discharge: HOME | End: 2021-02-04
Admitting: NURSE PRACTITIONER

## 2021-02-04 DIAGNOSIS — J34.89 SINUS PRESSURE: ICD-10-CM

## 2021-02-04 LAB — CREAT BLDA-MCNC: 1.1 MG/DL (ref 0.6–1.3)

## 2021-02-04 PROCEDURE — 25010000002 IOPAMIDOL 61 % SOLUTION: Performed by: NURSE PRACTITIONER

## 2021-02-04 PROCEDURE — 82565 ASSAY OF CREATININE: CPT

## 2021-02-04 PROCEDURE — 70488 CT MAXILLOFACIAL W/O & W/DYE: CPT

## 2021-02-04 RX ADMIN — IOPAMIDOL 100 ML: 612 INJECTION, SOLUTION INTRAVENOUS at 14:02

## 2021-02-15 NOTE — ANESTHESIA PREPROCEDURE EVALUATION
Anesthesia Evaluation     Patient summary reviewed   no history of anesthetic complications:  NPO Solid Status: > 8 hours             Airway   Mallampati: II  TM distance: >3 FB  Neck ROM: full  Dental      Pulmonary    (+) sleep apnea on CPAP,   (-) COPD, asthma, not a smoker  Cardiovascular   Exercise tolerance: good (4-7 METS)    ECG reviewed    (+) hypertension, hyperlipidemia,  carotid artery disease (s/p CEA)  (-) pacemaker, past MI, angina, CHF, cardiac stents      Neuro/Psych  (-) seizures, TIA, CVA  GI/Hepatic/Renal/Endo    (+)  GERD,  thyroid problem hypothyroidism  (-) liver disease, no renal disease, diabetes    Musculoskeletal     Abdominal    Substance History      OB/GYN          Other                          Anesthesia Plan    ASA 3     general     intravenous induction     Anesthetic plan, all risks, benefits, and alternatives have been provided, discussed and informed consent has been obtained with: patient.       Phyllis Hill  : 1955  Payor: Nash Last / Plan: Cassie Joaquin / Product Type: Managed Care Medicare /  [address]. [facility], [address], [address] [facility]  [phone]   [phone]                                                          Kassie Hanson, NP      OUTPATIENT PHYSICAL THERAPY: Daily Treatment Note 2/15/2021 Visit Count:  4    Tx Diagnosis:  Pain in right hand (M79.641)  Stiffness of right hand, not elsewhere classified (M25.641)        Pre-treatment Symptoms/Complaints: soreness  Pain: Initial:0/10 Post Session: 0/10   Medications Last Reviewed:  2/15/2021     Updated Objective Findings: full MP 3rd finger extension flexion to 70. IP flexion full        TREATMENT:   THERAPEUTIC EXERCISE: (15 minutes):  Exercises per grid below to improve mobility, strength and balance. Required minimal visual, verbal and manual cues to promote proper body alignment and promote proper body posture. Progressed resistance and complexity of movement as indicated. Date:  2021 Date:  21 Date:  21 Date  2/15/21   Activity/Exercise Parameters Parameters Parameters    Education HEP, POC, PT goals, anatomy/pathology, splint use percations Review of HEP, splint wear Review HEP Update HEP tendon glides all   Finger ext hand flat 10  10 x 2 2 x 10 2 x 10   Hook fist 10 10 2 x 10 2 x 10   Short arc MP flex to 30  10 2 x 10    Finger extension rolling   3 min review   Intrinsic plus    2 x 10   Composite fist    10 x           THERAPEUTIC ACTIVITY: ( 0 minutes): Activities per gid below to improve functional movement related mobility, strength and balance to improve neuro-muscular carryover to daily functional activities for improving patient's quality of life. Required visual, verbal and manual cues to promote proper body alignment and promote proper body posture/mechanics.  Progressed resistance and complexity of movement as indicated. Date:  2/15/2021 Date:   Date:     Activity/Exercise Parameters Parameters Parameters                                                                               MANUAL THERAPY: (15 minutes): Joint mobilization, Soft tissue mobilization was utilized and necessary because of the patient's restricted joint motion and restricted motion of soft tissue mobility. Date  2/15/2021    Technique Used Grade  Level # Time(s) Effect while being performed   Scar massage     Decrease pain improve mobility                                               MODALITIES: ( 8 minutes): *  Ultrasound was used today secondary to the patient having tightened structures limiting joint motion that require an increase in extensibility. Ultrasound was used today to increase tendon flexibility. 1.0 w/cm continuous             HEP Log Date 1. Active extension palm flat 1/21/2021   2. Hook fist  1/21/2021   3. MP flexion 0/30 1/21/2021   4 finger extension rolling  2/11/21   5. [employer] Portal  Treatment/Session Summary:    Response to Treatment: Pt demonstrated understanding of POC and initial HEP. No increase in pain or adverse reactions. Communication/Consultation:  Patient to see MD next week plan DC of splint   Equipment provided today: none     Recommendations/Intent for next treatment session:   Next visit will focus on Flexion-based Exercises Extension-Based Exercises (EOTA). Per protocol in chart           Treatment Plan of Care Effective Dates: 1/21/2021 TO 3/23/2021 (60 days).   Frequency/Duration: 2 times a week for 60 Days             Total Treatment Billable Duration:  38 min Rx  PT Patient Time In/Time Out  Time In: 1030  Time Out: 115 Karen Blank, RT    Future Appointments   Date Time Provider Barrie Lynne   2/18/2021 10:30 AM Sunny Bain Notice, RT [facility]   2/22/2021 10:30 AM Sunny Bain Notice, RT [facility]   2/25/2021 10:30 AM Sunny Bain Notice, RT [facility]   3/1/2021 10:30 AM PapSunny fountain Notice, RT [facility]   3/4/2021 10:30 AM Sunny Bain Notice, RT [facility]   6/15/2021  9:50 AM Jo Trejo MD [facility]

## 2021-03-02 ENCOUNTER — OFFICE VISIT (OUTPATIENT)
Dept: NEUROLOGY | Facility: CLINIC | Age: 76
End: 2021-03-02

## 2021-03-02 VITALS
BODY MASS INDEX: 27.13 KG/M2 | SYSTOLIC BLOOD PRESSURE: 128 MMHG | WEIGHT: 179 LBS | HEIGHT: 68 IN | HEART RATE: 74 BPM | DIASTOLIC BLOOD PRESSURE: 80 MMHG | RESPIRATION RATE: 18 BRPM

## 2021-03-02 DIAGNOSIS — G47.33 OSA (OBSTRUCTIVE SLEEP APNEA): Primary | ICD-10-CM

## 2021-03-02 DIAGNOSIS — S54.02XS INJURY OF ULNAR NERVE AT LEFT FOREARM LEVEL, SEQUELA: ICD-10-CM

## 2021-03-02 DIAGNOSIS — I63.9 CEREBROVASCULAR ACCIDENT (CVA), UNSPECIFIED MECHANISM (HCC): ICD-10-CM

## 2021-03-02 DIAGNOSIS — G47.9 SLEEP DISTURBANCE: ICD-10-CM

## 2021-03-02 PROCEDURE — 99214 OFFICE O/P EST MOD 30 MIN: CPT | Performed by: PSYCHIATRY & NEUROLOGY

## 2021-03-02 NOTE — PROGRESS NOTES
Subjective   Justin Szymanski, 1945, is a male who is being seen today for   Chief Complaint   Patient presents with   • Stroke   • Sleeping Problem     ADITHYA       HISTORY OF PRESENT ILLNESS: Extended follow-up.  Patient seen for history of stroke and no new stroke symptoms on his aspirin and Plavix and statin.  Patient has history of sleep apnea but does not tolerate the Pap device.  We are going to order a overnight continuous oximetry on room air through his DME [employer].  Patient is the ulnar nerve previous transposition but continues to have some weakness in the left hand and did not want physical therapy but was advised to continue and did not.  I advised him to get with physical therapy to keep working on options for treatment.  He has not noticed any significant change since last seen with that.    REVIEW OF SYSTEMS:   GENERAL: Blood pressure today 124/80 left arm seated 128/80 left arm standing with pulse 74   PULMONARY: As above  CVS: No acute chest pain or palpitation  GASTROINTESTINAL: No acute GI distress  GENITOURINARY: No acute  distress  GYN: Not applicable  MUSCULOSKELETAL: No acute musculoskeletal symptoms.  Patient has MRI cervical spine shows multilevel cervical spondylosis  HEENT: Patient has had previous cataract surgery.  Patient denies headaches or vision changes or hearing changes  ENDOCRINE: No acute endocrine symptoms  PSYCHIATRIC: No acute psychiatric symptoms  HEMATOLOGY: Borderline anemia  SKIN: No acute skin changes  Family history reviewed and otherwise noncontributory to this problem  Social history: Patient denies smoking or drug use but does use alcohol.    PHYSICAL EXAMINATION:    GENERAL: No acute distress.  Patient does have intentional tremor without cogwheeling or rigidity or rest tremor  CRANIUM: Normal cephalic/atraumatic  HEENT:       EYES: EOMs intact without nystagmus and fields full to confrontation.  Pupils equal round reactive to light.  No acute fundic  abnormalities.       EARS: Tympanic membranes partially obscured by wax bilaterally but hears tuning fork bilaterally       THROAT: No acute oropharynx abnormalities       NECK: No bruits/no lymphadenopathy  CHEST: No acute cardiopulmonary abnormalities by auscultation  ABDOMEN: Nondistended  EXTREMITIES: Patient has dorsalis pedis pulse symmetrical  NEURO: Patient alert and follows commands without difficulty  SPEECH: Normal    CRANIAL NERVES: Motor/sensory about the face normal and symmetric    MOTOR STRENGTH: Motor strength upper and lower extremities shows 4-5/5 intrinsic ulnar innervated muscle strength on the left.  Otherwise normal strength  STATION AND GAIT: Gait normal/Romberg negative  CEREBELLAR: Finger-nose and heel-to-shin normal  SENSORY: Slight hypersensitivity of the ulnar distribution bilaterally and decreased pin and vibration distal approximately lower extremities ankles bilaterally.  REFLEXES: Decreased ankle jerk versus knee jerk and biceps without clonus or Babinski    ASSESSMENT AND PLAN: Patient with history of CVA stable but to get to emergency room immediately if further stroke symptoms occur.  Patient to start further treatment of physical therapy with his group that he has associated with [facility].  Patient is to get overnight oximetry on room air to see if he qualifies for oxygen.  Patient is possibly tried a tennis elbow padding type device in the elbow area to keep pressure off the elbow.  I spent 30 minutes with patient with chart review exam and counseling.Patient's Body mass index is 27.22 kg/m². BMI is within normal parameters. No follow-up required...      Diagnoses and all orders for this visit:    1. ADITHYA (obstructive sleep apnea) (Primary)  -     Overnight Sleep Oximetry Study; Future    2. Sleep disturbance  -     Overnight Sleep Oximetry Study; Future    3. 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

## 2021-03-02 NOTE — PATIENT INSTRUCTIONS
Patient to continue safety and driving precautions as previously discussed.  Patient to get emergency room Malays stroke symptoms occur.

## 2021-03-08 ENCOUNTER — DOCUMENTATION (OUTPATIENT)
Dept: NEUROLOGY | Facility: CLINIC | Age: 76
End: 2021-03-08

## 2021-03-08 ENCOUNTER — TELEPHONE (OUTPATIENT)
Dept: NEUROLOGY | Facility: CLINIC | Age: 76
End: 2021-03-08

## 2021-03-08 NOTE — TELEPHONE ENCOUNTER
----- Message from Justin Beasley MD sent at 3/8/2021 10:03 AM CST -----  I did write a progress note the patient does have snoring  ----- Message -----  From: Pearl Mercado LPN  Sent: 3/8/2021   9:14 AM CST  To: Justin Beasley MD    [facility] said they can't download the overnight oximetry results because his insurance requires a different diagnosis.  Snoring is covered, Justin said he snores at times.

## 2021-03-08 NOTE — TELEPHONE ENCOUNTER
Karyn at [facility] notified Dr. Beasley wrote a note stating Justin ornelas, copy has been faxed to her.

## 2021-03-09 ENCOUNTER — OFFICE VISIT (OUTPATIENT)
Dept: GASTROENTEROLOGY | Facility: CLINIC | Age: 76
End: 2021-03-09

## 2021-03-09 VITALS
SYSTOLIC BLOOD PRESSURE: 120 MMHG | HEART RATE: 70 BPM | BODY MASS INDEX: 27.13 KG/M2 | WEIGHT: 179 LBS | HEIGHT: 68 IN | TEMPERATURE: 97.4 F | DIASTOLIC BLOOD PRESSURE: 80 MMHG | OXYGEN SATURATION: 98 %

## 2021-03-09 DIAGNOSIS — D36.9 TUBULAR ADENOMA: ICD-10-CM

## 2021-03-09 DIAGNOSIS — R10.84 GENERALIZED ABDOMINAL PAIN: Primary | ICD-10-CM

## 2021-03-09 DIAGNOSIS — K21.9 GASTROESOPHAGEAL REFLUX DISEASE, UNSPECIFIED WHETHER ESOPHAGITIS PRESENT: ICD-10-CM

## 2021-03-09 PROCEDURE — 99213 OFFICE O/P EST LOW 20 MIN: CPT | Performed by: INTERNAL MEDICINE

## 2021-03-09 RX ORDER — DICYCLOMINE HCL 20 MG
20 TABLET ORAL EVERY 6 HOURS
Status: ON HOLD | COMMUNITY
End: 2021-07-22

## 2021-03-09 RX ORDER — SODIUM, POTASSIUM,MAG SULFATES 17.5-3.13G
SOLUTION, RECONSTITUTED, ORAL ORAL
Qty: 177 ML | Refills: 0 | Status: ON HOLD | OUTPATIENT
Start: 2021-03-09 | End: 2021-07-22

## 2021-03-09 NOTE — PATIENT INSTRUCTIONS
Gastroesophageal Reflux Disease, Adult  Gastroesophageal reflux (ALEX) happens when acid from the stomach flows up into the tube that connects the mouth and the stomach (esophagus). Normally, food travels down the esophagus and stays in the stomach to be digested. However, when a person has ALEX, food and stomach acid sometimes move back up into the esophagus. If this becomes a more serious problem, the person may be diagnosed with a disease called gastroesophageal reflux disease (GERD). GERD occurs when the reflux:  · Happens often.  · Causes frequent or severe symptoms.  · Causes problems such as damage to the esophagus.  When stomach acid comes in contact with the esophagus, the acid may cause soreness (inflammation) in the esophagus. Over time, GERD may create small holes (ulcers) in the lining of the esophagus.  What are the causes?  This condition is caused by a problem with the muscle between the esophagus and the stomach (lower esophageal sphincter, or LES). Normally, the LES muscle closes after food passes through the esophagus to the stomach. When the LES is weakened or abnormal, it does not close properly, and that allows food and stomach acid to go back up into the esophagus.  The LES can be weakened by certain dietary substances, medicines, and medical conditions, including:  · Tobacco use.  · Pregnancy.  · Having a hiatal hernia.  · Alcohol use.  · Certain foods and beverages, such as coffee, chocolate, onions, and peppermint.  What increases the risk?  You are more likely to develop this condition if you:  · Have an increased body weight.  · Have a connective tissue disorder.  · Use NSAID medicines.  What are the signs or symptoms?  Symptoms of this condition include:  · Heartburn.  · Difficult or painful swallowing.  · The feeling of having a lump in the throat.  · A bitter taste in the mouth.  · Bad breath.  · Having a large amount of saliva.  · Having an upset or bloated  stomach.  · Belching.  · Chest pain. Different conditions can cause chest pain. Make sure you see your health care provider if you experience chest pain.  · Shortness of breath or wheezing.  · Ongoing (chronic) cough or a night-time cough.  · Wearing away of tooth enamel.  · Weight loss.  How is this diagnosed?  Your health care provider will take a medical history and perform a physical exam. To determine if you have mild or severe GERD, your health care provider may also monitor how you respond to treatment. You may also have tests, including:  · A test to examine your stomach and esophagus with a small camera (endoscopy).  · A test that measures the acidity level in your esophagus.  · A test that measures how much pressure is on your esophagus.  · A barium swallow or modified barium swallow test to show the shape, size, and functioning of your esophagus.  How is this treated?  The goal of treatment is to help relieve your symptoms and to prevent complications. Treatment for this condition may vary depending on how severe your symptoms are. Your health care provider may recommend:  · Changes to your diet.  · Medicine.  · Surgery.  Follow these instructions at home:  Eating and drinking    · Follow a diet as recommended by your health care provider. This may involve avoiding foods and drinks such as:  ? Coffee and tea (with or without caffeine).  ? Drinks that contain alcohol.  ? Energy drinks and sports drinks.  ? Carbonated drinks or sodas.  ? Chocolate and cocoa.  ? Peppermint and mint flavorings.  ? Garlic and onions.  ? Horseradish.  ? Spicy and acidic foods, including peppers, chili powder, wade powder, vinegar, hot sauces, and barbecue sauce.  ? Citrus fruit juices and citrus fruits, such as oranges, kennedy, and limes.  ? Tomato-based foods, such as red sauce, chili, salsa, and pizza with red sauce.  ? Fried and fatty foods, such as donuts, french fries, potato chips, and high-fat dressings.  ? High-fat  meats, such as hot dogs and fatty cuts of red and white meats, such as rib eye steak, sausage, ham, and chavarria.  ? High-fat dairy items, such as whole milk, butter, and cream cheese.  · Eat small, frequent meals instead of large meals.  · Avoid drinking large amounts of liquid with your meals.  · Avoid eating meals during the 2-3 hours before bedtime.  · Avoid lying down right after you eat.  · Do not exercise right after you eat.  Lifestyle    · Do not use any products that contain nicotine or tobacco, such as cigarettes, e-cigarettes, and chewing tobacco. If you need help quitting, ask your health care provider.  · Try to reduce your stress by using methods such as yoga or meditation. If you need help reducing stress, ask your health care provider.  · If you are overweight, reduce your weight to an amount that is healthy for you. Ask your health care provider for guidance about a safe weight loss goal.  General instructions  · Pay attention to any changes in your symptoms.  · Take over-the-counter and prescription medicines only as told by your health care provider. Do not take aspirin, ibuprofen, or other NSAIDs unless your health care provider told you to do so.  · Wear loose-fitting clothing. Do not wear anything tight around your waist that causes pressure on your abdomen.  · Raise (elevate) the head of your bed about 6 inches (15 cm).  · Avoid bending over if this makes your symptoms worse.  · Keep all follow-up visits as told by your health care provider. This is important.  Contact a health care provider if:  · You have:  ? New symptoms.  ? Unexplained weight loss.  ? Difficulty swallowing or it hurts to swallow.  ? Wheezing or a persistent cough.  ? A hoarse voice.  · Your symptoms do not improve with treatment.  Get help right away if you:  · Have pain in your arms, neck, jaw, teeth, or back.  · Feel sweaty, dizzy, or light-headed.  · Have chest pain or shortness of breath.  · Vomit and your vomit looks  like blood or coffee grounds.  · Faint.  · Have stool that is bloody or black.  · Cannot swallow, drink, or eat.  Summary  · Gastroesophageal reflux happens when acid from the stomach flows up into the esophagus. GERD is a disease in which the reflux happens often, causes frequent or severe symptoms, or causes problems such as damage to the esophagus.  · Treatment for this condition may vary depending on how severe your symptoms are. Your health care provider may recommend diet and lifestyle changes, medicine, or surgery.  · Contact a health care provider if you have new or worsening symptoms.  · Take over-the-counter and prescription medicines only as told by your health care provider. Do not take aspirin, ibuprofen, or other NSAIDs unless your health care provider told you to do so.  · Keep all follow-up visits as told by your health care provider. This is important.  This information is not intended to replace advice given to you by your health care provider. Make sure you discuss any questions you have with your health care provider.  Document Revised: 06/26/2019 Document Reviewed: 06/26/2019  [employer]vier Patient Education © 2020 [employer]

## 2021-03-09 NOTE — PROGRESS NOTES
Chief Complaint   Patient presents with   • GI Problem     burping litle cramping in stomach       PCP: Letha Wilder APRN  REFER: No ref. provider found    Subjective     HPI    Experienced difficulty with abdominal cramping and indigestion.  Started taking bentyl with improvement in abdominal discomfort and indigestion.  He will often experience indigestion first thing in morning then resolve after he eats.  No weight  Loss.   He is currently maintained on 20 mg OTC omeprazole daily.       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    numbness in right arm, the patinet states his symptom may be due to neck injury, following with neurology    • Valvular disease        Past Surgical History:   Procedure Laterality Date   • CAROTID ENDARTERECTOMY Left 5/6/2020    Procedure: LEFT CAROTID ENDARTERECTOMY WITH BOVINE PATCH ANGIOPLASTY, EEG MONITORING, AND COMPLETION DUPLEX VESSEL ULTRASOUND;  Surgeon: Sb Hunter DO;  Location: [facility] HYBRID OR 12;  Service: Vascular;  Laterality: Left;   • CATARACT EXTRACTION Bilateral    • CHOLECYSTECTOMY     • CHOLECYSTECTOMY WITH INTRAOPERATIVE CHOLANGIOGRAM N/A 6/18/2020    Procedure: CHOLECYSTECTOMY LAPAROSCOPIC INTRAOPERATIVE CHOLANGIOGRAM;  Surgeon: Maame Del Rio MD;  Location: [facility] OR;  Service: General;  Laterality: N/A;   • COLONOSCOPY  07/21/2016    two polyps ,both removed   • ENDOSCOPY  04/18/2013    normal   • ENDOSCOPY N/A 1/26/2018    Procedure: ESOPHAGOGASTRODUODENOSCOPY WITH ANESTHESIA;  Surgeon: Marc Mart DO;  Location: [facility] ENDOSCOPY;  Service:    • SINUS SURGERY      Dr. Griffith   •  TONSILLECTOMY     • ULNAR NERVE REPAIR         Outpatient Medications Marked as Taking for the 3/9/21 encounter (Office Visit) with Marc Mart, DO   Medication Sig Dispense Refill   • aspirin 81 MG chewable tablet Chew 81 mg Daily.     • atorvastatin (LIPITOR) 80 MG tablet Take 1 tablet by mouth Every Night. 30 tablet 0   • clopidogrel (PLAVIX) 75 MG tablet Take 1 tablet by mouth Daily. 30 tablet 5   • desonide (DESOWEN) 0.05 % cream Apply 1 application topically to the appropriate area as directed Daily As Needed for Irritation.  11   • dicyclomine (BENTYL) 20 MG tablet Take 20 mg by mouth Every 6 (Six) Hours.     • FLUoxetine (PROzac) 20 MG capsule TK 1 C PO QD  0   • fluticasone (FLONASE) 50 MCG/ACT nasal spray 2 sprays into the nostril(s) as directed by provider Daily As Needed for Rhinitis or Allergies.     • levothyroxine (SYNTHROID, LEVOTHROID) 88 MCG tablet Take 88 mcg by mouth Daily.     • lisinopril-hydrochlorothiazide (PRINZIDE,ZESTORETIC) 10-12.5 MG per tablet Take 1 tablet by mouth Daily.     • Multiple Vitamins-Minerals (MULTIVITAMIN WITH MINERALS) tablet tablet Take 1 tablet by mouth Daily.     • omeprazole (priLOSEC) 40 MG capsule Take 20 mg by mouth Daily.     • tadalafil (CIALIS) 20 MG tablet Take 20 mg by mouth Daily As Needed for erectile dysfunction.     • valACYclovir (VALTREX) 1000 MG tablet Take 1,000 mg by mouth 2 (Two) Times a Day As Needed.         No Known Allergies    Social History     Socioeconomic History   • Marital status:      Spouse name: Not on file   • Number of children: Not on file   • Years of education: Not on file   • Highest education level: Not on file   Tobacco Use   • Smoking status: Never Smoker   • Smokeless tobacco: Never Used   Substance and Sexual Activity   • Alcohol use: Yes     Alcohol/week: 1.0 standard drinks     Types: 1 Glasses of wine per week     Comment: nightly - red wine    • Drug use: No   • Sexual activity: Yes     Partners: Female        "      Review of Systems   Constitutional: Negative for unexpected weight change.   Respiratory: Negative for shortness of breath.    Cardiovascular: Negative for chest pain.   Gastrointestinal: Negative for abdominal pain and anal bleeding.       Objective     Vitals:    03/09/21 1254   BP: 120/80   Pulse: 70   Temp: 97.4 °F (36.3 °C)   SpO2: 98%   Weight: 81.2 kg (179 lb)   Height: 172.7 cm (68\")     Body mass index is 27.22 kg/m².    Physical Exam  Constitutional:       Appearance: Normal appearance. He is well-developed.   Eyes:      General: No scleral icterus.  Cardiovascular:      Rate and Rhythm: Regular rhythm.      Heart sounds: Normal heart sounds. No murmur.   Pulmonary:      Effort: Pulmonary effort is normal. No accessory muscle usage.      Breath sounds: Normal breath sounds.   Abdominal:      General: Bowel sounds are normal. There is no distension.      Palpations: Abdomen is soft. There is no mass.      Tenderness: There is no abdominal tenderness. There is no guarding or rebound.   Skin:     General: Skin is warm and dry.      Coloration: Skin is not jaundiced.   Neurological:      Mental Status: He is alert.   Psychiatric:         Behavior: Behavior is cooperative.         Imaging Results (Most Recent)     None          Body mass index is 27.22 kg/m².    Assessment/Plan     Diagnoses and all orders for this visit:    1. Generalized abdominal pain (Primary)    2. Gastroesophageal reflux disease, unspecified whether esophagitis present    3. Tubular adenoma  -     Case Request; Standing  -     Implement Anesthesia Orders Day of Procedure; Standing  -     Obtain Informed Consent; Standing  -     Case Request    Other orders  -     sodium-potassium-magnesium sulfates (Suprep Bowel Prep Kit) 17.5-3.13-1.6 GM/177ML solution oral solution; Take as directed  Dispense: 177 mL; Refill: 0        COLONOSCOPY WITH ANESTHESIA (N/A)    Continue Bentyl, cautioned about side effects including dizziness  Continue " OTC omeprazole, take 30 min prior to eating in morning    Colonoscopy due July 2021 - earlier if clinically indicated-pt wished to go ahead and schedule procedure today    Patient's Body mass index is 27.22 kg/m². BMI is above normal parameters. Recommendations include: no follow up.       Marc Mart, DO  03/09/21          Patient Instructions   Gastroesophageal Reflux Disease, Adult  Gastroesophageal reflux (ALEX) happens when acid from the stomach flows up into the tube that connects the mouth and the stomach (esophagus). Normally, food travels down the esophagus and stays in the stomach to be digested. However, when a person has ALEX, food and stomach acid sometimes move back up into the esophagus. If this becomes a more serious problem, the person may be diagnosed with a disease called gastroesophageal reflux disease (GERD). GERD occurs when the reflux:  · Happens often.  · Causes frequent or severe symptoms.  · Causes problems such as damage to the esophagus.  When stomach acid comes in contact with the esophagus, the acid may cause soreness (inflammation) in the esophagus. Over time, GERD may create small holes (ulcers) in the lining of the esophagus.  What are the causes?  This condition is caused by a problem with the muscle between the esophagus and the stomach (lower esophageal sphincter, or LES). Normally, the LES muscle closes after food passes through the esophagus to the stomach. When the LES is weakened or abnormal, it does not close properly, and that allows food and stomach acid to go back up into the esophagus.  The LES can be weakened by certain dietary substances, medicines, and medical conditions, including:  · Tobacco use.  · Pregnancy.  · Having a hiatal hernia.  · Alcohol use.  · Certain foods and beverages, such as coffee, chocolate, onions, and peppermint.  What increases the risk?  You are more likely to develop this condition if you:  · Have an increased body weight.  · Have a  connective tissue disorder.  · Use NSAID medicines.  What are the signs or symptoms?  Symptoms of this condition include:  · Heartburn.  · Difficult or painful swallowing.  · The feeling of having a lump in the throat.  · A bitter taste in the mouth.  · Bad breath.  · Having a large amount of saliva.  · Having an upset or bloated stomach.  · Belching.  · Chest pain. Different conditions can cause chest pain. Make sure you see your health care provider if you experience chest pain.  · Shortness of breath or wheezing.  · Ongoing (chronic) cough or a night-time cough.  · Wearing away of tooth enamel.  · Weight loss.  How is this diagnosed?  Your health care provider will take a medical history and perform a physical exam. To determine if you have mild or severe GERD, your health care provider may also monitor how you respond to treatment. You may also have tests, including:  · A test to examine your stomach and esophagus with a small camera (endoscopy).  · A test that measures the acidity level in your esophagus.  · A test that measures how much pressure is on your esophagus.  · A barium swallow or modified barium swallow test to show the shape, size, and functioning of your esophagus.  How is this treated?  The goal of treatment is to help relieve your symptoms and to prevent complications. Treatment for this condition may vary depending on how severe your symptoms are. Your health care provider may recommend:  · Changes to your diet.  · Medicine.  · Surgery.  Follow these instructions at home:  Eating and drinking    · Follow a diet as recommended by your health care provider. This may involve avoiding foods and drinks such as:  ? Coffee and tea (with or without caffeine).  ? Drinks that contain alcohol.  ? Energy drinks and sports drinks.  ? Carbonated drinks or sodas.  ? Chocolate and cocoa.  ? Peppermint and mint flavorings.  ? Garlic and onions.  ? Horseradish.  ? Spicy and acidic foods, including peppers, chili  powder, wade powder, vinegar, hot sauces, and barbecue sauce.  ? Citrus fruit juices and citrus fruits, such as oranges, kennedy, and limes.  ? Tomato-based foods, such as red sauce, chili, salsa, and pizza with red sauce.  ? Fried and fatty foods, such as donuts, french fries, potato chips, and high-fat dressings.  ? High-fat meats, such as hot dogs and fatty cuts of red and white meats, such as rib eye steak, sausage, ham, and hcavarria.  ? High-fat dairy items, such as whole milk, butter, and cream cheese.  · Eat small, frequent meals instead of large meals.  · Avoid drinking large amounts of liquid with your meals.  · Avoid eating meals during the 2-3 hours before bedtime.  · Avoid lying down right after you eat.  · Do not exercise right after you eat.  Lifestyle    · Do not use any products that contain nicotine or tobacco, such as cigarettes, e-cigarettes, and chewing tobacco. If you need help quitting, ask your health care provider.  · Try to reduce your stress by using methods such as yoga or meditation. If you need help reducing stress, ask your health care provider.  · If you are overweight, reduce your weight to an amount that is healthy for you. Ask your health care provider for guidance about a safe weight loss goal.  General instructions  · Pay attention to any changes in your symptoms.  · Take over-the-counter and prescription medicines only as told by your health care provider. Do not take aspirin, ibuprofen, or other NSAIDs unless your health care provider told you to do so.  · Wear loose-fitting clothing. Do not wear anything tight around your waist that causes pressure on your abdomen.  · Raise (elevate) the head of your bed about 6 inches (15 cm).  · Avoid bending over if this makes your symptoms worse.  · Keep all follow-up visits as told by your health care provider. This is important.  Contact a health care provider if:  · You have:  ? New symptoms.  ? Unexplained weight loss.  ? Difficulty  swallowing or it hurts to swallow.  ? Wheezing or a persistent cough.  ? A hoarse voice.  · Your symptoms do not improve with treatment.  Get help right away if you:  · Have pain in your arms, neck, jaw, teeth, or back.  · Feel sweaty, dizzy, or light-headed.  · Have chest pain or shortness of breath.  · Vomit and your vomit looks like blood or coffee grounds.  · Faint.  · Have stool that is bloody or black.  · Cannot swallow, drink, or eat.  Summary  · Gastroesophageal reflux happens when acid from the stomach flows up into the esophagus. GERD is a disease in which the reflux happens often, causes frequent or severe symptoms, or causes problems such as damage to the esophagus.  · Treatment for this condition may vary depending on how severe your symptoms are. Your health care provider may recommend diet and lifestyle changes, medicine, or surgery.  · Contact a health care provider if you have new or worsening symptoms.  · Take over-the-counter and prescription medicines only as told by your health care provider. Do not take aspirin, ibuprofen, or other NSAIDs unless your health care provider told you to do so.  · Keep all follow-up visits as told by your health care provider. This is important.  This information is not intended to replace advice given to you by your health care provider. Make sure you discuss any questions you have with your health care provider.  Document Revised: 06/26/2019 Document Reviewed: 06/26/2019  [employer] Patient Education © 2020 [employer]

## 2021-03-11 ENCOUNTER — TELEPHONE (OUTPATIENT)
Dept: GASTROENTEROLOGY | Facility: CLINIC | Age: 76
End: 2021-03-11

## 2021-03-12 ENCOUNTER — TELEPHONE (OUTPATIENT)
Dept: GASTROENTEROLOGY | Facility: CLINIC | Age: 76
End: 2021-03-12

## 2021-03-12 ENCOUNTER — TRANSCRIBE ORDERS (OUTPATIENT)
Dept: ADMINISTRATIVE | Facility: HOSPITAL | Age: 76
End: 2021-03-12

## 2021-03-12 DIAGNOSIS — Z11.59 SCREENING FOR VIRAL DISEASE: Primary | ICD-10-CM

## 2021-03-15 ENCOUNTER — LAB (OUTPATIENT)
Dept: LAB | Facility: HOSPITAL | Age: 76
End: 2021-03-15

## 2021-03-15 LAB — SARS-COV-2 ORF1AB RESP QL NAA+PROBE: NOT DETECTED

## 2021-03-15 PROCEDURE — C9803 HOPD COVID-19 SPEC COLLECT: HCPCS | Performed by: OTOLARYNGOLOGY

## 2021-03-15 PROCEDURE — U0004 COV-19 TEST NON-CDC HGH THRU: HCPCS | Performed by: OTOLARYNGOLOGY

## 2021-03-17 NOTE — PROGRESS NOTES
YOB: 1945  Location: [facility]  Location Address: [address] [place] [address], [facility], [address] [place] [address]  Location Phone: [phone]    Chief Complaint   Patient presents with   • Sinus Problem     sinus pressure   • Heartburn     pt belching saw gastro and was put on reflux meds       History of Present Illness  Justin Szymanski is a 75 y.o. male.  Justin Szymanski is here for evaluation of ENT complaints. The patient has had problems with cerumen accumulation and ear itching, nasal drainage, nasal congestion, repeated sinusitis, sinus pressure, postnasal drip, previous sinus surgery, allergy, facial pain, facial pressure and headaches, acid reflux, cough, excessive mucous and bad breath. Patient denies globus sensation, hoarseness, dysphagia, and dental pain. The symptoms are localized to the bilateral ears, frontal sinus, maxillary sinus, throat and larynx. The patient has had moderate symptoms. The symptoms have been present for the last several years The symptoms are aggravated by  allergy and reflux. There have been no factors that have improved the symptoms. Patient has been on flonase and prilosec with no improvement. He has had multiple sinus surgeries with the last sinus surgery approximately 10 years ago by Dr. Guzman. He has had a CT sinus.         CT personally reviewed.    I have personally reviewed the information imported into the chart during this visit.      I have personally reviewed the review of systems.      Study Result    Narrative & Impression   EXAMINATION:  CT SINUS W WO CONTRAST-  2021 1:51 PM CST     HISTORY: SINUS PRESSURE; J34.89-Other specified disorders of nose and  nasal sinuses polyposis. Previous sinus surgery     COMPARISON: 2021 sinus radiograph     TECHNIQUE: Radiation dose equals  mGy-cm.  Automated exposure  control dose reduction technique was implemented.     Thin section axial imaging was obtained with and without intravenous  contrast. 2-D  sagittal and coronal reconstruction images were generated.     FINDINGS:      Changes from previous sinus surgery identified with only defects in the  medial walls of the maxillary sinuses. There is mild mucosal thickening  observed in the left maxillary sinus.     Postsurgical changes in the ethmoid sinuses and nasal turbinate regions  are served with mild mucosal thickening.     There is partial opacification of the superior anterior ethmoid sinuses  particularly on the left external extending into the frontal sinuses  with mucosal thickening and partial opacification. Frontal ethmoid  recess regions are partially opacified particularly on the left.     There is mild lacelike debris appreciated in the sphenoid sinuses on the  right.     There is left-sided deviation of the nasal septum with a small  full-thickness septal defect appreciated inferiorly     There is no abnormal contrast-enhancement identified this examination.     IMPRESSION:  1. Extensive postsurgical changes. Mild mucosal thickening in the  maxillary and ethmoid sinuses with mild mucosal thickening with minimal  opacification of the superior anterior ethmoid and frontal sinuses.  This report was finalized on 02/04/2021 14:44 by Dr. James Ramirez MD.            Past Medical History:   Diagnosis Date   • Allergic rhinitis    • Carotid artery disease (CMS/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    numbness in right arm, the patinet states his symptom may be due to neck injury, following with neurology    • Valvular disease        Past Surgical History:   Procedure Laterality Date   • CAROTID ENDARTERECTOMY Left 5/6/2020    Procedure: LEFT CAROTID ENDARTERECTOMY WITH BOVINE PATCH  ANGIOPLASTY, EEG MONITORING, AND COMPLETION DUPLEX VESSEL ULTRASOUND;  Surgeon: Sb Hunter DO;  Location: [facility] HYBRID OR 12;  Service: Vascular;  Laterality: Left;   • CATARACT EXTRACTION Bilateral    • CHOLECYSTECTOMY     • CHOLECYSTECTOMY WITH INTRAOPERATIVE CHOLANGIOGRAM N/A 6/18/2020    Procedure: CHOLECYSTECTOMY LAPAROSCOPIC INTRAOPERATIVE CHOLANGIOGRAM;  Surgeon: Maame Del Rio MD;  Location: [facility] OR;  Service: General;  Laterality: N/A;   • COLONOSCOPY  07/21/2016    two polyps ,both removed   • ENDOSCOPY  04/18/2013    normal   • ENDOSCOPY N/A 1/26/2018    Procedure: ESOPHAGOGASTRODUODENOSCOPY WITH ANESTHESIA;  Surgeon: Marc Mart DO;  Location: [facility] ENDOSCOPY;  Service:    • SINUS SURGERY      Dr. Griffith   • TONSILLECTOMY     • ULNAR NERVE REPAIR         Outpatient Medications Marked as Taking for the 3/18/21 encounter (Office Visit) with Cecil Gonzales MD   Medication Sig Dispense Refill   • aspirin 81 MG chewable tablet Chew 81 mg Daily.     • atorvastatin (LIPITOR) 80 MG tablet Take 1 tablet by mouth Every Night. 30 tablet 0   • clopidogrel (PLAVIX) 75 MG tablet Take 1 tablet by mouth Daily. 30 tablet 5   • desonide (DESOWEN) 0.05 % cream Apply 1 application topically to the appropriate area as directed Daily As Needed for Irritation.  11   • dicyclomine (BENTYL) 20 MG tablet Take 20 mg by mouth Every 6 (Six) Hours.     • FLUoxetine (PROzac) 20 MG capsule TK 1 C PO QD  0   • fluticasone (FLONASE) 50 MCG/ACT nasal spray 2 sprays into the nostril(s) as directed by provider Daily As Needed for Rhinitis or Allergies.     • levothyroxine (SYNTHROID, LEVOTHROID) 88 MCG tablet Take 88 mcg by mouth Daily.     • lisinopril-hydrochlorothiazide (PRINZIDE,ZESTORETIC) 10-12.5 MG per tablet Take 1 tablet by mouth Daily.     • Multiple Vitamins-Minerals (MULTIVITAMIN WITH MINERALS) tablet tablet Take 1 tablet by mouth Daily.     • tadalafil (CIALIS) 20 MG tablet Take 20 mg by mouth Daily  As Needed for erectile dysfunction.     • valACYclovir (VALTREX) 1000 MG tablet Take 1,000 mg by mouth 2 (Two) Times a Day As Needed.     • zolpidem (AMBIEN) 10 MG tablet Take 10 mg by mouth At Night As Needed.     • [DISCONTINUED] omeprazole (priLOSEC) 40 MG capsule Take 20 mg by mouth Daily.         Patient has no known allergies.    Family History   Problem Relation Age of Onset   • Hyperlipidemia Mother    • Cancer Father    • Hypertension Father    • Hyperlipidemia Father    • Colon cancer Maternal Grandfather        Social History     Socioeconomic History   • Marital status:      Spouse name: Not on file   • Number of children: Not on file   • Years of education: Not on file   • Highest education level: Not on file   Tobacco Use   • Smoking status: Never Smoker   • Smokeless tobacco: Never Used   Vaping Use   • Vaping Use: Never used   Substance and Sexual Activity   • Alcohol use: Yes     Alcohol/week: 7.0 standard drinks     Types: 7 Glasses of wine per week     Comment: nightly - red wine    • Drug use: No   • Sexual activity: Yes     Partners: Female       Review of Systems   Constitutional: Negative.    HENT: Positive for congestion, postnasal drip, rhinorrhea, sinus pressure, sinus pain and sore throat.         Itching of ears, cerumen, bad breath   Eyes: Negative.    Respiratory: Positive for cough.    Cardiovascular: Negative.    Gastrointestinal:        Reflux   Endocrine: Negative.    Genitourinary: Negative.    Musculoskeletal: Negative.    Skin: Negative.    Allergic/Immunologic: Positive for environmental allergies.   Neurological: Positive for headaches.   Hematological: Negative.    Psychiatric/Behavioral: Negative.        Vitals:    03/18/21 0915   BP: 138/76   Pulse: 90   Temp: 97.8 °F (36.6 °C)       Body mass index is 27.08 kg/m².    Objective     Physical Exam  CONSTITUTIONAL: well nourished, well-developed, alert, oriented, in no acute distress     COMMUNICATION AND VOICE: able to  communicate normally, normal voice quality    HEAD: normocephalic, no lesions, atraumatic, no tenderness, no masses     FACE: appearance normal, no lesions, no tenderness, no deformities, facial motion symmetric    EYES: ocular motility normal, eyelids normal, orbits normal, no proptosis, conjunctiva normal , pupils equal, round     EARS:  Hearing: hearing to conversational voice intact bilaterally   External Ears: normal bilaterally, no lesions  TMs clear and intact bilaterally with well ventilated middle ear spaces.-Minimal cerumen on the left TM (recommended Debrox over-the-counter)    NOSE:  External Nose: external nasal structure normal, no tenderness on palpation, no nasal discharge, no lesions, no evidence of trauma, nostrils patent   Intranasal exam: See endoscopy    ORAL:  Lips: upper and lower lips without lesion   OC/OP-clear  LARYNX: See endoscopy    NECK:  Inspection and Palpation: neck appearance normal, no masses or tenderness    CHEST/RESPIRATORY: normal respiratory effort     CARDIOVASCULAR: no cyanosis or edema     NEUROLOGICAL/PSYCHIATRIC: oriented to time, place and person, mood normal, affect appropriate, CN II-XII intact grossly      Assessment/Plan   Diagnoses and all orders for this visit:    1. Laryngopharyngeal reflux (Primary)    2. Non-seasonal allergic rhinitis due to pollen    3. ADITHYA on CPAP    4. S/P FESS (functional endoscopic sinus surgery)    5. Nasal polyposis - hx of    Other orders  -     omeprazole (priLOSEC) 40 MG capsule; Take 1 capsule by mouth 2 (Two) Times a Day for 30 days. Take 30 minutes to 1 hour before meals  Dispense: 60 capsule; Refill: 3      * Surgery not found *  No orders of the defined types were placed in this encounter.    No follow-ups on file.       Patient Instructions   Continue intranasal steroid spray  Change to omeprazole twice daily before meals  Lifestyle changes were elucidated and recommended  Consider whole food plant-based  diet      Gastroesophageal Reflux Disease (Laryngopharyngeal Reflux), Adult  Gastroesophageal reflux disease (GERD) and/or Laryngopharyngeal Reflux, (LPR) happens when acid from your stomach flows up into the esophagus and/or throat and voicebox or larynx. When acid comes in contact with the these organs, the acid can cause soreness (inflammation). Over time, GERD may create small holes (ulcers) in the lining of the esophagus and may lead to the development of hoarseness, difficulty swallowing,   feeling of something stuck in the throat, increased mucous or drainage and even predispose to the development of malignancies, (cancer).    CAUSES   · Increased body weight. This puts pressure on the stomach, making acid rise from the stomach into the esophagus.  · Smoking. This increases acid production in the stomach.  · Drinking alcohol. This causes decreased pressure in the lower esophageal sphincter (valve or ring of muscle between the esophagus and stomach), allowing acid from the stomach into the esophagus.  · Late evening meals and a full stomach. This increases pressure and acid production in the stomach.  · A malformed lower esophageal sphincter  · Diet which can include avoidance of gluten and dairy products  · Age  SYMPTOMS   · Burning pain in the lower part of the mid-chest behind the breastbone and in the mid-stomach area. This may occur twice a week or more often.  · Trouble swallowing.  · Sore throat.  · Dry cough.  · Asthma-like symptoms including chest tightness, shortness of breath, or wheezing.  · Globus sensation-something stuck in the throat/fullness  · Hoarseness  DIAGNOSIS   Your caregiver may be able to diagnose GERD based on your symptoms. In some cases, X-rays and other tests may be done to check for complications or to check the condition of your stomach and esophagus.  You may need to see another doctor.  TREATMENT   Over-the-counter or prescription medicines to help decrease acid production.    Dietary and behavioral modifications or changes may be also recommended.  HOME CARE INSTRUCTIONS   · Change the factors that you can control. Ask your caregiver for guidance concerning weight loss, quitting smoking, and alcohol consumption.  · Avoid foods and drinks that make your symptoms worse, and MAY include such as:  ¨ Caffeine or alcoholic drinks.  ¨ Chocolate.  ¨ Gluten containing foods  ¨ Dairy  ¨ Peppermint or mint flavorings.  ¨ Garlic and onions.  ¨ Spicy foods.  ¨ Citrus fruits, such as oranges, kennedy, or limes.  ¨ Tomato-based foods such as sauce, chili, salsa, and pizza.  ¨ Fried and fatty foods.  · Avoid lying down for the 3 hours prior to your bedtime or prior to taking a nap.  · Eat small, frequent meals instead of large meals.  · Wear loose-fitting clothing. Do not wear anything tight around your waist that causes pressure on your stomach.  · Raise the head of your bed 6 to 8 inches with wood blocks to help you sleep. Extra pillows will not help.  · Only take over-the-counter or prescription medicines for pain, discomfort, or fever as directed by your caregiver.  · Do not take aspirin, ibuprofen, or other nonsteroidal anti-inflammatory drugs if possible (NSAIDs).  SEEK IMMEDIATE MEDICAL CARE IF:   · You have pain in your arms, neck, jaw, teeth, or back.  · Your pain increases or changes in intensity or duration.  · You develop nausea, vomiting, or sweating (diaphoresis).  · You develop shortness of breath, or you faint.  · Your vomit is green, yellow, black, or looks like coffee grounds or blood.  · Your stool is red, bloody, or black.  These symptoms could be signs of other problems, such as heart disease, gastric bleeding, or esophageal bleeding.  MAKE SURE YOU:   · Understand these instructions.  · Will watch your condition.  · Will get help right away if you are not doing well or get worse.     This information is not intended to replace advice given to you by your physician. Make sure you  discuss any questions you have with your health care provider.     Modified by Cecil Gonzales MD, FACS 9/8/2016.  Document Released: 09/27/2006 Document Revised: 01/08/2016 Document Reviewed: 04/13/2016  [email or web address] Interactive Patient Education ©2016 [employer]

## 2021-03-18 ENCOUNTER — OFFICE VISIT (OUTPATIENT)
Dept: OTOLARYNGOLOGY | Facility: CLINIC | Age: 76
End: 2021-03-18

## 2021-03-18 VITALS
SYSTOLIC BLOOD PRESSURE: 138 MMHG | HEART RATE: 90 BPM | TEMPERATURE: 97.8 F | DIASTOLIC BLOOD PRESSURE: 76 MMHG | BODY MASS INDEX: 26.99 KG/M2 | HEIGHT: 68 IN | WEIGHT: 178.1 LBS

## 2021-03-18 DIAGNOSIS — Z99.89 OSA ON CPAP: ICD-10-CM

## 2021-03-18 DIAGNOSIS — J30.1 NON-SEASONAL ALLERGIC RHINITIS DUE TO POLLEN: ICD-10-CM

## 2021-03-18 DIAGNOSIS — G47.33 OSA ON CPAP: ICD-10-CM

## 2021-03-18 DIAGNOSIS — K21.9 LARYNGOPHARYNGEAL REFLUX: Primary | ICD-10-CM

## 2021-03-18 DIAGNOSIS — J33.9 NASAL POLYPOSIS: ICD-10-CM

## 2021-03-18 DIAGNOSIS — Z98.890 S/P FESS (FUNCTIONAL ENDOSCOPIC SINUS SURGERY): ICD-10-CM

## 2021-03-18 PROCEDURE — 99204 OFFICE O/P NEW MOD 45 MIN: CPT | Performed by: OTOLARYNGOLOGY

## 2021-03-18 PROCEDURE — 31575 DIAGNOSTIC LARYNGOSCOPY: CPT | Performed by: OTOLARYNGOLOGY

## 2021-03-18 RX ORDER — ZOLPIDEM TARTRATE 10 MG/1
10 TABLET ORAL NIGHTLY PRN
COMMUNITY
Start: 2021-01-31

## 2021-03-18 RX ORDER — OMEPRAZOLE 40 MG/1
40 CAPSULE, DELAYED RELEASE ORAL 2 TIMES DAILY
Qty: 60 CAPSULE | Refills: 3 | Status: SHIPPED | OUTPATIENT
Start: 2021-03-18 | End: 2021-07-12

## 2021-03-18 NOTE — PATIENT INSTRUCTIONS
Continue intranasal steroid spray  Change to omeprazole twice daily before meals  Lifestyle changes were elucidated and recommended  Consider whole food plant-based diet      Gastroesophageal Reflux Disease (Laryngopharyngeal Reflux), Adult  Gastroesophageal reflux disease (GERD) and/or Laryngopharyngeal Reflux, (LPR) happens when acid from your stomach flows up into the esophagus and/or throat and voicebox or larynx. When acid comes in contact with the these organs, the acid can cause soreness (inflammation). Over time, GERD may create small holes (ulcers) in the lining of the esophagus and may lead to the development of hoarseness, difficulty swallowing,   feeling of something stuck in the throat, increased mucous or drainage and even predispose to the development of malignancies, (cancer).    CAUSES   · Increased body weight. This puts pressure on the stomach, making acid rise from the stomach into the esophagus.  · Smoking. This increases acid production in the stomach.  · Drinking alcohol. This causes decreased pressure in the lower esophageal sphincter (valve or ring of muscle between the esophagus and stomach), allowing acid from the stomach into the esophagus.  · Late evening meals and a full stomach. This increases pressure and acid production in the stomach.  · A malformed lower esophageal sphincter  · Diet which can include avoidance of gluten and dairy products  · Age  SYMPTOMS   · Burning pain in the lower part of the mid-chest behind the breastbone and in the mid-stomach area. This may occur twice a week or more often.  · Trouble swallowing.  · Sore throat.  · Dry cough.  · Asthma-like symptoms including chest tightness, shortness of breath, or wheezing.  · Globus sensation-something stuck in the throat/fullness  · Hoarseness  DIAGNOSIS   Your caregiver may be able to diagnose GERD based on your symptoms. In some cases, X-rays and other tests may be done to check for complications or to check the  condition of your stomach and esophagus.  You may need to see another doctor.  TREATMENT   Over-the-counter or prescription medicines to help decrease acid production.   Dietary and behavioral modifications or changes may be also recommended.  HOME CARE INSTRUCTIONS   · Change the factors that you can control. Ask your caregiver for guidance concerning weight loss, quitting smoking, and alcohol consumption.  · Avoid foods and drinks that make your symptoms worse, and MAY include such as:  ¨ Caffeine or alcoholic drinks.  ¨ Chocolate.  ¨ Gluten containing foods  ¨ Dairy  ¨ Peppermint or mint flavorings.  ¨ Garlic and onions.  ¨ Spicy foods.  ¨ Citrus fruits, such as oranges, kennedy, or limes.  ¨ Tomato-based foods such as sauce, chili, salsa, and pizza.  ¨ Fried and fatty foods.  · Avoid lying down for the 3 hours prior to your bedtime or prior to taking a nap.  · Eat small, frequent meals instead of large meals.  · Wear loose-fitting clothing. Do not wear anything tight around your waist that causes pressure on your stomach.  · Raise the head of your bed 6 to 8 inches with wood blocks to help you sleep. Extra pillows will not help.  · Only take over-the-counter or prescription medicines for pain, discomfort, or fever as directed by your caregiver.  · Do not take aspirin, ibuprofen, or other nonsteroidal anti-inflammatory drugs if possible (NSAIDs).  SEEK IMMEDIATE MEDICAL CARE IF:   · You have pain in your arms, neck, jaw, teeth, or back.  · Your pain increases or changes in intensity or duration.  · You develop nausea, vomiting, or sweating (diaphoresis).  · You develop shortness of breath, or you faint.  · Your vomit is green, yellow, black, or looks like coffee grounds or blood.  · Your stool is red, bloody, or black.  These symptoms could be signs of other problems, such as heart disease, gastric bleeding, or esophageal bleeding.  MAKE SURE YOU:   · Understand these instructions.  · Will watch your  condition.  · Will get help right away if you are not doing well or get worse.     This information is not intended to replace advice given to you by your physician. Make sure you discuss any questions you have with your health care provider.     Modified by Cecil Gonzales MD, FACS 9/8/2016.  Document Released: 09/27/2006 Document Revised: 01/08/2016 Document Reviewed: 04/13/2016  [employer] Interactive Patient Education ©2016 [employer]

## 2021-03-18 NOTE — PROGRESS NOTES
OPERATIVE NOTE:  Justin Szymanski    DATE OF PROCEDURE: 3/18/2021    PROCEDURE:   Flexible Fiberoptic Laryngoscopy    ANESTHESIA:  None    REASON FOR PROCEDURE:  Procedure was recommend for suspicious clinical behavior, globus sensation and frequent belching associated with taste of acidity.    Risks, benefits and alternatives were discussed.      DETAILS of OPERATION:  The patient was seated in the exam chair.  A flexible fiberoptic laryngoscopy was performed through the oral cavity.  The scope was introduced into the oral cavity and directed to the level of the glottis, examining the structures of the oropharynx, base of tongue, vallecula, supraglottic larynx, glottic larynx, and hypopharynx.  Nasal cavities were also inspected    FINDINGS:  Mucosal surfaces:   The mucosal surfaces demonstrated normal mucosa surfaces with moderate inflammation    Nose: Both antrostomies were patent pale boggy turbinates inferiorly were noted bilaterally.  Antral mucosa bilaterally was normal in appearance and no evidence of recidivistic polyposis was appreciated and there was no purulent discharge.    Base of tongue:  The base of tongue was found to have no mass or lesion.    Epiglottis:  The epiglottis was found to have no mass or lesion.    Aryepligottic fold:  The AE folds were found to have no mass or lesion.    False Vocal Fold:  The false cords were found to have no mass or lesion.    True Vocal Cord:  The true vocal cords were found to have no mass or lesion. Both true vocal cords adduct and abduct normally    Arytenoid:   The arytenoids were found to have moderate inter-arytenoid edema.    Hypopharynx:  The hypopharynx was found to have no mass or lesion.    The patient tolerated procedure well.

## 2021-03-19 ENCOUNTER — TELEPHONE (OUTPATIENT)
Dept: NEUROLOGY | Facility: CLINIC | Age: 76
End: 2021-03-19

## 2021-03-19 NOTE — TELEPHONE ENCOUNTER
Provider:     Caller: PT    Relationship to Patient: SELF    Pharmacy: [facility] [address] [place] [address]    Phone Number: [phone]    Reason for Call: PT CALLED IN TODAY IN REGARDS TO HIS OVERNIGHT OXIMETRY TEST. HE SAID HE WANTED AN UPDATE ON WHAT THE INSURANCE SAID. PLEASE REVIEW AND ADVISE

## 2021-03-22 NOTE — TELEPHONE ENCOUNTER
Justin notified I called [facility], the insurance covered the overnight oximetry.

## 2021-05-18 ENCOUNTER — OFFICE VISIT (OUTPATIENT)
Dept: OTOLARYNGOLOGY | Facility: CLINIC | Age: 76
End: 2021-05-18

## 2021-05-18 VITALS
HEIGHT: 68 IN | DIASTOLIC BLOOD PRESSURE: 85 MMHG | SYSTOLIC BLOOD PRESSURE: 151 MMHG | WEIGHT: 187.4 LBS | BODY MASS INDEX: 28.4 KG/M2 | TEMPERATURE: 98.6 F | HEART RATE: 76 BPM

## 2021-05-18 DIAGNOSIS — J33.9 NASAL POLYPOSIS: ICD-10-CM

## 2021-05-18 DIAGNOSIS — K21.9 LARYNGOPHARYNGEAL REFLUX: Primary | ICD-10-CM

## 2021-05-18 DIAGNOSIS — Z99.89 OSA ON CPAP: ICD-10-CM

## 2021-05-18 DIAGNOSIS — Z98.890 S/P FESS (FUNCTIONAL ENDOSCOPIC SINUS SURGERY): ICD-10-CM

## 2021-05-18 DIAGNOSIS — G47.33 OSA ON CPAP: ICD-10-CM

## 2021-05-18 DIAGNOSIS — J30.1 NON-SEASONAL ALLERGIC RHINITIS DUE TO POLLEN: ICD-10-CM

## 2021-05-18 PROBLEM — Z87.09 HISTORY OF NASAL POLYPOSIS: Status: ACTIVE | Noted: 2021-05-18

## 2021-05-18 PROCEDURE — 99213 OFFICE O/P EST LOW 20 MIN: CPT | Performed by: OTOLARYNGOLOGY

## 2021-05-18 NOTE — PATIENT INSTRUCTIONS
Continue PPIs and intranasal steroid sprays  Call return for problems  Reassurance given-conservative management  Follow-up in 4 to 6 months or sooner for difficulties

## 2021-05-18 NOTE — PROGRESS NOTES
YOB: 1945  Location: [facility]  Location Address: [address] [place] [address], [facility], [address] [place] [address]  Location Phone: [phone]    Chief Complaint   Patient presents with   • Follow-up     reflux is better, pt having some sinus pain and pressure; sinus headache       History of Present Illness  Justin Szymanski is a 75 y.o. male.  Justin Szymanski is here for evaluation of ENT complaints. The patient has had problems with cerumen accumulation and ear itching, nasal drainage, nasal congestion, repeated sinusitis, sinus pressure, postnasal drip, previous sinus surgery, allergy, facial pain, facial pressure and headaches, acid reflux, cough, excessive mucous and bad breath. Patient denies globus sensation, hoarseness, dysphagia, and dental pain. The symptoms are localized to the bilateral ears, frontal sinus, maxillary sinus, throat and larynx. The patient has had improvement of his reflux. He states he is using his nasal sprays and finds that they have improved his sinus condition. He has only a mild frontal sinus headache and maxillary sinus tenderness.  He has had multiple sinus surgeries with the last sinus surgery approximately 10 years ago by Dr. Guzman. He has had a CT sinus.           CT personally reviewed.     I have personally reviewed the information imported into the chart during this visit.       I have personally reviewed the review of systems.        Study Result     Narrative & Impression   EXAMINATION:  CT SINUS LUIS WO CONTRAST-  2021 1:51 PM CST     HISTORY: SINUS PRESSURE; J34.89-Other specified disorders of nose and  nasal sinuses polyposis. Previous sinus surgery     COMPARISON: 2021 sinus radiograph     TECHNIQUE: Radiation dose equals  mGy-cm.  Automated exposure  control dose reduction technique was implemented.     Thin section axial imaging was obtained with and without intravenous  contrast. 2-D sagittal and coronal reconstruction images were  generated.     FINDINGS:      Changes from previous sinus surgery identified with only defects in the  medial walls of the maxillary sinuses. There is mild mucosal thickening  observed in the left maxillary sinus.     Postsurgical changes in the ethmoid sinuses and nasal turbinate regions  are served with mild mucosal thickening.     There is partial opacification of the superior anterior ethmoid sinuses  particularly on the left external extending into the frontal sinuses  with mucosal thickening and partial opacification. Frontal ethmoid  recess regions are partially opacified particularly on the left.     There is mild lacelike debris appreciated in the sphenoid sinuses on the  right.     There is left-sided deviation of the nasal septum with a small  full-thickness septal defect appreciated inferiorly     There is no abnormal contrast-enhancement identified this examination.     IMPRESSION:  1. Extensive postsurgical changes. Mild mucosal thickening in the  maxillary and ethmoid sinuses with mild mucosal thickening with minimal  opacification of the superior anterior ethmoid and frontal sinuses.  This report was finalized on 02/04/2021 14:44 by Dr. James Ramirez MD.                         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    numbness in right arm, the patinet states his symptom may be due to neck injury, following with neurology    • Valvular disease        Past Surgical History:   Procedure Laterality Date   • CAROTID ENDARTERECTOMY Left 5/6/2020    Procedure: LEFT CAROTID ENDARTERECTOMY WITH  BOVINE PATCH ANGIOPLASTY, EEG MONITORING, AND COMPLETION DUPLEX VESSEL ULTRASOUND;  Surgeon: Sb Hunter DO;  Location:  [facility];  Service: Vascular;  Laterality: Left;   • CATARACT EXTRACTION Bilateral    • CHOLECYSTECTOMY     • CHOLECYSTECTOMY WITH INTRAOPERATIVE CHOLANGIOGRAM N/A 6/18/2020    Procedure: CHOLECYSTECTOMY LAPAROSCOPIC INTRAOPERATIVE CHOLANGIOGRAM;  Surgeon: Maame Del Rio MD;  Location: [facility] OR;  Service: General;  Laterality: N/A;   • COLONOSCOPY  07/21/2016    two polyps ,both removed   • ENDOSCOPY  04/18/2013    normal   • ENDOSCOPY N/A 1/26/2018    Procedure: ESOPHAGOGASTRODUODENOSCOPY WITH ANESTHESIA;  Surgeon: Marc Mart DO;  Location: [facility] ENDOSCOPY;  Service:    • SINUS SURGERY      Dr. Griffith   • TONSILLECTOMY     • ULNAR NERVE REPAIR         Outpatient Medications Marked as Taking for the 5/18/21 encounter (Office Visit) with Cecil Gonzales MD   Medication Sig Dispense Refill   • aspirin 81 MG chewable tablet Chew 81 mg Daily.     • atorvastatin (LIPITOR) 80 MG tablet Take 1 tablet by mouth Every Night. 30 tablet 0   • clopidogrel (PLAVIX) 75 MG tablet Take 1 tablet by mouth Daily. 30 tablet 5   • desonide (DESOWEN) 0.05 % cream Apply 1 application topically to the appropriate area as directed Daily As Needed for Irritation.  11   • dicyclomine (BENTYL) 20 MG tablet Take 20 mg by mouth Every 6 (Six) Hours.     • FLUoxetine (PROzac) 20 MG capsule TK 1 C PO QD  0   • fluticasone (FLONASE) 50 MCG/ACT nasal spray 2 sprays into the nostril(s) as directed by provider Daily As Needed for Rhinitis or Allergies.     • levothyroxine (SYNTHROID, LEVOTHROID) 88 MCG tablet Take 88 mcg by mouth Daily.     • lisinopril-hydrochlorothiazide (PRINZIDE,ZESTORETIC) 10-12.5 MG per tablet Take 1 tablet by mouth Daily.     • Multiple Vitamins-Minerals (MULTIVITAMIN WITH MINERALS) tablet tablet Take 1 tablet by mouth Daily.     • sodium-potassium-magnesium sulfates (Suprep  Bowel Prep Kit) 17.5-3.13-1.6 GM/177ML solution oral solution Take as directed 177 mL 0   • tadalafil (CIALIS) 20 MG tablet Take 20 mg by mouth Daily As Needed for erectile dysfunction.     • valACYclovir (VALTREX) 1000 MG tablet Take 1,000 mg by mouth 2 (Two) Times a Day As Needed.     • zolpidem (AMBIEN) 10 MG tablet Take 10 mg by mouth At Night As Needed.         Patient has no known allergies.    Family History   Problem Relation Age of Onset   • Hyperlipidemia Mother    • Cancer Father    • Hypertension Father    • Hyperlipidemia Father    • Colon cancer Maternal Grandfather        Social History     Socioeconomic History   • Marital status:      Spouse name: Not on file   • Number of children: Not on file   • Years of education: Not on file   • Highest education level: Not on file   Tobacco Use   • Smoking status: Never Smoker   • Smokeless tobacco: Never Used   Vaping Use   • Vaping Use: Never used   Substance and Sexual Activity   • Alcohol use: Yes     Alcohol/week: 7.0 standard drinks     Types: 7 Glasses of wine per week     Comment: nightly - red wine    • Drug use: No   • Sexual activity: Yes     Partners: Female       Review of Systems   Constitutional: Negative.    HENT: Positive for sinus pain.    Eyes: Negative.    Respiratory: Negative.    Cardiovascular: Negative.    Endocrine: Negative.    Musculoskeletal: Negative.    Skin: Negative.    Allergic/Immunologic: Negative.    Neurological: Positive for headaches.   Hematological: Negative.    Psychiatric/Behavioral: Negative.        Vitals:    05/18/21 1000   BP: 151/85   Pulse: 76   Temp: 98.6 °F (37 °C)       Body mass index is 28.49 kg/m².    Objective     Physical Exam  CONSTITUTIONAL: well nourished, well-developed, alert, oriented, in no acute distress     COMMUNICATION AND VOICE: able to communicate normally, normal voice quality    HEAD: normocephalic, no lesions, atraumatic, no tenderness, no masses     FACE: appearance normal, no  lesions, no tenderness, no deformities, facial motion symmetric    EYES: ocular motility normal, eyelids normal, orbits normal, no proptosis, conjunctiva normal , pupils equal, round     EARS:  Hearing: hearing to conversational voice intact bilaterally   External Ears: normal bilaterally, no lesions    NOSE:  External Nose: external nasal structure normal, no tenderness on palpation, no nasal discharge, no lesions, no evidence of trauma, nostrils patent   Intranasal exam: Minimal thick mucus which is clear but no crusting appreciated. Antrostomies are patent bilaterally.    ORAL:  Lips: upper and lower lips without lesion   No masses or lesions intraorally    IDL:  True vocal cords abduct and abduct normally without mass or lesion. Moderate interarytenoid bar is noted.      NECK:  Inspection and Palpation: neck appearance normal, no masses or tenderness    CHEST/RESPIRATORY: normal respiratory effort     CARDIOVASCULAR: no cyanosis or edema     NEUROLOGICAL/PSYCHIATRIC: oriented to time, place and person, mood normal, affect appropriate, CN II-XII intact grossly    Assessment/Plan   Diagnoses and all orders for this visit:    1. Laryngopharyngeal reflux (Primary)    2. S/P FESS (functional endoscopic sinus surgery)    3. Non-seasonal allergic rhinitis due to pollen    4. ADITHYA on CPAP    5. Nasal polyposis - hx of      * Surgery not found *  No orders of the defined types were placed in this encounter.    Return in about 6 months (around 11/18/2021).       Patient Instructions   Continue PPIs and intranasal steroid sprays  Call return for problems  Reassurance given-conservative management  Follow-up in 4 to 6 months or sooner for difficulties

## 2021-05-24 ENCOUNTER — OFFICE VISIT (OUTPATIENT)
Dept: VASCULAR SURGERY | Facility: CLINIC | Age: 76
End: 2021-05-24

## 2021-05-24 ENCOUNTER — HOSPITAL ENCOUNTER (OUTPATIENT)
Dept: ULTRASOUND IMAGING | Facility: HOSPITAL | Age: 76
Discharge: HOME | End: 2021-05-24
Admitting: NURSE PRACTITIONER

## 2021-05-24 VITALS
OXYGEN SATURATION: 96 % | DIASTOLIC BLOOD PRESSURE: 74 MMHG | HEIGHT: 68 IN | HEART RATE: 88 BPM | WEIGHT: 195 LBS | BODY MASS INDEX: 29.55 KG/M2 | SYSTOLIC BLOOD PRESSURE: 124 MMHG

## 2021-05-24 DIAGNOSIS — I65.23 BILATERAL CAROTID ARTERY STENOSIS: ICD-10-CM

## 2021-05-24 DIAGNOSIS — I65.23 BILATERAL CAROTID ARTERY STENOSIS: Primary | ICD-10-CM

## 2021-05-24 DIAGNOSIS — E78.5 HYPERLIPIDEMIA, UNSPECIFIED HYPERLIPIDEMIA TYPE: ICD-10-CM

## 2021-05-24 DIAGNOSIS — I10 ESSENTIAL HYPERTENSION: ICD-10-CM

## 2021-05-24 PROCEDURE — 93880 EXTRACRANIAL BILAT STUDY: CPT

## 2021-05-24 PROCEDURE — 93880 EXTRACRANIAL BILAT STUDY: CPT | Performed by: SURGERY

## 2021-05-24 PROCEDURE — 99214 OFFICE O/P EST MOD 30 MIN: CPT | Performed by: NURSE PRACTITIONER

## 2021-05-24 NOTE — PROGRESS NOTES
"5/24/2021       Letha Wilder APRN  [address] [place] [address]    Justin Szymanski  1945    Chief Complaint   Patient presents with   • Follow-up     6 Month Follow Up For Bilateral Carotid Artery Stenosis. Test [id number] US pad carotid bilateral. Patient denies any stroke like symptoms.    • Non Smoker     Patient is a  Non Smoker    • Med Management     Verbally verified medications with patient        Dear Letha Wilder APRN       HPI  I had the pleasure of seeing your patient Justin Szymanski in the office today.    As you recall, Justin Szymanski is a 75 y.o.  male who we are following for asymptomatic carotid disease.  Previous testing does show he has aneurysm of the ascending aorta 4.3 cm.  He did undergo a left carotid endarterectomy on 5/6/2020.  Currently he is doing well and denies any strokelike symptoms.  He is maintained on aspirin, Plavix, and Lipitor.  He did have noninvasive testing performed today, which I did review in office.        Review of Systems   Constitutional: Negative.    HENT: Negative.         Allergies, follows with Dr. Gonzales   Eyes: Negative.    Respiratory: Negative.    Cardiovascular: Negative.    Gastrointestinal: Negative.         Belching, to have an upcoming colonoscopy   Endocrine: Negative.    Genitourinary: Negative.    Musculoskeletal: Negative.    Skin: Negative.    Allergic/Immunologic: Negative.    Neurological: Negative.    Hematological: Negative.    Psychiatric/Behavioral: Negative.    All other systems reviewed and are negative.      /74 (BP Location: Right arm, Patient Position: Sitting, Cuff Size: Adult)   Pulse 88   Ht 172.7 cm (68\")   Wt 88.5 kg (195 lb)   SpO2 96%   BMI 29.65 kg/m²       Physical Exam  Vitals and nursing note reviewed.   Constitutional:       Appearance: Normal appearance. He is well-developed and overweight.   HENT:      Head: Normocephalic and atraumatic.   Eyes:      General: No scleral icterus.     Pupils: Pupils are equal, " round, and reactive to light.   Neck:      Thyroid: No thyromegaly.   Cardiovascular:      Rate and Rhythm: Normal rate and regular rhythm.      Heart sounds: Murmur heard.   Systolic murmur is present.     Pulmonary:      Effort: Pulmonary effort is normal.      Breath sounds: Normal breath sounds.   Abdominal:      General: Bowel sounds are normal.      Palpations: Abdomen is soft.   Musculoskeletal:         General: Normal range of motion.      Cervical back: Normal range of motion and neck supple.   Skin:     General: Skin is warm and dry.   Neurological:      General: No focal deficit present.      Mental Status: He is alert and oriented to person, place, and time.   Psychiatric:         Mood and Affect: Mood normal.         Behavior: Behavior normal. Behavior is cooperative.         Thought Content: Thought content normal.         Judgment: Judgment normal.         Diagnostic Data:  Noninvasive testing including a carotid duplex shows less than 50% right carotid stenosis and 50 to 69% left carotid stenosis with bilateral antegrade vertebral flow.    Patient Active Problem List   Diagnosis   • Allergic rhinitis due to pollen   • Snoring   • Nasal polyposis - hx of   • ADITHYA (obstructive sleep apnea)   • ADITHYA on CPAP   • Indigestion   • Heart murmur   • Hypertension   • Hyperlipidemia   • Ascending aortic aneurysm (CMS/HCC)   • PAD (peripheral artery disease) (CMS/HCC)   • Upper extremity weakness   • Bilateral carotid artery stenosis   • Numbness and tingling of right upper extremity   • Muscle fasciculation   • Preop testing   • Carotid stenosis   • Tubular adenoma   • S/P FESS (functional endoscopic sinus surgery)   • Laryngopharyngeal reflux   • History of nasal polyposis         ICD-10-CM ICD-9-CM   1. Bilateral carotid artery stenosis  I65.23 433.10     433.30   2. Essential hypertension  I10 401.9   3. Hyperlipidemia, unspecified hyperlipidemia type  E78.5 272.4       Plan: After thoroughly evaluating Justin SMITH  Stephon, I believe the best course of action is to remain conservative from vascular surgery standpoint.  Currently, he is doing well and denies any strokelike symptoms.  I did review his testing which shows less than 50% right carotid stenosis and 50 to 69% left carotid stenosis with bilateral antegrade vertebral flow.  We will see him back in 6 months with repeat noninvasive testing for continued surveillance, including a carotid duplex.   He also has an ascending aortic aneurysm.  We will continue to follow.  Body mass index is 29.65 kg/m².  He should discuss with his primary care provider on ways to attain a healthy BMI.  I did discuss vascular risk factors as they pertain to the progression of vascular disease including controlling his hypertension and hyperlipidemia.  His blood pressure is stable on his current medication regimen.  He is maintained on Lipitor for his hyperlipidemia.  The patient can continue taking her current medication regimen as previously planned.  This was all discussed in full with complete understanding.    Thank you for allowing me to participate in the care of your patient.  Please do not hesitate with any questions or concerns.  I will keep you aware of any further encounters with Justin Szymanski.        Sincerely yours,         EFRA Ellington Jessie, APRN

## 2021-07-12 RX ORDER — OMEPRAZOLE 40 MG/1
CAPSULE, DELAYED RELEASE ORAL
Qty: 60 CAPSULE | Refills: 3 | Status: SHIPPED | OUTPATIENT
Start: 2021-07-12 | End: 2021-10-11

## 2021-07-22 ENCOUNTER — ANESTHESIA EVENT (OUTPATIENT)
Dept: GASTROENTEROLOGY | Facility: HOSPITAL | Age: 76
End: 2021-07-22

## 2021-07-22 ENCOUNTER — TELEPHONE (OUTPATIENT)
Dept: GASTROENTEROLOGY | Facility: CLINIC | Age: 76
End: 2021-07-22

## 2021-07-22 ENCOUNTER — ANESTHESIA (OUTPATIENT)
Dept: GASTROENTEROLOGY | Facility: HOSPITAL | Age: 76
End: 2021-07-22

## 2021-07-22 ENCOUNTER — HOSPITAL ENCOUNTER (OUTPATIENT)
Facility: HOSPITAL | Age: 76
Setting detail: HOSPITAL OUTPATIENT SURGERY
Discharge: HOME | End: 2021-07-22
Attending: INTERNAL MEDICINE | Admitting: INTERNAL MEDICINE

## 2021-07-22 VITALS
HEIGHT: 68 IN | SYSTOLIC BLOOD PRESSURE: 133 MMHG | WEIGHT: 176 LBS | OXYGEN SATURATION: 98 % | DIASTOLIC BLOOD PRESSURE: 87 MMHG | HEART RATE: 65 BPM | BODY MASS INDEX: 26.67 KG/M2 | TEMPERATURE: 97.1 F | RESPIRATION RATE: 12 BRPM

## 2021-07-22 DIAGNOSIS — D36.9 TUBULAR ADENOMA: ICD-10-CM

## 2021-07-22 PROCEDURE — 45385 COLONOSCOPY W/LESION REMOVAL: CPT | Performed by: INTERNAL MEDICINE

## 2021-07-22 PROCEDURE — 25010000002 PROPOFOL 10 MG/ML EMULSION: Performed by: NURSE ANESTHETIST, CERTIFIED REGISTERED

## 2021-07-22 PROCEDURE — 88305 TISSUE EXAM BY PATHOLOGIST: CPT | Performed by: INTERNAL MEDICINE

## 2021-07-22 RX ORDER — LIDOCAINE HYDROCHLORIDE 20 MG/ML
INJECTION, SOLUTION EPIDURAL; INFILTRATION; INTRACAUDAL; PERINEURAL AS NEEDED
Status: DISCONTINUED | OUTPATIENT
Start: 2021-07-22 | End: 2021-07-22 | Stop reason: SURG

## 2021-07-22 RX ORDER — SODIUM CHLORIDE 0.9 % (FLUSH) 0.9 %
10 SYRINGE (ML) INJECTION AS NEEDED
Status: DISCONTINUED | OUTPATIENT
Start: 2021-07-22 | End: 2021-07-22 | Stop reason: HOSPADM

## 2021-07-22 RX ORDER — PROPOFOL 10 MG/ML
VIAL (ML) INTRAVENOUS AS NEEDED
Status: DISCONTINUED | OUTPATIENT
Start: 2021-07-22 | End: 2021-07-22 | Stop reason: SURG

## 2021-07-22 RX ORDER — SODIUM CHLORIDE 9 MG/ML
500 INJECTION, SOLUTION INTRAVENOUS CONTINUOUS PRN
Status: DISCONTINUED | OUTPATIENT
Start: 2021-07-22 | End: 2021-07-22 | Stop reason: HOSPADM

## 2021-07-22 RX ADMIN — PROPOFOL 200 MG: 10 INJECTION, EMULSION INTRAVENOUS at 07:44

## 2021-07-22 RX ADMIN — SODIUM CHLORIDE 500 ML: 9 INJECTION, SOLUTION INTRAVENOUS at 07:21

## 2021-07-22 RX ADMIN — LIDOCAINE HYDROCHLORIDE 50 MG: 20 INJECTION, SOLUTION EPIDURAL; INFILTRATION; INTRACAUDAL; PERINEURAL at 07:44

## 2021-07-22 NOTE — ANESTHESIA PREPROCEDURE EVALUATION
Anesthesia Evaluation     Patient summary reviewed   no history of anesthetic complications:  NPO Solid Status: > 8 hours  NPO Liquid Status: > 8 hours           Airway   Mallampati: I  TM distance: >3 FB  Neck ROM: full  No difficulty expected  Dental      Pulmonary - normal exam   (+) sleep apnea,   Cardiovascular - normal exam  Exercise tolerance: excellent (>7 METS)    (+) hypertension well controlled less than 2 medications, valvular problems/murmurs murmur, PVD, hyperlipidemia,  carotid artery disease left carotid      Neuro/Psych  (+) numbness,     (-) CVA  GI/Hepatic/Renal/Endo    (+)  GERD poorly controlled,  thyroid problem hypothyroidism    Musculoskeletal     Abdominal  - normal exam   Substance History - negative use     OB/GYN          Other - negative ROS                       Anesthesia Plan    ASA 2     MAC     intravenous induction     Anesthetic plan, all risks, benefits, and alternatives have been provided, discussed and informed consent has been obtained with: patient.

## 2021-07-22 NOTE — H&P
[facility]  Pre Procedure History & Physical    Chief Complaint:   Polyps    Subjective     HPI:   Polyps    Past Medical History:   Past Medical History:   Diagnosis Date   • Allergic rhinitis    • Carotid artery disease (CMS/HCC)    • COVID-19 vaccine series completed     Moderna   • GERD (gastroesophageal reflux disease)    • Heart murmur    • Hyperlipidemia    • Hypertension    • Hypothyroidism    • IBS (irritable bowel syndrome)    • Nasal polyposis    • Seasonal allergic rhinitis    • Sleep apnea     intermittently uses cpap   • Snoring    • Stroke (cerebrum) (CMS/HCC) 4/29/2020    numbness in right arm, the patinet states his symptom may be due to neck injury, following with neurology    • Valvular disease        Past Surgical History:  Past Surgical History:   Procedure Laterality Date   • CAROTID ENDARTERECTOMY Left 5/6/2020    Procedure: LEFT CAROTID ENDARTERECTOMY WITH BOVINE PATCH ANGIOPLASTY, EEG MONITORING, AND COMPLETION DUPLEX VESSEL ULTRASOUND;  Surgeon: Sb Hunter DO;  Location: [facility] OR 12;  Service: Vascular;  Laterality: Left;   • CATARACT EXTRACTION Bilateral    • CHOLECYSTECTOMY     • CHOLECYSTECTOMY WITH INTRAOPERATIVE CHOLANGIOGRAM N/A 6/18/2020    Procedure: CHOLECYSTECTOMY LAPAROSCOPIC INTRAOPERATIVE CHOLANGIOGRAM;  Surgeon: Maame Del Rio MD;  Location: [facility] OR;  Service: General;  Laterality: N/A;   • COLONOSCOPY  07/21/2016    two polyps ,both removed   • ENDOSCOPY  04/18/2013    normal   • ENDOSCOPY N/A 1/26/2018    Procedure: ESOPHAGOGASTRODUODENOSCOPY WITH ANESTHESIA;  Surgeon: Marc Mart DO;  Location: [facility] ENDOSCOPY;  Service:    • SINUS SURGERY      Dr. Griffith   • TONSILLECTOMY     • ULNAR NERVE REPAIR         Family History:  Family History   Problem Relation Age of Onset   • Hyperlipidemia Mother    • Cancer Father    • Hypertension Father    • Hyperlipidemia Father    • Colon cancer Maternal Grandfather        Social History:    reports that he has never smoked. He has never used smokeless tobacco. He reports current alcohol use of about 7.0 standard drinks of alcohol per week. He reports that he does not use drugs.    Medications:   Prior to Admission medications    Medication Sig Start Date End Date Taking? Authorizing Provider   aspirin 81 MG chewable tablet Chew 81 mg Daily.   Yes Denise Mckeon MD   atorvastatin (LIPITOR) 80 MG tablet Take 1 tablet by mouth Every Night. 4/30/20  Yes Zaheer Lr MD   fluticasone (FLONASE) 50 MCG/ACT nasal spray 2 sprays into the nostril(s) as directed by provider Daily As Needed for Rhinitis or Allergies.   Yes Denise Mckeon MD   levothyroxine (SYNTHROID, LEVOTHROID) 88 MCG tablet Take 88 mcg by mouth Daily.   Yes Denise Mckeon MD   lisinopril-hydrochlorothiazide (PRINZIDE,ZESTORETIC) 10-12.5 MG per tablet Take 1 tablet by mouth Daily. 5/18/20  Yes Denise Mckeon MD   Multiple Vitamins-Minerals (MULTIVITAMIN WITH MINERALS) tablet tablet Take 1 tablet by mouth Daily.   Yes Denise Mckeon MD   omeprazole (priLOSEC) 40 MG capsule TAKE ONE CAPSULE BY MOUTH TWICE DAILY, TAKE 30 MINUTES TO 1 HOURS BEFORE MEALS 7/12/21  Yes Savanna Valerio APRN   zolpidem (AMBIEN) 10 MG tablet Take 10 mg by mouth At Night As Needed. 1/31/21  Yes Denise Mckeon MD   clopidogrel (PLAVIX) 75 MG tablet Take 1 tablet by mouth Daily. 5/8/20   Ana Lerma APRN   desonide (DESOWEN) 0.05 % cream Apply 1 application topically to the appropriate area as directed Daily As Needed for Irritation. 3/22/17   Denise Mckeon MD   FLUoxetine (PROzac) 20 MG capsule TK 1 C PO QD 3/31/17   Denise Mckeon MD   tadalafil (CIALIS) 20 MG tablet Take 20 mg by mouth Daily As Needed for erectile dysfunction.    Denise Mckeon MD   valACYclovir (VALTREX) 1000 MG tablet Take 1,000 mg by mouth 2 (Two) Times a Day As Needed.    Denise Mckeon MD   dicyclomine  "(BENTYL) 20 MG tablet Take 20 mg by mouth Every 6 (Six) Hours.  7/22/21  Provider, MD Denise   sodium-potassium-magnesium sulfates (Suprep Bowel Prep Kit) 17.5-3.13-1.6 GM/177ML solution oral solution Take as directed 3/9/21 7/22/21  Marc Mart, DO       Allergies:  Patient has no known allergies.    ROS:    General: Weight stable  Resp: No SOA  Cardiovascular: No CP    Objective     Blood pressure 151/82, pulse 75, temperature 97.1 °F (36.2 °C), temperature source Temporal, resp. rate 20, height 172.7 cm (68\"), weight 79.8 kg (176 lb), SpO2 98 %.    Physical Exam   Constitutional: Pt is oriented to person, place, and in no distress.   HENT: Mouth/Throat: Oropharynx is clear.   Cardiovascular: Normal rate, regular rhythm.    Pulmonary/Chest: Effort normal. No respiratory distress. No  wheezes.   Abdominal: Soft. Non-distended.  Skin: Skin is warm and dry.   Psychiatric: Mood, memory, affect and judgment appear normal.     Assessment/Plan     Diagnosis:  Polyps    Anticipated Surgical Procedure:  C-scope    The risks, benefits, and alternatives of this procedure have been discussed with the patient or the responsible party- the patient understands and agrees to proceed.        "

## 2021-09-13 ENCOUNTER — TELEPHONE (OUTPATIENT)
Dept: NEUROLOGY | Facility: CLINIC | Age: 76
End: 2021-09-13

## 2021-09-13 NOTE — TELEPHONE ENCOUNTER
Caller: ALBERTO    Relationship: [facility]    Best call back number: [phone]    What was the call regarding: ALBERTO CALLED- ASKED TO SPEAK TO MINOO, SHE SAID SHE WAS JUST TALKING TO HER- NO ONE AT OFFICE PICKED UP WHEN I RANG OVER.    Do you require a callback: YES

## 2021-09-14 ENCOUNTER — OFFICE VISIT (OUTPATIENT)
Dept: NEUROLOGY | Facility: CLINIC | Age: 76
End: 2021-09-14

## 2021-09-14 VITALS
BODY MASS INDEX: 27.74 KG/M2 | WEIGHT: 183 LBS | HEART RATE: 68 BPM | HEIGHT: 68 IN | DIASTOLIC BLOOD PRESSURE: 80 MMHG | SYSTOLIC BLOOD PRESSURE: 124 MMHG | RESPIRATION RATE: 18 BRPM

## 2021-09-14 DIAGNOSIS — M47.812 CERVICAL SPONDYLOSIS: ICD-10-CM

## 2021-09-14 DIAGNOSIS — R20.8 DYSESTHESIA: ICD-10-CM

## 2021-09-14 DIAGNOSIS — G45.9 TIA (TRANSIENT ISCHEMIC ATTACK): Primary | ICD-10-CM

## 2021-09-14 DIAGNOSIS — G62.9 POLYNEUROPATHY: ICD-10-CM

## 2021-09-14 DIAGNOSIS — R09.89 OTHER SPECIFIED SYMPTOMS AND SIGNS INVOLVING THE CIRCULATORY AND RESPIRATORY SYSTEMS: ICD-10-CM

## 2021-09-14 DIAGNOSIS — I65.22 STENOSIS OF LEFT CAROTID ARTERY: ICD-10-CM

## 2021-09-14 DIAGNOSIS — R29.898 OTHER SYMPTOMS AND SIGNS INVOLVING THE MUSCULOSKELETAL SYSTEM: ICD-10-CM

## 2021-09-14 PROCEDURE — 99215 OFFICE O/P EST HI 40 MIN: CPT | Performed by: PSYCHIATRY & NEUROLOGY

## 2021-09-14 RX ORDER — FERROUS SULFATE 325(65) MG
TABLET ORAL
COMMUNITY
Start: 2021-06-24 | End: 2021-11-23

## 2021-09-14 NOTE — PATIENT INSTRUCTIONS
Patient to get to emergency room immediately if stroke symptoms occur.  Patient to add over-the-counter B12 at 500 mcg 1 p.o. daily.  Patient not to have pressure on the left elbow

## 2021-09-14 NOTE — PROGRESS NOTES
Subjective   Justin Szymanski, 1945, is a male who is being seen today for   Chief Complaint   Patient presents with   • Sleeping Problem   • Stroke     TIA       HISTORY OF PRESENT ILLNESS: Extended follow-up for previous history of TIA/CVA continuing on Plavix and aspirin and statin.  Patient does use regular blood work through [facility] with internal medicine and apparently stable blood work except for iron deficiency and he is on iron supplement.  Patient follows with Dr. Hunter regarding his carotid stenosis left greater than right which apparently is stable.  Also mentions is ascending aortic aneurysm.  Patient having new symptoms with dysesthesia in the feet.  His last exam did show some early generalized neuropathy.  His hemoglobin A1c has been slightly elevated and he denies any back pain.  Patient has history of the ulnar nerve transposition with still some weakness in the left hand  but saw physical therapy and was told that he did not need any physical therapy for that.  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  Patient's [place] Sleepiness Scale is 7  CVS: No chest pain or palpitation  GASTROINTESTINAL: No acute GI distress  GENITOURINARY: No acute  distress  GYN: Not applicable   MUSCULOSKELETAL: As above.  Patient denies any back pain  HEENT: No acute vision or hearing change  ENDOCRINE: No acute endocrine symptoms  PSYCHIATRIC: No acute psychiatric symptoms  HEMATOLOGY: No recent CBC for review  SKIN: No acute skin changes  Family history reviewed and otherwise noncontributory to this problem    Social history: Patient denies smoking or drug use.  Patient  does use alcohol.  PHYSICAL EXAMINATION:    GENERAL: Patient has negative Tinel's wrist and elbow.  CRANIUM: Normal cephalic/atraumatic  HEENT:       EYES: EOMs intact without nystagmus and fields full to confrontation.  No acute fundic abnormalities.  Pupils equal round reactive to light.       EARS: Left tympanic membrane obscured by wax but hears tuning fork bilaterally       THROAT: No acute oropharynx abnormalities no swallowing difficulties by history       NECK: No bruits/no lymphadenopathy  CHEST: No acute cardiopulmonary abnormalities by auscultation  ABDOMEN: Nondistended  EXTREMITIES: Ankle-brachial indices 1.2 bilaterally  NEURO: Patient alert and follows commands without difficulty  SPEECH: Normal    CRANIAL NERVES: Motor/sensory about the face normal and symmetric  EXTREMITIES: Patient has some mild ulnar innervated intrinsic hand muscle atrophy in the left upper extremity  MOTOR STRENGTH: Ulnar innervated muscle strength left upper extremity 4-5 over 5 otherwise normal strength in the upper extremities.  Patient has intentional tremor bilaterally.  Patient has normal strength in the lower extremities  STATION AND GAIT: Gait normal/Romberg negative  CEREBELLAR: Finger-nose and heel-to-shin normal  SENSORY: Decreased pin/vibration sensation in the ulnar distribution left upper extremity and decreased pin vibration distal to proximal in the lower extremities to the ankle bilaterally.  REFLEXES: Decreased ankle jerk versus knee jerk and biceps bilaterally without clonus or Babinski    ASSESSMENT AND PLAN: Patient with TIA/CVA stable.  Patient has polyneuropathy and is to get noninvasive arterial vascular studies of the lower extremities and nerve conductions only and flow both lower extremities.  I spent 40 minutes with this patient with counseling and exam and review of records.  Patient is to start B12 supplement over-the-counter with last B12 being low normal range.  Patient to get to emergency room  mainly for TIA/stroke symptoms.      Diagnoses and all orders for this visit:    1. TIA (transient ischemic attack) (Primary)    2. Stenosis of left carotid artery    3. Polyneuropathy    4. Cervical spondylosis    Other orders  -     US Arterial Doppler Lower Extremity Bilateral; Future  -     Nerve Conduction Test; Future        Dictated utilizing Dragon voice recognition software

## 2021-10-04 ENCOUNTER — HOSPITAL ENCOUNTER (OUTPATIENT)
Dept: ULTRASOUND IMAGING | Facility: HOSPITAL | Age: 76
Discharge: HOME | End: 2021-10-04
Admitting: PSYCHIATRY & NEUROLOGY

## 2021-10-04 DIAGNOSIS — R20.8 DYSESTHESIA: ICD-10-CM

## 2021-10-04 DIAGNOSIS — R09.89 OTHER SPECIFIED SYMPTOMS AND SIGNS INVOLVING THE CIRCULATORY AND RESPIRATORY SYSTEMS: ICD-10-CM

## 2021-10-04 PROCEDURE — 93923 UPR/LXTR ART STDY 3+ LVLS: CPT

## 2021-10-04 PROCEDURE — 93923 UPR/LXTR ART STDY 3+ LVLS: CPT | Performed by: SURGERY

## 2021-10-11 RX ORDER — OMEPRAZOLE 40 MG/1
CAPSULE, DELAYED RELEASE ORAL
Qty: 60 CAPSULE | Refills: 3 | Status: SHIPPED | OUTPATIENT
Start: 2021-10-11 | End: 2022-01-10

## 2021-10-21 ENCOUNTER — HOSPITAL ENCOUNTER (OUTPATIENT)
Dept: NEUROLOGY | Facility: HOSPITAL | Age: 76
Discharge: HOME | End: 2021-10-21
Admitting: PSYCHIATRY & NEUROLOGY

## 2021-10-21 DIAGNOSIS — G62.9 POLYNEUROPATHY: ICD-10-CM

## 2021-10-21 DIAGNOSIS — R29.898 OTHER SYMPTOMS AND SIGNS INVOLVING THE MUSCULOSKELETAL SYSTEM: ICD-10-CM

## 2021-10-21 PROCEDURE — 95909 NRV CNDJ TST 5-6 STUDIES: CPT

## 2021-10-21 PROCEDURE — 95909 NRV CNDJ TST 5-6 STUDIES: CPT | Performed by: PSYCHIATRY & NEUROLOGY

## 2021-11-08 ENCOUNTER — TELEPHONE (OUTPATIENT)
Dept: VASCULAR SURGERY | Facility: CLINIC | Age: 76
End: 2021-11-08

## 2021-11-23 ENCOUNTER — OFFICE VISIT (OUTPATIENT)
Dept: GASTROENTEROLOGY | Facility: CLINIC | Age: 76
End: 2021-11-23

## 2021-11-23 VITALS
TEMPERATURE: 97.7 F | BODY MASS INDEX: 27.13 KG/M2 | HEIGHT: 68 IN | SYSTOLIC BLOOD PRESSURE: 110 MMHG | OXYGEN SATURATION: 97 % | WEIGHT: 179 LBS | DIASTOLIC BLOOD PRESSURE: 72 MMHG | HEART RATE: 82 BPM

## 2021-11-23 DIAGNOSIS — R14.2 BELCHING: Primary | ICD-10-CM

## 2021-11-23 DIAGNOSIS — K58.9 IRRITABLE BOWEL SYNDROME, UNSPECIFIED TYPE: ICD-10-CM

## 2021-11-23 DIAGNOSIS — R19.8 ALTERED BOWEL FUNCTION: ICD-10-CM

## 2021-11-23 PROCEDURE — 99213 OFFICE O/P EST LOW 20 MIN: CPT | Performed by: NURSE PRACTITIONER

## 2021-11-23 NOTE — PROGRESS NOTES
"Chief Complaint   Patient presents with   • Follow-up     Pt states he continues to have \"stomach trouble\"-states at times after he eats he will start burping and getting gassy-will also feel very full quickly-has tried OTC Mylanta and gas-x but doesn't get relief; Pt also states he still has issues with his bowels-is still having diarrhea after eating-feels an urge to use the bathroom and will only pass a small amount of stool; Pt states he has used Dicyclomine in the past and it does help but PCP told him to hold it for now        PCP: Kaila Gonzales APRN  REFER: No ref. provider found    Subjective     HPI     Justin Szymanski presents today with complain of continued belching.  Belching is not daily.  No aggravating factors.  pepto bismol has provided some relief.  No abdominal pain.  No weight loss.  No heartburn, no indigestion.  Compliant with daily omeprazole.  He stopped omeprazole without improvement in belching.  He increased omeprazole to bid without improvement in belching.   He has made dietary changes without improvement in belching.  Does not chew gum, no soda.  Reports he does not belch at night.  No weight loss.  He does have days where he feels urge to have BM and only passes small amount of stool.  No bright red blood per rectum, no melena.    He complain of having loud audible stomach noises, this is noted more if his wife has company at the house.    CScope (Dr Mart) 7/2021-tubular adenoma      Endoscopy (Dr Mart) 2018     Negative stool study by pcp    Wt Readings from Last 3 Encounters:   11/23/21 81.2 kg (179 lb)   09/14/21 83 kg (183 lb)   07/22/21 79.8 kg (176 lb)         Past Medical History:   Diagnosis Date   • Allergic rhinitis    • Carotid artery disease (HCC)    • COVID-19 vaccine series completed     Moderna   • GERD (gastroesophageal reflux disease)    • Heart murmur    • Hyperlipidemia    • Hypertension    • Hypothyroidism    • IBS (irritable bowel syndrome)    • Nasal polyposis "    • Seasonal allergic rhinitis    • Sleep apnea     intermittently uses cpap   • Snoring    • Stroke (cerebrum) ([facility]) 4/29/2020    numbness in right arm, the patinet states his symptom may be due to neck injury, following with neurology    • Valvular disease        Past Surgical History:   Procedure Laterality Date   • CAROTID ENDARTERECTOMY Left 5/6/2020    Procedure: LEFT CAROTID ENDARTERECTOMY WITH BOVINE PATCH ANGIOPLASTY, EEG MONITORING, AND COMPLETION DUPLEX VESSEL ULTRASOUND;  Surgeon: Sb Hunter DO;  Location: [facility] HYBRID OR 12;  Service: Vascular;  Laterality: Left;   • CATARACT EXTRACTION Bilateral    • CHOLECYSTECTOMY     • CHOLECYSTECTOMY WITH INTRAOPERATIVE CHOLANGIOGRAM N/A 6/18/2020    Procedure: CHOLECYSTECTOMY LAPAROSCOPIC INTRAOPERATIVE CHOLANGIOGRAM;  Surgeon: Maame Del Rio MD;  Location: [facility] OR;  Service: General;  Laterality: N/A;   • COLONOSCOPY  07/21/2016    two polyps ,both removed   • COLONOSCOPY N/A 7/22/2021    Procedure: COLONOSCOPY WITH ANESTHESIA;  Surgeon: Marc Mart DO;  Location: [facility] ENDOSCOPY;  Service: Gastroenterology;  Laterality: N/A;  pre op: tubular adenoma  post op: diverticulosis, polyps  pcp: Kaila Gonzales APRN   • ENDOSCOPY  04/18/2013    normal   • ENDOSCOPY N/A 1/26/2018    Procedure: ESOPHAGOGASTRODUODENOSCOPY WITH ANESTHESIA;  Surgeon: Marc Mart DO;  Location: [facility] ENDOSCOPY;  Service:    • SINUS SURGERY      Dr. Griffith   • TONSILLECTOMY     • ULNAR NERVE REPAIR         Outpatient Medications Marked as Taking for the 11/23/21 encounter (Office Visit) with Rojas Frazier APRN   Medication Sig Dispense Refill   • aspirin 81 MG chewable tablet Chew 81 mg Daily.     • atorvastatin (LIPITOR) 80 MG tablet Take 1 tablet by mouth Every Night. (Patient taking differently: Take 40 mg by mouth Every Night.) 30 tablet 0   • clopidogrel (PLAVIX) 75 MG tablet Take 1 tablet by mouth Daily. 30 tablet 5   • desonide (DESOWEN) 0.05 %  cream Apply 1 application topically to the appropriate area as directed Daily As Needed for Irritation.  11   • FLUoxetine (PROzac) 20 MG capsule Take 20 mg by mouth Daily.  0   • fluticasone (FLONASE) 50 MCG/ACT nasal spray 2 sprays into the nostril(s) as directed by provider Daily As Needed for Rhinitis or Allergies.     • levothyroxine (SYNTHROID, LEVOTHROID) 88 MCG tablet Take 88 mcg by mouth Daily.     • lisinopril-hydrochlorothiazide (PRINZIDE,ZESTORETIC) 10-12.5 MG per tablet Take 1 tablet by mouth Daily.     • Multiple Vitamins-Minerals (MULTIVITAMIN WITH MINERALS) tablet tablet Take 1 tablet by mouth Daily.     • omeprazole (priLOSEC) 40 MG capsule TAKE 1 CAPSULE BY MOUTH TWICE DAILY; TAKE 30 MINUTES TO 1 HOUR BEFORE MEALS 60 capsule 3   • tadalafil (CIALIS) 20 MG tablet Take 20 mg by mouth Daily As Needed for erectile dysfunction.     • valACYclovir (VALTREX) 1000 MG tablet Take 1,000 mg by mouth 2 (Two) Times a Day As Needed.     • zolpidem (AMBIEN) 10 MG tablet Take 10 mg by mouth At Night As Needed.         No Known Allergies    Social History     Socioeconomic History   • Marital status:    Tobacco Use   • Smoking status: Never Smoker   • Smokeless tobacco: Never Used   Vaping Use   • Vaping Use: Never used   Substance and Sexual Activity   • Alcohol use: Yes     Alcohol/week: 7.0 standard drinks     Types: 7 Glasses of wine per week     Comment: nightly - red wine    • Drug use: No   • Sexual activity: Yes     Partners: Female       Review of Systems   Constitutional: Negative for unexpected weight change.   Respiratory: Negative for shortness of breath.    Cardiovascular: Negative for chest pain.   Gastrointestinal: Positive for diarrhea. Negative for abdominal pain and anal bleeding.       Objective     Vitals:    11/23/21 1357   BP: 110/72   BP Location: Left arm   Patient Position: Sitting   Cuff Size: Adult   Pulse: 82   Temp: 97.7 °F (36.5 °C)   TempSrc: Infrared   SpO2: 97%   Weight:  "81.2 kg (179 lb)   Height: 172.7 cm (68\")     Body mass index is 27.22 kg/m².    Physical Exam  Constitutional:       Appearance: Normal appearance. He is well-developed.   Eyes:      General: No scleral icterus.  Cardiovascular:      Rate and Rhythm: Regular rhythm.      Heart sounds: Normal heart sounds. No murmur heard.      Pulmonary:      Effort: Pulmonary effort is normal. No accessory muscle usage.      Breath sounds: Normal breath sounds.   Abdominal:      General: Bowel sounds are normal. There is no distension.      Palpations: Abdomen is soft. There is no mass.      Tenderness: There is no abdominal tenderness. There is no guarding or rebound.   Skin:     General: Skin is warm and dry.      Coloration: Skin is not jaundiced.   Neurological:      Mental Status: He is alert.   Psychiatric:         Behavior: Behavior is cooperative.         Imaging Results (Most Recent)     None          Body mass index is 27.22 kg/m².    Assessment/Plan     Diagnoses and all orders for this visit:    1. Belching (Primary)    2. Altered bowel function    3. Irritable bowel syndrome, unspecified type        * Surgery not found *    Suspect symptoms related to IBS  Ok to use pepto bismol if that provides relief  offered to schedule EGD, however, do not feel procedure would provide different information as he underwent EGD in 2018 for same symptoms.  Belching not worse now compared to 2018.    Assured him I do not feel he has abnormality present in colon due to recent colonoscopy  Discussed miralax  needed to prevent constipation and to help facilitate BM when he does not feel stool passing easily.    He will call office with questions or concnerns        Rojas Frazier, APRN  11/23/21          There are no Patient Instructions on file for this visit.    "

## 2021-11-24 ENCOUNTER — TELEPHONE (OUTPATIENT)
Dept: VASCULAR SURGERY | Facility: CLINIC | Age: 76
End: 2021-11-24

## 2021-11-24 ENCOUNTER — APPOINTMENT (OUTPATIENT)
Dept: ULTRASOUND IMAGING | Facility: HOSPITAL | Age: 76
End: 2021-11-24

## 2021-11-29 ENCOUNTER — HOSPITAL ENCOUNTER (OUTPATIENT)
Dept: ULTRASOUND IMAGING | Facility: HOSPITAL | Age: 76
Discharge: HOME | End: 2021-11-29
Admitting: NURSE PRACTITIONER

## 2021-11-29 ENCOUNTER — OFFICE VISIT (OUTPATIENT)
Dept: VASCULAR SURGERY | Facility: CLINIC | Age: 76
End: 2021-11-29

## 2021-11-29 VITALS
HEIGHT: 68 IN | WEIGHT: 180 LBS | OXYGEN SATURATION: 97 % | DIASTOLIC BLOOD PRESSURE: 66 MMHG | HEART RATE: 84 BPM | RESPIRATION RATE: 18 BRPM | BODY MASS INDEX: 27.28 KG/M2 | SYSTOLIC BLOOD PRESSURE: 112 MMHG

## 2021-11-29 DIAGNOSIS — I71.21 ASCENDING AORTIC ANEURYSM (HCC): ICD-10-CM

## 2021-11-29 DIAGNOSIS — I65.23 BILATERAL CAROTID ARTERY STENOSIS: Primary | ICD-10-CM

## 2021-11-29 DIAGNOSIS — I10 ESSENTIAL HYPERTENSION: ICD-10-CM

## 2021-11-29 DIAGNOSIS — E78.5 HYPERLIPIDEMIA, UNSPECIFIED HYPERLIPIDEMIA TYPE: ICD-10-CM

## 2021-11-29 DIAGNOSIS — Z13.6 SCREENING FOR AAA (ABDOMINAL AORTIC ANEURYSM): ICD-10-CM

## 2021-11-29 DIAGNOSIS — I65.23 BILATERAL CAROTID ARTERY STENOSIS: ICD-10-CM

## 2021-11-29 PROCEDURE — 93880 EXTRACRANIAL BILAT STUDY: CPT | Performed by: SURGERY

## 2021-11-29 PROCEDURE — 99214 OFFICE O/P EST MOD 30 MIN: CPT | Performed by: NURSE PRACTITIONER

## 2021-11-29 PROCEDURE — 93880 EXTRACRANIAL BILAT STUDY: CPT

## 2021-12-17 ENCOUNTER — OFFICE VISIT (OUTPATIENT)
Dept: OTOLARYNGOLOGY | Facility: CLINIC | Age: 76
End: 2021-12-17

## 2021-12-17 VITALS
HEART RATE: 70 BPM | HEIGHT: 68 IN | WEIGHT: 183.2 LBS | TEMPERATURE: 97.4 F | DIASTOLIC BLOOD PRESSURE: 74 MMHG | BODY MASS INDEX: 27.77 KG/M2 | SYSTOLIC BLOOD PRESSURE: 124 MMHG

## 2021-12-17 DIAGNOSIS — K21.9 LARYNGOPHARYNGEAL REFLUX: Primary | ICD-10-CM

## 2021-12-17 DIAGNOSIS — J33.9 NASAL POLYPOSIS: ICD-10-CM

## 2021-12-17 DIAGNOSIS — H61.22 IMPACTED CERUMEN OF LEFT EAR: ICD-10-CM

## 2021-12-17 DIAGNOSIS — Z99.89 OSA ON CPAP: ICD-10-CM

## 2021-12-17 DIAGNOSIS — Z98.890 S/P FESS (FUNCTIONAL ENDOSCOPIC SINUS SURGERY): ICD-10-CM

## 2021-12-17 DIAGNOSIS — J30.1 NON-SEASONAL ALLERGIC RHINITIS DUE TO POLLEN: ICD-10-CM

## 2021-12-17 DIAGNOSIS — G47.33 OSA ON CPAP: ICD-10-CM

## 2021-12-17 PROCEDURE — 99213 OFFICE O/P EST LOW 20 MIN: CPT | Performed by: NURSE PRACTITIONER

## 2021-12-17 NOTE — PATIENT INSTRUCTIONS
CONTACT INFORMATION:  The main office phone number is [phone]. For emergencies after hours and on weekends, this number will convert over to our answering service and the on call provider will answer. Please try to keep non emergent phone calls/ questions to office hours 9am-5pm Monday through Friday.      ETHERA  As an alternative, you can sign up and use the Epic MyChart system for more direct and quicker access for non emergent questions/ problems.  [employer] allows you to send messages to your doctor, view your test results, renew your prescriptions, schedule appointments, and more. To sign up, go to invi and click on the Sign Up Now link in the New User? box. Enter your ETHERA Activation Code exactly as it appears below along with the last four digits of your Social Security Number and your Date of Birth () to complete the sign-up process. If you do not sign up before the expiration date, you must request a new code.     ETHERA Activation Code: Activation code not generated  Current ETHERA Status: Active     If you have questions, you can email [email or web address] or call [phone] to talk to our ETHERA staff. Remember, ETHERA is NOT to be used for urgent needs. For medical emergencies, dial 911.     IF YOU SMOKE OR USE TOBACCO PLEASE READ THE FOLLOWING:  Why is smoking bad for me?  Smoking increases the risk of heart disease, lung disease, vascular disease, stroke, and cancer. If you smoke, STOP!        IF YOU SMOKE OR USE TOBACCO PLEASE READ THE FOLLOWING:  Why is smoking bad for me?  Smoking increases the risk of heart disease, lung disease, vascular disease, stroke, and cancer. If you smoke, STOP!     For more information:  Quit Now [place]  -QUIT-NOW  [email or web address]    Gastroesophageal Reflux Disease (Laryngopharyngeal Reflux), Adult  Gastroesophageal reflux disease (GERD) and/or Laryngopharyngeal Reflux, (LPR) happens  when acid from your stomach flows up into the esophagus and/or throat and voicebox or larynx. When acid comes in contact with the these organs, the acid can cause soreness (inflammation). Over time, GERD may create small holes (ulcers) in the lining of the esophagus and may lead to the development of hoarseness, difficulty swallowing,   feeling of something stuck in the throat, increased mucous or drainage and even predispose to the development of malignancies, (cancer).    CAUSES   · Increased body weight. This puts pressure on the stomach, making acid rise from the stomach into the esophagus.  · Smoking. This increases acid production in the stomach.  · Drinking alcohol. This causes decreased pressure in the lower esophageal sphincter (valve or ring of muscle between the esophagus and stomach), allowing acid from the stomach into the esophagus.  · Late evening meals and a full stomach. This increases pressure and acid production in the stomach.  · A malformed lower esophageal sphincter  · Diet which can include avoidance of gluten and dairy products  · Age  SYMPTOMS   · Burning pain in the lower part of the mid-chest behind the breastbone and in the mid-stomach area. This may occur twice a week or more often.  · Trouble swallowing.  · Sore throat.  · Dry cough.  · Asthma-like symptoms including chest tightness, shortness of breath, or wheezing.  · Globus sensation-something stuck in the throat/fullness  · Hoarseness  DIAGNOSIS   Your caregiver may be able to diagnose GERD based on your symptoms. In some cases, X-rays and other tests may be done to check for complications or to check the condition of your stomach and esophagus.  You may need to see another doctor.  TREATMENT   Over-the-counter or prescription medicines to help decrease acid production.   Dietary and behavioral modifications or changes may be also recommended.  HOME CARE INSTRUCTIONS   · Change the factors that you can control. Ask your caregiver  for guidance concerning weight loss, quitting smoking, and alcohol consumption.  · Avoid foods and drinks that make your symptoms worse, and MAY include such as:  ¨ Caffeine or alcoholic drinks.  ¨ Chocolate.  ¨ Gluten containing foods  ¨ Dairy  ¨ Peppermint or mint flavorings.  ¨ Garlic and onions.  ¨ Spicy foods.  ¨ Citrus fruits, such as oranges, kennedy, or limes.  ¨ Tomato-based foods such as sauce, chili, salsa, and pizza.  ¨ Fried and fatty foods.  · Avoid lying down for the 3 hours prior to your bedtime or prior to taking a nap.  · Eat small, frequent meals instead of large meals.  · Wear loose-fitting clothing. Do not wear anything tight around your waist that causes pressure on your stomach.  · Raise the head of your bed 6 to 8 inches with wood blocks to help you sleep. Extra pillows will not help.  · Only take over-the-counter or prescription medicines for pain, discomfort, or fever as directed by your caregiver.  · Do not take aspirin, ibuprofen, or other nonsteroidal anti-inflammatory drugs if possible (NSAIDs).  SEEK IMMEDIATE MEDICAL CARE IF:   · You have pain in your arms, neck, jaw, teeth, or back.  · Your pain increases or changes in intensity or duration.  · You develop nausea, vomiting, or sweating (diaphoresis).  · You develop shortness of breath, or you faint.  · Your vomit is green, yellow, black, or looks like coffee grounds or blood.  · Your stool is red, bloody, or black.  These symptoms could be signs of other problems, such as heart disease, gastric bleeding, or esophageal bleeding.  MAKE SURE YOU:   · Understand these instructions.  · Will watch your condition.  · Will get help right away if you are not doing well or get worse.     This information is not intended to replace advice given to you by your physician. Make sure you discuss any questions you have with your health care provider.     Modified by Cecil Gonzales MD, FACS 9/8/2016.  Document Released: 09/27/2006 Document Revised:  01/08/2016 Document Reviewed: 04/13/2016  [employer] Interactive Patient Education ©2016 [employer]    For the best response, use your nasal sprays every day without skipping doses. It may take several weeks before the full effect is acheived.

## 2021-12-17 NOTE — PROGRESS NOTES
YOB: 1945  Location: [facility]  Location Address: [address] [place] [address], [facility], [address] [place] [address]  Location Phone: [phone]    Chief Complaint   Patient presents with   • Follow-up     6 month reflux, ADITHYA snoring   • Sinus Problem     pressure    • Ear Problem     left ear pain, decreased hearing       History of Present Illness  Justin Szymanski is a 76 y.o. male.  Justin Szymanski is here for follow up of ENT complaints.  The patient has had problems with cerumen accumulation and ear itching, nasal drainage, nasal congestion, repeated sinusitis, sinus pressure, postnasal drip, previous sinus surgery, allergy, facial pain, facial pressure and headaches, acid reflux, cough, excessive mucous and bad breath. Patient denies globus sensation, hoarseness, dysphagia, and dental pain. The symptoms are localized to the bilateral ears, frontal sinus, maxillary sinus, throat and larynx. he states symptoms have been improved since using the prilosec twice per day. The patient has had improvement of his reflux. He states he is using his nasal sprays and finds that they have improved his sinus condition. He has only a mild frontal sinus headache and maxillary sinus tenderness.  He has had multiple sinus surgeries with the last sinus surgery approximately 10 years ago by Dr. Guzman      Past Medical History:   Diagnosis Date   • Allergic rhinitis    • Carotid artery disease (Formerly [facility])    • COVID-19 vaccine series completed     Moderna   • GERD (gastroesophageal reflux disease)    • Heart murmur    • Hyperlipidemia    • Hypertension    • Hypothyroidism    • IBS (irritable bowel syndrome)    • Nasal polyposis    • Seasonal allergic rhinitis    • Sleep apnea     intermittently uses cpap   • Snoring    • Stroke (cerebrum) (Formerly [facility]) 2020    numbness in right arm, the patinet states his symptom may be due to neck injury, following with neurology    • Valvular disease        Past Surgical History:   Procedure Laterality  Date   • CAROTID ENDARTERECTOMY Left 5/6/2020    Procedure: LEFT CAROTID ENDARTERECTOMY WITH BOVINE PATCH ANGIOPLASTY, EEG MONITORING, AND COMPLETION DUPLEX VESSEL ULTRASOUND;  Surgeon: Sb Hunter DO;  Location: [facility] HYBRID OR 12;  Service: Vascular;  Laterality: Left;   • CATARACT EXTRACTION Bilateral    • CHOLECYSTECTOMY     • CHOLECYSTECTOMY WITH INTRAOPERATIVE CHOLANGIOGRAM N/A 6/18/2020    Procedure: CHOLECYSTECTOMY LAPAROSCOPIC INTRAOPERATIVE CHOLANGIOGRAM;  Surgeon: Maame Del Rio MD;  Location: [facility] OR;  Service: General;  Laterality: N/A;   • COLONOSCOPY  07/21/2016    two polyps ,both removed   • COLONOSCOPY N/A 7/22/2021    Procedure: COLONOSCOPY WITH ANESTHESIA;  Surgeon: Marc Mart DO;  Location: [facility] ENDOSCOPY;  Service: Gastroenterology;  Laterality: N/A;  pre op: tubular adenoma  post op: diverticulosis, polyps  pcp: Kaila Gonzales APRN   • ENDOSCOPY  04/18/2013    normal   • ENDOSCOPY N/A 1/26/2018    Procedure: ESOPHAGOGASTRODUODENOSCOPY WITH ANESTHESIA;  Surgeon: Marc Mart DO;  Location: [facility] ENDOSCOPY;  Service:    • SINUS SURGERY      Dr. Griffith   • TONSILLECTOMY     • ULNAR NERVE REPAIR         Outpatient Medications Marked as Taking for the 12/17/21 encounter (Office Visit) with Letha Wilder APRN   Medication Sig Dispense Refill   • aspirin 81 MG chewable tablet Chew 81 mg Daily.     • atorvastatin (LIPITOR) 80 MG tablet Take 1 tablet by mouth Every Night. (Patient taking differently: Take 80 mg by mouth Every Night. Takes 1/2 tablet nightly.) 30 tablet 0   • clopidogrel (PLAVIX) 75 MG tablet Take 1 tablet by mouth Daily. 30 tablet 5   • desonide (DESOWEN) 0.05 % cream Apply 1 application topically to the appropriate area as directed Daily As Needed for Irritation.  11   • FLUoxetine (PROzac) 20 MG capsule Take 20 mg by mouth Daily.  0   • fluticasone (FLONASE) 50 MCG/ACT nasal spray 2 sprays into the nostril(s) as directed by provider Daily As Needed  for Rhinitis or Allergies.     • levothyroxine (SYNTHROID, LEVOTHROID) 88 MCG tablet Take 88 mcg by mouth Daily.     • lisinopril-hydrochlorothiazide (PRINZIDE,ZESTORETIC) 10-12.5 MG per tablet Take 1 tablet by mouth Daily.     • Multiple Vitamins-Minerals (MULTIVITAMIN WITH MINERALS) tablet tablet Take 1 tablet by mouth Daily.     • omeprazole (priLOSEC) 40 MG capsule TAKE 1 CAPSULE BY MOUTH TWICE DAILY; TAKE 30 MINUTES TO 1 HOUR BEFORE MEALS 60 capsule 3   • tadalafil (CIALIS) 20 MG tablet Take 20 mg by mouth Daily As Needed for erectile dysfunction.     • valACYclovir (VALTREX) 1000 MG tablet Take 1,000 mg by mouth 2 (Two) Times a Day As Needed.     • zolpidem (AMBIEN) 10 MG tablet Take 10 mg by mouth At Night As Needed.         Patient has no known allergies.    Family History   Problem Relation Age of Onset   • Hyperlipidemia Mother    • Cancer Father    • Hypertension Father    • Hyperlipidemia Father    • Colon cancer Maternal Grandfather    • Colon polyps Neg Hx    • Esophageal cancer Neg Hx    • Liver cancer Neg Hx    • Liver disease Neg Hx    • Rectal cancer Neg Hx    • Stomach cancer Neg Hx        Social History     Socioeconomic History   • Marital status:    Tobacco Use   • Smoking status: Never Smoker   • Smokeless tobacco: Never Used   Vaping Use   • Vaping Use: Never used   Substance and Sexual Activity   • Alcohol use: Yes     Alcohol/week: 7.0 standard drinks     Types: 7 Glasses of wine per week     Comment: nightly - red wine    • Drug use: No   • Sexual activity: Yes     Partners: Female       Review of Systems   Constitutional: Negative.    HENT: Positive for postnasal drip.         Admits cerumen accumulation      Eyes: Negative.    Respiratory: Negative.    Cardiovascular: Negative.    Gastrointestinal: Negative.    Genitourinary: Negative.    Musculoskeletal: Negative.    Allergic/Immunologic: Positive for environmental allergies.   Neurological: Negative.        Vitals:    12/17/21  1043   BP: 124/74   Pulse: 70   Temp: 97.4 °F (36.3 °C)       Body mass index is 27.86 kg/m².    Objective     Physical Exam  Vitals reviewed.   Constitutional:       Appearance: He is normal weight.   HENT:      Head: Normocephalic.      Right Ear: Hearing, tympanic membrane, ear canal and external ear normal.      Left Ear: Tympanic membrane and external ear normal. There is impacted cerumen (cleared with curette ).      Nose: Rhinorrhea present. Rhinorrhea is clear.      Mouth/Throat:      Lips: Pink.      Mouth: Mucous membranes are moist.      Pharynx: Uvula midline.   Musculoskeletal:      Cervical back: Full passive range of motion without pain and normal range of motion.   Lymphadenopathy:      Cervical: No cervical adenopathy.   Neurological:      Mental Status: He is alert.         Assessment/Plan   Diagnoses and all orders for this visit:    1. Laryngopharyngeal reflux (Primary)    2. S/P FESS (functional endoscopic sinus surgery)    3. Non-seasonal allergic rhinitis due to pollen    4. ADITHYA on CPAP    5. Nasal polyposis - hx of    6. Impacted cerumen of left ear    continue medications as ordered   Will follow up and recheck ears for possible cleanout   Discussed dietary changes for reflux     * Surgery not found *  No orders of the defined types were placed in this encounter.    Return in about 3 months (around 3/17/2022) for Recheck ears.       Patient Instructions   CONTACT INFORMATION:  The main office phone number is [phone]. For emergencies after hours and on weekends, this number will convert over to our answering service and the on call provider will answer. Please try to keep non emergent phone calls/ questions to office hours 9am-5pm Monday through Friday.      [employer]  As an alternative, you can sign up and use the Epic MyChart system for more direct and quicker access for non emergent questions/ problems.  [facility] [employer] allows you to send messages to your doctor, view your test  results, renew your prescriptions, schedule appointments, and more. To sign up, go to [email or web address] and click on the Sign Up Now link in the New User? box. Enter your [email or web address] Activation Code exactly as it appears below along with the last four digits of your Social Security Number and your Date of Birth () to complete the sign-up process. If you do not sign up before the expiration date, you must request a new code.     [email or web address] Activation Code: Activation code not generated  Current [email or web address] Status: Active     If you have questions, you can email [email or web address] or call [phone] to talk to our [email or web address] staff. Remember, [email or web address] is NOT to be used for urgent needs. For medical emergencies, dial 911.     IF YOU SMOKE OR USE TOBACCO PLEASE READ THE FOLLOWING:  Why is smoking bad for me?  Smoking increases the risk of heart disease, lung disease, vascular disease, stroke, and cancer. If you smoke, STOP!        IF YOU SMOKE OR USE TOBACCO PLEASE READ THE FOLLOWING:  Why is smoking bad for me?  Smoking increases the risk of heart disease, lung disease, vascular disease, stroke, and cancer. If you smoke, STOP!     For more information:  Quit Now [place]  -QUIT-NOW  [email or web address]    Gastroesophageal Reflux Disease (Laryngopharyngeal Reflux), Adult  Gastroesophageal reflux disease (GERD) and/or Laryngopharyngeal Reflux, (LPR) happens when acid from your stomach flows up into the esophagus and/or throat and voicebox or larynx. When acid comes in contact with the these organs, the acid can cause soreness (inflammation). Over time, GERD may create small holes (ulcers) in the lining of the esophagus and may lead to the development of hoarseness, difficulty swallowing,   feeling of something stuck in the throat, increased mucous or drainage and even predispose to the development of malignancies, (cancer).    CAUSES   · Increased body weight. This puts pressure on the  stomach, making acid rise from the stomach into the esophagus.  · Smoking. This increases acid production in the stomach.  · Drinking alcohol. This causes decreased pressure in the lower esophageal sphincter (valve or ring of muscle between the esophagus and stomach), allowing acid from the stomach into the esophagus.  · Late evening meals and a full stomach. This increases pressure and acid production in the stomach.  · A malformed lower esophageal sphincter  · Diet which can include avoidance of gluten and dairy products  · Age  SYMPTOMS   · Burning pain in the lower part of the mid-chest behind the breastbone and in the mid-stomach area. This may occur twice a week or more often.  · Trouble swallowing.  · Sore throat.  · Dry cough.  · Asthma-like symptoms including chest tightness, shortness of breath, or wheezing.  · Globus sensation-something stuck in the throat/fullness  · Hoarseness  DIAGNOSIS   Your caregiver may be able to diagnose GERD based on your symptoms. In some cases, X-rays and other tests may be done to check for complications or to check the condition of your stomach and esophagus.  You may need to see another doctor.  TREATMENT   Over-the-counter or prescription medicines to help decrease acid production.   Dietary and behavioral modifications or changes may be also recommended.  HOME CARE INSTRUCTIONS   · Change the factors that you can control. Ask your caregiver for guidance concerning weight loss, quitting smoking, and alcohol consumption.  · Avoid foods and drinks that make your symptoms worse, and MAY include such as:  ¨ Caffeine or alcoholic drinks.  ¨ Chocolate.  ¨ Gluten containing foods  ¨ Dairy  ¨ Peppermint or mint flavorings.  ¨ Garlic and onions.  ¨ Spicy foods.  ¨ Citrus fruits, such as oranges, kennedy, or limes.  ¨ Tomato-based foods such as sauce, chili, salsa, and pizza.  ¨ Fried and fatty foods.  · Avoid lying down for the 3 hours prior to your bedtime or prior to taking a  nap.  · Eat small, frequent meals instead of large meals.  · Wear loose-fitting clothing. Do not wear anything tight around your waist that causes pressure on your stomach.  · Raise the head of your bed 6 to 8 inches with wood blocks to help you sleep. Extra pillows will not help.  · Only take over-the-counter or prescription medicines for pain, discomfort, or fever as directed by your caregiver.  · Do not take aspirin, ibuprofen, or other nonsteroidal anti-inflammatory drugs if possible (NSAIDs).  SEEK IMMEDIATE MEDICAL CARE IF:   · You have pain in your arms, neck, jaw, teeth, or back.  · Your pain increases or changes in intensity or duration.  · You develop nausea, vomiting, or sweating (diaphoresis).  · You develop shortness of breath, or you faint.  · Your vomit is green, yellow, black, or looks like coffee grounds or blood.  · Your stool is red, bloody, or black.  These symptoms could be signs of other problems, such as heart disease, gastric bleeding, or esophageal bleeding.  MAKE SURE YOU:   · Understand these instructions.  · Will watch your condition.  · Will get help right away if you are not doing well or get worse.     This information is not intended to replace advice given to you by your physician. Make sure you discuss any questions you have with your health care provider.     Modified by Cecil Gonzales MD, FACS 9/8/2016.  Document Released: 09/27/2006 Document Revised: 01/08/2016 Document Reviewed: 04/13/2016  [employer] Patient Education ©2016 [employer]    For the best response, use your nasal sprays every day without skipping doses. It may take several weeks before the full effect is acheived.

## 2021-12-18 NOTE — PROGRESS NOTES
"11/29/2021       Kaila Gonzales, APRN  [address] [place] [address]    Justin Szymanski  1945    Chief Complaint   Patient presents with   • Follow-up     6 Month Follow Up For Bilateral Carotid Artery Stenosis. Test [id number] US pad carotid bilateral. Patient denies any stroke like symptoms.    • Non Smoker     Patient is a Non Smoker        Dear Kaila Gonzales, APRN       HPI  I had the pleasure of seeing your patient Justin Szymanski in the office today.    As you recall, Justin Szymanski is a 76 y.o.  male who we are following for asymptomatic carotid occlusive disease and an ascending aortic aneurysm measuring 4.3 cm.  He did undergo a left carotid endarterectomy on 5/6/2020.  Currently he is doing well denies any strokelike symptoms.  He is maintained on aspirin, Plavix, and Lipitor.  He did have noninvasive testing performed today, which I did review in office.  We are following for asymptomatic carotid disease.  Previous testing does show he has aneurysm of the ascending aorta 4.3 cm.  He did undergo a left carotid endarterectomy on 5/6/2020.  Currently he is doing well and denies any strokelike symptoms.  He is maintained on aspirin, Plavix, and Lipitor.  He did have noninvasive testing performed today, which I did review in office.        Review of Systems   Constitutional: Negative.    HENT: Negative.         Allergies   Eyes: Negative.    Respiratory: Negative.    Cardiovascular: Negative.    Gastrointestinal: Negative.         Belching, which is improving   Endocrine: Negative.    Genitourinary: Negative.    Musculoskeletal: Negative.    Skin: Negative.    Allergic/Immunologic: Negative.    Neurological: Negative.    Hematological: Negative.    Psychiatric/Behavioral: Negative.    All other systems reviewed and are negative.      /66 (BP Location: Left arm, Patient Position: Sitting, Cuff Size: Adult)   Pulse 84   Resp 18   Ht 172.7 cm (68\")   Wt 81.6 kg (180 lb)   SpO2 97%   BMI 27.37 kg/m² "   Physical Exam  Vitals and nursing note reviewed.   Constitutional:       Appearance: Normal appearance. He is well-developed.   HENT:      Head: Normocephalic and atraumatic.   Eyes:      General: No scleral icterus.     Pupils: Pupils are equal, round, and reactive to light.   Neck:      Thyroid: No thyromegaly.   Cardiovascular:      Rate and Rhythm: Normal rate and regular rhythm.      Heart sounds: Murmur heard.       Pulmonary:      Effort: Pulmonary effort is normal.      Breath sounds: Normal breath sounds.   Abdominal:      General: Bowel sounds are normal.      Palpations: Abdomen is soft.   Musculoskeletal:         General: Normal range of motion.      Cervical back: Normal range of motion and neck supple.   Skin:     General: Skin is warm and dry.   Neurological:      General: No focal deficit present.      Mental Status: He is alert and oriented to person, place, and time.   Psychiatric:         Mood and Affect: Mood normal.         Behavior: Behavior normal.         Thought Content: Thought content normal.         Judgment: Judgment normal.            Diagnostic Data:  US Carotid Bilateral    Result Date: 11/29/2021  Narrative: History: Carotid occlusive disease      Impression: Impression: 1. There is less than 50% stenosis of the right internal carotid artery. 2. There is 50-69% stenosis of the left internal carotid artery. 3. Antegrade flow is demonstrated in bilateral vertebral arteries.  Comments: Bilateral carotid vertebral arterial duplex scan was performed.  Grayscale imaging shows intimal thickening and calcified elements at the carotid bifurcation. The right internal carotid artery peak systolic velocity is 93.9 cm/sec. The end-diastolic velocity is 31.8 cm/sec. The right ICA/CCA ratio is approximately 0.9 . These findings correlate with less than 50% stenosis of the right internal carotid artery.  Grayscale imaging shows intimal thickening and calcified elements at the carotid bifurcation.  The left internal carotid artery peak systolic velocity is 195.4 cm/sec. The end-diastolic velocity is 56.9 cm/sec. The left ICA/CCA ratio is approximately 1.6 . These findings correlate with 50-69% stenosis of the left internal carotid artery.  Antegrade flow is demonstrated in bilateral vertebral arteries. There is greater than 50% stenosis in bilateral external carotid arteries. This report was finalized on 11/29/2021 14:54 by Dr. Sb Hunter MD.       Patient Active Problem List   Diagnosis   • Allergic rhinitis due to pollen   • Snoring   • Nasal polyposis - hx of   • ADITHYA (obstructive sleep apnea)   • ADITHYA on CPAP   • Indigestion   • Heart murmur   • Hypertension   • Hyperlipidemia   • Ascending aortic aneurysm (HCC)   • PAD (peripheral artery disease) (HCC)   • Upper extremity weakness   • Bilateral carotid artery stenosis   • Numbness and tingling of right upper extremity   • Muscle fasciculation   • Preop testing   • Carotid stenosis   • Tubular adenoma   • S/P FESS (functional endoscopic sinus surgery)   • Laryngopharyngeal reflux   • History of nasal polyposis         ICD-10-CM ICD-9-CM   1. Bilateral carotid artery stenosis  I65.23 433.10     433.30   2. Ascending aortic aneurysm (HCC)  I71.2 441.2   3. Essential hypertension  I10 401.9   4. Hyperlipidemia, unspecified hyperlipidemia type  E78.5 272.4   5. Screening for AAA (abdominal aortic aneurysm)  Z13.6 V81.2       Plan: After thoroughly evaluating Justin Szymanski, I believe the best course of action is to remain conservative from vascular surgery standpoint.  Currently he is doing well denies any strokelike symptoms.  I did review his testing which shows less than 50% right carotid stenosis and 50 to 69% left carotid stenosis.  We will see him back in 6 months with repeat noninvasive testing for continued surveillance, including a carotid duplex, CTA of the chest to follow his ascending aortic aneurysm, and screening ultrasound of his aorta.  I did  discuss vascular risk factors as it pertain to the progression of vascular disease including controlling his hypertension and hyperlipidemia.  His blood pressure stable on his current medication regimen.  He is maintained on Lipitor for his hyperlipidemia. The patient can continue taking her current medication regimen as previously planned.  This was all discussed in full with complete understanding.    Thank you for allowing me to participate in the care of your patient.  Please do not hesitate with any questions or concerns.  I will keep you aware of any further encounters with Justin Szymanski.        Sincerely yours,         EFRA Ellington Cainan G, APRN

## 2022-01-03 ENCOUNTER — HOSPITAL ENCOUNTER (OUTPATIENT)
Dept: GENERAL RADIOLOGY | Facility: HOSPITAL | Age: 77
Discharge: HOME | End: 2022-01-03
Admitting: NURSE PRACTITIONER

## 2022-01-03 ENCOUNTER — TRANSCRIBE ORDERS (OUTPATIENT)
Dept: GENERAL RADIOLOGY | Facility: HOSPITAL | Age: 77
End: 2022-01-03

## 2022-01-03 DIAGNOSIS — R10.84 GENERALIZED ABDOMINAL PAIN: Primary | ICD-10-CM

## 2022-01-03 DIAGNOSIS — R10.84 GENERALIZED ABDOMINAL PAIN: ICD-10-CM

## 2022-01-03 PROCEDURE — 74018 RADEX ABDOMEN 1 VIEW: CPT

## 2022-01-10 ENCOUNTER — HOSPITAL ENCOUNTER (OUTPATIENT)
Dept: CT IMAGING | Facility: HOSPITAL | Age: 77
Discharge: HOME | End: 2022-01-10
Admitting: NURSE PRACTITIONER

## 2022-01-10 ENCOUNTER — TRANSCRIBE ORDERS (OUTPATIENT)
Dept: ADMINISTRATIVE | Facility: HOSPITAL | Age: 77
End: 2022-01-10

## 2022-01-10 DIAGNOSIS — R10.84 GENERALIZED ABDOMINAL PAIN: ICD-10-CM

## 2022-01-10 DIAGNOSIS — R10.84 GENERALIZED ABDOMINAL PAIN: Primary | ICD-10-CM

## 2022-01-10 PROCEDURE — 74176 CT ABD & PELVIS W/O CONTRAST: CPT

## 2022-01-10 RX ORDER — OMEPRAZOLE 40 MG/1
CAPSULE, DELAYED RELEASE ORAL
Qty: 60 CAPSULE | Refills: 3 | Status: SHIPPED | OUTPATIENT
Start: 2022-01-10

## 2022-04-04 DIAGNOSIS — Z98.890 S/P FESS (FUNCTIONAL ENDOSCOPIC SINUS SURGERY): ICD-10-CM

## 2022-04-04 DIAGNOSIS — K21.9 LARYNGOPHARYNGEAL REFLUX: Primary | ICD-10-CM

## 2022-04-08 ENCOUNTER — APPOINTMENT (OUTPATIENT)
Dept: LAB | Facility: HOSPITAL | Age: 77
End: 2022-04-08

## 2022-05-06 DIAGNOSIS — K21.9 LARYNGOPHARYNGEAL REFLUX: Primary | ICD-10-CM

## 2022-05-17 ENCOUNTER — LAB (OUTPATIENT)
Dept: LAB | Facility: HOSPITAL | Age: 77
End: 2022-05-17

## 2022-05-17 DIAGNOSIS — K21.9 LARYNGOPHARYNGEAL REFLUX: ICD-10-CM

## 2022-05-17 LAB — SARS-COV-2 ORF1AB RESP QL NAA+PROBE: NOT DETECTED

## 2022-05-17 PROCEDURE — C9803 HOPD COVID-19 SPEC COLLECT: HCPCS

## 2022-05-17 PROCEDURE — U0004 COV-19 TEST NON-CDC HGH THRU: HCPCS

## 2022-05-19 ENCOUNTER — OFFICE VISIT (OUTPATIENT)
Dept: OTOLARYNGOLOGY | Facility: CLINIC | Age: 77
End: 2022-05-19

## 2022-05-19 VITALS
SYSTOLIC BLOOD PRESSURE: 130 MMHG | DIASTOLIC BLOOD PRESSURE: 70 MMHG | HEART RATE: 75 BPM | RESPIRATION RATE: 16 BRPM | TEMPERATURE: 98 F | HEIGHT: 68 IN | BODY MASS INDEX: 27.28 KG/M2 | WEIGHT: 180 LBS

## 2022-05-19 DIAGNOSIS — Z98.890 S/P FESS (FUNCTIONAL ENDOSCOPIC SINUS SURGERY): ICD-10-CM

## 2022-05-19 DIAGNOSIS — G47.33 OSA ON CPAP: ICD-10-CM

## 2022-05-19 DIAGNOSIS — K21.9 LARYNGOPHARYNGEAL REFLUX: Primary | ICD-10-CM

## 2022-05-19 DIAGNOSIS — J34.2 NASAL SEPTAL DEVIATION: ICD-10-CM

## 2022-05-19 DIAGNOSIS — Z99.89 OSA ON CPAP: ICD-10-CM

## 2022-05-19 DIAGNOSIS — J30.1 NON-SEASONAL ALLERGIC RHINITIS DUE TO POLLEN: ICD-10-CM

## 2022-05-19 DIAGNOSIS — J33.9 NASAL POLYPOSIS: ICD-10-CM

## 2022-05-19 PROCEDURE — 99213 OFFICE O/P EST LOW 20 MIN: CPT | Performed by: NURSE PRACTITIONER

## 2022-05-19 PROCEDURE — 31231 NASAL ENDOSCOPY DX: CPT | Performed by: NURSE PRACTITIONER

## 2022-05-19 RX ORDER — AZELASTINE 1 MG/ML
2 SPRAY, METERED NASAL 2 TIMES DAILY
Qty: 30 ML | Refills: 11 | Status: SHIPPED | OUTPATIENT
Start: 2022-05-19 | End: 2023-04-05 | Stop reason: SDUPTHER

## 2022-05-19 NOTE — PROGRESS NOTES
YOB: 1945  Location: [facility]  Location Address: [address] [place] [address], [facility], [address] [place] [address]  Location Phone: [phone]    Chief Complaint   Patient presents with   • Follow-up     Ears and nasal congestion       History of Present Illness  Justin Szymanski is a 76 y.o. male.  Justin Szymanski is here for follow up of ENT complaints. The patient has had problems with ear and sinus problems  He reports left ear pain as well as bilateral maxillary sinus pain and pressure.The symptoms have been present for the last several years   Patient states the symptoms seem to come and go, but he never feels as if he can breathe normally through his nose. He is using flonase daily as well as mucinex as needed. Patient reports that he occasionally uses Afrin.  He is still taking reflux medication daily.    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    numbness in right arm, the patinet states his symptom may be due to neck injury, following with neurology    • Valvular disease        Past Surgical History:   Procedure Laterality Date   • CAROTID ENDARTERECTOMY Left 2020    Procedure: LEFT CAROTID ENDARTERECTOMY WITH BOVINE PATCH ANGIOPLASTY, EEG MONITORING, AND COMPLETION DUPLEX VESSEL ULTRASOUND;  Surgeon: Sb Hunter DO;  Location: [place] [address];  Service: Vascular;  Laterality: Left;   • CATARACT EXTRACTION Bilateral    • CHOLECYSTECTOMY     • CHOLECYSTECTOMY WITH INTRAOPERATIVE CHOLANGIOGRAM N/A 2020    Procedure: CHOLECYSTECTOMY LAPAROSCOPIC INTRAOPERATIVE CHOLANGIOGRAM;  Surgeon:  Maame Del Rio MD;  Location: [facility] OR;  Service: General;  Laterality: N/A;   • COLONOSCOPY  07/21/2016    two polyps ,both removed   • COLONOSCOPY N/A 7/22/2021    Procedure: COLONOSCOPY WITH ANESTHESIA;  Surgeon: Marc Mart DO;  Location: [facility] ENDOSCOPY;  Service: Gastroenterology;  Laterality: N/A;  pre op: tubular adenoma  post op: diverticulosis, polyps  pcp: Kaila Gonzales APRN   • ENDOSCOPY  04/18/2013    normal   • ENDOSCOPY N/A 1/26/2018    Procedure: ESOPHAGOGASTRODUODENOSCOPY WITH ANESTHESIA;  Surgeon: Marc Mart DO;  Location: [facility] ENDOSCOPY;  Service:    • SINUS SURGERY      Dr. Griffith   • TONSILLECTOMY     • ULNAR NERVE REPAIR         Outpatient Medications Marked as Taking for the 5/19/22 encounter (Office Visit) with Letha Wilder APRN   Medication Sig Dispense Refill   • atorvastatin (LIPITOR) 80 MG tablet Take 1 tablet by mouth Every Night. (Patient taking differently: Take 80 mg by mouth Every Night. Takes 1/2 tablet nightly.) 30 tablet 0   • clopidogrel (PLAVIX) 75 MG tablet Take 1 tablet by mouth Daily. 30 tablet 5   • FLUoxetine (PROzac) 20 MG capsule Take 20 mg by mouth Daily.  0   • fluticasone (FLONASE) 50 MCG/ACT nasal spray 2 sprays into the nostril(s) as directed by provider Daily As Needed for Rhinitis or Allergies.     • levothyroxine (SYNTHROID, LEVOTHROID) 88 MCG tablet Take 88 mcg by mouth Daily.     • lisinopril-hydrochlorothiazide (PRINZIDE,ZESTORETIC) 10-12.5 MG per tablet Take 1 tablet by mouth Daily.     • Multiple Vitamins-Minerals (MULTIVITAMIN WITH MINERALS) tablet tablet Take 1 tablet by mouth Daily.     • omeprazole (priLOSEC) 40 MG capsule TAKE ONE CAPSULE BY MOUTH TWICE DAILY, TAKE 30 MINUTES TO 1 HOURS BEFORE A MEAL 60 capsule 3       Patient has no known allergies.    Family History   Problem Relation Age of Onset   • Hyperlipidemia Mother    • Cancer Father    • Hypertension Father    • Hyperlipidemia Father    • Colon cancer Maternal  Grandfather    • Colon polyps Neg Hx    • Esophageal cancer Neg Hx    • Liver cancer Neg Hx    • Liver disease Neg Hx    • Rectal cancer Neg Hx    • Stomach cancer Neg Hx        Social History     Socioeconomic History   • Marital status:    Tobacco Use   • Smoking status: Never Smoker   • Smokeless tobacco: Never Used   Vaping Use   • Vaping Use: Never used   Substance and Sexual Activity   • Alcohol use: Yes     Alcohol/week: 7.0 standard drinks     Types: 7 Glasses of wine per week     Comment: nightly - red wine    • Drug use: No   • Sexual activity: Yes     Partners: Female       Review of Systems   Constitutional: Negative.    HENT: Positive for congestion and sinus pressure.    Eyes: Negative.    Respiratory: Negative.    Cardiovascular: Negative.    Gastrointestinal: Negative.    Endocrine: Negative.    Genitourinary: Negative.    Musculoskeletal: Negative.    Allergic/Immunologic: Positive for environmental allergies.   Neurological: Negative.        Vitals:    05/19/22 1000   BP: 130/70   Pulse: 75   Resp: 16   Temp: 98 °F (36.7 °C)       Body mass index is 27.37 kg/m².    Objective       Physical Exam  Vitals reviewed.   Constitutional:       Appearance: He is normal weight.   HENT:      Head: Normocephalic.      Right Ear: Hearing, tympanic membrane, ear canal and external ear normal.      Left Ear: Hearing, tympanic membrane, ear canal and external ear normal.      Nose: Septal deviation and mucosal edema present.      Mouth/Throat:      Lips: Pink.      Mouth: Mucous membranes are moist.      Pharynx: Uvula midline.   Neurological:      Mental Status: He is alert.             Nasal Endoscopy    Date/Time: 5/19/2022 10:38 AM  Performed by: Letah Wilder APRN  Authorized by: Letha Wilder APRN     Procedure details:     Indications: sino-nasal symptoms      Medication:  None    Instrument: rigid nasal endoscopy      Scope location: bilateral nare    Nasal cavity:     Right inferior turbinates:  enlarged      Left inferior turbinates: enlarged    Septum:     Deviation comment:  Septal deviation with possible perforation larger on the right than left   Sinus/ Nasopharynx:     Right middle meatus: inflammation      Left middle meatus: inflammation    Post-procedure details:     Patient tolerance of procedure:  Tolerated well      Assessment & Plan   Diagnoses and all orders for this visit:    1. Laryngopharyngeal reflux (Primary)    2. ADITHYA on CPAP    3. S/P FESS (functional endoscopic sinus surgery)  -     CT Sinus With & Without Contrast; Future    4. Non-seasonal allergic rhinitis due to pollen    5. Nasal polyposis - hx of  -     CT Sinus With & Without Contrast; Future    6. Nasal septal deviation  -     CT Sinus With & Without Contrast; Future    Other orders  -     $ Nasal / Sinus Endoscopy  -     azelastine (ASTELIN) 0.1 % nasal spray; 2 sprays into the nostril(s) as directed by provider 2 (Two) Times a Day. Use in each nostril as directed  Dispense: 30 mL; Refill: 11      * Surgery not found *  Orders Placed This Encounter   Procedures   • CT Sinus With & Without Contrast     Standing Status:   Future     Standing Expiration Date:   5/19/2023     Order Specific Question:   Release to patient     Answer:   Immediate   • $ Nasal / Sinus Endoscopy     This order was created via procedure documentation     Order Specific Question:   Release to patient     Answer:   Immediate     Return in about 3 months (around 8/19/2022) for Recheck.   Will repeat CT of sinuses to evaluate for any changes.  Stop Afrin use.  Start astelin daily.  Increase water intake.  Decrease milk/dairy intake.  Patient Instructions   CONTACT INFORMATION:  The main office phone number is [phone]. For emergencies after hours and on weekends, this number will convert over to our answering service and the on call provider will answer. Please try to keep non emergent phone calls/ questions to office hours 9am-5pm Monday through  Friday.      P[employer]  As an alternative, you can sign up and use the Epic MyChart system for more direct and quicker access for non emergent questions/ problems.  [employer] software allows you to send messages to your doctor, view your test results, renew your prescriptions, schedule appointments, and more. To sign up, go to MyMosa and click on the Sign Up Now link in the New User? box. Enter your [employer] Activation Code exactly as it appears below along with the last four digits of your Social Security Number and your Date of Birth () to complete the sign-up process. If you do not sign up before the expiration date, you must request a new code.     P[employer] Activation Code: Activation code not generated  Current [employer] Status: Active     If you have questions, you can email [email or web address] or call [phone] to talk to our [employer] staff. Remember, [employer] is NOT to be used for urgent needs. For medical emergencies, dial 911.     IF YOU SMOKE OR USE TOBACCO PLEASE READ THE FOLLOWING:  Why is smoking bad for me?  Smoking increases the risk of heart disease, lung disease, vascular disease, stroke, and cancer. If you smoke, STOP!        IF YOU SMOKE OR USE TOBACCO PLEASE READ THE FOLLOWING:  Why is smoking bad for me?  Smoking increases the risk of heart disease, lung disease, vascular disease, stroke, and cancer. If you smoke, STOP!     For more information:  Quit Now [place]  -QUIT-NOW  https://[place].quitlogix.org/en-[place]/ For the best response, use your nasal sprays every day without skipping doses. It may take several weeks before the full effect is acheived.  Gastroesophageal Reflux Disease (Laryngopharyngeal Reflux), Adult  Gastroesophageal reflux disease (GERD) and/or Laryngopharyngeal Reflux, (LPR) happens when acid from your stomach flows up into the esophagus and/or throat and voicebox or larynx. When acid comes in contact with the these organs, the acid can  cause soreness (inflammation). Over time, GERD may create small holes (ulcers) in the lining of the esophagus and may lead to the development of hoarseness, difficulty swallowing,   feeling of something stuck in the throat, increased mucous or drainage and even predispose to the development of malignancies, (cancer).    CAUSES   · Increased body weight. This puts pressure on the stomach, making acid rise from the stomach into the esophagus.  · Smoking. This increases acid production in the stomach.  · Drinking alcohol. This causes decreased pressure in the lower esophageal sphincter (valve or ring of muscle between the esophagus and stomach), allowing acid from the stomach into the esophagus.  · Late evening meals and a full stomach. This increases pressure and acid production in the stomach.  · A malformed lower esophageal sphincter  · Diet which can include avoidance of gluten and dairy products  · Age  SYMPTOMS   · Burning pain in the lower part of the mid-chest behind the breastbone and in the mid-stomach area. This may occur twice a week or more often.  · Trouble swallowing.  · Sore throat.  · Dry cough.  · Asthma-like symptoms including chest tightness, shortness of breath, or wheezing.  · Globus sensation-something stuck in the throat/fullness  · Hoarseness  DIAGNOSIS   Your caregiver may be able to diagnose GERD based on your symptoms. In some cases, X-rays and other tests may be done to check for complications or to check the condition of your stomach and esophagus.  You may need to see another doctor.  TREATMENT   Over-the-counter or prescription medicines to help decrease acid production.   Dietary and behavioral modifications or changes may be also recommended.  HOME CARE INSTRUCTIONS   · Change the factors that you can control. Ask your caregiver for guidance concerning weight loss, quitting smoking, and alcohol consumption.  · Avoid foods and drinks that make your symptoms worse, and MAY include such  as:  ¨ Caffeine or alcoholic drinks.  ¨ Chocolate.  ¨ Gluten containing foods  ¨ Dairy  ¨ Peppermint or mint flavorings.  ¨ Garlic and onions.  ¨ Spicy foods.  ¨ Citrus fruits, such as oranges, kennedy, or limes.  ¨ Tomato-based foods such as sauce, chili, salsa, and pizza.  ¨ Fried and fatty foods.  · Avoid lying down for the 3 hours prior to your bedtime or prior to taking a nap.  · Eat small, frequent meals instead of large meals.  · Wear loose-fitting clothing. Do not wear anything tight around your waist that causes pressure on your stomach.  · Raise the head of your bed 6 to 8 inches with wood blocks to help you sleep. Extra pillows will not help.  · Only take over-the-counter or prescription medicines for pain, discomfort, or fever as directed by your caregiver.  · Do not take aspirin, ibuprofen, or other nonsteroidal anti-inflammatory drugs if possible (NSAIDs).  SEEK IMMEDIATE MEDICAL CARE IF:   · You have pain in your arms, neck, jaw, teeth, or back.  · Your pain increases or changes in intensity or duration.  · You develop nausea, vomiting, or sweating (diaphoresis).  · You develop shortness of breath, or you faint.  · Your vomit is green, yellow, black, or looks like coffee grounds or blood.  · Your stool is red, bloody, or black.  These symptoms could be signs of other problems, such as heart disease, gastric bleeding, or esophageal bleeding.  MAKE SURE YOU:   · Understand these instructions.  · Will watch your condition.  · Will get help right away if you are not doing well or get worse.     This information is not intended to replace advice given to you by your physician. Make sure you discuss any questions you have with your health care provider.     Modified by Cecil Gonzales MD, FACS 9/8/2016.  Document Released: 09/27/2006 Document Revised: 01/08/2016 Document Reviewed: 04/13/2016  [employer] Interactive Patient Education ©2016 [employer]

## 2022-05-19 NOTE — PATIENT INSTRUCTIONS
CONTACT INFORMATION:  The main office phone number is [phone]. For emergencies after hours and on weekends, this number will convert over to our answering service and the on call provider will answer. Please try to keep non emergent phone calls/ questions to office hours 9am-5pm Monday through Friday.      [employer]  As an alternative, you can sign up and use the Epic MyChart system for more direct and quicker access for non emergent questions/ problems.  [employer] allows you to send messages to your doctor, view your test results, renew your prescriptions, schedule appointments, and more. To sign up, go to [employer] and click on the Sign Up Now link in the New User? box. Enter your [employer] Activation Code exactly as it appears below along with the last four digits of your Social Security Number and your Date of Birth () to complete the sign-up process. If you do not sign up before the expiration date, you must request a new code.     [employer] Activation Code: Activation code not generated  Current [employer] Status: Active     If you have questions, you can email [email or web address] or call [phone] to talk to our [employer] staff. Remember, [employer] is NOT to be used for urgent needs. For medical emergencies, dial 911.     IF YOU SMOKE OR USE TOBACCO PLEASE READ THE FOLLOWING:  Why is smoking bad for me?  Smoking increases the risk of heart disease, lung disease, vascular disease, stroke, and cancer. If you smoke, STOP!        IF YOU SMOKE OR USE TOBACCO PLEASE READ THE FOLLOWING:  Why is smoking bad for me?  Smoking increases the risk of heart disease, lung disease, vascular disease, stroke, and cancer. If you smoke, STOP!     For more information:  Quit Now [place]  -QUIT-NOW  https://[place].quitlogix.org/en-[place]/ For the best response, use your nasal sprays every day without skipping doses. It may take several weeks before the full effect is acheived.   Gastroesophageal Reflux Disease (Laryngopharyngeal Reflux), Adult  Gastroesophageal reflux disease (GERD) and/or Laryngopharyngeal Reflux, (LPR) happens when acid from your stomach flows up into the esophagus and/or throat and voicebox or larynx. When acid comes in contact with the these organs, the acid can cause soreness (inflammation). Over time, GERD may create small holes (ulcers) in the lining of the esophagus and may lead to the development of hoarseness, difficulty swallowing,   feeling of something stuck in the throat, increased mucous or drainage and even predispose to the development of malignancies, (cancer).    CAUSES   Increased body weight. This puts pressure on the stomach, making acid rise from the stomach into the esophagus.  Smoking. This increases acid production in the stomach.  Drinking alcohol. This causes decreased pressure in the lower esophageal sphincter (valve or ring of muscle between the esophagus and stomach), allowing acid from the stomach into the esophagus.  Late evening meals and a full stomach. This increases pressure and acid production in the stomach.  A malformed lower esophageal sphincter  Diet which can include avoidance of gluten and dairy products  Age  SYMPTOMS   Burning pain in the lower part of the mid-chest behind the breastbone and in the mid-stomach area. This may occur twice a week or more often.  Trouble swallowing.  Sore throat.  Dry cough.  Asthma-like symptoms including chest tightness, shortness of breath, or wheezing.  Globus sensation-something stuck in the throat/fullness  Hoarseness  DIAGNOSIS   Your caregiver may be able to diagnose GERD based on your symptoms. In some cases, X-rays and other tests may be done to check for complications or to check the condition of your stomach and esophagus.  You may need to see another doctor.  TREATMENT   Over-the-counter or prescription medicines to help decrease acid production.   Dietary and behavioral modifications  or changes may be also recommended.  HOME CARE INSTRUCTIONS   Change the factors that you can control. Ask your caregiver for guidance concerning weight loss, quitting smoking, and alcohol consumption.  Avoid foods and drinks that make your symptoms worse, and MAY include such as:  Caffeine or alcoholic drinks.  Chocolate.  Gluten containing foods  Dairy  Peppermint or mint flavorings.  Garlic and onions.  Spicy foods.  Citrus fruits, such as oranges, kennedy, or limes.  Tomato-based foods such as sauce, chili, salsa, and pizza.  Fried and fatty foods.  Avoid lying down for the 3 hours prior to your bedtime or prior to taking a nap.  Eat small, frequent meals instead of large meals.  Wear loose-fitting clothing. Do not wear anything tight around your waist that causes pressure on your stomach.  Raise the head of your bed 6 to 8 inches with wood blocks to help you sleep. Extra pillows will not help.  Only take over-the-counter or prescription medicines for pain, discomfort, or fever as directed by your caregiver.  Do not take aspirin, ibuprofen, or other nonsteroidal anti-inflammatory drugs if possible (NSAIDs).  SEEK IMMEDIATE MEDICAL CARE IF:   You have pain in your arms, neck, jaw, teeth, or back.  Your pain increases or changes in intensity or duration.  You develop nausea, vomiting, or sweating (diaphoresis).  You develop shortness of breath, or you faint.  Your vomit is green, yellow, black, or looks like coffee grounds or blood.  Your stool is red, bloody, or black.  These symptoms could be signs of other problems, such as heart disease, gastric bleeding, or esophageal bleeding.  MAKE SURE YOU:   Understand these instructions.  Will watch your condition.  Will get help right away if you are not doing well or get worse.     This information is not intended to replace advice given to you by your physician. Make sure you discuss any questions you have with your health care provider.     Modified by Cecil Gonzales,  MD, FACS 9/8/2016.  Document Released: 09/27/2006 Document Revised: 01/08/2016 Document Reviewed: 04/13/2016  [employer] Interactive Patient Education ©2016 [employer]

## 2022-05-26 NOTE — PROGRESS NOTES
"5/31/2022        Kaila Gonzales, APRN  [address] [place] [address]    Justin Szymanski  1945    Chief Complaint   Patient presents with   • Follow-up     6 Month Follow Up For Screening of Abdominal Aortic Aneurysm, Ascending Aortic Aneurysm, and Bilateral Carotid Artery Stenosis. Test [id number] US pad aorta alejo, CT pad angiogram chest w wo, and US pad carotid bilateral. Patient denies any stroke like symptoms.    • Non Smoker     Patient is a Non Smoker    • Med Management     Verbally verified medications with patient       Dear Kaila Gonzales, APRN       HPI  I had the pleasure of seeing your patient Justin Szymanski in the office today.    As you recall, Justin Szymanski is a 76 y.o.  male who we are following for asymptomatic carotid occlusive disease and an ascending aortic aneurysm and carotid disease.  He did undergo a left carotid endarterectomy on 5/6/2020.  Currently he is doing well denies any strokelike symptoms.  He is maintained on aspirin, Plavix, and Lipitor.   He did have noninvasive testing performed today, which I did review in office.        Review of Systems   Constitutional: Negative.    HENT: Negative.         Allergies   Eyes: Negative.    Respiratory: Negative.    Cardiovascular: Negative.    Gastrointestinal: Negative.         Belching, which is improving   Endocrine: Negative.    Genitourinary: Negative.    Musculoskeletal: Negative.    Skin: Negative.    Allergic/Immunologic: Negative.    Neurological: Negative.    Hematological: Negative.    Psychiatric/Behavioral: Negative.    All other systems reviewed and are negative.      /84 (BP Location: Right arm, Patient Position: Sitting, Cuff Size: Adult)   Pulse 74   Ht 172.7 cm (68\")   Wt 81.6 kg (180 lb)   SpO2 98%   BMI 27.37 kg/m²   Physical Exam  Vitals and nursing note reviewed.   Constitutional:       Appearance: Normal appearance. He is well-developed.   HENT:      Head: Normocephalic and atraumatic.   Eyes:      General: No " scleral icterus.     Pupils: Pupils are equal, round, and reactive to light.   Neck:      Thyroid: No thyromegaly.   Cardiovascular:      Rate and Rhythm: Normal rate and regular rhythm.      Heart sounds: Murmur heard.   Pulmonary:      Effort: Pulmonary effort is normal.      Breath sounds: Normal breath sounds.   Abdominal:      General: Bowel sounds are normal.      Palpations: Abdomen is soft.   Musculoskeletal:         General: Normal range of motion.      Cervical back: Normal range of motion and neck supple.   Skin:     General: Skin is warm and dry.   Neurological:      General: No focal deficit present.      Mental Status: He is alert and oriented to person, place, and time.   Psychiatric:         Mood and Affect: Mood normal.         Behavior: Behavior normal.         Thought Content: Thought content normal.         Judgment: Judgment normal.            Diagnostic Data:  CT Angiogram Chest With & Without Contrast    Result Date: 5/31/2022  Narrative: EXAMINATION: CT ANGIOGRAM CHEST W WO CONTRAST-  5/31/2022 8:38 AM CDT  HISTORY: ascending aortic aneurysm; I71.2-Thoracic aortic aneurysm, without rupture  In order to have a CT radiation dose as low as reasonably achievable Automated Exposure Control was utilized for adjustment of the mA and/or KV according to patient size.  DLP in mGycm= 301  The CT angiography of the chest are performed after intravenous contrast enhancement. Images are acquired in axial plane and subsequent 2-D reconstructions in coronal and sagittal planes and 3-D maximum intensity projection reconstruction.  There is no previous study for comparison.  There is normal opacification of the pulmonary arteries and the branches bilaterally. There are no filling defects in the opacified pulmonary arterial bed.  There are atheromatous changes of the thoracic aorta. There is ectasia of the ascending thoracic aorta which measures 4.9 cm in AP dimension. No evidence of dissection. Atheromatous  changes of the coronary arteries are seen.  There is no evidence of mediastinal or hilar mass or lymphadenopathy.  The limited visualized soft tissues of the neck are unremarkable.  There is no axillary lymphadenopathy.  The lungs are moderately well-expanded. No evidence of infiltrate. No consolidation. No mass. The tracheobronchial structures appear normal.  The limited visualized liver and spleen are unremarkable. The adrenal glands are normal. Visualized pancreas is unremarkable. The kidneys are incompletely included in the study and not evaluated. There is an apparent tiny low-density nodule in the lower pole of the right kidney which is incompletely visualized and evaluated.  Images reviewed at bone window show no acute bony abnormality.      Impression: 1. No evidence of pulmonary embolism. 2. The lungs are unremarkable.          This report was finalized on 05/31/2022 10:14 by Dr. Rachel Castañeda MD.    US Carotid Bilateral    Result Date: 5/31/2022  Narrative: History: Carotid occlusive disease      Impression: Impression: 1. There is less than 50% stenosis of the right internal carotid artery. 2. There is 50-69% stenosis of the left internal carotid artery. 3. Antegrade flow is demonstrated in bilateral vertebral arteries.  Comments: Bilateral carotid vertebral arterial duplex scan was performed.  Grayscale imaging shows intimal thickening and calcified elements at the carotid bifurcation. The right internal carotid artery peak systolic velocity is 98.9 cm/sec. The end-diastolic velocity is 42.7 cm/sec. The right ICA/CCA ratio is approximately 1.0 . These findings correlate with less than 50% stenosis of the right internal carotid artery.  Grayscale imaging shows intimal thickening and calcified elements at the carotid bifurcation. The left internal carotid artery peak systolic velocity is 164.3 cm/sec. The end-diastolic velocity is 47.8 cm/sec. The left ICA/CCA ratio is approximately 1.6 . These findings  correlate with 50-69% stenosis of the left internal carotid artery.  Antegrade flow is demonstrated in bilateral vertebral arteries. There is greater than 50% stenosis of the left external carotid artery. This report was finalized on 05/31/2022 07:44 by Dr. Sb Hunter MD.    US Aorta Limited    Result Date: 5/31/2022  Narrative: EXAMINATION:   US AORTA LIMITED-  5/31/2022 8:17 AM CDT  HISTORY: ULTRASOUND AORTA 5/31/2022 7:51 AM CDT  HISTORY: Abdominal aortic aneurysm  COMPARISON: None  TECHNIQUE: Transverse and longitudinal sonographic images of the abdominal aorta were obtained.  FINDINGS:  The proximal aorta measures 2 x2 cm.  The mid aorta measures 1.4 x 1.3 cm.  The distal aorta measures 1.1 x 1.2 cm.  The aortoiliac bifurcation is normal in appearance. The right iliac artery is 10 mm. The left iliac artery is 11 mm.      Impression: 1. Normal sonographic appearance of the abdominal aorta.  This report was finalized on 05/31/2022 08:19 by Dr. Cali Garcia MD.       Patient Active Problem List   Diagnosis   • Allergic rhinitis due to pollen   • Snoring   • Nasal polyposis - hx of   • ADITHYA (obstructive sleep apnea)   • ADITHYA on CPAP   • Indigestion   • Heart murmur   • Hypertension   • Hyperlipidemia   • Ascending aortic aneurysm (HCC)   • PAD (peripheral artery disease) (HCC)   • Upper extremity weakness   • Bilateral carotid artery stenosis   • Numbness and tingling of right upper extremity   • Muscle fasciculation   • Preop testing   • Carotid stenosis   • Tubular adenoma   • S/P FESS (functional endoscopic sinus surgery)   • Laryngopharyngeal reflux   • History of nasal polyposis         ICD-10-CM ICD-9-CM   1. Ascending aortic aneurysm (HCC)  I71.2 441.2   2. Bilateral carotid artery stenosis  I65.23 433.10     433.30   3. Essential hypertension  I10 401.9   4. Hyperlipidemia, unspecified hyperlipidemia type  E78.5 272.4       Plan: After thoroughly evaluating Justin Szymanski, I believe the best course of  action is to remain conservative from vascular surgery standpoint.  I did review his testing which shows less than 50% right carotid stenosis and 50 to 69% left carotid stenosis.  His CTA of the chest does show a 4.9 cm ascending aortic aneurysm.  I will refer him to CT surgery for continued monitoring.  As far as his carotid disease is concerned, I will see him back in 1 year for continued surveillance with repeat noninvasive testing including carotid duplex.  His ultrasound of the aorta showed no evidence of aneurysmal disease.   I did discuss vascular risk factors as it pertain to the progression of vascular disease including controlling his hypertension and hyperlipidemia.  These risk factors are currently stable and controlled.  The patient can continue taking her current medication regimen as previously planned.  This was all discussed in full with complete understanding.    Thank you for allowing me to participate in the care of your patient.  Please do not hesitate with any questions or concerns.  I will keep you aware of any further encounters with Justin Szymanski.        Sincerely yours,         Sb Hunter, Kaila Mckeon, EFRA

## 2022-05-27 ENCOUNTER — TELEPHONE (OUTPATIENT)
Dept: VASCULAR SURGERY | Facility: CLINIC | Age: 77
End: 2022-05-27

## 2022-05-27 NOTE — TELEPHONE ENCOUNTER
Left message reminding Mr Szymanski of his appointments for Tuesday, May 31st, 2022. Reminded [facility] 630 am for 7 o'clock testing with nothing to eat or drink after midnight and follow up afterwards at 915 am with Dr Hunter.

## 2022-05-31 ENCOUNTER — HOSPITAL ENCOUNTER (OUTPATIENT)
Dept: ULTRASOUND IMAGING | Facility: HOSPITAL | Age: 77
Discharge: HOME | End: 2022-05-31

## 2022-05-31 ENCOUNTER — HOSPITAL ENCOUNTER (OUTPATIENT)
Dept: CT IMAGING | Facility: HOSPITAL | Age: 77
Discharge: HOME | End: 2022-05-31

## 2022-05-31 ENCOUNTER — OFFICE VISIT (OUTPATIENT)
Dept: VASCULAR SURGERY | Facility: CLINIC | Age: 77
End: 2022-05-31

## 2022-05-31 VITALS
WEIGHT: 180 LBS | HEART RATE: 74 BPM | HEIGHT: 68 IN | BODY MASS INDEX: 27.28 KG/M2 | OXYGEN SATURATION: 98 % | SYSTOLIC BLOOD PRESSURE: 136 MMHG | DIASTOLIC BLOOD PRESSURE: 84 MMHG

## 2022-05-31 DIAGNOSIS — Z13.6 SCREENING FOR AAA (ABDOMINAL AORTIC ANEURYSM): ICD-10-CM

## 2022-05-31 DIAGNOSIS — I10 ESSENTIAL HYPERTENSION: ICD-10-CM

## 2022-05-31 DIAGNOSIS — I65.23 BILATERAL CAROTID ARTERY STENOSIS: ICD-10-CM

## 2022-05-31 DIAGNOSIS — I71.21 ASCENDING AORTIC ANEURYSM: ICD-10-CM

## 2022-05-31 DIAGNOSIS — I71.21 ASCENDING AORTIC ANEURYSM: Primary | ICD-10-CM

## 2022-05-31 DIAGNOSIS — E78.5 HYPERLIPIDEMIA, UNSPECIFIED HYPERLIPIDEMIA TYPE: ICD-10-CM

## 2022-05-31 PROCEDURE — 93880 EXTRACRANIAL BILAT STUDY: CPT | Performed by: SURGERY

## 2022-05-31 PROCEDURE — 0 IOPAMIDOL PER 1 ML: Performed by: NURSE PRACTITIONER

## 2022-05-31 PROCEDURE — 76775 US EXAM ABDO BACK WALL LIM: CPT

## 2022-05-31 PROCEDURE — 93880 EXTRACRANIAL BILAT STUDY: CPT

## 2022-05-31 PROCEDURE — 71275 CT ANGIOGRAPHY CHEST: CPT

## 2022-05-31 PROCEDURE — 99214 OFFICE O/P EST MOD 30 MIN: CPT | Performed by: SURGERY

## 2022-05-31 RX ADMIN — IOPAMIDOL 100 ML: 755 INJECTION, SOLUTION INTRAVENOUS at 08:46

## 2022-06-02 ENCOUNTER — HOSPITAL ENCOUNTER (OUTPATIENT)
Dept: CT IMAGING | Facility: HOSPITAL | Age: 77
Discharge: HOME | End: 2022-06-02
Admitting: NURSE PRACTITIONER

## 2022-06-02 DIAGNOSIS — Z98.890 S/P FESS (FUNCTIONAL ENDOSCOPIC SINUS SURGERY): ICD-10-CM

## 2022-06-02 DIAGNOSIS — J34.2 NASAL SEPTAL DEVIATION: ICD-10-CM

## 2022-06-02 DIAGNOSIS — J33.9 NASAL POLYPOSIS: ICD-10-CM

## 2022-06-02 PROCEDURE — 70486 CT MAXILLOFACIAL W/O DYE: CPT

## 2022-06-08 ENCOUNTER — TELEPHONE (OUTPATIENT)
Dept: OTOLARYNGOLOGY | Facility: CLINIC | Age: 77
End: 2022-06-08

## 2022-06-08 NOTE — TELEPHONE ENCOUNTER
----- Message from EFRA Long sent at 6/7/2022  4:46 PM CDT -----  Please call patient with results   Have patient scheduled when Dr. Gonzales in office   Have him stop his afrin completely and call us if he thinks he has an infection and we need to see him during that time.

## 2022-06-13 RX ORDER — AMOXICILLIN 500 MG/1
500 CAPSULE ORAL 3 TIMES DAILY
Qty: 30 CAPSULE | Refills: 0 | Status: SHIPPED | OUTPATIENT
Start: 2022-06-13 | End: 2022-08-02

## 2022-06-23 ENCOUNTER — OFFICE VISIT (OUTPATIENT)
Dept: CARDIAC SURGERY | Facility: CLINIC | Age: 77
End: 2022-06-23

## 2022-06-23 VITALS
WEIGHT: 179.6 LBS | SYSTOLIC BLOOD PRESSURE: 151 MMHG | HEART RATE: 87 BPM | BODY MASS INDEX: 27.22 KG/M2 | OXYGEN SATURATION: 97 % | HEIGHT: 68 IN | DIASTOLIC BLOOD PRESSURE: 90 MMHG

## 2022-06-23 DIAGNOSIS — R01.1 HEART MURMUR: ICD-10-CM

## 2022-06-23 DIAGNOSIS — I71.21 ASCENDING AORTIC ANEURYSM: Primary | ICD-10-CM

## 2022-06-23 PROCEDURE — 99204 OFFICE O/P NEW MOD 45 MIN: CPT | Performed by: SURGERY

## 2022-06-23 RX ORDER — PREGABALIN 50 MG/1
50 CAPSULE ORAL DAILY
COMMUNITY
Start: 2022-06-15

## 2022-07-08 NOTE — PROGRESS NOTES
Cardiothoracic Surgery Consultation    Referring Physician: Dr. Sb Hunter    Primary Care Physician: EFRA Huynh    Chief Complaint   Patient presents with   • Ascending Aortic Aneurysm     New pt from [facility]         Subjective     Mr. Szymanski is a 77-year-old male who presents with incidental finding of ascending aortic aneurysm.  He underwent left carotid endarterectomy 2 years ago after having some numbness and tingling on one side and being followed for asymptomatic carotid disease.  He did well after this and recovered well.  Given the work-up for this he was identified as having an ascending aortic aneurysm.  He has no family history of aortic aneurysms or dissections or sudden death that he knows of.  He has never had surgery on his heart lungs or chest.  He is a never smoker.  His blood pressure usually runs good and well under 140 systolic and 90 diastolic.  He was followed by Dr. Hunter and had repeat CTA which showed some mild enlargement of his aneurysm and therefore was referred to me for continued surveillance.        Review of Systems   Constitutional: Negative for activity change, fatigue and unexpected weight change.   Respiratory: Negative for chest tightness and shortness of breath.    Cardiovascular: Negative for chest pain and leg swelling.        A complete review of systems was performed, is negative except stated above.    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 HYBRID OR 12;  Service: Vascular;  Laterality: Left;   • CATARACT EXTRACTION Bilateral    • CHOLECYSTECTOMY     • CHOLECYSTECTOMY WITH INTRAOPERATIVE CHOLANGIOGRAM N/A 6/18/2020    Procedure: CHOLECYSTECTOMY LAPAROSCOPIC INTRAOPERATIVE CHOLANGIOGRAM;  Surgeon: Maame Del Rio MD;  Location: [facility] OR;  Service: General;  Laterality: N/A;   • COLONOSCOPY  07/21/2016    two polyps ,both removed   • COLONOSCOPY N/A 7/22/2021    Procedure: COLONOSCOPY WITH ANESTHESIA;  Surgeon: Marc Mart DO;  Location: [facility] ENDOSCOPY;  Service: Gastroenterology;  Laterality: N/A;  pre op: tubular adenoma  post op: diverticulosis, polyps  pcp: Kaila Gonzales APRN   • ENDOSCOPY  04/18/2013    normal   • ENDOSCOPY N/A 1/26/2018    Procedure: ESOPHAGOGASTRODUODENOSCOPY WITH ANESTHESIA;  Surgeon: Marc Mart DO;  Location: [facility] ENDOSCOPY;  Service:    • SINUS SURGERY      Dr. Griffith   • TONSILLECTOMY     • ULNAR NERVE REPAIR       Family History   Problem Relation Age of Onset   • Hyperlipidemia Mother    • Cancer Father    • Hypertension Father    • Hyperlipidemia Father    • Colon cancer Maternal Grandfather    • Colon polyps Neg Hx    • Esophageal cancer Neg Hx    • Liver cancer Neg Hx    • Liver disease Neg Hx    • Rectal cancer Neg Hx    • Stomach cancer Neg Hx      Social History     Tobacco Use   • Smoking status: Never Smoker   • Smokeless tobacco: Never Used   Vaping Use   • Vaping Use: Never used   Substance Use Topics   • Alcohol use: Yes     Alcohol/week: 7.0 standard drinks     Types: 7 Glasses of wine per week     Comment: nightly - red wine    • Drug use: No     Current Outpatient Medications   Medication Sig  Dispense Refill   • amoxicillin (AMOXIL) 500 MG capsule Take 1 capsule by mouth 3 (Three) Times a Day. 30 capsule 0   • atorvastatin (LIPITOR) 80 MG tablet Take 1 tablet by mouth Every Night. (Patient taking differently: Take 80 mg by mouth Every Night. Takes 1/2 tablet nightly.) 30 tablet 0   • azelastine (ASTELIN) 0.1 % nasal spray 2 sprays into the nostril(s) as directed by provider 2 (Two) Times a Day. Use in each nostril as directed 30 mL 11   • clopidogrel (PLAVIX) 75 MG tablet Take 1 tablet by mouth Daily. 30 tablet 5   • desonide (DESOWEN) 0.05 % cream Apply 1 application topically to the appropriate area as directed Daily As Needed for Irritation.  11   • FLUoxetine (PROzac) 20 MG capsule Take 20 mg by mouth Daily.  0   • fluticasone (FLONASE) 50 MCG/ACT nasal spray 2 sprays into the nostril(s) as directed by provider Daily As Needed for Rhinitis or Allergies.     • levothyroxine (SYNTHROID, LEVOTHROID) 88 MCG tablet Take 88 mcg by mouth Daily.     • lisinopril-hydrochlorothiazide (PRINZIDE,ZESTORETIC) 10-12.5 MG per tablet Take 1 tablet by mouth Daily.     • Multiple Vitamins-Minerals (MULTIVITAMIN WITH MINERALS) tablet tablet Take 1 tablet by mouth Daily.     • omeprazole (priLOSEC) 40 MG capsule TAKE ONE CAPSULE BY MOUTH TWICE DAILY, TAKE 30 MINUTES TO 1 HOURS BEFORE A MEAL 60 capsule 3   • pregabalin (LYRICA) 50 MG capsule Take 50 mg by mouth Daily.     • tadalafil (CIALIS) 20 MG tablet Take 20 mg by mouth Daily As Needed for erectile dysfunction.     • valACYclovir (VALTREX) 1000 MG tablet Take 1,000 mg by mouth 2 (Two) Times a Day As Needed.     • zolpidem (AMBIEN) 10 MG tablet Take 10 mg by mouth At Night As Needed.     • aspirin 81 MG chewable tablet Chew 81 mg Daily.       No current facility-administered medications for this visit.     Allergies:  Patient has no known allergies.    Objective      Vital Signs  Visit Vitals  /90 (BP Location: Right arm, Patient Position: Sitting, Cuff Size:  "Adult)   Pulse 87   Ht 172.7 cm (68\")   Wt 81.5 kg (179 lb 9.6 oz)   SpO2 97%   BMI 27.31 kg/m²         Physical Exam  Constitutional:       General: He is not in acute distress.     Appearance: He is well-developed. He is not diaphoretic.   HENT:      Head: Normocephalic and atraumatic.      Right Ear: External ear normal.      Left Ear: External ear normal.   Eyes:      General:         Right eye: No discharge.         Left eye: No discharge.      Pupils: Pupils are equal, round, and reactive to light.   Neck:      Vascular: No JVD.      Trachea: No tracheal deviation.   Cardiovascular:      Rate and Rhythm: Normal rate and regular rhythm.      Heart sounds: Normal heart sounds. No murmur heard.  Pulmonary:      Effort: Pulmonary effort is normal. No respiratory distress.      Breath sounds: Normal breath sounds. No stridor. No wheezing.   Abdominal:      General: There is no distension.      Palpations: Abdomen is soft.      Tenderness: There is no abdominal tenderness. There is no guarding.   Musculoskeletal:         General: No tenderness or deformity. Normal range of motion.      Cervical back: Normal range of motion and neck supple.   Skin:     General: Skin is warm and dry.      Capillary Refill: Capillary refill takes less than 2 seconds.      Coloration: Skin is not pale.      Findings: No erythema or rash.   Neurological:      Mental Status: He is alert and oriented to person, place, and time.      Motor: No abnormal muscle tone.      Coordination: Coordination normal.   Psychiatric:         Behavior: Behavior normal.         Thought Content: Thought content normal.         Judgment: Judgment normal.         Results Review:   WBC   Date Value Ref Range Status   06/15/2020 7.38 3.40 - 10.80 10*3/mm3 Final     RBC   Date Value Ref Range Status   06/15/2020 3.97 (L) 4.14 - 5.80 10*6/mm3 Final     Hemoglobin   Date Value Ref Range Status   06/15/2020 12.6 (L) 13.0 - 17.7 g/dL Final     Hematocrit   Date Value " Ref Range Status   06/15/2020 38.2 37.5 - 51.0 % Final     MCV   Date Value Ref Range Status   06/15/2020 96.2 79.0 - 97.0 fL Final     MCH   Date Value Ref Range Status   06/15/2020 31.7 26.6 - 33.0 pg Final     MCHC   Date Value Ref Range Status   06/15/2020 33.0 31.5 - 35.7 g/dL Final     RDW   Date Value Ref Range Status   06/15/2020 13.2 12.3 - 15.4 % Final     RDW-SD   Date Value Ref Range Status   06/15/2020 46.9 37.0 - 54.0 fl Final     MPV   Date Value Ref Range Status   06/15/2020 9.3 6.0 - 12.0 fL Final     Platelets   Date Value Ref Range Status   06/15/2020 270 140 - 450 10*3/mm3 Final     Neutrophil %   Date Value Ref Range Status   06/15/2020 63.9 42.7 - 76.0 % Final     Lymphocyte %   Date Value Ref Range Status   06/15/2020 19.1 (L) 19.6 - 45.3 % Final     Monocyte %   Date Value Ref Range Status   06/15/2020 9.8 5.0 - 12.0 % Final     Eosinophil %   Date Value Ref Range Status   06/15/2020 6.0 0.3 - 6.2 % Final     Basophil %   Date Value Ref Range Status   06/15/2020 1.1 0.0 - 1.5 % Final     Immature Grans %   Date Value Ref Range Status   06/15/2020 0.1 0.0 - 0.5 % Final     Neutrophils, Absolute   Date Value Ref Range Status   06/15/2020 4.72 1.70 - 7.00 10*3/mm3 Final     Lymphocytes, Absolute   Date Value Ref Range Status   06/15/2020 1.41 0.70 - 3.10 10*3/mm3 Final     Monocytes, Absolute   Date Value Ref Range Status   06/15/2020 0.72 0.10 - 0.90 10*3/mm3 Final     Eosinophils, Absolute   Date Value Ref Range Status   06/15/2020 0.44 (H) 0.00 - 0.40 10*3/mm3 Final     Basophils, Absolute   Date Value Ref Range Status   06/15/2020 0.08 0.00 - 0.20 10*3/mm3 Final     Immature Grans, Absolute   Date Value Ref Range Status   06/15/2020 0.01 0.00 - 0.05 10*3/mm3 Final     nRBC   Date Value Ref Range Status   06/15/2020 0.0 0.0 - 0.2 /100 WBC Final     Glucose   Date Value Ref Range Status   06/15/2020 116 (H) 65 - 99 mg/dL Final     Sodium   Date Value Ref Range Status   06/15/2020 140 136 - 145  mmol/L Final     Potassium   Date Value Ref Range Status   06/15/2020 4.4 3.5 - 5.2 mmol/L Final     CO2   Date Value Ref Range Status   06/15/2020 26.0 22.0 - 29.0 mmol/L Final     Chloride   Date Value Ref Range Status   06/15/2020 103 98 - 107 mmol/L Final     Anion Gap   Date Value Ref Range Status   06/15/2020 11.0 5.0 - 15.0 mmol/L Final     Creatinine   Date Value Ref Range Status   02/04/2021 1.10 0.60 - 1.30 mg/dL Final     Comment:     Serial Number: [id number]:  [id number]     BUN   Date Value Ref Range Status   06/15/2020 21 8 - 23 mg/dL Final     BUN/Creatinine Ratio   Date Value Ref Range Status   06/15/2020 19.6 7.0 - 25.0 Final     Calcium   Date Value Ref Range Status   06/15/2020 9.0 8.6 - 10.5 mg/dL Final     eGFR Non  Amer   Date Value Ref Range Status   06/15/2020 68 >60 mL/min/1.73 Final     Alkaline Phosphatase   Date Value Ref Range Status   06/15/2020 70 39 - 117 U/L Final     Total Protein   Date Value Ref Range Status   06/15/2020 7.1 6.0 - 8.5 g/dL Final     ALT (SGPT)   Date Value Ref Range Status   06/15/2020 16 1 - 41 U/L Final     AST (SGOT)   Date Value Ref Range Status   06/15/2020 26 1 - 40 U/L Final     Total Bilirubin   Date Value Ref Range Status   06/15/2020 0.4 0.2 - 1.2 mg/dL Final     Albumin   Date Value Ref Range Status   06/15/2020 4.50 3.50 - 5.20 g/dL Final     Globulin   Date Value Ref Range Status   06/15/2020 2.6 gm/dL Final        I reviewed the patient's clinical results and discussed with patient.    CT Chest:  There is normal opacification of the pulmonary arteries and the branches  bilaterally. There are no filling defects in the opacified pulmonary  arterial bed.     There are atheromatous changes of the thoracic aorta. There is ectasia  of the ascending thoracic aorta which measures 4.9 cm in AP dimension.  No evidence of dissection. Atheromatous changes of the coronary arteries  are seen.     There is no evidence of mediastinal or hilar mass or  lymphadenopathy.     The limited visualized soft tissues of the neck are unremarkable.     There is no axillary lymphadenopathy.     The lungs are moderately well-expanded. No evidence of infiltrate. No  consolidation. No mass. The tracheobronchial structures appear normal.     The limited visualized liver and spleen are unremarkable. The adrenal  glands are normal. Visualized pancreas is unremarkable. The kidneys are  incompletely included in the study and not evaluated. There is an  apparent tiny low-density nodule in the lower pole of the right kidney  which is incompletely visualized and evaluated.     Images reviewed at bone window show no acute bony abnormality.     IMPRESSION:  1. No evidence of pulmonary embolism.  2. The lungs are unremarkable.     This report was finalized on 2022 10:14 by Dr. Rachel Castañeda MD.  ECHO:      [facility] CARDIOLOGY  [address] [place] [id number] [phone]          Justin Szymanski  Complete Transthoracic Echocardiogram with Complete Doppler and Color Flow  Order# [id number]  Reading physician: Carlos Bowden MD Ordering physician: Malena Richardson APRN Study date: 20       Patient Information    Patient Name   Justin Szymanski MRN   [id number] Legal Sex   Male  (Age)   1945 (77 y.o.)         [employer] PACS Images     Show images for Adult Transthoracic Echo Complete W/ Cont if Necessary Per Protocol (With Agitated Saline)        Sedation Narrator Report    Sedation Narrator Report       Interpretation Summary    · Left ventricular systolic function is normal. Estimated EF appears to be in the range of 61 - 65%.  · Left ventricular diastolic dysfunction (grade I) consistent with impaired relaxation.  · Trace-to-mild aortic valve regurgitation is present.  · Mild dilation of the aortic root is present.              I personally reviewed images of following exams, the following is my interpretation:    CT Chest: Ascending and  aortic root aneurysm, I measure ascending aorta to be 4.8 cm in maximum dimension, the root to be 4.3 cm in maximum dimension, in the distal ascending/proximal arch to be 4.2 cm in maximum dimension, no evidence of dissection or DEVONTE or IMH    ECHO: Normal LV function, aortic valve is trileaflet      Assessment & Plan     Mr. Szymanski is a 77-year-old male who presents with incidental finding of ascending aortic aneurysm.  This was found incidentally during work-up for carotid endarterectomy.  He is otherwise quite healthy and active 77-year-old male.  He has normal heart function, tricuspid valve.  I measure his maximum aortic dimension in the ascending aorta to be 4.8 cm.  He has no significant family history of aneurysmal disease or dissections.  I discussed with him the natural history of ascending aortic aneurysm such as his today.  We discussed treatment options.  We agreed that continued surveillance is the best treatment option.  He is a non-smoker and his blood pressure is well controlled.    We discussed the risk of this aneurysm size considerations for repair and the risk of the operative procedure that would be required to treat the aneurysm.  Currently the risk of surgery outweigh the benefits of prevention of aortic problems.  My recommendation would be to continue to survey the aneurysm with CT scans.  We discussed the signs and symptoms of an acute aortic emergency and the patient should immediately present to the emergency room if that were to happen.  We also discussed the importance of tight blood pressure control.  We discussed avoiding strenuous activity that would put undue stress on the aneurysm.    We will plan on Mr. Szymanski coming back in 6 months with a repeat CTA to survey his ascending aneurysm with Nina Burnett.  Thank you for trusting me with the care of Mr. Szymanski.  Please do not hesitate to call with any questions or concerns.    Raul Rodriguez MD  Cardiothoracic Surgeon

## 2022-07-27 DIAGNOSIS — Z01.818 PRE-OPERATIVE EXAMINATION: Primary | ICD-10-CM

## 2022-08-01 ENCOUNTER — LAB (OUTPATIENT)
Dept: LAB | Facility: HOSPITAL | Age: 77
End: 2022-08-01

## 2022-08-01 DIAGNOSIS — K21.9 LARYNGOPHARYNGEAL REFLUX: ICD-10-CM

## 2022-08-01 DIAGNOSIS — Z01.818 PRE-OPERATIVE EXAMINATION: ICD-10-CM

## 2022-08-01 DIAGNOSIS — Z98.890 S/P FESS (FUNCTIONAL ENDOSCOPIC SINUS SURGERY): ICD-10-CM

## 2022-08-01 LAB — SARS-COV-2 ORF1AB RESP QL NAA+PROBE: NOT DETECTED

## 2022-08-01 PROCEDURE — C9803 HOPD COVID-19 SPEC COLLECT: HCPCS

## 2022-08-01 PROCEDURE — U0004 COV-19 TEST NON-CDC HGH THRU: HCPCS

## 2022-08-01 NOTE — PROGRESS NOTES
YOB: 1945  Location: [facility]  Location Address: [address] [place] [address], [facility], [address] [place] [address]  Location Phone: [phone]    Chief Complaint   Patient presents with   • Nasal Congestion   • Sinusitis       History of Present Illness   Justin Szymanski is a 77 y.o. male.  Justin Szymanski is here for follow up of ENT complaints. The patient has had problems with nasal congestion, sinus pressure, intermittent headaches  The symptoms are localized to the left side. The patient has had mild to moderate symptoms. The symptoms have been present for the last several years The symptoms are aggravated by  seasonal allergies . The symptoms are improved by nasal sprays.  Patient states he had covid 6 weeks ago he did not have any symptoms   He has been using astelin and flonase and has felt some mild improvement. He continues to feel as if his left side is blocked. Patient has stopped using afrin and has been using nasal irrigation. He continues to have intermittent sinus pressure left > right     He has had multiple sinus surgeries in the past. The last one being in [place] approximately 12 - 15 years ago.     Patient was on immunotherapy in the past, but stopped these 5 years ago, but did not feel as if they relieved symptoms.         Study Result    Narrative & Impression   EXAMINATION:   CT SINUS WO CONTRAST-  2022 1:50 PM CDT     HISTORY: CT of the sinuses dated 2022 1:32 PM CDT     HISTORY: nasal septal deviation; Z98.890-Other specified postprocedural  states; J33.9-Nasal polyp, unspecified; J34.2-Deviated nasal septum     COMPARISON: 2021      TECHNIQUE: Thin slice axial tomographic images of the sinuses were  obtained, and multiplanar reconstructed images in the coronal and  sagittal planes were also submitted for review.      FINDINGS:   Postsurgical changes noted in the paranasal sinuses. There are surgical  resection of the medial wall the right and left maxillary sinus.  There  is been resection of some ethmoid air cells.     There is fluid in a left ethmoid sinus and extends into the ethmoid no  frontal recess and fluid is present in the left frontal sinus. No mucous  retention cyst, mucoceles, or polyps are demonstrated. The mastoid air  cells and middle ear cavities are clear. The internal auditory canals  are patent and symmetric bilaterally. The nasal mucosa and nasopharynx  are patent. The nasal septum is mildly deviated from right to left.. The  nasal turbinates are unremarkable. The osteomeatal units are patent  bilaterally.      The visualized orbits and globes are intact.      The surrounding soft tissue and osseous structures are unremarkable. The  included portion of the brain is unremarkable.      IMPRESSION:  1. Fluid is present in the left ethmoid oh frontal recess with fluid in  the left frontal sinus. This consistent with sinusitis.  2. Postsurgical changes noted in the maxillary and ethmoid air cells as  described above..            This report was finalized on 06/02/2022 13:54 by Dr. Cali Garcia MD.            Past Medical History:   Diagnosis Date   • Allergic rhinitis    • Carotid artery disease ([facility])    • COVID-19 vaccine series completed     Moderna   • GERD (gastroesophageal reflux disease)    • Heart murmur    • Hyperlipidemia    • Hypertension    • Hypothyroidism    • IBS (irritable bowel syndrome)    • Nasal polyposis    • Seasonal allergic rhinitis    • Sleep apnea     intermittently uses cpap   • Snoring    • Stroke (cerebrum) ([facility]) 4/29/2020    numbness in right arm, the patinet states his symptom may be due to neck injury, following with neurology    • Valvular disease        Past Surgical History:   Procedure Laterality Date   • CAROTID ENDARTERECTOMY Left 5/6/2020    Procedure: LEFT CAROTID ENDARTERECTOMY WITH BOVINE PATCH ANGIOPLASTY, EEG MONITORING, AND COMPLETION DUPLEX VESSEL ULTRASOUND;  Surgeon: Sb Hunter DO;  Location: [facility]  HYBRID OR 12;  Service: Vascular;  Laterality: Left;   • CATARACT EXTRACTION Bilateral    • CHOLECYSTECTOMY     • CHOLECYSTECTOMY WITH INTRAOPERATIVE CHOLANGIOGRAM N/A 6/18/2020    Procedure: CHOLECYSTECTOMY LAPAROSCOPIC INTRAOPERATIVE CHOLANGIOGRAM;  Surgeon: Maame Del Rio MD;  Location: [facility] OR;  Service: General;  Laterality: N/A;   • COLONOSCOPY  07/21/2016    two polyps ,both removed   • COLONOSCOPY N/A 7/22/2021    Procedure: COLONOSCOPY WITH ANESTHESIA;  Surgeon: Marc Mart DO;  Location: [facility] ENDOSCOPY;  Service: Gastroenterology;  Laterality: N/A;  pre op: tubular adenoma  post op: diverticulosis, polyps  pcp: Kaila Gonzales APRN   • ENDOSCOPY  04/18/2013    normal   • ENDOSCOPY N/A 1/26/2018    Procedure: ESOPHAGOGASTRODUODENOSCOPY WITH ANESTHESIA;  Surgeon: Marc Mart DO;  Location: [facility] ENDOSCOPY;  Service:    • SINUS SURGERY      Dr. Griffith   • TONSILLECTOMY     • ULNAR NERVE REPAIR         Outpatient Medications Marked as Taking for the 8/2/22 encounter (Office Visit) with Cecil Gonzales MD   Medication Sig Dispense Refill   • aspirin 81 MG chewable tablet Chew 81 mg Daily.     • atorvastatin (LIPITOR) 80 MG tablet Take 1 tablet by mouth Every Night. (Patient taking differently: Take 80 mg by mouth Every Night. Takes 1/2 tablet nightly.) 30 tablet 0   • azelastine (ASTELIN) 0.1 % nasal spray 2 sprays into the nostril(s) as directed by provider 2 (Two) Times a Day. Use in each nostril as directed 30 mL 11   • clopidogrel (PLAVIX) 75 MG tablet Take 1 tablet by mouth Daily. 30 tablet 5   • desonide (DESOWEN) 0.05 % cream Apply 1 application topically to the appropriate area as directed Daily As Needed for Irritation.  11   • FLUoxetine (PROzac) 20 MG capsule Take 20 mg by mouth Daily.  0   • fluticasone (FLONASE) 50 MCG/ACT nasal spray 2 sprays into the nostril(s) as directed by provider Daily As Needed for Rhinitis or Allergies.     • levothyroxine (SYNTHROID, LEVOTHROID)  88 MCG tablet Take 88 mcg by mouth Daily.     • lisinopril-hydrochlorothiazide (PRINZIDE,ZESTORETIC) 10-12.5 MG per tablet Take 1 tablet by mouth Daily.     • Multiple Vitamins-Minerals (MULTIVITAMIN WITH MINERALS) tablet tablet Take 1 tablet by mouth Daily.     • omeprazole (priLOSEC) 40 MG capsule TAKE ONE CAPSULE BY MOUTH TWICE DAILY, TAKE 30 MINUTES TO 1 HOURS BEFORE A MEAL 60 capsule 3   • pregabalin (LYRICA) 50 MG capsule Take 50 mg by mouth Daily.     • tadalafil (CIALIS) 20 MG tablet Take 20 mg by mouth Daily As Needed for erectile dysfunction.     • valACYclovir (VALTREX) 1000 MG tablet Take 1,000 mg by mouth 2 (Two) Times a Day As Needed.     • zolpidem (AMBIEN) 10 MG tablet Take 10 mg by mouth At Night As Needed.         Patient has no known allergies.    Family History   Problem Relation Age of Onset   • Hyperlipidemia Mother    • Cancer Father    • Hypertension Father    • Hyperlipidemia Father    • Colon cancer Maternal Grandfather    • Colon polyps Neg Hx    • Esophageal cancer Neg Hx    • Liver cancer Neg Hx    • Liver disease Neg Hx    • Rectal cancer Neg Hx    • Stomach cancer Neg Hx        Social History     Socioeconomic History   • Marital status:    Tobacco Use   • Smoking status: Never Smoker   • Smokeless tobacco: Never Used   Vaping Use   • Vaping Use: Never used   Substance and Sexual Activity   • Alcohol use: Yes     Alcohol/week: 7.0 standard drinks     Types: 7 Glasses of wine per week     Comment: nightly - red wine    • Drug use: No   • Sexual activity: Yes     Partners: Female       Review of Systems   Constitutional: Positive for fatigue.   HENT: Positive for congestion, sinus pressure and sinus pain.    Eyes: Negative.    Respiratory: Negative.    Cardiovascular: Negative.    Gastrointestinal: Negative.    Endocrine: Negative.    Genitourinary: Negative.    Allergic/Immunologic: Positive for environmental allergies.   Neurological: Positive for headaches.       Vitals:     08/02/22 1005   BP: 131/74   Pulse: 87   Resp: 16   Temp: 98 °F (36.7 °C)       Body mass index is 26.91 kg/m².    Objective     Physical Exam  Vitals reviewed.   Constitutional:       Appearance: He is normal weight.   HENT:      Head: Normocephalic.      Right Ear: Hearing, tympanic membrane, ear canal and external ear normal.      Left Ear: Hearing, tympanic membrane, ear canal and external ear normal.      Nose: Congestion present.      Mouth/Throat:      Lips: Pink.      Mouth: Mucous membranes are moist.      Pharynx: Uvula midline.   Neurological:      Mental Status: He is alert.         Assessment & Plan   Diagnoses and all orders for this visit:    1. S/P FESS (functional endoscopic sinus surgery) (Primary)    2. Laryngopharyngeal reflux    3. ADITHYA on CPAP    4. Nasal polyposis - hx of    5. Non-seasonal allergic rhinitis due to pollen    6. Nasal septal deviation    Other orders  -     cefuroxime (CEFTIN) 500 MG tablet; Take 1 tablet by mouth 2 (Two) Times a Day for 14 days.  Dispense: 28 tablet; Refill: 0    continue nasal sprays   Reflux medications and precautions discussed   Discussed trying course of antibiotics and if no improvement will discuss balloon frontal sinuplasty     * Surgery not found *  No orders of the defined types were placed in this encounter.    Return in about 2 months (around 10/2/2022) for Recheck.         Patient Instructions   Gastroesophageal Reflux Disease (Laryngopharyngeal Reflux), Adult  Gastroesophageal reflux disease (GERD) and/or Laryngopharyngeal Reflux, (LPR) happens when acid from your stomach flows up into the esophagus and/or throat and voicebox or larynx. When acid comes in contact with the these organs, the acid can cause soreness (inflammation). Over time, GERD may create small holes (ulcers) in the lining of the esophagus and may lead to the development of hoarseness, difficulty swallowing,   feeling of something stuck in the throat, increased mucous or drainage  and even predispose to the development of malignancies, (cancer).    CAUSES   · Increased body weight. This puts pressure on the stomach, making acid rise from the stomach into the esophagus.  · Smoking. This increases acid production in the stomach.  · Drinking alcohol. This causes decreased pressure in the lower esophageal sphincter (valve or ring of muscle between the esophagus and stomach), allowing acid from the stomach into the esophagus.  · Late evening meals and a full stomach. This increases pressure and acid production in the stomach.  · A malformed lower esophageal sphincter  · Diet which can include avoidance of gluten and dairy products  · Age  SYMPTOMS   · Burning pain in the lower part of the mid-chest behind the breastbone and in the mid-stomach area. This may occur twice a week or more often.  · Trouble swallowing.  · Sore throat.  · Dry cough.  · Asthma-like symptoms including chest tightness, shortness of breath, or wheezing.  · Globus sensation-something stuck in the throat/fullness  · Hoarseness  DIAGNOSIS   Your caregiver may be able to diagnose GERD based on your symptoms. In some cases, X-rays and other tests may be done to check for complications or to check the condition of your stomach and esophagus.  You may need to see another doctor.  TREATMENT   Over-the-counter or prescription medicines to help decrease acid production.   Dietary and behavioral modifications or changes may be also recommended.  HOME CARE INSTRUCTIONS   · Change the factors that you can control. Ask your caregiver for guidance concerning weight loss, quitting smoking, and alcohol consumption.  · Avoid foods and drinks that make your symptoms worse, and MAY include such as:  ¨ Caffeine or alcoholic drinks.  ¨ Chocolate.  ¨ Gluten containing foods  ¨ Dairy  ¨ Peppermint or mint flavorings.  ¨ Garlic and onions.  ¨ Spicy foods.  ¨ Citrus fruits, such as oranges, kennedy, or limes.  ¨ Tomato-based foods such as sauce,  chili, salsa, and pizza.  ¨ Fried and fatty foods.  · Avoid lying down for the 3 hours prior to your bedtime or prior to taking a nap.  · Eat small, frequent meals instead of large meals.  · Wear loose-fitting clothing. Do not wear anything tight around your waist that causes pressure on your stomach.  · Raise the head of your bed 6 to 8 inches with wood blocks to help you sleep. Extra pillows will not help.  · Only take over-the-counter or prescription medicines for pain, discomfort, or fever as directed by your caregiver.  · Do not take aspirin, ibuprofen, or other nonsteroidal anti-inflammatory drugs if possible (NSAIDs).  SEEK IMMEDIATE MEDICAL CARE IF:   · You have pain in your arms, neck, jaw, teeth, or back.  · Your pain increases or changes in intensity or duration.  · You develop nausea, vomiting, or sweating (diaphoresis).  · You develop shortness of breath, or you faint.  · Your vomit is green, yellow, black, or looks like coffee grounds or blood.  · Your stool is red, bloody, or black.  These symptoms could be signs of other problems, such as heart disease, gastric bleeding, or esophageal bleeding.  MAKE SURE YOU:   · Understand these instructions.  · Will watch your condition.  · Will get help right away if you are not doing well or get worse.     This information is not intended to replace advice given to you by your physician. Make sure you discuss any questions you have with your health care provider.     Modified by Cecil Gonzales MD, FACS 9/8/2016.  Document Released: 09/27/2006 Document Revised: 01/08/2016 Document Reviewed: 04/13/2016  [employer] Interactive Patient Education ©2016 [employer] For the best response, use your nasal sprays every day without skipping doses. It may take several weeks before the full effect is acheived.

## 2022-08-02 ENCOUNTER — OFFICE VISIT (OUTPATIENT)
Dept: OTOLARYNGOLOGY | Facility: CLINIC | Age: 77
End: 2022-08-02

## 2022-08-02 VITALS
HEART RATE: 87 BPM | RESPIRATION RATE: 16 BRPM | TEMPERATURE: 98 F | DIASTOLIC BLOOD PRESSURE: 74 MMHG | HEIGHT: 68 IN | SYSTOLIC BLOOD PRESSURE: 131 MMHG | WEIGHT: 177 LBS | BODY MASS INDEX: 26.83 KG/M2

## 2022-08-02 DIAGNOSIS — G47.33 OSA ON CPAP: ICD-10-CM

## 2022-08-02 DIAGNOSIS — J33.9 NASAL POLYPOSIS: ICD-10-CM

## 2022-08-02 DIAGNOSIS — Z98.890 S/P FESS (FUNCTIONAL ENDOSCOPIC SINUS SURGERY): Primary | ICD-10-CM

## 2022-08-02 DIAGNOSIS — J34.2 NASAL SEPTAL DEVIATION: ICD-10-CM

## 2022-08-02 DIAGNOSIS — K21.9 LARYNGOPHARYNGEAL REFLUX: ICD-10-CM

## 2022-08-02 DIAGNOSIS — Z99.89 OSA ON CPAP: ICD-10-CM

## 2022-08-02 DIAGNOSIS — J30.1 NON-SEASONAL ALLERGIC RHINITIS DUE TO POLLEN: ICD-10-CM

## 2022-08-02 PROCEDURE — 31231 NASAL ENDOSCOPY DX: CPT | Performed by: OTOLARYNGOLOGY

## 2022-08-02 PROCEDURE — 99213 OFFICE O/P EST LOW 20 MIN: CPT | Performed by: NURSE PRACTITIONER

## 2022-08-02 RX ORDER — CEFUROXIME AXETIL 500 MG/1
500 TABLET ORAL 2 TIMES DAILY
Qty: 28 TABLET | Refills: 0 | Status: SHIPPED | OUTPATIENT
Start: 2022-08-02 | End: 2022-08-16

## 2022-08-02 NOTE — PATIENT INSTRUCTIONS
Gastroesophageal Reflux Disease (Laryngopharyngeal Reflux), Adult  Gastroesophageal reflux disease (GERD) and/or Laryngopharyngeal Reflux, (LPR) happens when acid from your stomach flows up into the esophagus and/or throat and voicebox or larynx. When acid comes in contact with the these organs, the acid can cause soreness (inflammation). Over time, GERD may create small holes (ulcers) in the lining of the esophagus and may lead to the development of hoarseness, difficulty swallowing,   feeling of something stuck in the throat, increased mucous or drainage and even predispose to the development of malignancies, (cancer).    CAUSES   Increased body weight. This puts pressure on the stomach, making acid rise from the stomach into the esophagus.  Smoking. This increases acid production in the stomach.  Drinking alcohol. This causes decreased pressure in the lower esophageal sphincter (valve or ring of muscle between the esophagus and stomach), allowing acid from the stomach into the esophagus.  Late evening meals and a full stomach. This increases pressure and acid production in the stomach.  A malformed lower esophageal sphincter  Diet which can include avoidance of gluten and dairy products  Age  SYMPTOMS   Burning pain in the lower part of the mid-chest behind the breastbone and in the mid-stomach area. This may occur twice a week or more often.  Trouble swallowing.  Sore throat.  Dry cough.  Asthma-like symptoms including chest tightness, shortness of breath, or wheezing.  Globus sensation-something stuck in the throat/fullness  Hoarseness  DIAGNOSIS   Your caregiver may be able to diagnose GERD based on your symptoms. In some cases, X-rays and other tests may be done to check for complications or to check the condition of your stomach and esophagus.  You may need to see another doctor.  TREATMENT   Over-the-counter or prescription medicines to help decrease acid production.   Dietary and behavioral modifications  or changes may be also recommended.  HOME CARE INSTRUCTIONS   Change the factors that you can control. Ask your caregiver for guidance concerning weight loss, quitting smoking, and alcohol consumption.  Avoid foods and drinks that make your symptoms worse, and MAY include such as:  Caffeine or alcoholic drinks.  Chocolate.  Gluten containing foods  Dairy  Peppermint or mint flavorings.  Garlic and onions.  Spicy foods.  Citrus fruits, such as oranges, kennedy, or limes.  Tomato-based foods such as sauce, chili, salsa, and pizza.  Fried and fatty foods.  Avoid lying down for the 3 hours prior to your bedtime or prior to taking a nap.  Eat small, frequent meals instead of large meals.  Wear loose-fitting clothing. Do not wear anything tight around your waist that causes pressure on your stomach.  Raise the head of your bed 6 to 8 inches with wood blocks to help you sleep. Extra pillows will not help.  Only take over-the-counter or prescription medicines for pain, discomfort, or fever as directed by your caregiver.  Do not take aspirin, ibuprofen, or other nonsteroidal anti-inflammatory drugs if possible (NSAIDs).  SEEK IMMEDIATE MEDICAL CARE IF:   You have pain in your arms, neck, jaw, teeth, or back.  Your pain increases or changes in intensity or duration.  You develop nausea, vomiting, or sweating (diaphoresis).  You develop shortness of breath, or you faint.  Your vomit is green, yellow, black, or looks like coffee grounds or blood.  Your stool is red, bloody, or black.  These symptoms could be signs of other problems, such as heart disease, gastric bleeding, or esophageal bleeding.  MAKE SURE YOU:   Understand these instructions.  Will watch your condition.  Will get help right away if you are not doing well or get worse.     This information is not intended to replace advice given to you by your physician. Make sure you discuss any questions you have with your health care provider.     Modified by Cecil Gonzales,  MD, FACS 9/8/2016.  Document Released: 09/27/2006 Document Revised: 01/08/2016 Document Reviewed: 04/13/2016  [employer] Interactive Patient Education ©2016 [employer] For the best response, use your nasal sprays every day without skipping doses. It may take several weeks before the full effect is acheived.

## 2022-08-02 NOTE — PROGRESS NOTES
PROCEDURE NOTE    Justin Szymanski    DATE OF PROCEDURE: 8/2/2022    PROCEDURE:   Bilateral Diagnostic Rigid Nasal Endoscopy    PREPROCEDURE DIAGNOSIS:   History of functional endoscopic ethmoidectomy with recurrent frontal pressure and history of nasal polyposis    POSTPROCEDURE DIAGNOSIS:  SAME    ANESTHESIA:   None    PROCEDURE DESCRIPTION:    With the patient in the chair bilateral diagnostic rigid nasal endoscopy was performed with the [employer] 0° endoscope without difficulty.     An endoscope was passed along the left nasal floor to the nasopharynx.  It was then passed into the region of the middle meatus, middle turbinate, and the sphenoethmoid region. An identical procedure was performed on the right side.  The following findings were noted as stated below:    Findings: Bilateral antrostomies are patent with mild inflammation throughout the nose.  No recidivistic polyposis is appreciated.  No td purulent discharge is noted.  Remnants of the middle turbinates are noted bilaterally.  No other abnormalities appreciated.    Condition:  Stable.  Patient tolerated procedure well.    Complications:  None  There was no significant bleeding.

## 2022-10-03 NOTE — PROGRESS NOTES
YOB: 1945  Location: [facility]  Location Address: [address] [place] [address], [facility], [address] [place] [address]  Location Phone: [phone]    Chief Complaint   Patient presents with   • Sinus Problem       History of Present Illness  Justin Szymanski is a 77 y.o. male.  Justin Szymanski is here for follow up of ENT complaints. The patient has had problems with nasal congestion, sinus pressure, intermittent headaches, and ear itching. The patient has had improving symptoms. The symptoms have been present for the last several years. There have been no identified factors that aggravate the symptoms. The symptoms are improved by oral antibiotics and nasal sprays.    He reports that overall his symptoms have improved.     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[facility]) 2020    numbness in right arm, the patinet states his symptom may be due to neck injury, following with neurology    • Valvular disease        Past Surgical History:   Procedure Laterality Date   • CAROTID ENDARTERECTOMY Left 2020    Procedure: LEFT CAROTID ENDARTERECTOMY WITH BOVINE PATCH ANGIOPLASTY, EEG MONITORING, AND COMPLETION DUPLEX VESSEL ULTRASOUND;  Surgeon: Sb Hunter DO;  Location: [facility] OR 12;  Service: Vascular;  Laterality: Left;   • CATARACT EXTRACTION Bilateral    • CHOLECYSTECTOMY     • CHOLECYSTECTOMY WITH INTRAOPERATIVE CHOLANGIOGRAM N/A 2020    Procedure: CHOLECYSTECTOMY LAPAROSCOPIC INTRAOPERATIVE CHOLANGIOGRAM;  Surgeon: Maame Del Rio MD;  Location: [facility] OR;  Service: General;  Laterality: N/A;   • COLONOSCOPY   07/21/2016    two polyps ,both removed   • COLONOSCOPY N/A 7/22/2021    Procedure: COLONOSCOPY WITH ANESTHESIA;  Surgeon: Marc Mart DO;  Location: [facility] ENDOSCOPY;  Service: Gastroenterology;  Laterality: N/A;  pre op: tubular adenoma  post op: diverticulosis, polyps  pcp: Kaila Gonzales APRN   • ENDOSCOPY  04/18/2013    normal   • ENDOSCOPY N/A 1/26/2018    Procedure: ESOPHAGOGASTRODUODENOSCOPY WITH ANESTHESIA;  Surgeon: Marc Mart DO;  Location: [facility] ENDOSCOPY;  Service:    • SINUS SURGERY      Dr. Griffith   • TONSILLECTOMY     • ULNAR NERVE REPAIR         Outpatient Medications Marked as Taking for the 10/4/22 encounter (Office Visit) with Cecil Gonzales MD   Medication Sig Dispense Refill   • atorvastatin (LIPITOR) 80 MG tablet Take 1 tablet by mouth Every Night. (Patient taking differently: Take 80 mg by mouth Every Night. Takes 1/2 tablet nightly.) 30 tablet 0   • azelastine (ASTELIN) 0.1 % nasal spray 2 sprays into the nostril(s) as directed by provider 2 (Two) Times a Day. Use in each nostril as directed 30 mL 11   • clopidogrel (PLAVIX) 75 MG tablet Take 1 tablet by mouth Daily. 30 tablet 5   • FLUoxetine (PROzac) 20 MG capsule Take 20 mg by mouth Daily.  0   • fluticasone (FLONASE) 50 MCG/ACT nasal spray 2 sprays into the nostril(s) as directed by provider Daily As Needed for Rhinitis or Allergies.     • levothyroxine (SYNTHROID, LEVOTHROID) 88 MCG tablet Take 88 mcg by mouth Daily.     • lisinopril-hydrochlorothiazide (PRINZIDE,ZESTORETIC) 10-12.5 MG per tablet Take 1 tablet by mouth Daily.     • Multiple Vitamins-Minerals (MULTIVITAMIN WITH MINERALS) tablet tablet Take 1 tablet by mouth Daily.     • omeprazole (priLOSEC) 40 MG capsule TAKE ONE CAPSULE BY MOUTH TWICE DAILY, TAKE 30 MINUTES TO 1 HOURS BEFORE A MEAL 60 capsule 3   • pregabalin (LYRICA) 50 MG capsule Take 50 mg by mouth Daily.     • tadalafil (CIALIS) 20 MG tablet Take 20 mg by mouth Daily As Needed for erectile  dysfunction.         Patient has no known allergies.    Family History   Problem Relation Age of Onset   • Hyperlipidemia Mother    • Cancer Father    • Hypertension Father    • Hyperlipidemia Father    • Colon cancer Maternal Grandfather    • Colon polyps Neg Hx    • Esophageal cancer Neg Hx    • Liver cancer Neg Hx    • Liver disease Neg Hx    • Rectal cancer Neg Hx    • Stomach cancer Neg Hx        Social History     Socioeconomic History   • Marital status:    Tobacco Use   • Smoking status: Never Smoker   • Smokeless tobacco: Never Used   Vaping Use   • Vaping Use: Never used   Substance and Sexual Activity   • Alcohol use: Yes     Alcohol/week: 7.0 standard drinks     Types: 7 Glasses of wine per week     Comment: nightly - red wine    • Drug use: No   • Sexual activity: Yes     Partners: Female       Review of Systems   Constitutional: Negative.    HENT:        Admits ear itching   Eyes: Negative.    Respiratory: Negative.    Cardiovascular: Negative.    Gastrointestinal: Negative.    Endocrine: Negative.    Genitourinary: Negative.    Musculoskeletal: Negative.    Skin: Negative.    Allergic/Immunologic: Negative.    Neurological: Negative.    Hematological: Negative.    Psychiatric/Behavioral: Negative.        Vitals:    10/04/22 1008   BP: 134/74   Pulse: 68   Resp: 16   Temp: 98 °F (36.7 °C)       Body mass index is 27.37 kg/m².    Objective     Physical Exam  Vitals reviewed.   Constitutional:       Appearance: Normal appearance. He is overweight.   HENT:      Head: Normocephalic and atraumatic.      Right Ear: Hearing, tympanic membrane and external ear normal.      Left Ear: Hearing, tympanic membrane and external ear normal.      Ears:      Comments: Dermatitis of bilateral ear canals noted     Nose: Nose normal.      Mouth/Throat:      Lips: Pink.      Mouth: Mucous membranes are moist.      Pharynx: Oropharynx is clear.   Musculoskeletal:      Cervical back: Full passive range of motion  without pain.   Neurological:      Mental Status: He is alert.   Psychiatric:         Behavior: Behavior is cooperative.         Assessment & Plan   Diagnoses and all orders for this visit:    1. S/P FESS (functional endoscopic sinus surgery) (Primary)    2. Laryngopharyngeal reflux    3. ADITHYA on CPAP    4. Nasal polyposis - hx of    5. Non-seasonal allergic rhinitis due to pollen    6. Nasal septal deviation    7. Dermatitis of both ear canals    Other orders  -     mometasone (ELOCON) 0.1 % cream; Apply 1 application topically to the appropriate area as directed Daily.  Dispense: 15 g; Refill: 0      * Surgery not found *  No orders of the defined types were placed in this encounter.    Elocon ointment to both ears for ear itching  We will continue to conservatively manage symptoms with nasal sprays.  Call with any new/worsening problems or concerns    Dr. Gonzales examined and discussed care with patient and agrees with treatment plan.       Return in about 6 months (around 4/4/2023) for Recheck.       There are no Patient Instructions on file for this visit.

## 2022-10-04 ENCOUNTER — OFFICE VISIT (OUTPATIENT)
Dept: OTOLARYNGOLOGY | Facility: CLINIC | Age: 77
End: 2022-10-04

## 2022-10-04 VITALS
RESPIRATION RATE: 16 BRPM | BODY MASS INDEX: 27.28 KG/M2 | HEIGHT: 68 IN | SYSTOLIC BLOOD PRESSURE: 134 MMHG | HEART RATE: 68 BPM | DIASTOLIC BLOOD PRESSURE: 74 MMHG | TEMPERATURE: 98 F | WEIGHT: 180 LBS

## 2022-10-04 DIAGNOSIS — Z99.89 OSA ON CPAP: ICD-10-CM

## 2022-10-04 DIAGNOSIS — K21.9 LARYNGOPHARYNGEAL REFLUX: ICD-10-CM

## 2022-10-04 DIAGNOSIS — J30.1 NON-SEASONAL ALLERGIC RHINITIS DUE TO POLLEN: ICD-10-CM

## 2022-10-04 DIAGNOSIS — J34.2 NASAL SEPTAL DEVIATION: ICD-10-CM

## 2022-10-04 DIAGNOSIS — J33.9 NASAL POLYPOSIS: ICD-10-CM

## 2022-10-04 DIAGNOSIS — H60.543 DERMATITIS OF BOTH EAR CANALS: ICD-10-CM

## 2022-10-04 DIAGNOSIS — Z98.890 S/P FESS (FUNCTIONAL ENDOSCOPIC SINUS SURGERY): Primary | ICD-10-CM

## 2022-10-04 DIAGNOSIS — G47.33 OSA ON CPAP: ICD-10-CM

## 2022-10-04 PROCEDURE — 99214 OFFICE O/P EST MOD 30 MIN: CPT | Performed by: NURSE PRACTITIONER

## 2022-10-04 RX ORDER — MOMETASONE FUROATE 1 MG/G
1 CREAM TOPICAL DAILY
Qty: 15 G | Refills: 0 | Status: SHIPPED | OUTPATIENT
Start: 2022-10-04 | End: 2023-04-05 | Stop reason: SDUPTHER

## 2022-10-04 NOTE — PATIENT INSTRUCTIONS
Elocon ointment to both ears for ear itching  We will continue to conservatively manage symptoms with nasal sprays.  Call with any new/worsening problems or concerns    For the best response, use your nasal sprays every day without skipping doses. It may take several weeks before the full effect is acheived.      CONTACT INFORMATION:  The main office phone number is [phone]. For emergencies after hours and on weekends, this number will convert over to our answering service and the on call provider will answer. Please try to keep non emergent phone calls/ questions to office hours 9am-5pm Monday through Friday.      [employer]  As an alternative, you can sign up and use the Epic MyChart system for more direct and quicker access for non emergent questions/ problems.  [employer] allows you to send messages to your doctor, view your test results, renew your prescriptions, schedule appointments, and more. To sign up, go to [employer] and click on the Sign Up Now link in the New User? box. Enter your [employer] Activation Code exactly as it appears below along with the last four digits of your Social Security Number and your Date of Birth () to complete the sign-up process. If you do not sign up before the expiration date, you must request a new code.     [employer] Activation Code: Activation code not generated  Current [employer] Status: Active     If you have questions, you can email [email or web address] or call [phone] to talk to our [employer] staff. Remember, [employer] is NOT to be used for urgent needs. For medical emergencies, dial 911.     IF YOU SMOKE OR USE TOBACCO PLEASE READ THE FOLLOWING:  Why is smoking bad for me?  Smoking increases the risk of heart disease, lung disease, vascular disease, stroke, and cancer. If you smoke, STOP!        IF YOU SMOKE OR USE TOBACCO PLEASE READ THE FOLLOWING:  Why is smoking bad for me?  Smoking increases the risk of heart disease, lung  disease, vascular disease, stroke, and cancer. If you smoke, STOP!     For more information:  Quit Now [place]  1-800-QUIT-NOW  [email or web address]

## 2022-12-12 ENCOUNTER — TELEPHONE (OUTPATIENT)
Dept: CARDIAC SURGERY | Facility: CLINIC | Age: 77
End: 2022-12-12

## 2022-12-12 NOTE — TELEPHONE ENCOUNTER
LM for pt re: upcoming appointments on 01/09/2023. If pt returns call, please provide appointment information for this date, as well as prep instructions.    HUB: If pt returns call, please provide this information and document in this telephone encounter. Thank you!

## 2023-01-04 PROBLEM — Z78.9 NON-SMOKER: Status: ACTIVE | Noted: 2023-01-04

## 2023-01-04 NOTE — PROGRESS NOTES
Chief Complaint   Patient presents with   • Ascending Aortic Aneurysm     Patient is here for follow up w/CT          Subjective     History of Present Illness  Mr. Szymanski is a 77-year-old male who initially presented to Dr. Rodriguez with incidental finding of ascending aortic aneurysm.  He underwent left carotid endarterectomy 2 years ago after having some numbness and tingling on one side and being followed for asymptomatic carotid disease.  He did well after this and recovered well.  Given the work-up for this he was identified as having an ascending aortic aneurysm.  He has no family history of aortic aneurysms or dissections or sudden death that he knows of.  He was followed by Dr. Hunter and had repeat CTA which showed some mild enlargement of his aneurysm and therefore was referred to Dr. Rodriguez for continued surveillance.  His initial visit with Dr. Rodriguez was 6 months ago and at that time thoracic aortic aneurysm measured 4.8 cm.  After discussion with the patient, the decision was made for ongoing surveillance and he is here today for 6-month follow-up with repeat CTA chest.  He reports that lately his blood pressure has been slightly elevated and adjustment in medications have been made by his primary care provider with improvement.  Otherwise no new issues since last office visit.            Review of Systems   Constitutional: Negative for activity change, fatigue and unexpected weight change.   Respiratory: Negative for chest tightness and shortness of breath.    Cardiovascular: Negative for chest pain and leg swelling.        A complete review of systems was performed, is negative except stated above.    Past Medical History:   Diagnosis Date   • Allergic rhinitis    • Carotid artery disease ([facility])    • COVID-19 vaccine series completed     Moderna   • GERD (gastroesophageal reflux disease)    • Heart murmur    • Hyperlipidemia    • Hypertension    • Hypothyroidism    • IBS (irritable bowel syndrome)    •  Nasal polyposis    • Seasonal allergic rhinitis    • Sleep apnea     intermittently uses cpap   • Snoring    • Stroke (cerebrum) ([facility]) 4/29/2020    numbness in right arm, the patinet states his symptom may be due to neck injury, following with neurology    • Valvular disease      Past Surgical History:   Procedure Laterality Date   • CAROTID ENDARTERECTOMY Left 5/6/2020    Procedure: LEFT CAROTID ENDARTERECTOMY WITH BOVINE PATCH ANGIOPLASTY, EEG MONITORING, AND COMPLETION DUPLEX VESSEL ULTRASOUND;  Surgeon: Sb Hunter DO;  Location: [facility] HYBRID OR 12;  Service: Vascular;  Laterality: Left;   • CATARACT EXTRACTION Bilateral    • CHOLECYSTECTOMY     • CHOLECYSTECTOMY WITH INTRAOPERATIVE CHOLANGIOGRAM N/A 6/18/2020    Procedure: CHOLECYSTECTOMY LAPAROSCOPIC INTRAOPERATIVE CHOLANGIOGRAM;  Surgeon: Maame Del Rio MD;  Location: [facility] OR;  Service: General;  Laterality: N/A;   • COLONOSCOPY  07/21/2016    two polyps ,both removed   • COLONOSCOPY N/A 7/22/2021    Procedure: COLONOSCOPY WITH ANESTHESIA;  Surgeon: Marc Mart DO;  Location: [facility] ENDOSCOPY;  Service: Gastroenterology;  Laterality: N/A;  pre op: tubular adenoma  post op: diverticulosis, polyps  pcp: Kaila Gonzales APRN   • ENDOSCOPY  04/18/2013    normal   • ENDOSCOPY N/A 1/26/2018    Procedure: ESOPHAGOGASTRODUODENOSCOPY WITH ANESTHESIA;  Surgeon: Marc Mart DO;  Location: [facility] ENDOSCOPY;  Service:    • SINUS SURGERY      Dr. Griffith   • TONSILLECTOMY     • ULNAR NERVE REPAIR       Family History   Problem Relation Age of Onset   • Hyperlipidemia Mother    • Cancer Father    • Hypertension Father    • Hyperlipidemia Father    • Colon cancer Maternal Grandfather    • Colon polyps Neg Hx    • Esophageal cancer Neg Hx    • Liver cancer Neg Hx    • Liver disease Neg Hx    • Rectal cancer Neg Hx    • Stomach cancer Neg Hx      Social History     Tobacco Use   • Smoking status: Never   • Smokeless tobacco: Never   Vaping Use   •  Vaping Use: Never used   Substance Use Topics   • Alcohol use: Yes     Alcohol/week: 7.0 standard drinks     Types: 7 Glasses of wine per week     Comment: nightly - red wine    • Drug use: No     Current Outpatient Medications   Medication Sig Dispense Refill   • aspirin 81 MG chewable tablet Chew 81 mg Daily.     • atorvastatin (LIPITOR) 80 MG tablet Take 1 tablet by mouth Every Night. (Patient taking differently: Take 80 mg by mouth Every Night. Takes 1/2 tablet nightly.) 30 tablet 0   • azelastine (ASTELIN) 0.1 % nasal spray 2 sprays into the nostril(s) as directed by provider 2 (Two) Times a Day. Use in each nostril as directed 30 mL 11   • clopidogrel (PLAVIX) 75 MG tablet Take 1 tablet by mouth Daily. 30 tablet 5   • desonide (DESOWEN) 0.05 % cream Apply 1 application topically to the appropriate area as directed Daily As Needed for Irritation.  11   • FLUoxetine (PROzac) 20 MG capsule Take 20 mg by mouth Daily.  0   • fluticasone (FLONASE) 50 MCG/ACT nasal spray 2 sprays into the nostril(s) as directed by provider Daily As Needed for Rhinitis or Allergies.     • levothyroxine (SYNTHROID, LEVOTHROID) 88 MCG tablet Take 88 mcg by mouth Daily.     • lisinopril-hydrochlorothiazide (PRINZIDE,ZESTORETIC) 10-12.5 MG per tablet Take 1 tablet by mouth Daily.     • mometasone (ELOCON) 0.1 % cream Apply 1 application topically to the appropriate area as directed Daily. 15 g 0   • Multiple Vitamins-Minerals (MULTIVITAMIN WITH MINERALS) tablet tablet Take 1 tablet by mouth Daily.     • omeprazole (priLOSEC) 40 MG capsule TAKE ONE CAPSULE BY MOUTH TWICE DAILY, TAKE 30 MINUTES TO 1 HOURS BEFORE A MEAL 60 capsule 3   • pregabalin (LYRICA) 50 MG capsule Take 50 mg by mouth Daily.     • tadalafil (CIALIS) 20 MG tablet Take 20 mg by mouth Daily As Needed for erectile dysfunction.     • valACYclovir (VALTREX) 1000 MG tablet Take 1,000 mg by mouth 2 (Two) Times a Day As Needed.     • zolpidem (AMBIEN) 10 MG tablet Take 10 mg by  mouth At Night As Needed.       No current facility-administered medications for this visit.     Allergies:  Patient has no known allergies.    Objective      Vital Signs  Visit Vitals  /84 (BP Location: Right arm, Patient Position: Sitting, Cuff Size: Adult)   Ht 172.7 cm (68\")   Wt 82.8 kg (182 lb 9.6 oz)   SpO2 96%   BMI 27.76 kg/m²         Physical Exam  Constitutional:       General: He is not in acute distress.     Appearance: He is well-developed. He is not diaphoretic.   HENT:      Head: Normocephalic and atraumatic.      Right Ear: External ear normal.      Left Ear: External ear normal.   Eyes:      General:         Right eye: No discharge.         Left eye: No discharge.      Pupils: Pupils are equal, round, and reactive to light.   Neck:      Vascular: No JVD.      Trachea: No tracheal deviation.   Cardiovascular:      Rate and Rhythm: Normal rate and regular rhythm.      Heart sounds: Normal heart sounds. No murmur heard.  Pulmonary:      Effort: Pulmonary effort is normal. No respiratory distress.      Breath sounds: Normal breath sounds. No stridor. No wheezing.   Abdominal:      General: There is no distension.      Palpations: Abdomen is soft.      Tenderness: There is no abdominal tenderness. There is no guarding.   Musculoskeletal:         General: No tenderness or deformity. Normal range of motion.      Cervical back: Normal range of motion and neck supple.   Skin:     General: Skin is warm and dry.      Capillary Refill: Capillary refill takes less than 2 seconds.      Coloration: Skin is not pale.      Findings: No erythema or rash.   Neurological:      Mental Status: He is alert and oriented to person, place, and time.      Motor: No abnormal muscle tone.      Coordination: Coordination normal.   Psychiatric:         Behavior: Behavior normal.         Thought Content: Thought content normal.         Judgment: Judgment normal.         Results Review:        I reviewed the patient's  clinical results and discussed with patient.    CT Chest: 2022  There is normal opacification of the pulmonary arteries and the branches  bilaterally. There are no filling defects in the opacified pulmonary  arterial bed.     There are atheromatous changes of the thoracic aorta. There is ectasia  of the ascending thoracic aorta which measures 4.9 cm in AP dimension.  No evidence of dissection. Atheromatous changes of the coronary arteries  are seen.     There is no evidence of mediastinal or hilar mass or lymphadenopathy.     The limited visualized soft tissues of the neck are unremarkable.     There is no axillary lymphadenopathy.     The lungs are moderately well-expanded. No evidence of infiltrate. No  consolidation. No mass. The tracheobronchial structures appear normal.     The limited visualized liver and spleen are unremarkable. The adrenal  glands are normal. Visualized pancreas is unremarkable. The kidneys are  incompletely included in the study and not evaluated. There is an  apparent tiny low-density nodule in the lower pole of the right kidney  which is incompletely visualized and evaluated.     Images reviewed at bone window show no acute bony abnormality.     IMPRESSION:  1. No evidence of pulmonary embolism.  2. The lungs are unremarkable.     This report was finalized on 2022 10:14 by Dr. Rachel Castañeda MD.  ECHO:      [facility] CARDIOLOGY  [address] [place] [id number] [phone]          Justin Szymanski  Complete Transthoracic Echocardiogram with Complete Doppler and Color Flow  Order# [id number]  Reading physician: Carlos Bowden MD Ordering physician: Malena Richardson APRN Study date: 20       Patient Information    Patient Name   Justin Szymanski MRN   [id number] Legal Sex   Male  (Age)   1945 (77 y.o.)         [facility]     Show images for Adult Transthoracic Echo Complete W/ Cont if Necessary Per Protocol (With Agitated  Saline)        Sedation Narrator Report    Sedation Narrator Report       Interpretation Summary    · Left ventricular systolic function is normal. Estimated EF appears to be in the range of 61 - 65%.  · Left ventricular diastolic dysfunction (grade I) consistent with impaired relaxation.  · Trace-to-mild aortic valve regurgitation is present.  · Mild dilation of the aortic root is present.              I personally reviewed images of following exams, the following is my interpretation:    CT Chest: 1/9/2022  Stable ascending aortic aneurysm measuring 4.6 cm in maximum dimension with no evidence of dissection.        Assessment & Plan    Thoracic aortic aneurysm  Non-smoker    Mr. Szymanski is a 77-year-old male who presented to Dr. Rodriguez 6 months ago with incidental finding of ascending aortic aneurysm.  This was found incidentally during work-up for carotid endarterectomy.  He is otherwise quite healthy and active 77-year-old male.  He has normal heart function and tricuspid aortic valve.  At initial visit with Dr. Rodriguez 6 months ago thoracic aortic aneurysm measured 4.8 cm.  Follow-up CTA chest today reveals stability and I measure ascending aortic aneurysm at 4.6 cm.  He has no significant family history of aneurysmal disease or dissections.  I discussed with him the natural history of ascending aortic aneurysm such as his today.  We agreed that continued surveillance is the best treatment option.  He is a non-smoker and his blood pressure is well controlled at this time.  I did discuss with the patient and recommend that should additional agents be needed, recommend beta-blocker therapy along with ACE inhibitor from an aneurysm standpoint.  He is currently on an ACE inhibitor.    We discussed the risk of this aneurysm size considerations for repair and the risk of the operative procedure that would be required to treat the aneurysm.  Currently the risk of surgery outweigh the benefits of prevention of aortic  problems.  My recommendation would be to continue to survey the aneurysm with CT scans.  We discussed the signs and symptoms of an acute aortic emergency and the patient should immediately present to the emergency room if that were to happen.  We also discussed the importance of tight blood pressure control.  We discussed avoiding strenuous activity that would put undue stress on the aneurysm.      BMI is >= 25 and <30. (Overweight) The following options were offered after discussion;: referral to a nutritionist    Justin Szymanski  reports that he has never smoked. He has never used smokeless tobacco.. I have educated him on the risk of diseases from using tobacco products such as cancer, COPD and heart disease.     Advance Care Planning   ACP discussion was held with the patient during this visit. Patient does not have an advance directive, declines further assistance.       Follow-up in 6 months with Dr. Rodriguez with repeat CTA chest.

## 2023-01-09 ENCOUNTER — HOSPITAL ENCOUNTER (OUTPATIENT)
Dept: CT IMAGING | Facility: HOSPITAL | Age: 78
Discharge: HOME | End: 2023-01-09
Admitting: SURGERY
Payer: MEDICARE

## 2023-01-09 ENCOUNTER — OFFICE VISIT (OUTPATIENT)
Dept: CARDIAC SURGERY | Facility: CLINIC | Age: 78
End: 2023-01-09
Payer: MEDICARE

## 2023-01-09 VITALS
BODY MASS INDEX: 27.68 KG/M2 | WEIGHT: 182.6 LBS | DIASTOLIC BLOOD PRESSURE: 84 MMHG | OXYGEN SATURATION: 96 % | HEIGHT: 68 IN | HEART RATE: 105 BPM | SYSTOLIC BLOOD PRESSURE: 137 MMHG

## 2023-01-09 DIAGNOSIS — Z78.9 NON-SMOKER: ICD-10-CM

## 2023-01-09 DIAGNOSIS — I71.21 ASCENDING AORTIC ANEURYSM: ICD-10-CM

## 2023-01-09 DIAGNOSIS — I71.21 ANEURYSM OF ASCENDING AORTA WITHOUT RUPTURE: Primary | ICD-10-CM

## 2023-01-09 LAB — CREAT BLDA-MCNC: 1.2 MG/DL (ref 0.6–1.3)

## 2023-01-09 PROCEDURE — 82565 ASSAY OF CREATININE: CPT

## 2023-01-09 PROCEDURE — 71275 CT ANGIOGRAPHY CHEST: CPT

## 2023-01-09 PROCEDURE — 99213 OFFICE O/P EST LOW 20 MIN: CPT | Performed by: NURSE PRACTITIONER

## 2023-01-09 PROCEDURE — 0 IOPAMIDOL PER 1 ML: Performed by: SURGERY

## 2023-01-09 RX ADMIN — IOPAMIDOL 100 ML: 755 INJECTION, SOLUTION INTRAVENOUS at 11:49

## 2023-04-05 ENCOUNTER — OFFICE VISIT (OUTPATIENT)
Dept: OTOLARYNGOLOGY | Facility: CLINIC | Age: 78
End: 2023-04-05
Payer: MEDICARE

## 2023-04-05 VITALS
WEIGHT: 183 LBS | HEIGHT: 68 IN | BODY MASS INDEX: 27.74 KG/M2 | SYSTOLIC BLOOD PRESSURE: 122 MMHG | RESPIRATION RATE: 16 BRPM | TEMPERATURE: 98.4 F | HEART RATE: 72 BPM | DIASTOLIC BLOOD PRESSURE: 68 MMHG

## 2023-04-05 DIAGNOSIS — J30.1 NON-SEASONAL ALLERGIC RHINITIS DUE TO POLLEN: ICD-10-CM

## 2023-04-05 DIAGNOSIS — Z98.890 S/P FESS (FUNCTIONAL ENDOSCOPIC SINUS SURGERY): Primary | ICD-10-CM

## 2023-04-05 DIAGNOSIS — G47.33 OSA ON CPAP: ICD-10-CM

## 2023-04-05 DIAGNOSIS — K21.9 LARYNGOPHARYNGEAL REFLUX: ICD-10-CM

## 2023-04-05 DIAGNOSIS — Z99.89 OSA ON CPAP: ICD-10-CM

## 2023-04-05 PROCEDURE — 3074F SYST BP LT 130 MM HG: CPT | Performed by: NURSE PRACTITIONER

## 2023-04-05 PROCEDURE — 99213 OFFICE O/P EST LOW 20 MIN: CPT | Performed by: NURSE PRACTITIONER

## 2023-04-05 PROCEDURE — 3078F DIAST BP <80 MM HG: CPT | Performed by: NURSE PRACTITIONER

## 2023-04-05 RX ORDER — LEVOTHYROXINE SODIUM 0.1 MG/1
100 TABLET ORAL
COMMUNITY
Start: 2023-01-20

## 2023-04-05 RX ORDER — ATORVASTATIN CALCIUM 40 MG/1
1 TABLET, FILM COATED ORAL DAILY
COMMUNITY
Start: 2023-03-24

## 2023-04-05 RX ORDER — AZELASTINE 1 MG/ML
2 SPRAY, METERED NASAL 2 TIMES DAILY
Qty: 30 ML | Refills: 11 | Status: SHIPPED | OUTPATIENT
Start: 2023-04-05

## 2023-04-05 RX ORDER — MOMETASONE FUROATE 1 MG/G
1 CREAM TOPICAL DAILY
Qty: 15 G | Refills: 0 | Status: SHIPPED | OUTPATIENT
Start: 2023-04-05

## 2023-04-05 NOTE — PROGRESS NOTES
YOB: 1945  Location: [facility]  Location Address: [address] [place] [address], [facility], [address] [place] [address]  Location Phone: [phone]    Chief Complaint   Patient presents with   • Sinus Problem     Having drainage down throat       History of Present Illness  Justin Szymanski is a 77 y.o. male.  Justin Szymanski is here for follow up of ENT complaints. The patient has had problems with nasal congestion, sinus pressure, interrmitent headaches and ear itching. The symptoms are not localized to a particular location. The patient has had stable symptoms. The symptoms have been present for the last several years. He is currently taking Astelin and Flonase daily that seems to control symptoms. He has been allergy tested in the past and has underwent several years of immunotherapy.     Past Medical History:   Diagnosis Date   • Allergic rhinitis    • Carotid artery disease    • COVID-19 vaccine series completed     Moderna   • GERD (gastroesophageal reflux disease)    • Heart murmur    • Hyperlipidemia    • Hypertension    • Hypothyroidism    • IBS (irritable bowel syndrome)    • Nasal polyposis    • Seasonal allergic rhinitis    • Sleep apnea     intermittently uses cpap   • Snoring    • Stroke (cerebrum) 2020    numbness in right arm, the patinet states his symptom may be due to neck injury, following with neurology    • Valvular disease        Past Surgical History:   Procedure Laterality Date   • CAROTID ENDARTERECTOMY Left 2020    Procedure: LEFT CAROTID ENDARTERECTOMY WITH BOVINE PATCH ANGIOPLASTY, EEG MONITORING, AND COMPLETION DUPLEX VESSEL ULTRASOUND;  Surgeon: Sb Hunter DO;  Location: [facility] OR ;  Service: Vascular;  Laterality: Left;   • CATARACT EXTRACTION Bilateral    • CHOLECYSTECTOMY     • CHOLECYSTECTOMY WITH INTRAOPERATIVE CHOLANGIOGRAM N/A 2020    Procedure: CHOLECYSTECTOMY LAPAROSCOPIC INTRAOPERATIVE CHOLANGIOGRAM;  Surgeon: Maame Del Rio MD;  Location: [facility]  OR;  Service: General;  Laterality: N/A;   • COLONOSCOPY  07/21/2016    two polyps ,both removed   • COLONOSCOPY N/A 7/22/2021    Procedure: COLONOSCOPY WITH ANESTHESIA;  Surgeon: Marc Mart DO;  Location: [facility] ENDOSCOPY;  Service: Gastroenterology;  Laterality: N/A;  pre op: tubular adenoma  post op: diverticulosis, polyps  pcp: Kaila Gonzales APRN   • ENDOSCOPY  04/18/2013    normal   • ENDOSCOPY N/A 1/26/2018    Procedure: ESOPHAGOGASTRODUODENOSCOPY WITH ANESTHESIA;  Surgeon: Marc Mart DO;  Location: [facility] ENDOSCOPY;  Service:    • SINUS SURGERY      Dr. Griffith   • TONSILLECTOMY     • ULNAR NERVE REPAIR         Outpatient Medications Marked as Taking for the 4/5/23 encounter (Office Visit) with Carl Scott APRN   Medication Sig Dispense Refill   • atorvastatin (LIPITOR) 40 MG tablet Take 1 tablet by mouth Daily.     • azelastine (ASTELIN) 0.1 % nasal spray 2 sprays into the nostril(s) as directed by provider 2 (Two) Times a Day. Use in each nostril as directed 30 mL 11   • clopidogrel (PLAVIX) 75 MG tablet Take 1 tablet by mouth Daily. 30 tablet 5   • desonide (DESOWEN) 0.05 % cream Apply 1 application topically to the appropriate area as directed Daily As Needed for Irritation.  11   • FLUoxetine (PROzac) 20 MG capsule Take 1 capsule by mouth Daily.  0   • fluticasone (FLONASE) 50 MCG/ACT nasal spray 2 sprays into the nostril(s) as directed by provider Daily As Needed for Rhinitis or Allergies.     • levothyroxine (SYNTHROID, LEVOTHROID) 100 MCG tablet Take 1 tablet by mouth Every Morning Before Breakfast.     • lisinopril-hydrochlorothiazide (PRINZIDE,ZESTORETIC) 10-12.5 MG per tablet Take 1 tablet by mouth Daily.     • mometasone (ELOCON) 0.1 % cream Apply 1 application topically to the appropriate area as directed Daily. 15 g 0   • Multiple Vitamins-Minerals (MULTIVITAMIN WITH MINERALS) tablet tablet Take 1 tablet by mouth Daily.     • omeprazole (priLOSEC) 40 MG capsule TAKE ONE  CAPSULE BY MOUTH TWICE DAILY, TAKE 30 MINUTES TO 1 HOURS BEFORE A MEAL 60 capsule 3   • tadalafil (CIALIS) 20 MG tablet Take 1 tablet by mouth Daily As Needed for Erectile Dysfunction.     • valACYclovir (VALTREX) 1000 MG tablet Take 1 tablet by mouth 2 (Two) Times a Day As Needed.     • zolpidem (AMBIEN) 10 MG tablet Take 1 tablet by mouth At Night As Needed.     • [DISCONTINUED] atorvastatin (LIPITOR) 80 MG tablet Take 1 tablet by mouth Every Night. (Patient taking differently: Take 1 tablet by mouth Every Night. Takes 1/2 tablet nightly.) 30 tablet 0   • [DISCONTINUED] azelastine (ASTELIN) 0.1 % nasal spray 2 sprays into the nostril(s) as directed by provider 2 (Two) Times a Day. Use in each nostril as directed 30 mL 11   • [DISCONTINUED] levothyroxine (SYNTHROID, LEVOTHROID) 88 MCG tablet Take 1 tablet by mouth Daily.         Patient has no known allergies.    Family History   Problem Relation Age of Onset   • Hyperlipidemia Mother    • Cancer Father    • Hypertension Father    • Hyperlipidemia Father    • Colon cancer Maternal Grandfather    • Colon polyps Neg Hx    • Esophageal cancer Neg Hx    • Liver cancer Neg Hx    • Liver disease Neg Hx    • Rectal cancer Neg Hx    • Stomach cancer Neg Hx        Social History     Socioeconomic History   • Marital status:    Tobacco Use   • Smoking status: Never   • Smokeless tobacco: Never   Vaping Use   • Vaping Use: Never used   Substance and Sexual Activity   • Alcohol use: Yes     Alcohol/week: 7.0 standard drinks     Types: 7 Glasses of wine per week     Comment: nightly - red wine    • Drug use: No   • Sexual activity: Yes     Partners: Female       Review of Systems   Constitutional: Negative.    HENT: Positive for congestion and postnasal drip.    Respiratory: Negative.    Cardiovascular: Negative.    Gastrointestinal: Negative.    Genitourinary: Negative.        Vitals:    04/05/23 1012   BP: 122/68   Pulse: 72   Resp: 16   Temp: 98.4 °F (36.9 °C)        Body mass index is 27.83 kg/m².    Objective     Physical Exam  Vitals reviewed.   Constitutional:       Appearance: Normal appearance. He is overweight.   HENT:      Head: Normocephalic and atraumatic.      Right Ear: Hearing, tympanic membrane and external ear normal.      Left Ear: Hearing, tympanic membrane and external ear normal.      Ears:      Comments: Dermatitis of bilateral ear canals noted     Nose: Nose normal.      Mouth/Throat:      Lips: Pink.      Mouth: Mucous membranes are moist.      Pharynx: Oropharynx is clear.   Musculoskeletal:      Cervical back: Full passive range of motion without pain.   Neurological:      Mental Status: He is alert.   Psychiatric:         Behavior: Behavior is cooperative.         Assessment & Plan   Diagnoses and all orders for this visit:    1. S/P FESS (functional endoscopic sinus surgery) (Primary)    2. Laryngopharyngeal reflux    3. ADITHYA on CPAP    4. Non-seasonal allergic rhinitis due to pollen    Other orders  -     azelastine (ASTELIN) 0.1 % nasal spray; 2 sprays into the nostril(s) as directed by provider 2 (Two) Times a Day. Use in each nostril as directed  Dispense: 30 mL; Refill: 11  -     mometasone (ELOCON) 0.1 % cream; Apply 1 application topically to the appropriate area as directed Daily.  Dispense: 15 g; Refill: 0      * Surgery not found *  No orders of the defined types were placed in this encounter.    Continue nasal sprays  Elocon for ear itching  Return for problems    Return in about 6 months (around 10/5/2023) for Recheck.       Patient Instructions   Continue nasal sprays  Elocon for ear itching  Return for problems    CONTACT INFORMATION:  The main office phone number is [phone]. For emergencies after hours and on weekends, this number will convert over to our answering service and the on call provider will answer. Please try to keep non emergent phone calls/ questions to office hours 9am-5pm Monday through Friday.      MyChart  As  an alternative, you can sign up and use the Epic MyChart system for more direct and quicker access for non emergent questions/ problems.  DailyTicket allows you to send messages to your doctor, view your test results, renew your prescriptions, schedule appointments, and more. To sign up, go to [employer] and click on the Sign Up Now link in the New User? box. Enter your Panizon Activation Code exactly as it appears below along with the last four digits of your Social Security Number and your Date of Birth () to complete the sign-up process. If you do not sign up before the expiration date, you must request a new code.     Panizon Activation Code: Activation code not generated  Current Panizon Status: Active     If you have questions, you can email [email or web address] or call [phone] to talk to our Panizon staff. Remember, Panizon is NOT to be used for urgent needs. For medical emergencies, dial 911.     IF YOU SMOKE OR USE TOBACCO PLEASE READ THE FOLLOWING:  Why is smoking bad for me?  Smoking increases the risk of heart disease, lung disease, vascular disease, stroke, and cancer. If you smoke, STOP!        IF YOU SMOKE OR USE TOBACCO PLEASE READ THE FOLLOWING:  Why is smoking bad for me?  Smoking increases the risk of heart disease, lung disease, vascular disease, stroke, and cancer. If you smoke, STOP!     For more information:  Quit Now [place]  -QUIT-NOW  [email or web address]

## 2023-04-05 NOTE — PATIENT INSTRUCTIONS
Continue nasal sprays  Elocon for ear itching  Return for problems    CONTACT INFORMATION:  The main office phone number is [phone]. For emergencies after hours and on weekends, this number will convert over to our answering service and the on call provider will answer. Please try to keep non emergent phone calls/ questions to office hours 9am-5pm Monday through Friday.      Hypercontext  As an alternative, you can sign up and use the Epic MyChart system for more direct and quicker access for non emergent questions/ problems.  [employer] [facility] Hypercontext allows you to send messages to your doctor, view your test results, renew your prescriptions, schedule appointments, and more. To sign up, go to [employer] and click on the Sign Up Now link in the New User? box. Enter your Hypercontext Activation Code exactly as it appears below along with the last four digits of your Social Security Number and your Date of Birth () to complete the sign-up process. If you do not sign up before the expiration date, you must request a new code.     Hypercontext Activation Code: Activation code not generated  Current Hypercontext Status: Active     If you have questions, you can email [email or web address] or call [phone] to talk to our Hypercontext staff. Remember, Hypercontext is NOT to be used for urgent needs. For medical emergencies, dial 911.     IF YOU SMOKE OR USE TOBACCO PLEASE READ THE FOLLOWING:  Why is smoking bad for me?  Smoking increases the risk of heart disease, lung disease, vascular disease, stroke, and cancer. If you smoke, STOP!        IF YOU SMOKE OR USE TOBACCO PLEASE READ THE FOLLOWING:  Why is smoking bad for me?  Smoking increases the risk of heart disease, lung disease, vascular disease, stroke, and cancer. If you smoke, STOP!     For more information:  Quit Now [facility]  -QUIT-NOW  [email or web address]

## 2023-05-16 ENCOUNTER — TELEPHONE (OUTPATIENT)
Dept: VASCULAR SURGERY | Facility: CLINIC | Age: 78
End: 2023-05-16
Payer: MEDICARE

## 2023-05-17 ENCOUNTER — TELEPHONE (OUTPATIENT)
Dept: OTOLARYNGOLOGY | Facility: CLINIC | Age: 78
End: 2023-05-17
Payer: MEDICARE

## 2023-05-17 ENCOUNTER — HOSPITAL ENCOUNTER (OUTPATIENT)
Dept: ULTRASOUND IMAGING | Facility: HOSPITAL | Age: 78
Discharge: HOME | End: 2023-05-17
Admitting: SURGERY
Payer: MEDICARE

## 2023-05-17 ENCOUNTER — OFFICE VISIT (OUTPATIENT)
Dept: VASCULAR SURGERY | Facility: CLINIC | Age: 78
End: 2023-05-17
Payer: MEDICARE

## 2023-05-17 VITALS
DIASTOLIC BLOOD PRESSURE: 84 MMHG | HEART RATE: 96 BPM | WEIGHT: 179 LBS | BODY MASS INDEX: 27.22 KG/M2 | SYSTOLIC BLOOD PRESSURE: 124 MMHG | OXYGEN SATURATION: 95 %

## 2023-05-17 DIAGNOSIS — I65.23 BILATERAL CAROTID ARTERY STENOSIS: Primary | ICD-10-CM

## 2023-05-17 DIAGNOSIS — I71.21 ANEURYSM OF ASCENDING AORTA WITHOUT RUPTURE: ICD-10-CM

## 2023-05-17 DIAGNOSIS — I65.23 BILATERAL CAROTID ARTERY STENOSIS: ICD-10-CM

## 2023-05-17 DIAGNOSIS — E78.5 HYPERLIPIDEMIA, UNSPECIFIED HYPERLIPIDEMIA TYPE: ICD-10-CM

## 2023-05-17 DIAGNOSIS — I10 ESSENTIAL HYPERTENSION: ICD-10-CM

## 2023-05-17 PROCEDURE — 93880 EXTRACRANIAL BILAT STUDY: CPT

## 2023-05-17 RX ORDER — CEFUROXIME AXETIL 500 MG/1
500 TABLET ORAL 2 TIMES DAILY
Qty: 20 TABLET | Refills: 0 | Status: SHIPPED | OUTPATIENT
Start: 2023-05-17 | End: 2023-05-27

## 2023-05-17 NOTE — PROGRESS NOTES
05/17/2023         Kaila Gonzales, APRN  [address] [place] [address]    Justin Szymanski  1945    Chief Complaint   Patient presents with    Follow-up     1 yr f/u with carotid testing.  Last seen in the office on 5/31/22.  Pt denies any stroke type symptoms.       Dear Kaila Gonzales, APRN       HPI  I had the pleasure of seeing your patient Justin Szymanski in the office today.    As you recall, Justin Szymanski is a 77 y.o.  male who we are following for asymptomatic carotid occlusive disease.  He does follow with Dr. Rodriguez for an ascending aortic aneurysm.  He underwent a left carotid endarterectomy on 5/6/2020.  Currently he is doing well and denies any strokelike symptoms.  He is maintained on Plavix and Lipitor.  He did have noninvasive testing performed today, which I did review in office.         Review of Systems   Constitutional: Negative.    HENT: Negative.     Eyes: Negative.    Respiratory: Negative.     Cardiovascular: Negative.    Gastrointestinal: Negative.    Endocrine: Negative.    Genitourinary: Negative.    Musculoskeletal: Negative.    Skin: Negative.    Allergic/Immunologic: Negative.    Neurological: Negative.    Hematological: Negative.    Psychiatric/Behavioral: Negative.     All other systems reviewed and are negative.     /84   Pulse 96   Wt 81.2 kg (179 lb)   SpO2 95%   BMI 27.22 kg/m²   Physical Exam  Vitals and nursing note reviewed.   Constitutional:       General: He is not in acute distress.     Appearance: Normal appearance. He is well-developed. He is not diaphoretic.   HENT:      Head: Normocephalic and atraumatic.   Neck:      Vascular: No carotid bruit or JVD.   Cardiovascular:      Rate and Rhythm: Normal rate and regular rhythm.      Pulses: Normal pulses.           Femoral pulses are 2+ on the right side and 2+ on the left side.       Popliteal pulses are 2+ on the right side and 2+ on the left side.        Dorsalis pedis pulses are 2+ on the right side and 2+ on the  left side.        Posterior tibial pulses are 2+ on the right side and 2+ on the left side.      Heart sounds: Normal heart sounds, S1 normal and S2 normal. No murmur heard.    No friction rub. No gallop.   Pulmonary:      Effort: Pulmonary effort is normal.      Breath sounds: Normal breath sounds.   Abdominal:      General: Bowel sounds are normal. There is no abdominal bruit.      Palpations: Abdomen is soft.      Tenderness: There is no abdominal tenderness.   Musculoskeletal:         General: Normal range of motion.   Skin:     General: Skin is warm and dry.   Neurological:      Mental Status: He is alert and oriented to person, place, and time.      Cranial Nerves: No cranial nerve deficit.   Psychiatric:         Mood and Affect: Mood normal.         Behavior: Behavior normal.         Thought Content: Thought content normal.         Judgment: Judgment normal.           Diagnostic Data:  US Carotid Bilateral    Result Date: 5/17/2023  Narrative: History: Carotid occlusive disease      Impression: Impression: 1. There is less than 50% stenosis of the right internal carotid artery. 2. There is 50-69% stenosis of the left internal carotid artery. 3. Antegrade flow is demonstrated in bilateral vertebral arteries.  Comments: Bilateral carotid vertebral arterial duplex scan was performed.  Grayscale imaging shows intimal thickening and calcified elements at the carotid bifurcation. The right internal carotid artery peak systolic velocity is 96.1 cm/sec. The end-diastolic velocity is 38.4 cm/sec. The right ICA/CCA ratio is approximately 0.9 . These findings correlate with less than 50% stenosis of the right internal carotid artery.  Grayscale imaging shows intimal thickening and calcified elements at the carotid bifurcation. The left internal carotid artery peak systolic velocity is 142.3 cm/sec. The end-diastolic velocity is 42.7 cm/sec. The left ICA/CCA ratio is approximately 1.3 . These findings correlate with  50-69% stenosis of the left internal carotid artery.  Antegrade flow is demonstrated in bilateral vertebral arteries. There is greater than 50% stenosis of the right external carotid artery. This report was finalized on 05/17/2023 14:12 by Dr. Sb Hunter MD.        Patient Active Problem List   Diagnosis    Allergic rhinitis due to pollen    Snoring    Nasal polyposis - hx of    ADITHYA (obstructive sleep apnea)    ADITHYA on CPAP    Indigestion    Heart murmur    Hypertension    Hyperlipidemia    Ascending aortic aneurysm    PAD (peripheral artery disease)    Upper extremity weakness    Bilateral carotid artery stenosis    Numbness and tingling of right upper extremity    Muscle fasciculation    Preop testing    Carotid stenosis    Tubular adenoma    S/P FESS (functional endoscopic sinus surgery)    Laryngopharyngeal reflux    History of nasal polyposis    Non-smoker         ICD-10-CM ICD-9-CM   1. Bilateral carotid artery stenosis  I65.23 433.10     433.30   2. Aneurysm of ascending aorta without rupture  I71.21 441.2   3. Essential hypertension  I10 401.9   4. Hyperlipidemia, unspecified hyperlipidemia type  E78.5 272.4       Plan: After thoroughly evaluating Justin Szymanski, I believe the best course of action is to remain conservative from vascular surgery standpoint.  I did review his testing which shows less than 50% right carotid stenosis and 50 to 69% left carotid stenosis.  We will see him back in 1 year for continued surveillance with repeat noninvasive testing including a carotid duplex.  CT surgery will follow his a sending aortic aneurysm going forward.  He has had a previous ultrasound of the aorta showing no evidence of aneurysmal disease.  I did discuss vascular risk factors as they pertain to the progression of vascular disease including controlling his hypertension and hyperlipidemia.  His blood pressure stable on his current medications.  He is maintained on Lipitor for his hyperlipidemia.  The  patient can continue taking her current medication regimen as previously planned.  This was all discussed in full with complete understanding.    Thank you for allowing me to participate in the care of your patient.  Please do not hesitate with any questions or concerns.  I will keep you aware of any further encounters with Justin Szymanski.        Sincerely yours,         EFRA Ellington Cainan G, APRN

## 2023-05-17 NOTE — LETTER
May 22, 2023       No Recipients    Patient: Justin Szymanski   YOB: 1945   Date of Visit: 5/17/2023       Dear Dr. Suarez Recipients:    Thank you for referring Justin Szymanski to me for evaluation. Below are the relevant portions of my assessment and plan of care.    If you have questions, please do not hesitate to call me. I look forward to following Justin along with you.         Sincerely,        EFRA Ellington        CC:   No Recipients    Ana Lerma APRN  05/22/23 1850  Sign when Signing Visit  05/17/2023         Kaila Gonzales, APRN  [address] [place] [address]    Justin Szymanski  1945    Chief Complaint   Patient presents with   • Follow-up     1 yr f/u with carotid testing.  Last seen in the office on 5/31/22.  Pt denies any stroke type symptoms.       Dear Kaila Gonzales, APRN       HPI  I had the pleasure of seeing your patient Justin Szymanski in the office today.    As you recall, Justin Szymanski is a 77 y.o.  male who we are following for asymptomatic carotid occlusive disease.  He does follow with Dr. Rodriguez for an ascending aortic aneurysm.  He underwent a left carotid endarterectomy on 5/6/2020.  Currently he is doing well and denies any strokelike symptoms.  He is maintained on Plavix and Lipitor.  He did have noninvasive testing performed today, which I did review in office.         Review of Systems   Constitutional: Negative.    HENT: Negative.     Eyes: Negative.    Respiratory: Negative.     Cardiovascular: Negative.    Gastrointestinal: Negative.    Endocrine: Negative.    Genitourinary: Negative.    Musculoskeletal: Negative.    Skin: Negative.    Allergic/Immunologic: Negative.    Neurological: Negative.    Hematological: Negative.    Psychiatric/Behavioral: Negative.     All other systems reviewed and are negative.     /84   Pulse 96   Wt 81.2 kg (179 lb)   SpO2 95%   BMI 27.22 kg/m²   Physical Exam  Vitals and nursing note reviewed.   Constitutional:        General: He is not in acute distress.     Appearance: Normal appearance. He is well-developed. He is not diaphoretic.   HENT:      Head: Normocephalic and atraumatic.   Neck:      Vascular: No carotid bruit or JVD.   Cardiovascular:      Rate and Rhythm: Normal rate and regular rhythm.      Pulses: Normal pulses.           Femoral pulses are 2+ on the right side and 2+ on the left side.       Popliteal pulses are 2+ on the right side and 2+ on the left side.        Dorsalis pedis pulses are 2+ on the right side and 2+ on the left side.        Posterior tibial pulses are 2+ on the right side and 2+ on the left side.      Heart sounds: Normal heart sounds, S1 normal and S2 normal. No murmur heard.    No friction rub. No gallop.   Pulmonary:      Effort: Pulmonary effort is normal.      Breath sounds: Normal breath sounds.   Abdominal:      General: Bowel sounds are normal. There is no abdominal bruit.      Palpations: Abdomen is soft.      Tenderness: There is no abdominal tenderness.   Musculoskeletal:         General: Normal range of motion.   Skin:     General: Skin is warm and dry.   Neurological:      Mental Status: He is alert and oriented to person, place, and time.      Cranial Nerves: No cranial nerve deficit.   Psychiatric:         Mood and Affect: Mood normal.         Behavior: Behavior normal.         Thought Content: Thought content normal.         Judgment: Judgment normal.           Diagnostic Data:  US Carotid Bilateral    Result Date: 5/17/2023  Narrative: History: Carotid occlusive disease      Impression: Impression: 1. There is less than 50% stenosis of the right internal carotid artery. 2. There is 50-69% stenosis of the left internal carotid artery. 3. Antegrade flow is demonstrated in bilateral vertebral arteries.  Comments: Bilateral carotid vertebral arterial duplex scan was performed.  Grayscale imaging shows intimal thickening and calcified elements at the carotid bifurcation. The right  internal carotid artery peak systolic velocity is 96.1 cm/sec. The end-diastolic velocity is 38.4 cm/sec. The right ICA/CCA ratio is approximately 0.9 . These findings correlate with less than 50% stenosis of the right internal carotid artery.  Grayscale imaging shows intimal thickening and calcified elements at the carotid bifurcation. The left internal carotid artery peak systolic velocity is 142.3 cm/sec. The end-diastolic velocity is 42.7 cm/sec. The left ICA/CCA ratio is approximately 1.3 . These findings correlate with 50-69% stenosis of the left internal carotid artery.  Antegrade flow is demonstrated in bilateral vertebral arteries. There is greater than 50% stenosis of the right external carotid artery. This report was finalized on 05/17/2023 14:12 by Dr. Sb Hunter MD.        Patient Active Problem List   Diagnosis   • Allergic rhinitis due to pollen   • Snoring   • Nasal polyposis - hx of   • ADITHYA (obstructive sleep apnea)   • ADITHYA on CPAP   • Indigestion   • Heart murmur   • Hypertension   • Hyperlipidemia   • Ascending aortic aneurysm   • PAD (peripheral artery disease)   • Upper extremity weakness   • Bilateral carotid artery stenosis   • Numbness and tingling of right upper extremity   • Muscle fasciculation   • Preop testing   • Carotid stenosis   • Tubular adenoma   • S/P FESS (functional endoscopic sinus surgery)   • Laryngopharyngeal reflux   • History of nasal polyposis   • Non-smoker         ICD-10-CM ICD-9-CM   1. Bilateral carotid artery stenosis  I65.23 433.10     433.30   2. Aneurysm of ascending aorta without rupture  I71.21 441.2   3. Essential hypertension  I10 401.9   4. Hyperlipidemia, unspecified hyperlipidemia type  E78.5 272.4       Plan: After thoroughly evaluating Justin Szymanski, I believe the best course of action is to remain conservative from vascular surgery standpoint.  I did review his testing which shows less than 50% right carotid stenosis and 50 to 69% left carotid  stenosis.  We will see him back in 1 year for continued surveillance with repeat noninvasive testing including a carotid duplex.  CT surgery will follow his a sending aortic aneurysm going forward.  He has had a previous ultrasound of the aorta showing no evidence of aneurysmal disease.  I did discuss vascular risk factors as they pertain to the progression of vascular disease including controlling his hypertension and hyperlipidemia.  His blood pressure stable on his current medications.  He is maintained on Lipitor for his hyperlipidemia.  The patient can continue taking her current medication regimen as previously planned.  This was all discussed in full with complete understanding.    Thank you for allowing me to participate in the care of your patient.  Please do not hesitate with any questions or concerns.  I will keep you aware of any further encounters with Justin Szymanski.        Sincerely yours,         Ana Lerma, EFRA Gonzales, EFRA Coleman

## 2023-05-17 NOTE — TELEPHONE ENCOUNTER
Patient came into office and stated ever since his last appointment he has had facial pain and congestion with no relief. Requesting abx be sent to [facility]an in [place]

## 2023-07-20 ENCOUNTER — TELEPHONE (OUTPATIENT)
Dept: GASTROENTEROLOGY | Age: 78
End: 2023-07-20

## 2023-07-20 NOTE — TELEPHONE ENCOUNTER
Patient: Chanelle Anderson    YOB: 1945      Clearance was received on July 20, 2023. for Endoscopy / Colonoscopy scheduled for: 7/25/23    Patient may discontinue the use of PLAVIX for 5  days prior to the procedure.     IS Lovenox required:  NO    PATIENT NOTIFIED ON:  7/20/23      Our fax machine was down - rcvd this as a verbal from Jory Pacheco

## 2023-07-24 ENCOUNTER — ANESTHESIA EVENT (OUTPATIENT)
Dept: ENDOSCOPY | Age: 78
End: 2023-07-24
Payer: COMMERCIAL

## 2023-07-25 ENCOUNTER — HOSPITAL ENCOUNTER (OUTPATIENT)
Age: 78
Setting detail: OUTPATIENT SURGERY
Discharge: HOME | End: 2023-07-25
Attending: INTERNAL MEDICINE | Admitting: INTERNAL MEDICINE
Payer: COMMERCIAL

## 2023-07-25 ENCOUNTER — ANESTHESIA (OUTPATIENT)
Dept: ENDOSCOPY | Age: 78
End: 2023-07-25
Payer: COMMERCIAL

## 2023-07-25 VITALS
SYSTOLIC BLOOD PRESSURE: 134 MMHG | DIASTOLIC BLOOD PRESSURE: 84 MMHG | RESPIRATION RATE: 18 BRPM | TEMPERATURE: 97.3 F | BODY MASS INDEX: 27.28 KG/M2 | WEIGHT: 180 LBS | OXYGEN SATURATION: 93 % | HEIGHT: 68 IN | HEART RATE: 68 BPM

## 2023-07-25 PROCEDURE — 43242 EGD US FINE NEEDLE BX/ASPIR: CPT | Performed by: INTERNAL MEDICINE

## 2023-07-25 PROCEDURE — 2500000003 HC RX 250 WO HCPCS: Performed by: NURSE ANESTHETIST, CERTIFIED REGISTERED

## 2023-07-25 PROCEDURE — 3609018500 HC EGD US SCOPE W/ADJACENT STRUCTURES: Performed by: INTERNAL MEDICINE

## 2023-07-25 PROCEDURE — 7100000011 HC PHASE II RECOVERY - ADDTL 15 MIN: Performed by: INTERNAL MEDICINE

## 2023-07-25 PROCEDURE — 7100000010 HC PHASE II RECOVERY - FIRST 15 MIN: Performed by: INTERNAL MEDICINE

## 2023-07-25 PROCEDURE — 6360000002 HC RX W HCPCS: Performed by: NURSE ANESTHETIST, CERTIFIED REGISTERED

## 2023-07-25 PROCEDURE — 3700000001 HC ADD 15 MINUTES (ANESTHESIA): Performed by: INTERNAL MEDICINE

## 2023-07-25 PROCEDURE — 2580000003 HC RX 258: Performed by: INTERNAL MEDICINE

## 2023-07-25 PROCEDURE — 3700000000 HC ANESTHESIA ATTENDED CARE: Performed by: INTERNAL MEDICINE

## 2023-07-25 PROCEDURE — 2709999900 HC NON-CHARGEABLE SUPPLY: Performed by: INTERNAL MEDICINE

## 2023-07-25 PROCEDURE — 2720000010 HC SURG SUPPLY STERILE: Performed by: INTERNAL MEDICINE

## 2023-07-25 PROCEDURE — 88305 TISSUE EXAM BY PATHOLOGIST: CPT

## 2023-07-25 PROCEDURE — 88112 CYTOPATH CELL ENHANCE TECH: CPT

## 2023-07-25 RX ORDER — SODIUM CHLORIDE, SODIUM LACTATE, POTASSIUM CHLORIDE, CALCIUM CHLORIDE 600; 310; 30; 20 MG/100ML; MG/100ML; MG/100ML; MG/100ML
INJECTION, SOLUTION INTRAVENOUS CONTINUOUS
Status: DISCONTINUED | OUTPATIENT
Start: 2023-07-25 | End: 2023-07-25 | Stop reason: HOSPADM

## 2023-07-25 RX ORDER — LIDOCAINE HYDROCHLORIDE 10 MG/ML
INJECTION, SOLUTION INFILTRATION; PERINEURAL PRN
Status: DISCONTINUED | OUTPATIENT
Start: 2023-07-25 | End: 2023-07-25 | Stop reason: SDUPTHER

## 2023-07-25 RX ORDER — ONDANSETRON 2 MG/ML
INJECTION INTRAMUSCULAR; INTRAVENOUS PRN
Status: DISCONTINUED | OUTPATIENT
Start: 2023-07-25 | End: 2023-07-25 | Stop reason: SDUPTHER

## 2023-07-25 RX ORDER — FENTANYL CITRATE 50 UG/ML
INJECTION, SOLUTION INTRAMUSCULAR; INTRAVENOUS PRN
Status: DISCONTINUED | OUTPATIENT
Start: 2023-07-25 | End: 2023-07-25 | Stop reason: SDUPTHER

## 2023-07-25 RX ORDER — PROPOFOL 10 MG/ML
INJECTION, EMULSION INTRAVENOUS CONTINUOUS PRN
Status: DISCONTINUED | OUTPATIENT
Start: 2023-07-25 | End: 2023-07-25 | Stop reason: SDUPTHER

## 2023-07-25 RX ORDER — CLOPIDOGREL BISULFATE 75 MG/1
75 TABLET ORAL DAILY
COMMUNITY

## 2023-07-25 RX ADMIN — LIDOCAINE HYDROCHLORIDE 40 MG: 10 INJECTION, SOLUTION INFILTRATION; PERINEURAL at 13:03

## 2023-07-25 RX ADMIN — ONDANSETRON 4 MG: 2 INJECTION INTRAMUSCULAR; INTRAVENOUS at 13:03

## 2023-07-25 RX ADMIN — FENTANYL CITRATE 50 MCG: 50 INJECTION INTRAMUSCULAR; INTRAVENOUS at 13:03

## 2023-07-25 RX ADMIN — SODIUM CHLORIDE, POTASSIUM CHLORIDE, SODIUM LACTATE AND CALCIUM CHLORIDE: 600; 310; 30; 20 INJECTION, SOLUTION INTRAVENOUS at 11:49

## 2023-07-25 RX ADMIN — PROPOFOL 140 MCG/KG/MIN: 10 INJECTION, EMULSION INTRAVENOUS at 13:03

## 2023-07-25 ASSESSMENT — PAIN - FUNCTIONAL ASSESSMENT: PAIN_FUNCTIONAL_ASSESSMENT: 0-10

## 2023-07-25 NOTE — ANESTHESIA POSTPROCEDURE EVALUATION
Department of Anesthesiology  Postprocedure Note    Patient: Maryetta Claude  MRN: [id number]  YOB: 1945  Date of evaluation: 7/25/2023      Procedure Summary     Date: 07/25/23 Room / Location: [address]    Anesthesia Start: 1257 Anesthesia Stop:     Procedure: ENDOSCOPIC ULTRASOUND Diagnosis:       Pancreatic abnormality      (Pancreatic abnormality [Q45.3])    Surgeons: Bisi Jones MD Responsible Provider: MALKA Lane CRNA    Anesthesia Type: general, TIVA ASA Status: 3          Anesthesia Type: No value filed.     Humberto Phase I: Humberto Score: 10    Humberto Phase II:        Anesthesia Post Evaluation    Patient location during evaluation: bedside  Patient participation: complete - patient participated  Level of consciousness: sleepy but conscious  Pain score: 0  Airway patency: patent  Nausea & Vomiting: no nausea and no vomiting  Complications: no  Cardiovascular status: hemodynamically stable and blood pressure returned to baseline  Respiratory status: acceptable and nasal cannula  Hydration status: stable

## 2023-07-25 NOTE — DISCHARGE INSTRUCTIONS
RECOMMENDATIONS:   - Await cytology and Flow cytometry results. - Pending results, patient will likely require further imaging and referral to medical oncology. - Please call with any questions. Endoscopic Ultrasound (Oral): What to Expect At Home  Your Recovery  After you have an endoscopic ultrasound--a test to look for problems in the stomach, liver, gallbladder, and other organs--you will stay until the sedation begins to wear off. You will be able to go home after your doctor or nurse checks to make sure you are not having any problems. You may have a sore throat for a day or two after the test.  This care sheet gives you a general idea about what to expect after the test.    How can you care for yourself at home? Activity    Rest as much as you need to after you go home. You should be able to go back to your usual activities the day after the test.   Diet    Follow your doctor's directions for eating after the test.     Drink plenty of fluids (unless your doctor has told you not to). Medicines    If you have a sore throat the day after the test, use an over-the-counter spray to numb your throat. Other instructions    Do not drive or operate any heavy equipment for the remainder of the day. Do not sign legal documents or make major decisions until the medicine effects are gone and you can think clearly. The anesthesia medicine can make it hard for you to fully understand what you are agreeing to. When should you call for help? Call 911 anytime you think you may need emergency care. For example, call if:    You passed out (lost consciousness). You have trouble breathing. Call your doctor now or seek immediate medical care if:    You are vomiting. You have new or worse belly pain. You have a fever. You cannot pass stools or gas. Watch closely for any changes in your health, and be sure to contact your doctor if:    You do not get better as expected.

## 2023-07-25 NOTE — OP NOTE
Referring/Primary Care Provider: MALKA Vanegas Juliana Mound MD    Date of Procedure: 07/25/23    Procedure:   1. EGD with Endoscopic Ultrasound with FNA     Indications:   1. Abnormal imaging with large abdominal lesion/mass noted - seen incidentally on surveillance for aortic aneurysm. Anesthesia:  Sedation was administered by anesthesia who monitored the patient during the procedure. Procedure:   After reviewing the patient's chart, H&P, medications, obtaining informed consent, and discussing risks benefits and alternatives to the procedure the patient was placed in the left lateral decubitus position. A oblique viewing Olympus 180 Linear EUS scope was lubricated and inserted through the mouth into the oropharynx. Under indirect visualization, the upper esophagus was intubated. The scope was advanced to the level of the third portion of duodenum with limited views of the esophageal mucosa. Findings and maneuvers are listed in impression below. The patient tolerated the procedure well. There were no immediate complications. Findings:   Endoscopic Finding:     Limited endoscopic evaluation of the upper GI tract appeared normal    Endosonographic Findings:  - The celiac axis and associated vascular structures was identified and examined. - Limited views of the left lobe of the liver revealed no obvious biliary dilation or focal hepatic mass. - The EUS scope was advanced to the duodenal bulb. The CBD appeared normal. No filling defects seen. - Pancreas: normal appearance. No MPD dilation noted. - In the upper abdomen two large hypoechoic lesions were noted. These were well circumscribed. These measured 4.27 x 4.49cm and 7.3 x 4.05cm respectively. The larger mass was noted right of mid-line near the head of the pancreas. The small was more midline near the celiac axis. Using doppler to confirm an adequate needle path, FNA was pursued of the small 4.4cm lesion.  Using a 22F

## 2023-07-25 NOTE — ANESTHESIA POSTPROCEDURE EVALUATION
Department of Anesthesiology  Postprocedure Note    Patient: Kwaku Atkins  MRN: [id number]  YOB: 1945  Date of evaluation: 7/25/2023      Procedure Summary     Date: 07/25/23 Room / Location: [address]    Anesthesia Start: 1257 Anesthesia Stop:     Procedure: ENDOSCOPIC ULTRASOUND Diagnosis:       Pancreatic abnormality      (Pancreatic abnormality [Q45.3])    Surgeons: Aleja Andrade MD Responsible Provider: MALKA Mercedes CRNA    Anesthesia Type: general, TIVA ASA Status: 3          Anesthesia Type: No value filed.     Humberto Phase I: Humberto Score: 10    Humberto Phase II:        Anesthesia Post Evaluation    Patient location during evaluation: bedside  Patient participation: complete - patient participated  Level of consciousness: sleepy but conscious  Pain score: 0  Airway patency: patent  Nausea & Vomiting: no nausea and no vomiting  Complications: no  Cardiovascular status: hemodynamically stable and blood pressure returned to baseline  Respiratory status: acceptable and nasal cannula  Hydration status: stable

## 2023-07-26 ENCOUNTER — TELEPHONE (OUTPATIENT)
Dept: GASTROENTEROLOGY | Age: 78
End: 2023-07-26

## 2023-07-26 NOTE — TELEPHONE ENCOUNTER
Pt called back after I made the first message and we went over what I stated in the first message. After discussing with Dr Debra Sears yesterday, I left vm for patient today that I will not be here the rest of the week if his results come in. Dr Debra Sears thought I would be, he is out the rest of the week also. He asked that I go ahead and enter in the referral for oncology today, even though the results are not back. He's already scheduled with Dr Shreya Bills 8/8/23, so it looks like they contacted him. I stated on the vm, if he has issues or concerns he can call and an APRN can try to assist with further questions while Dr Debra Sears is out. IMPRESSION:  Abdominal mass vs Lymph node - s/p FNA as above. RECOMMENDATIONS:   - Await cytology and Flow cytometry results. - Pending results, patient will likely require further imaging and referral to medical oncology. - Please call with any questions. The results were discussed with the patient and family. A copy of the images obtained were given to the patient.       Chandni Hunter MD  07/25/23  1:33 PM

## 2023-08-07 NOTE — PROGRESS NOTES
ThinPrep, and cell block section): Atypical B-cell lymphoid population suspicious for lymphoma. COMMENT: Material was sent to [facility] laboratory for flow cytometry. Flow cytometry performed by [facility] ([id number]) shows an atypical B-cell population (86% of sample) with increased cell size. The cells are positive for B-cell markers, CD10, and FMC7. The cells are negative for CD5. There is no tissue available to correlate these findings with other than the smears. FISH-IGH/BCL2 translocation DETECTED; CCND1 IGH, MYC, BCL6-not detected. No evidence of specific gene translocations detected by other Lymphoma FISH probes. Clinical correlation is recommended. 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 The patient will need completion of PET scan and diagnostic CT abdomen and pelvis with contrast. We will need further tissue biopsy. Hyperuricemia-uric acid 9.3  -Concern for tumor lysis syndrome  -Recommend 3 mg rasburicase ASAP  -Recommend to increase p.o. intake of fluid  -Recommend allopurinol 100 mg p.o. daily  -Recommend IV fluids    Acute kidney injury  Labs Renal Latest Ref Rng & Units 8/8/2023   BUN 8 - 23 mg/dL 28(H)   Cr 0.5 - 1.2 mg/dL 1.6(H)   K 3.5 - 5.0 mmol/L 4.7   Na 136 - 145 mmol/L 140       PLAN:  RTC with MD in 2 weeks after scans  CBC, CMP, LDH, uric acid, B2M, Hepatitis panel, HIV today  PET scan at [facility]  CT abd/pelvis at [facility]  2D echo at [facility], anticipated cardiotoxic chemotherapy  Rasburicase 3 mg IV x 1 dose  IV fluids  Called and discussed with Dr. Tee Suero regarding repeating biopsy      Follow Up:   Return in 2 weeks (on 8/22/2023) for CBC, Appointment with Dr. Alessandra Burnham. PET scan at [facility]  CT abd/pelvis at [address] echo at [facility] [address], Bernadine Storey am pre-charting as a registered nurse for Alexa Flores MD. Electronically signed by Bernadine Storey RN on 8/8/2023 at 6:20 PM CDT.     I, Bernadine Storey am scribing for Alexa Flores MD.

## 2023-08-08 ENCOUNTER — HOSPITAL ENCOUNTER (OUTPATIENT)
Dept: CARDIOLOGY | Facility: HOSPITAL | Age: 78
Discharge: HOME | End: 2023-08-08
Admitting: INTERNAL MEDICINE
Payer: MEDICARE

## 2023-08-08 ENCOUNTER — TRANSCRIBE ORDERS (OUTPATIENT)
Dept: ADMINISTRATIVE | Facility: HOSPITAL | Age: 78
End: 2023-08-08
Payer: MEDICARE

## 2023-08-08 ENCOUNTER — HOSPITAL ENCOUNTER (OUTPATIENT)
Dept: INFUSION THERAPY | Age: 78
Discharge: HOME | End: 2023-08-08
Payer: COMMERCIAL

## 2023-08-08 ENCOUNTER — OFFICE VISIT (OUTPATIENT)
Dept: HEMATOLOGY | Age: 78
End: 2023-08-08
Payer: COMMERCIAL

## 2023-08-08 VITALS
DIASTOLIC BLOOD PRESSURE: 64 MMHG | HEIGHT: 68 IN | OXYGEN SATURATION: 97 % | HEART RATE: 80 BPM | BODY MASS INDEX: 27.28 KG/M2 | SYSTOLIC BLOOD PRESSURE: 128 MMHG | WEIGHT: 180 LBS

## 2023-08-08 VITALS
SYSTOLIC BLOOD PRESSURE: 126 MMHG | HEIGHT: 68 IN | BODY MASS INDEX: 27.74 KG/M2 | DIASTOLIC BLOOD PRESSURE: 75 MMHG | WEIGHT: 183 LBS

## 2023-08-08 DIAGNOSIS — Z11.59 ENCOUNTER FOR SCREENING FOR VIRAL DISEASE: ICD-10-CM

## 2023-08-08 DIAGNOSIS — Z51.81 ENCOUNTER FOR MONITORING CARDIOTOXIC DRUG THERAPY: ICD-10-CM

## 2023-08-08 DIAGNOSIS — Z51.81 ENCOUNTER FOR THERAPEUTIC DRUG MONITORING: ICD-10-CM

## 2023-08-08 DIAGNOSIS — Z79.899 NEED FOR PROPHYLACTIC CHEMOTHERAPY: ICD-10-CM

## 2023-08-08 DIAGNOSIS — Z91.89 AT RISK FOR CARDIAC DYSFUNCTION: ICD-10-CM

## 2023-08-08 DIAGNOSIS — R59.1 LYMPHADENOPATHY: ICD-10-CM

## 2023-08-08 DIAGNOSIS — R59.0 ABDOMINAL LYMPHADENOPATHY: ICD-10-CM

## 2023-08-08 DIAGNOSIS — C83.33 DIFFUSE LARGE B-CELL LYMPHOMA OF INTRA-ABDOMINAL LYMPH NODES: ICD-10-CM

## 2023-08-08 DIAGNOSIS — C83.33 DIFFUSE LARGE B-CELL LYMPHOMA OF INTRA-ABDOMINAL LYMPH NODES (HCC): Primary | ICD-10-CM

## 2023-08-08 DIAGNOSIS — Z71.89 CARE PLAN DISCUSSED WITH PATIENT: ICD-10-CM

## 2023-08-08 DIAGNOSIS — Z01.818 EXAMINATION PRIOR TO CHEMOTHERAPY: ICD-10-CM

## 2023-08-08 DIAGNOSIS — Z91.89 AT RISK FOR CARDIAC COMPLICATION: ICD-10-CM

## 2023-08-08 DIAGNOSIS — Z79.899 ENCOUNTER FOR MONITORING CARDIOTOXIC DRUG THERAPY: ICD-10-CM

## 2023-08-08 DIAGNOSIS — R59.1 LYMPHADENOPATHY: Primary | ICD-10-CM

## 2023-08-08 LAB
ALBUMIN SERPL-MCNC: 4.8 G/DL (ref 3.5–5.2)
ALP SERPL-CCNC: 90 U/L (ref 40–130)
ALT SERPL-CCNC: 17 U/L (ref 5–41)
ANION GAP SERPL CALCULATED.3IONS-SCNC: 14 MMOL/L (ref 7–19)
AST SERPL-CCNC: 25 U/L (ref 5–40)
BILIRUB SERPL-MCNC: 0.3 MG/DL (ref 0.2–1.2)
BUN SERPL-MCNC: 28 MG/DL (ref 8–23)
CALCIUM SERPL-MCNC: 9.7 MG/DL (ref 8.8–10.2)
CHLORIDE SERPL-SCNC: 100 MMOL/L (ref 98–111)
CO2 SERPL-SCNC: 26 MMOL/L (ref 22–29)
CREAT SERPL-MCNC: 1.6 MG/DL (ref 0.5–1.2)
ERYTHROCYTE [DISTWIDTH] IN BLOOD BY AUTOMATED COUNT: 13.6 % (ref 11.6–14.4)
GLUCOSE SERPL-MCNC: 90 MG/DL (ref 74–109)
HAV IGM SERPL QL IA: REACTIVE
HBV CORE IGM SERPL QL IA: ABNORMAL
HBV SURFACE AG SERPL QL IA: ABNORMAL
HCT VFR BLD AUTO: 40.1 % (ref 40.1–51)
HCV AB SERPL QL IA: ABNORMAL
HGB BLD-MCNC: 14 G/DL (ref 13.7–17.5)
HIV-1 P24 AG: NORMAL
HIV1+2 AB SERPLBLD QL IA.RAPID: NORMAL
LDH SERPL-CCNC: 201 U/L (ref 91–215)
LYMPHOCYTES # BLD: 1.23 K/UL (ref 1.18–3.74)
LYMPHOCYTES NFR BLD: 15.7 % (ref 19.3–53.1)
MCH RBC QN AUTO: 32.3 PG (ref 25.7–32.2)
MCHC RBC AUTO-ENTMCNC: 34.9 G/DL (ref 32.3–36.5)
MCV RBC AUTO: 92.4 FL (ref 79–92.2)
MONOCYTES # BLD: 1 K/UL (ref 0.24–0.82)
MONOCYTES NFR BLD: 12.8 % (ref 4.7–12.5)
NEUTROPHILS # BLD: 5.35 K/UL (ref 1.56–6.13)
NEUTS SEG NFR BLD: 68.5 % (ref 34–71.1)
PLATELET # BLD AUTO: 215 K/UL (ref 163–337)
PMV BLD AUTO: 9.5 FL (ref 7.4–10.4)
POTASSIUM SERPL-SCNC: 4.7 MMOL/L (ref 3.5–5)
PROT SERPL-MCNC: 7.2 G/DL (ref 6.6–8.7)
RBC # BLD AUTO: 4.34 M/UL (ref 4.63–6.08)
SODIUM SERPL-SCNC: 140 MMOL/L (ref 136–145)
URATE SERPL-MCNC: 9.4 MG/DL (ref 3.4–7)
WBC # BLD AUTO: 7.81 K/UL (ref 4.23–9.07)

## 2023-08-08 PROCEDURE — 36415 COLL VENOUS BLD VENIPUNCTURE: CPT | Performed by: INTERNAL MEDICINE

## 2023-08-08 PROCEDURE — 93356 MYOCRD STRAIN IMG SPCKL TRCK: CPT

## 2023-08-08 PROCEDURE — 93356 MYOCRD STRAIN IMG SPCKL TRCK: CPT | Performed by: EMERGENCY MEDICINE

## 2023-08-08 PROCEDURE — 93306 TTE W/DOPPLER COMPLETE: CPT | Performed by: EMERGENCY MEDICINE

## 2023-08-08 PROCEDURE — 99212 OFFICE O/P EST SF 10 MIN: CPT

## 2023-08-08 PROCEDURE — 85025 COMPLETE CBC W/AUTO DIFF WBC: CPT

## 2023-08-08 PROCEDURE — 99205 OFFICE O/P NEW HI 60 MIN: CPT | Performed by: INTERNAL MEDICINE

## 2023-08-08 PROCEDURE — 93306 TTE W/DOPPLER COMPLETE: CPT

## 2023-08-08 PROCEDURE — 1123F ACP DISCUSS/DSCN MKR DOCD: CPT | Performed by: INTERNAL MEDICINE

## 2023-08-08 PROCEDURE — 36415 COLL VENOUS BLD VENIPUNCTURE: CPT

## 2023-08-08 ASSESSMENT — PROMIS GLOBAL HEALTH SCALE
IN GENERAL, HOW WOULD YOU RATE YOUR MENTAL HEALTH, INCLUDING YOUR MOOD AND YOUR ABILITY TO THINK [ON A SCALE OF 1 (POOR) TO 5 (EXCELLENT)]?: 3
IN THE PAST 7 DAYS, HOW WOULD YOU RATE YOUR FATIGUE ON AVERAGE [ON A SCALE FROM 1 (NONE) TO 5 (VERY SEVERE)]?: 4
IN GENERAL, HOW WOULD YOU RATE YOUR SATISFACTION WITH YOUR SOCIAL ACTIVITIES AND RELATIONSHIPS [ON A SCALE OF 1 (POOR) TO 5 (EXCELLENT)]?: 3
IN GENERAL, WOULD YOU SAY YOUR HEALTH IS...[ON A SCALE OF 1 (POOR) TO 5 (EXCELLENT)]: 3
IN GENERAL, PLEASE RATE HOW WELL YOU CARRY OUT YOUR USUAL SOCIAL ACTIVITIES (INCLUDES ACTIVITIES AT HOME, AT WORK, AND IN YOUR COMMUNITY, AND RESPONSIBILITIES AS A PARENT, CHILD, SPOUSE, EMPLOYEE, FRIEND, ETC) [ON A SCALE OF 1 (POOR) TO 5 (EXCELLENT)]?: 3
IN THE PAST 7 DAYS, HOW WOULD YOU RATE YOUR PAIN ON AVERAGE [ON A SCALE FROM 0 (NO PAIN) TO 10 (WORST IMAGINABLE PAIN)]?: 2
TO WHAT EXTENT ARE YOU ABLE TO CARRY OUT YOUR EVERYDAY PHYSICAL ACTIVITIES SUCH AS WALKING, CLIMBING STAIRS, CARRYING GROCERIES, OR MOVING A CHAIR [ON A SCALE OF 1 (NOT AT ALL) TO 5 (COMPLETELY)]?: 4
IN THE PAST 7 DAYS, HOW OFTEN HAVE YOU BEEN BOTHERED BY EMOTIONAL PROBLEMS, SUCH AS FEELING ANXIOUS, DEPRESSED, OR IRRITABLE [ON A SCALE FROM 1 (NEVER) TO 5 (ALWAYS)]?: 4
SUM OF RESPONSES TO QUESTIONS 2, 4, 5, & 10: 13
IN GENERAL, WOULD YOU SAY YOUR QUALITY OF LIFE IS...[ON A SCALE OF 1 (POOR) TO 5 (EXCELLENT)]: 3
SUM OF RESPONSES TO QUESTIONS 3, 6, 7, & 8: 13
IN GENERAL, HOW WOULD YOU RATE YOUR PHYSICAL HEALTH [ON A SCALE OF 1 (POOR) TO 5 (EXCELLENT)]?: 3

## 2023-08-09 ENCOUNTER — CLINICAL DOCUMENTATION (OUTPATIENT)
Dept: HEMATOLOGY | Age: 78
End: 2023-08-09

## 2023-08-09 DIAGNOSIS — E79.0 HYPERURICEMIA: Primary | ICD-10-CM

## 2023-08-09 DIAGNOSIS — Z91.89 AT HIGH RISK OF TUMOR LYSIS SYNDROME: ICD-10-CM

## 2023-08-09 LAB
BH CV ECHO LEFT VENTRICLE GLOBAL LONGITUDINAL STRAIN: -15.5 %
BH CV ECHO MEAS - AO MAX PG: 10 MMHG
BH CV ECHO MEAS - AO MEAN PG: 5 MMHG
BH CV ECHO MEAS - AO V2 MAX: 158 CM/SEC
BH CV ECHO MEAS - AO V2 VTI: 30.6 CM
BH CV ECHO MEAS - AVA(I,D): 3.5 CM2
BH CV ECHO MEAS - EDV(CUBED): 68.9 ML
BH CV ECHO MEAS - EDV(MOD-SP2): 48.5 ML
BH CV ECHO MEAS - EDV(MOD-SP4): 67.4 ML
BH CV ECHO MEAS - EF(MOD-SP2): 54.6 %
BH CV ECHO MEAS - EF(MOD-SP4): 63.1 %
BH CV ECHO MEAS - ESV(CUBED): 15.6 ML
BH CV ECHO MEAS - ESV(MOD-SP2): 22 ML
BH CV ECHO MEAS - ESV(MOD-SP4): 24.9 ML
BH CV ECHO MEAS - FS: 39 %
BH CV ECHO MEAS - IVS/LVPW: 1.11 CM
BH CV ECHO MEAS - IVSD: 1 CM
BH CV ECHO MEAS - LA DIMENSION: 3.8 CM
BH CV ECHO MEAS - LAT PEAK E' VEL: 10.9 CM/SEC
BH CV ECHO MEAS - LV DIASTOLIC VOL/BSA (35-75): 34.2 CM2
BH CV ECHO MEAS - LV MASS(C)D: 123 GRAMS
BH CV ECHO MEAS - LV MAX PG: 3 MMHG
BH CV ECHO MEAS - LV MEAN PG: 1 MMHG
BH CV ECHO MEAS - LV SYSTOLIC VOL/BSA (12-30): 12.7 CM2
BH CV ECHO MEAS - LV V1 MAX: 86.2 CM/SEC
BH CV ECHO MEAS - LV V1 VTI: 20 CM
BH CV ECHO MEAS - LVIDD: 4.1 CM
BH CV ECHO MEAS - LVIDS: 2.5 CM
BH CV ECHO MEAS - LVOT AREA: 5.3 CM2
BH CV ECHO MEAS - LVOT DIAM: 2.6 CM
BH CV ECHO MEAS - LVPWD: 0.9 CM
BH CV ECHO MEAS - MED PEAK E' VEL: 6.8 CM/SEC
BH CV ECHO MEAS - MR MAX PG: 142.6 MMHG
BH CV ECHO MEAS - MR MAX VEL: 597 CM/SEC
BH CV ECHO MEAS - MV A MAX VEL: 115 CM/SEC
BH CV ECHO MEAS - MV DEC SLOPE: 319 CM/SEC2
BH CV ECHO MEAS - MV E MAX VEL: 84.8 CM/SEC
BH CV ECHO MEAS - MV E/A: 0.74
BH CV ECHO MEAS - MV P1/2T: 89.7 MSEC
BH CV ECHO MEAS - MVA(P1/2T): 2.45 CM2
BH CV ECHO MEAS - PA V2 MAX: 125 CM/SEC
BH CV ECHO MEAS - RAP SYSTOLE: 5 MMHG
BH CV ECHO MEAS - RV MAX PG: 3.7 MMHG
BH CV ECHO MEAS - RV V1 MAX: 96.5 CM/SEC
BH CV ECHO MEAS - RVDD: 2.7 CM
BH CV ECHO MEAS - RVSP: 24.9 MMHG
BH CV ECHO MEAS - SI(MOD-SP2): 13.5 ML/M2
BH CV ECHO MEAS - SI(MOD-SP4): 21.6 ML/M2
BH CV ECHO MEAS - SV(LVOT): 106.2 ML
BH CV ECHO MEAS - SV(MOD-SP2): 26.5 ML
BH CV ECHO MEAS - SV(MOD-SP4): 42.5 ML
BH CV ECHO MEAS - TAPSE (>1.6): 1.71 CM
BH CV ECHO MEAS - TR MAX PG: 19.9 MMHG
BH CV ECHO MEAS - TR MAX VEL: 223 CM/SEC
BH CV ECHO MEASUREMENTS AVERAGE E/E' RATIO: 9.58
BH CV XLRA - RV BASE: 3.3 CM
LEFT ATRIUM VOLUME INDEX: 22.3 ML/M2
LEFT ATRIUM VOLUME: 43.9 ML

## 2023-08-09 RX ORDER — ALLOPURINOL 100 MG/1
100 TABLET ORAL DAILY
Qty: 30 TABLET | Refills: 5 | Status: SHIPPED | OUTPATIENT
Start: 2023-08-09

## 2023-08-09 NOTE — PROGRESS NOTES
Per Dr Cori Lombardo increasing and uric acid elevated. Pt may be starting tumor lysis syndrome. He recommends starting Allopurinol 100mg daily and to give Rasburicase/Elitek 3mg IV x1 dose. Call to pt to inform him of above. Pt voiced understanding. Script sent to pharmacy. Will call pt in AM with infusion time due to infusion staff already being gone for the day.

## 2023-08-10 ENCOUNTER — HOSPITAL ENCOUNTER (OUTPATIENT)
Dept: INFUSION THERAPY | Age: 78
Setting detail: SPECIMEN
Discharge: HOME | End: 2023-08-10
Payer: COMMERCIAL

## 2023-08-10 VITALS
HEART RATE: 73 BPM | OXYGEN SATURATION: 95 % | RESPIRATION RATE: 18 BRPM | SYSTOLIC BLOOD PRESSURE: 156 MMHG | DIASTOLIC BLOOD PRESSURE: 80 MMHG | TEMPERATURE: 97.4 F

## 2023-08-10 DIAGNOSIS — E79.0 HYPERURICEMIA: ICD-10-CM

## 2023-08-10 DIAGNOSIS — E86.0 DEHYDRATION: ICD-10-CM

## 2023-08-10 DIAGNOSIS — Z91.89 AT HIGH RISK OF TUMOR LYSIS SYNDROME: Primary | ICD-10-CM

## 2023-08-10 PROCEDURE — 96360 HYDRATION IV INFUSION INIT: CPT

## 2023-08-10 PROCEDURE — 6360000002 HC RX W HCPCS: Performed by: INTERNAL MEDICINE

## 2023-08-10 PROCEDURE — 96368 THER/DIAG CONCURRENT INF: CPT

## 2023-08-10 PROCEDURE — 96365 THER/PROPH/DIAG IV INF INIT: CPT

## 2023-08-10 PROCEDURE — 2580000003 HC RX 258: Performed by: INTERNAL MEDICINE

## 2023-08-10 RX ORDER — 0.9 % SODIUM CHLORIDE 0.9 %
1000 INTRAVENOUS SOLUTION INTRAVENOUS ONCE
Status: COMPLETED | OUTPATIENT
Start: 2023-08-10 | End: 2023-08-10

## 2023-08-10 RX ORDER — HEPARIN 100 UNIT/ML
500 SYRINGE INTRAVENOUS PRN
OUTPATIENT
Start: 2023-08-10

## 2023-08-10 RX ORDER — SODIUM CHLORIDE 0.9 % (FLUSH) 0.9 %
5-40 SYRINGE (ML) INJECTION PRN
Status: DISCONTINUED | OUTPATIENT
Start: 2023-08-10 | End: 2023-08-11 | Stop reason: HOSPADM

## 2023-08-10 RX ORDER — SODIUM CHLORIDE 0.9 % (FLUSH) 0.9 %
5-40 SYRINGE (ML) INJECTION PRN
OUTPATIENT
Start: 2023-08-10

## 2023-08-10 RX ORDER — SODIUM CHLORIDE 0.9 % (FLUSH) 0.9 %
5-40 SYRINGE (ML) INJECTION PRN
Status: CANCELLED | OUTPATIENT
Start: 2023-08-10

## 2023-08-10 RX ORDER — 0.9 % SODIUM CHLORIDE 0.9 %
1000 INTRAVENOUS SOLUTION INTRAVENOUS ONCE
Status: CANCELLED | OUTPATIENT
Start: 2023-08-10 | End: 2023-08-10

## 2023-08-10 RX ORDER — 0.9 % SODIUM CHLORIDE 0.9 %
1000 INTRAVENOUS SOLUTION INTRAVENOUS ONCE
OUTPATIENT
Start: 2023-08-10 | End: 2023-08-10

## 2023-08-10 RX ORDER — SODIUM CHLORIDE 9 MG/ML
INJECTION, SOLUTION INTRAVENOUS ONCE
Status: CANCELLED
Start: 2023-08-10 | End: 2023-08-10

## 2023-08-10 RX ADMIN — SODIUM CHLORIDE 1000 ML: 9 INJECTION, SOLUTION INTRAVENOUS at 10:41

## 2023-08-10 RX ADMIN — SODIUM CHLORIDE 3 MG: 9 INJECTION, SOLUTION INTRAVENOUS at 11:10

## 2023-08-10 NOTE — PROGRESS NOTES
1 liter NS infused and Rasburicase. Patient tolerated well. Reviewed future appointments and patient discharged to home.

## 2023-08-10 NOTE — DISCHARGE INSTRUCTIONS
contained herein is not intended to cover all possible uses, directions, precautions, warnings, drug interactions, allergic reactions, or adverse effects. If you have questions about the drugs you are taking, check with your doctor, nurse or pharmacist.  Copyright 7728-4486 [address]: 7.01. Revision date: 1/30/2020. Care instructions adapted under license by [facility] ([facility]). If you have questions about a medical condition or this instruction, always ask your healthcare professional. [address] any warranty or liability for your use of this information.

## 2023-08-11 LAB — B2 MICROGLOB SERPL-MCNC: 3.2 MG/L

## 2023-08-16 ENCOUNTER — HOSPITAL ENCOUNTER (OUTPATIENT)
Dept: CT IMAGING | Facility: HOSPITAL | Age: 78
Discharge: HOME | End: 2023-08-16
Payer: MEDICARE

## 2023-08-16 DIAGNOSIS — R59.1 LYMPHADENOPATHY: ICD-10-CM

## 2023-08-16 DIAGNOSIS — C83.33 DIFFUSE LARGE B-CELL LYMPHOMA OF INTRA-ABDOMINAL LYMPH NODES: ICD-10-CM

## 2023-08-16 PROCEDURE — 0 FLUDEOXYGLUCOSE F18 SOLUTION: Performed by: INTERNAL MEDICINE

## 2023-08-16 PROCEDURE — A9552 F18 FDG: HCPCS | Performed by: INTERNAL MEDICINE

## 2023-08-16 PROCEDURE — 74177 CT ABD & PELVIS W/CONTRAST: CPT

## 2023-08-16 PROCEDURE — 25510000001 IOPAMIDOL 61 % SOLUTION: Performed by: INTERNAL MEDICINE

## 2023-08-16 PROCEDURE — 78815 PET IMAGE W/CT SKULL-THIGH: CPT

## 2023-08-16 RX ADMIN — IOPAMIDOL 100 ML: 612 INJECTION, SOLUTION INTRAVENOUS at 11:05

## 2023-08-16 RX ADMIN — FLUDEOXYGLUCOSE F18 1 DOSE: 300 INJECTION INTRAVENOUS at 08:13

## 2023-08-18 ENCOUNTER — HOSPITAL ENCOUNTER (OUTPATIENT)
Age: 78
Setting detail: OUTPATIENT SURGERY
Discharge: HOME | End: 2023-08-18
Attending: INTERNAL MEDICINE | Admitting: INTERNAL MEDICINE
Payer: MEDICARE

## 2023-08-18 ENCOUNTER — ANESTHESIA EVENT (OUTPATIENT)
Dept: ENDOSCOPY | Age: 78
End: 2023-08-18
Payer: MEDICARE

## 2023-08-18 ENCOUNTER — ANESTHESIA (OUTPATIENT)
Dept: ENDOSCOPY | Age: 78
End: 2023-08-18
Payer: MEDICARE

## 2023-08-18 VITALS
BODY MASS INDEX: 27.28 KG/M2 | TEMPERATURE: 97.4 F | WEIGHT: 180 LBS | DIASTOLIC BLOOD PRESSURE: 72 MMHG | OXYGEN SATURATION: 97 % | HEIGHT: 68 IN | SYSTOLIC BLOOD PRESSURE: 115 MMHG | HEART RATE: 61 BPM | RESPIRATION RATE: 18 BRPM

## 2023-08-18 DIAGNOSIS — C85.90 LYMPHOMA, UNSPECIFIED BODY REGION, UNSPECIFIED LYMPHOMA TYPE (HCC): ICD-10-CM

## 2023-08-18 PROCEDURE — 7100000011 HC PHASE II RECOVERY - ADDTL 15 MIN: Performed by: INTERNAL MEDICINE

## 2023-08-18 PROCEDURE — 43242 EGD US FINE NEEDLE BX/ASPIR: CPT | Performed by: INTERNAL MEDICINE

## 2023-08-18 PROCEDURE — 6360000002 HC RX W HCPCS: Performed by: NURSE ANESTHETIST, CERTIFIED REGISTERED

## 2023-08-18 PROCEDURE — 88112 CYTOPATH CELL ENHANCE TECH: CPT

## 2023-08-18 PROCEDURE — 2720000010 HC SURG SUPPLY STERILE: Performed by: INTERNAL MEDICINE

## 2023-08-18 PROCEDURE — 3700000001 HC ADD 15 MINUTES (ANESTHESIA): Performed by: INTERNAL MEDICINE

## 2023-08-18 PROCEDURE — 3700000000 HC ANESTHESIA ATTENDED CARE: Performed by: INTERNAL MEDICINE

## 2023-08-18 PROCEDURE — 2709999900 HC NON-CHARGEABLE SUPPLY: Performed by: INTERNAL MEDICINE

## 2023-08-18 PROCEDURE — 3609018500 HC EGD US SCOPE W/ADJACENT STRUCTURES: Performed by: INTERNAL MEDICINE

## 2023-08-18 PROCEDURE — 2580000003 HC RX 258: Performed by: INTERNAL MEDICINE

## 2023-08-18 PROCEDURE — 7100000010 HC PHASE II RECOVERY - FIRST 15 MIN: Performed by: INTERNAL MEDICINE

## 2023-08-18 PROCEDURE — 88305 TISSUE EXAM BY PATHOLOGIST: CPT

## 2023-08-18 PROCEDURE — 2500000003 HC RX 250 WO HCPCS: Performed by: NURSE ANESTHETIST, CERTIFIED REGISTERED

## 2023-08-18 RX ORDER — FENTANYL CITRATE 50 UG/ML
INJECTION, SOLUTION INTRAMUSCULAR; INTRAVENOUS PRN
Status: DISCONTINUED | OUTPATIENT
Start: 2023-08-18 | End: 2023-08-18 | Stop reason: SDUPTHER

## 2023-08-18 RX ORDER — ONDANSETRON 2 MG/ML
INJECTION INTRAMUSCULAR; INTRAVENOUS PRN
Status: DISCONTINUED | OUTPATIENT
Start: 2023-08-18 | End: 2023-08-18 | Stop reason: SDUPTHER

## 2023-08-18 RX ORDER — PROPOFOL 10 MG/ML
INJECTION, EMULSION INTRAVENOUS CONTINUOUS PRN
Status: DISCONTINUED | OUTPATIENT
Start: 2023-08-18 | End: 2023-08-18 | Stop reason: SDUPTHER

## 2023-08-18 RX ORDER — SODIUM CHLORIDE, SODIUM LACTATE, POTASSIUM CHLORIDE, CALCIUM CHLORIDE 600; 310; 30; 20 MG/100ML; MG/100ML; MG/100ML; MG/100ML
INJECTION, SOLUTION INTRAVENOUS CONTINUOUS
Status: DISCONTINUED | OUTPATIENT
Start: 2023-08-18 | End: 2023-08-18 | Stop reason: HOSPADM

## 2023-08-18 RX ORDER — LIDOCAINE HYDROCHLORIDE 10 MG/ML
INJECTION, SOLUTION INFILTRATION; PERINEURAL PRN
Status: DISCONTINUED | OUTPATIENT
Start: 2023-08-18 | End: 2023-08-18 | Stop reason: SDUPTHER

## 2023-08-18 RX ADMIN — ONDANSETRON 4 MG: 2 INJECTION INTRAMUSCULAR; INTRAVENOUS at 10:13

## 2023-08-18 RX ADMIN — LIDOCAINE HYDROCHLORIDE 40 MG: 10 INJECTION, SOLUTION INFILTRATION; PERINEURAL at 10:13

## 2023-08-18 RX ADMIN — FENTANYL CITRATE 50 MCG: 50 INJECTION INTRAMUSCULAR; INTRAVENOUS at 10:09

## 2023-08-18 RX ADMIN — SODIUM CHLORIDE, POTASSIUM CHLORIDE, SODIUM LACTATE AND CALCIUM CHLORIDE: 600; 310; 30; 20 INJECTION, SOLUTION INTRAVENOUS at 10:00

## 2023-08-18 RX ADMIN — PROPOFOL 100 MCG/KG/MIN: 10 INJECTION, EMULSION INTRAVENOUS at 10:13

## 2023-08-18 ASSESSMENT — PAIN - FUNCTIONAL ASSESSMENT: PAIN_FUNCTIONAL_ASSESSMENT: 0-10

## 2023-08-18 NOTE — PROGRESS NOTES
MEDICAL ONCOLOGY PROGRESS NOTE    Pt Name: Tayler Houston  MRN: [id number]  YOB: 1945  Date of evaluation: 8/22/2023    HISTORY OF PRESENT ILLNESS:    Diagnosis  Lymphadenopathy, upper abdomen, July 2023  Suspicious for high grade lymphoma  FISH: BCL-2(+), BCL-6(-), cMYC(-)    Treatment Summary  Anticipated R-CHOP chemotherapy  Anticipated consultations with radiation oncology    Cancer History  Giacomo Shah was first seen by me on 08/08/23. Referred by Dr Liss oLwe for a diagnosis of lymphadenopathy, concerning for lymphoma. Denies any B symptoms. 5/31/2022-CTA angiogram chest-no evidence of pulmonary embolism. No evidence of lymphadenopathy. 1/9/2023-CT angiogram chest showed Ectatic ascending aorta measuring 4.2 cm diameter, unchanged from prior study. Stable 4 mm RIGHT lower lobe pulmonary nodule. No enlarged lymph nodes in the chest.   7/13/23 CTA chest [facility]): Stable mildly dilated ascending thoracic aorta. Stable appearance of the lungs with no acute infiltrate. Bulky lymphadenopathy within the upper abdomen is suspicious for lymphoma and will require further evaluation. An enlarged aortocaval node adjacent to the pancreas measures 55 x 43 mm. An adjacent 51 x 39 mm retroperitoneal lymph node artery lies between   the origin of the hepatic and splenic artery at the celiac axis. 7/25/23 EGD with EUS by Dr Monserrat West/[facility]: The celiac axis and associated vascular structures was identified and examined. Limited views of the left lobe of the liver revealed no obvious biliary dilation or focal hepatic mass. The EUS scope was advanced to the duodenal bulb. The CBD appeared normal. No filling defects seen. Pancreas: normal appearance. No MPD dilation noted. In the upper abdomen two large hypoechoic lesions were noted. These were well circumscribed. These measured 4.27 x 4.49cm and 7.3 x 4.05cm respectively. The larger mass was noted right of mid-line near the head of the pancreas.  The small was more midline

## 2023-08-18 NOTE — OP NOTE
Referring/Primary Care Provider: MALKA Obando Marthe Hy MD    Date of Procedure: 08/18/23    Procedure:   1. EGD with Endoscopic Ultrasound and FNA    Indications:   1. Lymphoma - path inconclusive for full characterization. PET noted activity only in this region, Repeat FNA with further cytology/flow requested    Anesthesia:  Sedation was administered by anesthesia who monitored the patient during the procedure. Procedure:   After reviewing the patient's chart, H&P, medications, obtaining informed consent, and discussing risks benefits and alternatives to the procedure the patient was placed in the left lateral decubitus position. A oblique viewing Olympus 180 Linear EUS scope was lubricated and inserted through the mouth into the oropharynx. Under indirect visualization, the upper esophagus was intubated. The scope was advanced to the level of the third portion of duodenum with limited views of the esophageal mucosa. Findings and maneuvers are listed in impression below. The patient tolerated the procedure well. There were no immediate complications. Findings:   Endoscopic Finding:   - endoscopic evaluation of the upper GI tract appeared normal    Endosonographic Findings:  -The upper abdomen near the area of the aorta, 2 large hypoechoic homogeneous areas were noted. These were consistent with the known large lymphadenopathy. Using Doppler imaging was used to find and adequate path for FNA - noted with a 4.1 cm area of the mass. This corresponded to the previously biopsied mass. FNA was pursued with a 19-gauge FNB needle. Multiple passes were made using doppler imaging. Specimen was placed in cytology and flow cytometry media. - Limited views of the left lobe of the liver revealed no biliary dilation or focal hepatic mass. - The EUS scope was advanced to the duodenal bulb.  The CBD appeared normal.     - Pancreas: normal.     Estimated Blood Loss:

## 2023-08-18 NOTE — H&P
Patient Name: Chanelle Anderson  : 1945  MRN: [id number]  DATE: 23    Allergies: No Known Allergies     ENDOSCOPY  History and Physical    Procedure:    [] Diagnostic Colonoscopy       [] Screening Colonoscopy  [x] EGD      [] ERCP      [] EUS       [] Other    [x] Previous office notes/History and Physical reviewed from the patients chart. Please see EMR for further details of HPI. I have examined the patient's status immediately prior to the procedure and:      Indications/HPI:    []Abdominal Pain   []Barretts  []Screening/Surveillance   []History of Polyps  []Dysphagia            [] +Cologard/DNA testing  [x]Abnormal Imaging              []EOE Hx              [] Family Hx of CRC/Polyps  []Anemia                            []Food Impaction       []Recent Poor Prep  []GI Bleed             []Lymphadenopathy  []History of Polyps  []Change in bowel habits []Heartburn/Reflux  []Cancer- GI/Lung  []Chest Pain - Non Cardiac []Heme (+) Stool []Ulcers  []Constipation  []Hemoptysis  []Incontinence    []Diarrhea  []Hypoxemia  []Rectal Bleed (BRBPR)  []Nausea/Vomiting   [] Varices  []Crohns/Colitis  []Pancreatic Cyst   [] Cirrhosis   []Pancreatitis    []Abnormal MRCP  []Elevated LFT [] Stent Removal, Previous ERCP  []Other:     Anesthesia:   [x] MAC [] Moderate Sedation   [] General   [] None     ROS: 12 pt Review of Symptoms was negative unless mentioned above    Medications:   Prior to Admission medications    Medication Sig Start Date End Date Taking?  Authorizing Provider   allopurinol (ZYLOPRIM) 100 MG tablet Take 1 tablet by mouth daily 23   Marjorie Mar MD   LISINOPRIL PO Take by mouth in the morning and at bedtime    Historical Provider, MD   LEVOTHYROXINE SODIUM PO Take by mouth daily    Historical Provider, MD   Atorvastatin Calcium (LIPITOR PO) Take by mouth daily    Historical Provider, MD   clopidogrel (PLAVIX) 75 MG tablet Take 1 tablet by mouth daily    Historical Provider, MD   Multiple

## 2023-08-18 NOTE — ANESTHESIA POSTPROCEDURE EVALUATION
Department of Anesthesiology  Postprocedure Note    Patient: Regan Farr  MRN: [id number]  YOB: 1945  Date of evaluation: 8/18/2023      Procedure Summary     Date: 08/18/23 Room / Location: [address]    Anesthesia Start: 1005 Anesthesia Stop:     Procedure: EGD ESOPHAGOGASTRODUODENOSCOPY ULTRASOUND Diagnosis:       Lymphoma, unspecified body region, unspecified lymphoma type ([address])      (Lymphoma, unspecified body region, unspecified lymphoma type ([address]) [C85.90])    Surgeons: Alan Beckman MD Responsible Provider: MALKA Demarco CRNA    Anesthesia Type: general, TIVA ASA Status: 3          Anesthesia Type: No value filed.     Humberto Phase I: Humberto Score: 10    Humberto Phase II:        Anesthesia Post Evaluation    Patient location during evaluation: bedside  Patient participation: complete - patient participated  Level of consciousness: sleepy but conscious  Pain score: 0  Airway patency: patent  Nausea & Vomiting: no nausea and no vomiting  Complications: no  Cardiovascular status: hemodynamically stable and blood pressure returned to baseline  Respiratory status: acceptable and nasal cannula  Hydration status: stable  Pain management: adequate

## 2023-08-18 NOTE — PROGRESS NOTES
Patient d/w Dr Margean Kawasaki last evening. Known dx of lymphoma however more tissue needed for better characterization of disease and treatment plan. Given symptoms of possible TLS, we will plan for EUS-FNA asap with repeat cytology and flow.

## 2023-08-22 ENCOUNTER — OFFICE VISIT (OUTPATIENT)
Dept: HEMATOLOGY | Age: 78
End: 2023-08-22
Payer: MEDICARE

## 2023-08-22 ENCOUNTER — HOSPITAL ENCOUNTER (OUTPATIENT)
Dept: INFUSION THERAPY | Age: 78
Discharge: HOME | End: 2023-08-22
Payer: MEDICARE

## 2023-08-22 VITALS
SYSTOLIC BLOOD PRESSURE: 120 MMHG | WEIGHT: 180 LBS | BODY MASS INDEX: 27.28 KG/M2 | DIASTOLIC BLOOD PRESSURE: 70 MMHG | HEIGHT: 68 IN | HEART RATE: 95 BPM | OXYGEN SATURATION: 97 %

## 2023-08-22 DIAGNOSIS — C83.33 DIFFUSE LARGE B-CELL LYMPHOMA OF INTRA-ABDOMINAL LYMPH NODES (HCC): Primary | ICD-10-CM

## 2023-08-22 DIAGNOSIS — Z71.89 CARE PLAN DISCUSSED WITH PATIENT: ICD-10-CM

## 2023-08-22 DIAGNOSIS — C83.33 DIFFUSE LARGE B-CELL LYMPHOMA OF INTRA-ABDOMINAL LYMPH NODES (HCC): ICD-10-CM

## 2023-08-22 DIAGNOSIS — R59.0 ABDOMINAL LYMPHADENOPATHY: ICD-10-CM

## 2023-08-22 DIAGNOSIS — R59.1 LYMPHADENOPATHY: ICD-10-CM

## 2023-08-22 DIAGNOSIS — Z91.89 AT HIGH RISK OF TUMOR LYSIS SYNDROME: ICD-10-CM

## 2023-08-22 DIAGNOSIS — E79.0 HYPERURICEMIA: ICD-10-CM

## 2023-08-22 LAB
ALBUMIN SERPL-MCNC: 4.3 G/DL (ref 3.5–5.2)
ALP SERPL-CCNC: 70 U/L (ref 40–130)
ALT SERPL-CCNC: 23 U/L (ref 21–72)
ANION GAP SERPL CALCULATED.3IONS-SCNC: 14 MMOL/L (ref 7–19)
AST SERPL-CCNC: 29 U/L (ref 17–59)
BILIRUB SERPL-MCNC: 0.4 MG/DL (ref 0.2–1.3)
BUN SERPL-MCNC: 23 MG/DL (ref 9–20)
CALCIUM SERPL-MCNC: 9.5 MG/DL (ref 8.4–10.2)
CHLORIDE SERPL-SCNC: 102 MMOL/L (ref 98–111)
CO2 SERPL-SCNC: 26 MMOL/L (ref 22–29)
CREAT SERPL-MCNC: 1 MG/DL (ref 0.6–1.2)
ERYTHROCYTE [DISTWIDTH] IN BLOOD BY AUTOMATED COUNT: 13.4 % (ref 11.6–14.4)
GLOBULIN: 2.8 G/DL
GLUCOSE SERPL-MCNC: 80 MG/DL (ref 74–106)
HCT VFR BLD AUTO: 38.1 % (ref 40.1–51)
HGB BLD-MCNC: 13.1 G/DL (ref 13.7–17.5)
LDH SERPL-CCNC: 196 U/L (ref 120–246)
LYMPHOCYTES # BLD: 1.28 K/UL (ref 1.18–3.74)
LYMPHOCYTES NFR BLD: 16.3 % (ref 19.3–53.1)
MCH RBC QN AUTO: 32 PG (ref 25.7–32.2)
MCHC RBC AUTO-ENTMCNC: 34.4 G/DL (ref 32.3–36.5)
MCV RBC AUTO: 93.2 FL (ref 79–92.2)
MONOCYTES # BLD: 0.93 K/UL (ref 0.24–0.82)
MONOCYTES NFR BLD: 11.9 % (ref 4.7–12.5)
NEUTROPHILS # BLD: 5.45 K/UL (ref 1.56–6.13)
NEUTS SEG NFR BLD: 69.4 % (ref 34–71.1)
PLATELET # BLD AUTO: 176 K/UL (ref 163–337)
PMV BLD AUTO: 10.3 FL (ref 7.4–10.4)
POTASSIUM SERPL-SCNC: 5 MMOL/L (ref 3.5–5.1)
PROT SERPL-MCNC: 7.2 G/DL (ref 6.3–8.2)
RBC # BLD AUTO: 4.09 M/UL (ref 4.63–6.08)
SODIUM SERPL-SCNC: 142 MMOL/L (ref 137–145)
URATE SERPL-MCNC: 6 MG/DL (ref 3.5–8.5)
WBC # BLD AUTO: 7.84 K/UL (ref 4.23–9.07)

## 2023-08-22 PROCEDURE — 1123F ACP DISCUSS/DSCN MKR DOCD: CPT | Performed by: INTERNAL MEDICINE

## 2023-08-22 PROCEDURE — 36415 COLL VENOUS BLD VENIPUNCTURE: CPT

## 2023-08-22 PROCEDURE — 80053 COMPREHEN METABOLIC PANEL: CPT

## 2023-08-22 PROCEDURE — 84550 ASSAY OF BLOOD/URIC ACID: CPT

## 2023-08-22 PROCEDURE — 99212 OFFICE O/P EST SF 10 MIN: CPT

## 2023-08-22 PROCEDURE — 99215 OFFICE O/P EST HI 40 MIN: CPT | Performed by: INTERNAL MEDICINE

## 2023-08-22 PROCEDURE — 85025 COMPLETE CBC W/AUTO DIFF WBC: CPT

## 2023-08-22 PROCEDURE — 83615 LACTATE (LD) (LDH) ENZYME: CPT

## 2023-08-22 RX ORDER — PREDNISONE 50 MG/1
TABLET ORAL
Qty: 60 TABLET | Refills: 0 | Status: SHIPPED | OUTPATIENT
Start: 2023-08-22

## 2023-08-22 RX ORDER — PROMETHAZINE HYDROCHLORIDE 25 MG/1
12.5 TABLET ORAL EVERY 6 HOURS PRN
Qty: 30 TABLET | Refills: 3 | Status: SHIPPED | OUTPATIENT
Start: 2023-08-22

## 2023-08-22 RX ORDER — ONDANSETRON 4 MG/1
4 TABLET, FILM COATED ORAL EVERY 6 HOURS PRN
Qty: 30 TABLET | Refills: 3 | Status: SHIPPED | OUTPATIENT
Start: 2023-08-22

## 2023-08-24 ENCOUNTER — CLINICAL DOCUMENTATION (OUTPATIENT)
Facility: HOSPITAL | Age: 78
End: 2023-08-24

## 2023-08-24 NOTE — PROGRESS NOTES
Patient scheduled to begin Cycle 1 - Day 1 for treatment of diffuse large B-cell lymphoma on 08/28/2023.   Treatment regimen per NCCN as follows:     Treatment every 21 days for 6 cycles:    Cytoxan  Doxorubicin  Vincristine  Rituximab  NEULASTA ON-PRO

## 2023-08-28 ENCOUNTER — HOSPITAL ENCOUNTER (OUTPATIENT)
Dept: INFUSION THERAPY | Age: 78
Setting detail: INFUSION SERIES
Discharge: HOME | End: 2023-08-28
Payer: MEDICARE

## 2023-08-28 VITALS
WEIGHT: 179.7 LBS | HEIGHT: 68 IN | HEART RATE: 67 BPM | BODY MASS INDEX: 27.23 KG/M2 | RESPIRATION RATE: 18 BRPM | SYSTOLIC BLOOD PRESSURE: 113 MMHG | OXYGEN SATURATION: 95 % | DIASTOLIC BLOOD PRESSURE: 65 MMHG | TEMPERATURE: 97.4 F

## 2023-08-28 DIAGNOSIS — C83.33 DIFFUSE LARGE B-CELL LYMPHOMA OF INTRA-ABDOMINAL LYMPH NODES (HCC): Primary | ICD-10-CM

## 2023-08-28 LAB
ALBUMIN SERPL-MCNC: 4 G/DL (ref 3.5–5.2)
ALP SERPL-CCNC: 64 U/L (ref 40–130)
ALT SERPL-CCNC: 15 U/L (ref 5–41)
ANION GAP SERPL CALCULATED.3IONS-SCNC: 10 MMOL/L (ref 7–19)
AST SERPL-CCNC: 14 U/L (ref 5–40)
BASOPHILS # BLD: 0 K/UL (ref 0–0.2)
BASOPHILS NFR BLD: 0.1 % (ref 0–1)
BILIRUB SERPL-MCNC: 0.3 MG/DL (ref 0.2–1.2)
BUN SERPL-MCNC: 35 MG/DL (ref 8–23)
CALCIUM SERPL-MCNC: 9 MG/DL (ref 8.8–10.2)
CHLORIDE SERPL-SCNC: 101 MMOL/L (ref 98–111)
CO2 SERPL-SCNC: 27 MMOL/L (ref 22–29)
CREAT SERPL-MCNC: 1.3 MG/DL (ref 0.5–1.2)
EOSINOPHIL # BLD: 0 K/UL (ref 0–0.6)
EOSINOPHIL NFR BLD: 0.1 % (ref 0–5)
ERYTHROCYTE [DISTWIDTH] IN BLOOD BY AUTOMATED COUNT: 13.2 % (ref 11.5–14.5)
GLUCOSE SERPL-MCNC: 134 MG/DL (ref 74–109)
HCT VFR BLD AUTO: 40.8 % (ref 42–52)
HGB BLD-MCNC: 13.4 G/DL (ref 14–18)
IMM GRANULOCYTES # BLD: 0.1 K/UL
LYMPHOCYTES # BLD: 1.4 K/UL (ref 1.1–4.5)
LYMPHOCYTES NFR BLD: 11 % (ref 20–40)
MCH RBC QN AUTO: 32.2 PG (ref 27–31)
MCHC RBC AUTO-ENTMCNC: 32.8 G/DL (ref 33–37)
MCV RBC AUTO: 98.1 FL (ref 80–94)
MONOCYTES # BLD: 1.1 K/UL (ref 0–0.9)
MONOCYTES NFR BLD: 8.4 % (ref 0–10)
NEUTROPHILS # BLD: 10.2 K/UL (ref 1.5–7.5)
NEUTS SEG NFR BLD: 79.5 % (ref 50–65)
PLATELET # BLD AUTO: 270 K/UL (ref 130–400)
PMV BLD AUTO: 9.8 FL (ref 9.4–12.4)
POTASSIUM SERPL-SCNC: 4 MMOL/L (ref 3.5–5)
PROT SERPL-MCNC: 6.9 G/DL (ref 6.6–8.7)
RBC # BLD AUTO: 4.16 M/UL (ref 4.7–6.1)
SODIUM SERPL-SCNC: 138 MMOL/L (ref 136–145)
WBC # BLD AUTO: 12.8 K/UL (ref 4.8–10.8)

## 2023-08-28 PROCEDURE — 2580000003 HC RX 258: Performed by: INTERNAL MEDICINE

## 2023-08-28 PROCEDURE — 96415 CHEMO IV INFUSION ADDL HR: CPT

## 2023-08-28 PROCEDURE — 96366 THER/PROPH/DIAG IV INF ADDON: CPT

## 2023-08-28 PROCEDURE — 96417 CHEMO IV INFUS EACH ADDL SEQ: CPT

## 2023-08-28 PROCEDURE — 85025 COMPLETE CBC W/AUTO DIFF WBC: CPT

## 2023-08-28 PROCEDURE — 96411 CHEMO IV PUSH ADDL DRUG: CPT

## 2023-08-28 PROCEDURE — 96413 CHEMO IV INFUSION 1 HR: CPT

## 2023-08-28 PROCEDURE — 6360000002 HC RX W HCPCS: Performed by: INTERNAL MEDICINE

## 2023-08-28 PROCEDURE — 80053 COMPREHEN METABOLIC PANEL: CPT

## 2023-08-28 PROCEDURE — 96375 TX/PRO/DX INJ NEW DRUG ADDON: CPT

## 2023-08-28 PROCEDURE — 6370000000 HC RX 637 (ALT 250 FOR IP): Performed by: INTERNAL MEDICINE

## 2023-08-28 PROCEDURE — 96377 APPLICATON ON-BODY INJECTOR: CPT

## 2023-08-28 RX ORDER — SODIUM CHLORIDE 9 MG/ML
5-250 INJECTION, SOLUTION INTRAVENOUS PRN
Status: CANCELLED | OUTPATIENT
Start: 2023-08-28

## 2023-08-28 RX ORDER — ACETAMINOPHEN 325 MG/1
650 TABLET ORAL
Status: CANCELLED | OUTPATIENT
Start: 2023-08-28

## 2023-08-28 RX ORDER — ALBUTEROL SULFATE 90 UG/1
4 AEROSOL, METERED RESPIRATORY (INHALATION) PRN
Status: CANCELLED | OUTPATIENT
Start: 2023-08-28

## 2023-08-28 RX ORDER — PALONOSETRON 0.05 MG/ML
0.25 INJECTION, SOLUTION INTRAVENOUS ONCE
Status: CANCELLED | OUTPATIENT
Start: 2023-08-28 | End: 2023-08-28

## 2023-08-28 RX ORDER — DOXORUBICIN HYDROCHLORIDE 2 MG/ML
50 INJECTION, SOLUTION INTRAVENOUS ONCE
Status: CANCELLED | OUTPATIENT
Start: 2023-08-28 | End: 2023-08-28

## 2023-08-28 RX ORDER — MEPERIDINE HYDROCHLORIDE 25 MG/ML
12.5 INJECTION INTRAMUSCULAR; INTRAVENOUS; SUBCUTANEOUS PRN
Status: CANCELLED | OUTPATIENT
Start: 2023-08-28

## 2023-08-28 RX ORDER — SODIUM CHLORIDE 0.9 % (FLUSH) 0.9 %
5-40 SYRINGE (ML) INJECTION PRN
Status: CANCELLED | OUTPATIENT
Start: 2023-08-28

## 2023-08-28 RX ORDER — ACETAMINOPHEN 500 MG
1000 TABLET ORAL ONCE
Status: CANCELLED | OUTPATIENT
Start: 2023-08-28 | End: 2023-08-28

## 2023-08-28 RX ORDER — SODIUM CHLORIDE 9 MG/ML
INJECTION, SOLUTION INTRAVENOUS CONTINUOUS
Status: CANCELLED | OUTPATIENT
Start: 2023-08-28

## 2023-08-28 RX ORDER — DIPHENHYDRAMINE HYDROCHLORIDE 50 MG/ML
50 INJECTION INTRAMUSCULAR; INTRAVENOUS ONCE
Status: COMPLETED | OUTPATIENT
Start: 2023-08-28 | End: 2023-08-28

## 2023-08-28 RX ORDER — EPINEPHRINE 1 MG/ML
0.3 INJECTION, SOLUTION, CONCENTRATE INTRAVENOUS PRN
Status: CANCELLED | OUTPATIENT
Start: 2023-08-28

## 2023-08-28 RX ORDER — DIPHENHYDRAMINE HYDROCHLORIDE 50 MG/ML
50 INJECTION INTRAMUSCULAR; INTRAVENOUS
Status: CANCELLED | OUTPATIENT
Start: 2023-08-28

## 2023-08-28 RX ORDER — HEPARIN 100 UNIT/ML
500 SYRINGE INTRAVENOUS PRN
Status: CANCELLED | OUTPATIENT
Start: 2023-08-28

## 2023-08-28 RX ORDER — ACETAMINOPHEN 500 MG
1000 TABLET ORAL ONCE
Status: COMPLETED | OUTPATIENT
Start: 2023-08-28 | End: 2023-08-28

## 2023-08-28 RX ORDER — SODIUM CHLORIDE 9 MG/ML
5-250 INJECTION, SOLUTION INTRAVENOUS PRN
Status: DISCONTINUED | OUTPATIENT
Start: 2023-08-28 | End: 2023-08-29 | Stop reason: HOSPADM

## 2023-08-28 RX ORDER — PALONOSETRON 0.05 MG/ML
0.25 INJECTION, SOLUTION INTRAVENOUS ONCE
Status: COMPLETED | OUTPATIENT
Start: 2023-08-28 | End: 2023-08-28

## 2023-08-28 RX ORDER — DIPHENHYDRAMINE HYDROCHLORIDE 50 MG/ML
50 INJECTION INTRAMUSCULAR; INTRAVENOUS ONCE
Status: CANCELLED | OUTPATIENT
Start: 2023-08-28 | End: 2023-08-28

## 2023-08-28 RX ORDER — DEXAMETHASONE SODIUM PHOSPHATE 10 MG/ML
10 INJECTION, SOLUTION INTRAMUSCULAR; INTRAVENOUS
Status: COMPLETED | OUTPATIENT
Start: 2023-08-28 | End: 2023-08-28

## 2023-08-28 RX ORDER — DEXAMETHASONE SODIUM PHOSPHATE 10 MG/ML
10 INJECTION, SOLUTION INTRAMUSCULAR; INTRAVENOUS
Status: CANCELLED | OUTPATIENT
Start: 2023-08-28

## 2023-08-28 RX ORDER — DOXORUBICIN HYDROCHLORIDE 2 MG/ML
50 INJECTION, SOLUTION INTRAVENOUS ONCE
Status: COMPLETED | OUTPATIENT
Start: 2023-08-28 | End: 2023-08-28

## 2023-08-28 RX ORDER — ONDANSETRON 2 MG/ML
8 INJECTION INTRAMUSCULAR; INTRAVENOUS
Status: CANCELLED | OUTPATIENT
Start: 2023-08-28

## 2023-08-28 RX ADMIN — PEGFILGRASTIM 6 MG: KIT SUBCUTANEOUS at 14:50

## 2023-08-28 RX ADMIN — SODIUM CHLORIDE 740 MG: 9 INJECTION, SOLUTION INTRAVENOUS at 12:06

## 2023-08-28 RX ADMIN — CYCLOPHOSPHAMIDE 1480 MG: 2 INJECTION, POWDER, FOR SOLUTION INTRAVENOUS; ORAL at 10:31

## 2023-08-28 RX ADMIN — VINCRISTINE SULFATE 2 MG: 1 INJECTION, SOLUTION INTRAVENOUS at 11:40

## 2023-08-28 RX ADMIN — DIPHENHYDRAMINE HYDROCHLORIDE 50 MG: 50 INJECTION INTRAMUSCULAR; INTRAVENOUS at 11:47

## 2023-08-28 RX ADMIN — SODIUM CHLORIDE 20 ML/HR: 9 INJECTION, SOLUTION INTRAVENOUS at 08:32

## 2023-08-28 RX ADMIN — PALONOSETRON 0.25 MG: 0.05 INJECTION, SOLUTION INTRAVENOUS at 08:26

## 2023-08-28 RX ADMIN — FOSAPREPITANT DIMEGLUMINE 150 MG: 150 INJECTION, POWDER, LYOPHILIZED, FOR SOLUTION INTRAVENOUS at 09:05

## 2023-08-28 RX ADMIN — DOXORUBICIN HYDROCHLORIDE 100 MG: 2 INJECTION, SOLUTION INTRAVENOUS at 11:18

## 2023-08-28 RX ADMIN — DEXAMETHASONE SODIUM PHOSPHATE 10 MG: 10 INJECTION, SOLUTION INTRAMUSCULAR; INTRAVENOUS at 08:26

## 2023-08-28 RX ADMIN — ACETAMINOPHEN 1000 MG: 500 TABLET ORAL at 11:14

## 2023-08-28 NOTE — PROGRESS NOTES
Lab Results   Component Value Date    WBC 12.8 (H) 08/28/2023    HGB 13.4 (L) 08/28/2023    HCT 40.8 (L) 08/28/2023    MCV 98.1 (H) 08/28/2023     08/28/2023     Lab Results   Component Value Date    NEUTROABS 10.2 (H) 08/28/2023     Lab Results   Component Value Date     08/28/2023    K 4.0 08/28/2023     08/28/2023    CO2 27 08/28/2023    BUN 35 (H) 08/28/2023    CREATININE 1.3 (H) 08/28/2023    GLUCOSE 134 (H) 08/28/2023    CALCIUM 9.0 08/28/2023    PROT 6.9 08/28/2023    LABALBU 4.0 08/28/2023    BILITOT 0.3 08/28/2023    ALKPHOS 64 08/28/2023    AST 14 08/28/2023    ALT 15 08/28/2023    LABGLOM 57 (A) 08/28/2023    GLOB 2.8 08/22/2023

## 2023-08-28 NOTE — FLOWSHEET NOTE
Pt here for 1st cycle of rchop. Tolerated well, education given and pt and wife verbalizes understanding. Neulasta on pro applied .

## 2023-08-28 NOTE — DISCHARGE INSTRUCTIONS
cause early menopause, depending on your age when you receive this medicine. Ask your doctor about this risk. You should not breastfeed while you are using doxorubicin. How is doxorubicin given? Doxorubicin is given as an infusion into a vein. A healthcare provider will give you this injection. Tell your caregivers if you feel any burning, pain, or swelling around the IV needle when doxorubicin is injected. Doxorubicin is sometimes given together with other cancer medications. You may be given other medications to prevent nausea, vomiting, or infections. If any of this medication accidentally gets on your skin, wash it thoroughly with soap and warm water. Doxorubicin can lower your blood cell counts. Your blood will need to be tested often. Your cancer treatments may be delayed based on the results. What happens if I miss a dose? Call your doctor for instructions if you miss an appointment for your doxorubicin injection. What happens if I overdose? Since this medication is given by a healthcare professional in a medical setting, an overdose is unlikely to occur. What should I avoid while taking doxorubicin? Avoid being near people who are sick or have infections. Tell your doctor at once if you develop signs of infection. Avoid activities that may increase your risk of bleeding or injury. Use extra care to prevent bleeding while shaving or brushing your teeth. What are the possible side effects of doxorubicin? Get emergency medical help if you have signs of an allergic reaction: hives; difficult breathing; swelling of your face, lips, tongue, or throat. Some side effects may occur during the injection. Tell your caregiver right away if you feel dizzy, nauseated, light-headed, sweaty, or have a headache, chest tightness, back pain, trouble breathing, or swelling in your face.   Call your doctor at once if you have:  pain, blisters, or skin sores where the injection was given;  missed menstrual periods;  easy bruising, unusual bleeding (nose, mouth, vagina, or rectum), purple or red pinpoint spots under your skin;  low white blood cell counts --fever, swollen gums, painful mouth sores, pain when swallowing, skin sores, cold or flu symptoms, cough, trouble breathing; or  signs of heart problems --fast heartbeats, shortness of breath (even with mild exertion), feeling very weak or tired, swelling in your ankles or feet. Doxorubicin may cause your urine to turn a reddish-orange color. This side effect is usually not harmful. Common side effects may include:  nausea, vomiting; or  hair loss. This is not a complete list of side effects and others may occur. Call your doctor for medical advice about side effects. You may report side effects to FDA at 3-749-FDA-8469. What other drugs will affect doxorubicin? Many drugs can affect doxorubicin. This includes prescription and over-the-counter medicines, vitamins, and herbal products. Not all possible interactions are listed here. Tell your doctor about all your current medicines and any medicine you start or stop using. Where can I get more information? Your doctor or pharmacist can provide more information about doxorubicin. Remember, keep this and all other medicines out of the reach of children, never share your medicines with others, and use this medication only for the indication prescribed. Every effort has been made to ensure that the information provided by [address] [place] [address] is accurate, up-to-date, and complete, but no guarantee is made to that effect. Drug information contained herein may be time sensitive. [facility] information has been compiled for use by healthcare practitioners and consumers in the [facility] and therefore [facility] does not warrant that uses outside of the [facility] are appropriate, unless specifically indicated otherwise. [facility]'s drug information does not endorse drugs, diagnose patients or recommend therapy.

## 2023-09-13 ENCOUNTER — TELEPHONE (OUTPATIENT)
Dept: HEMATOLOGY | Age: 78
End: 2023-09-13

## 2023-09-13 RX ORDER — PANTOPRAZOLE SODIUM 40 MG/1
40 TABLET, DELAYED RELEASE ORAL 2 TIMES DAILY
Qty: 60 TABLET | Refills: 1 | Status: SHIPPED | OUTPATIENT
Start: 2023-09-13

## 2023-09-13 NOTE — TELEPHONE ENCOUNTER
Patient called and I spoke with patient. Patient complains of diarrhea and he is taking Pepto Bismol, I educated patient to take Imodium and if that did not slow down the diarrhea to call me back. Patient reports burning sensation in his belly, new MD orders for Protonix 40 mg BID will be sent to [facility] in [facility]. MD wants patient to come in for labs. I scheduled patient for labs tomorrow, we will draw CBC, CMP, MAG, Phos to make sure patient is not dehydrated.  Electronically signed by Laron Irving RN on 9/13/2023 at 4:29 PM

## 2023-09-13 NOTE — TELEPHONE ENCOUNTER
Attempted to call patient and patients wife. I left a message on both phones for someone to call me back. Will attempt at a later time.  Electronically signed by Stephani Nascimento RN on 9/13/2023 at 4:03 PM

## 2023-09-14 ENCOUNTER — HOSPITAL ENCOUNTER (OUTPATIENT)
Dept: INFUSION THERAPY | Age: 78
Discharge: HOME | End: 2023-09-14
Payer: MEDICARE

## 2023-09-14 DIAGNOSIS — R59.1 LYMPHADENOPATHY: ICD-10-CM

## 2023-09-14 LAB
ALBUMIN SERPL-MCNC: 4.2 G/DL (ref 3.5–5.2)
ALP SERPL-CCNC: 99 U/L (ref 40–130)
ALT SERPL-CCNC: 27 U/L (ref 21–72)
ANION GAP SERPL CALCULATED.3IONS-SCNC: 8 MMOL/L (ref 7–19)
AST SERPL-CCNC: 37 U/L (ref 17–59)
BASOPHILS # BLD: 0.11 K/UL (ref 0.01–0.08)
BASOPHILS NFR BLD: 1 % (ref 0.1–1.2)
BILIRUB SERPL-MCNC: <0.2 MG/DL (ref 0.2–1.3)
BUN SERPL-MCNC: 21 MG/DL (ref 9–20)
CALCIUM SERPL-MCNC: 8.6 MG/DL (ref 8.4–10.2)
CHLORIDE SERPL-SCNC: 101 MMOL/L (ref 98–111)
CO2 SERPL-SCNC: 29 MMOL/L (ref 22–29)
CREAT SERPL-MCNC: 1.1 MG/DL (ref 0.6–1.2)
EOSINOPHIL # BLD: 0.04 K/UL (ref 0.04–0.54)
EOSINOPHIL NFR BLD: 0.4 % (ref 0.7–7)
ERYTHROCYTE [DISTWIDTH] IN BLOOD BY AUTOMATED COUNT: 13.8 % (ref 11.6–14.4)
GLOBULIN: 2.6 G/DL
GLUCOSE SERPL-MCNC: 155 MG/DL (ref 74–106)
HCT VFR BLD AUTO: 37.5 % (ref 40.1–51)
HGB BLD-MCNC: 12.7 G/DL (ref 13.7–17.5)
LYMPHOCYTES # BLD: 0.89 K/UL (ref 1.18–3.74)
LYMPHOCYTES NFR BLD: 8.2 % (ref 19.3–53.1)
MAGNESIUM SERPL-MCNC: 2 MG/DL (ref 1.6–2.3)
MCH RBC QN AUTO: 31.8 PG (ref 25.7–32.2)
MCHC RBC AUTO-ENTMCNC: 33.9 G/DL (ref 32.3–36.5)
MCV RBC AUTO: 94 FL (ref 79–92.2)
MONOCYTES # BLD: 0.86 K/UL (ref 0.24–0.82)
MONOCYTES NFR BLD: 7.9 % (ref 4.7–12.5)
NEUTROPHILS # BLD: 8.68 K/UL (ref 1.56–6.13)
NEUTS SEG NFR BLD: 80 % (ref 34–71.1)
PHOSPHATE SERPL-MCNC: 2.9 MG/DL (ref 2.5–4.5)
PLATELET # BLD AUTO: 395 K/UL (ref 163–337)
PMV BLD AUTO: 9.5 FL (ref 7.4–10.4)
POTASSIUM SERPL-SCNC: 4.2 MMOL/L (ref 3.5–5.1)
PROT SERPL-MCNC: 6.7 G/DL (ref 6.3–8.2)
RBC # BLD AUTO: 3.99 M/UL (ref 4.63–6.08)
SODIUM SERPL-SCNC: 138 MMOL/L (ref 137–145)
WBC # BLD AUTO: 10.85 K/UL (ref 4.23–9.07)

## 2023-09-14 PROCEDURE — 84100 ASSAY OF PHOSPHORUS: CPT

## 2023-09-14 PROCEDURE — 80053 COMPREHEN METABOLIC PANEL: CPT

## 2023-09-14 PROCEDURE — 85025 COMPLETE CBC W/AUTO DIFF WBC: CPT

## 2023-09-14 PROCEDURE — 83735 ASSAY OF MAGNESIUM: CPT

## 2023-09-14 PROCEDURE — 36415 COLL VENOUS BLD VENIPUNCTURE: CPT

## 2023-09-14 RX ORDER — PANTOPRAZOLE SODIUM 40 MG/1
40 TABLET, DELAYED RELEASE ORAL 2 TIMES DAILY
Qty: 180 TABLET | OUTPATIENT
Start: 2023-09-14

## 2023-09-15 ENCOUNTER — TELEPHONE (OUTPATIENT)
Dept: HEMATOLOGY | Age: 78
End: 2023-09-15

## 2023-09-15 DIAGNOSIS — C83.33 DIFFUSE LARGE B-CELL LYMPHOMA OF INTRA-ABDOMINAL LYMPH NODES (HCC): Primary | ICD-10-CM

## 2023-09-15 NOTE — TELEPHONE ENCOUNTER
Left message for patient reminding them of their up coming appointment with [facility] Hematology/Oncology office.

## 2023-09-18 ENCOUNTER — HOSPITAL ENCOUNTER (OUTPATIENT)
Dept: INFUSION THERAPY | Age: 78
Setting detail: INFUSION SERIES
Discharge: HOME | End: 2023-09-18
Payer: MEDICARE

## 2023-09-18 ENCOUNTER — OFFICE VISIT (OUTPATIENT)
Dept: HEMATOLOGY | Age: 78
End: 2023-09-18
Payer: MEDICARE

## 2023-09-18 ENCOUNTER — HOSPITAL ENCOUNTER (OUTPATIENT)
Dept: INFUSION THERAPY | Age: 78
Discharge: HOME | End: 2023-09-18

## 2023-09-18 VITALS
SYSTOLIC BLOOD PRESSURE: 120 MMHG | OXYGEN SATURATION: 97 % | WEIGHT: 173 LBS | DIASTOLIC BLOOD PRESSURE: 62 MMHG | BODY MASS INDEX: 26.22 KG/M2 | HEART RATE: 89 BPM | HEIGHT: 68 IN

## 2023-09-18 VITALS
SYSTOLIC BLOOD PRESSURE: 97 MMHG | HEART RATE: 70 BPM | RESPIRATION RATE: 18 BRPM | TEMPERATURE: 96.8 F | OXYGEN SATURATION: 99 % | DIASTOLIC BLOOD PRESSURE: 61 MMHG

## 2023-09-18 DIAGNOSIS — E88.3 TUMOR LYSIS SYNDROME FOLLOWING ANTINEOPLASTIC DRUG THERAPY: ICD-10-CM

## 2023-09-18 DIAGNOSIS — T45.1X5A TUMOR LYSIS SYNDROME FOLLOWING ANTINEOPLASTIC DRUG THERAPY: ICD-10-CM

## 2023-09-18 DIAGNOSIS — C83.33 DIFFUSE LARGE B-CELL LYMPHOMA OF INTRA-ABDOMINAL LYMPH NODES (HCC): ICD-10-CM

## 2023-09-18 DIAGNOSIS — R59.1 LYMPHADENOPATHY: ICD-10-CM

## 2023-09-18 DIAGNOSIS — T45.1X5D ADVERSE EFFECT OF CHEMOTHERAPY, SUBSEQUENT ENCOUNTER: ICD-10-CM

## 2023-09-18 DIAGNOSIS — C83.33 DIFFUSE LARGE B-CELL LYMPHOMA OF INTRA-ABDOMINAL LYMPH NODES (HCC): Primary | ICD-10-CM

## 2023-09-18 DIAGNOSIS — Z51.11 CHEMOTHERAPY MANAGEMENT, ENCOUNTER FOR: Primary | ICD-10-CM

## 2023-09-18 DIAGNOSIS — Z71.89 CARE PLAN DISCUSSED WITH PATIENT: ICD-10-CM

## 2023-09-18 LAB
ALBUMIN SERPL-MCNC: 4 G/DL (ref 3.5–5.2)
ALP SERPL-CCNC: 87 U/L (ref 40–130)
ALT SERPL-CCNC: 16 U/L (ref 5–41)
ANION GAP SERPL CALCULATED.3IONS-SCNC: 13 MMOL/L (ref 7–19)
AST SERPL-CCNC: 20 U/L (ref 5–40)
BASOPHILS # BLD: 0.1 K/UL (ref 0–0.2)
BASOPHILS NFR BLD: 0.9 % (ref 0–1)
BILIRUB SERPL-MCNC: <0.2 MG/DL (ref 0.2–1.2)
BUN SERPL-MCNC: 25 MG/DL (ref 8–23)
CALCIUM SERPL-MCNC: 8 MG/DL (ref 8.8–10.2)
CHLORIDE SERPL-SCNC: 103 MMOL/L (ref 98–111)
CO2 SERPL-SCNC: 21 MMOL/L (ref 22–29)
CREAT SERPL-MCNC: 1.5 MG/DL (ref 0.5–1.2)
EOSINOPHIL # BLD: 0 K/UL (ref 0–0.6)
EOSINOPHIL NFR BLD: 0.3 % (ref 0–5)
ERYTHROCYTE [DISTWIDTH] IN BLOOD BY AUTOMATED COUNT: 14.4 % (ref 11.5–14.5)
GLUCOSE SERPL-MCNC: 129 MG/DL (ref 74–109)
HCT VFR BLD AUTO: 38.8 % (ref 42–52)
HGB BLD-MCNC: 12.8 G/DL (ref 14–18)
IMM GRANULOCYTES # BLD: 0.1 K/UL
LYMPHOCYTES # BLD: 0.5 K/UL (ref 1.1–4.5)
LYMPHOCYTES NFR BLD: 4.7 % (ref 20–40)
MCH RBC QN AUTO: 32.1 PG (ref 27–31)
MCHC RBC AUTO-ENTMCNC: 33 G/DL (ref 33–37)
MCV RBC AUTO: 97.2 FL (ref 80–94)
MONOCYTES # BLD: 1.6 K/UL (ref 0–0.9)
MONOCYTES NFR BLD: 16 % (ref 0–10)
NEUTROPHILS # BLD: 7.6 K/UL (ref 1.5–7.5)
NEUTS SEG NFR BLD: 77.2 % (ref 50–65)
PLATELET # BLD AUTO: 497 K/UL (ref 130–400)
PMV BLD AUTO: 9.3 FL (ref 9.4–12.4)
POTASSIUM SERPL-SCNC: 4.2 MMOL/L (ref 3.5–5)
PROT SERPL-MCNC: 7.1 G/DL (ref 6.6–8.7)
RBC # BLD AUTO: 3.99 M/UL (ref 4.7–6.1)
SODIUM SERPL-SCNC: 137 MMOL/L (ref 136–145)
WBC # BLD AUTO: 9.8 K/UL (ref 4.8–10.8)

## 2023-09-18 PROCEDURE — 96415 CHEMO IV INFUSION ADDL HR: CPT

## 2023-09-18 PROCEDURE — 85025 COMPLETE CBC W/AUTO DIFF WBC: CPT

## 2023-09-18 PROCEDURE — 96417 CHEMO IV INFUS EACH ADDL SEQ: CPT

## 2023-09-18 PROCEDURE — 2580000003 HC RX 258: Performed by: INTERNAL MEDICINE

## 2023-09-18 PROCEDURE — 96366 THER/PROPH/DIAG IV INF ADDON: CPT

## 2023-09-18 PROCEDURE — 6360000002 HC RX W HCPCS: Performed by: INTERNAL MEDICINE

## 2023-09-18 PROCEDURE — 96413 CHEMO IV INFUSION 1 HR: CPT

## 2023-09-18 PROCEDURE — 99214 OFFICE O/P EST MOD 30 MIN: CPT | Performed by: INTERNAL MEDICINE

## 2023-09-18 PROCEDURE — 6370000000 HC RX 637 (ALT 250 FOR IP): Performed by: INTERNAL MEDICINE

## 2023-09-18 PROCEDURE — 80053 COMPREHEN METABOLIC PANEL: CPT

## 2023-09-18 PROCEDURE — 1123F ACP DISCUSS/DSCN MKR DOCD: CPT | Performed by: INTERNAL MEDICINE

## 2023-09-18 PROCEDURE — 96375 TX/PRO/DX INJ NEW DRUG ADDON: CPT

## 2023-09-18 PROCEDURE — 96409 CHEMO IV PUSH SNGL DRUG: CPT

## 2023-09-18 PROCEDURE — 96411 CHEMO IV PUSH ADDL DRUG: CPT

## 2023-09-18 PROCEDURE — 96377 APPLICATON ON-BODY INJECTOR: CPT

## 2023-09-18 RX ORDER — DEXAMETHASONE SODIUM PHOSPHATE 10 MG/ML
10 INJECTION, SOLUTION INTRAMUSCULAR; INTRAVENOUS
Status: COMPLETED | OUTPATIENT
Start: 2023-09-18 | End: 2023-09-18

## 2023-09-18 RX ORDER — SODIUM CHLORIDE 0.9 % (FLUSH) 0.9 %
5-40 SYRINGE (ML) INJECTION PRN
Status: CANCELLED | OUTPATIENT
Start: 2023-09-18

## 2023-09-18 RX ORDER — PALONOSETRON 0.05 MG/ML
0.25 INJECTION, SOLUTION INTRAVENOUS ONCE
Status: CANCELLED | OUTPATIENT
Start: 2023-09-18 | End: 2023-09-18

## 2023-09-18 RX ORDER — SODIUM CHLORIDE 9 MG/ML
5-250 INJECTION, SOLUTION INTRAVENOUS PRN
Status: CANCELLED | OUTPATIENT
Start: 2023-09-18

## 2023-09-18 RX ORDER — FAMOTIDINE 10 MG/ML
20 INJECTION, SOLUTION INTRAVENOUS
Status: CANCELLED | OUTPATIENT
Start: 2023-09-18

## 2023-09-18 RX ORDER — DOXORUBICIN HYDROCHLORIDE 2 MG/ML
50 INJECTION, SOLUTION INTRAVENOUS ONCE
Status: CANCELLED | OUTPATIENT
Start: 2023-09-18 | End: 2023-09-18

## 2023-09-18 RX ORDER — SODIUM CHLORIDE 0.9 % (FLUSH) 0.9 %
5-40 SYRINGE (ML) INJECTION PRN
Status: DISCONTINUED | OUTPATIENT
Start: 2023-09-18 | End: 2023-09-19 | Stop reason: HOSPADM

## 2023-09-18 RX ORDER — PALONOSETRON 0.05 MG/ML
0.25 INJECTION, SOLUTION INTRAVENOUS ONCE
Status: COMPLETED | OUTPATIENT
Start: 2023-09-18 | End: 2023-09-18

## 2023-09-18 RX ORDER — DOXORUBICIN HYDROCHLORIDE 2 MG/ML
50 INJECTION, SOLUTION INTRAVENOUS ONCE
Status: COMPLETED | OUTPATIENT
Start: 2023-09-18 | End: 2023-09-18

## 2023-09-18 RX ORDER — DIPHENHYDRAMINE HYDROCHLORIDE 50 MG/ML
50 INJECTION INTRAMUSCULAR; INTRAVENOUS
Status: CANCELLED | OUTPATIENT
Start: 2023-09-18

## 2023-09-18 RX ORDER — ACETAMINOPHEN 325 MG/1
650 TABLET ORAL
Status: CANCELLED | OUTPATIENT
Start: 2023-09-18

## 2023-09-18 RX ORDER — ONDANSETRON 2 MG/ML
8 INJECTION INTRAMUSCULAR; INTRAVENOUS
Status: CANCELLED | OUTPATIENT
Start: 2023-09-18

## 2023-09-18 RX ORDER — SODIUM CHLORIDE 9 MG/ML
5-250 INJECTION, SOLUTION INTRAVENOUS PRN
Status: DISCONTINUED | OUTPATIENT
Start: 2023-09-18 | End: 2023-09-19 | Stop reason: HOSPADM

## 2023-09-18 RX ORDER — HEPARIN SODIUM (PORCINE) LOCK FLUSH IV SOLN 100 UNIT/ML 100 UNIT/ML
500 SOLUTION INTRAVENOUS PRN
Status: CANCELLED | OUTPATIENT
Start: 2023-09-18

## 2023-09-18 RX ORDER — DIPHENHYDRAMINE HYDROCHLORIDE 50 MG/ML
50 INJECTION INTRAMUSCULAR; INTRAVENOUS ONCE
Status: COMPLETED | OUTPATIENT
Start: 2023-09-18 | End: 2023-09-18

## 2023-09-18 RX ORDER — MEPERIDINE HYDROCHLORIDE 50 MG/ML
12.5 INJECTION INTRAMUSCULAR; INTRAVENOUS; SUBCUTANEOUS PRN
Status: CANCELLED | OUTPATIENT
Start: 2023-09-18

## 2023-09-18 RX ORDER — ACETAMINOPHEN 500 MG
1000 TABLET ORAL ONCE
Status: COMPLETED | OUTPATIENT
Start: 2023-09-18 | End: 2023-09-18

## 2023-09-18 RX ORDER — SODIUM CHLORIDE 9 MG/ML
INJECTION, SOLUTION INTRAVENOUS CONTINUOUS
Status: CANCELLED | OUTPATIENT
Start: 2023-09-18

## 2023-09-18 RX ORDER — DIPHENHYDRAMINE HYDROCHLORIDE 50 MG/ML
50 INJECTION INTRAMUSCULAR; INTRAVENOUS ONCE
Status: CANCELLED | OUTPATIENT
Start: 2023-09-18 | End: 2023-09-18

## 2023-09-18 RX ORDER — ACETAMINOPHEN 325 MG/1
1000 TABLET ORAL ONCE
Status: CANCELLED | OUTPATIENT
Start: 2023-09-18 | End: 2023-09-18

## 2023-09-18 RX ORDER — EPINEPHRINE 1 MG/ML
0.3 INJECTION, SOLUTION, CONCENTRATE INTRAVENOUS PRN
Status: CANCELLED | OUTPATIENT
Start: 2023-09-18

## 2023-09-18 RX ORDER — ALBUTEROL SULFATE 90 UG/1
4 AEROSOL, METERED RESPIRATORY (INHALATION) PRN
Status: CANCELLED | OUTPATIENT
Start: 2023-09-18

## 2023-09-18 RX ADMIN — ACETAMINOPHEN 1000 MG: 500 TABLET ORAL at 10:21

## 2023-09-18 RX ADMIN — CYCLOPHOSPHAMIDE 1480 MG: 2 INJECTION, POWDER, FOR SOLUTION INTRAVENOUS; ORAL at 11:01

## 2023-09-18 RX ADMIN — VINCRISTINE SULFATE 2 MG: 1 INJECTION, SOLUTION INTRAVENOUS at 12:03

## 2023-09-18 RX ADMIN — DOXORUBICIN HYDROCHLORIDE 100 MG: 2 INJECTION, SOLUTION INTRAVENOUS at 11:40

## 2023-09-18 RX ADMIN — PEGFILGRASTIM 6 MG: KIT SUBCUTANEOUS at 14:17

## 2023-09-18 RX ADMIN — PALONOSETRON 0.25 MG: 0.05 INJECTION, SOLUTION INTRAVENOUS at 10:20

## 2023-09-18 RX ADMIN — DEXAMETHASONE SODIUM PHOSPHATE 10 MG: 10 INJECTION, SOLUTION INTRAMUSCULAR; INTRAVENOUS at 10:20

## 2023-09-18 RX ADMIN — SODIUM CHLORIDE 20 ML/HR: 9 INJECTION, SOLUTION INTRAVENOUS at 10:19

## 2023-09-18 RX ADMIN — SODIUM CHLORIDE 740 MG: 9 INJECTION, SOLUTION INTRAVENOUS at 12:26

## 2023-09-18 RX ADMIN — DIPHENHYDRAMINE HYDROCHLORIDE 50 MG: 50 INJECTION, SOLUTION INTRAMUSCULAR; INTRAVENOUS at 10:30

## 2023-09-18 RX ADMIN — FOSAPREPITANT DIMEGLUMINE 150 MG: 150 INJECTION, POWDER, LYOPHILIZED, FOR SOLUTION INTRAVENOUS at 10:37

## 2023-09-18 ASSESSMENT — PAIN SCALES - GENERAL: PAINLEVEL_OUTOF10: 0

## 2023-09-18 NOTE — DISCHARGE INSTRUCTIONS
cyclophosphamide (oral/injection)  Pronunciation:  miguel godinez  What is the most important information I should know about cyclophosphamide? You should not use cyclophosphamide if you have a bladder obstruction or other urination problems. What is cyclophosphamide? Cyclophosphamide is used to treat several types of cancer. Cyclophosphamide is also used to treat minimal change nephrotic syndrome (kidney disease) in children who cannot use other treatments or when other treatments have failed. Cyclophosphamide may also be used for purposes not listed in this medication guide. What should I discuss with my healthcare provider before using cyclophosphamide? You should not use cyclophosphamide if you are allergic to it, or if you have:  a bladder obstruction. Tell your doctor if you have ever had:  a weak immune system (caused by disease or by using certain medicines);  an active or chronic infection (including a bladder infection);  bladder problems;  kidney disease (or if you are on dialysis);  lung disease;  heart disease;  liver disease; or  if you are receiving other cancer treatments or radiation. Using cyclophosphamide may increase your risk of developing other types of cancer such as bladder cancer, thyroid cancer, or leukemia. Talk with your doctor about your specific risk. Cyclophosphamide can harm an unborn baby if the mother or the father is using this medicine. If you are a woman, do not use cyclophosphamide if you are pregnant. Use effective birth control to prevent pregnancy while you are using this medicine and for at least 1 year after your last dose. If you are a man, use effective birth control if your sex partner is able to get pregnant. Keep using birth control for at least 4 months after your last dose. Tell your doctor right away if a pregnancy occurs while either the mother or the father is using cyclophosphamide.   This medicine may affect fertility (ability to have aches). Doxorubicin may cause dangerous effects on your heart. Call your doctor at once if you feel very weak or tired, or have fast heartbeats, shortness of breath (even with mild exertion), or swelling in your ankles or feet. What is doxorubicin? Doxorubicin is used to treat different types of cancers that affect the breast, bladder, kidneys, ovaries, thyroid, stomach, lungs, bones, nerve tissues, joints, and soft tissues. Doxorubicin is also used to treat Hodgkin lymphoma, non-Hodgkin lymphoma, and certain types of leukemia. Doxorubicin may also be used for purposes not listed in this medication guide. What should I discuss with my healthcare provider before receiving doxorubicin? You should not use this medicine if you are allergic to doxorubicin or similar medications (doxorubicin, daunorubicin, epirubicin, idarubicin, mitoxantrone), or if you have:  very low blood cell counts caused by chemotherapy you received in the past;  severe liver disease;  severe heart problems; or  if you have recently had a heart attack. Tell your doctor if you have ever had:  severe liver disease;  heart disease or heart failure; or  radiation treatment to your chest.  Tell your doctor about all other cancer medicines or radiation treatments you have received in the past.   Using doxorubicin may increase your risk of developing a bone marrow disease or other types of leukemia later in life. Ask your doctor about your specific risk. You may need to have a negative pregnancy test before starting this treatment. Both men and women using this medicine should use effective birth control to prevent pregnancy. Doxorubicin can harm an unborn baby or cause birth defects if the mother or father is using this medicine. Keep using birth control for at least 6 months after your last dose. Tell your doctor right away if a pregnancy occurs while either the mother or the father is using doxorubicin.   This medicine may affect fertility

## 2023-09-18 NOTE — PROGRESS NOTES
Patient here for Cycle 2 Day 1. Labs called to Dr Potter Profit office related to Calcium level okay to proceed with chemo.

## 2023-09-19 ENCOUNTER — TELEPHONE (OUTPATIENT)
Dept: HEMATOLOGY | Age: 78
End: 2023-09-19

## 2023-09-19 DIAGNOSIS — E86.0 DEHYDRATION: ICD-10-CM

## 2023-09-19 DIAGNOSIS — Z91.89 AT HIGH RISK OF TUMOR LYSIS SYNDROME: Primary | ICD-10-CM

## 2023-09-19 DIAGNOSIS — C83.33 DIFFUSE LARGE B-CELL LYMPHOMA OF INTRA-ABDOMINAL LYMPH NODES (HCC): ICD-10-CM

## 2023-09-19 NOTE — TELEPHONE ENCOUNTER
I have spoken with patient regarding his creatinine  level. Amirah Romeromarcelino has requesting for patient to increase fluid intake as well as get IVF next. He is currently is scheduled for 9/26/23 at [facility] at 9:30. Patient has been made aware of appointment and has verbalized understanding.

## 2023-09-25 ENCOUNTER — TELEPHONE (OUTPATIENT)
Dept: HEMATOLOGY | Age: 78
End: 2023-09-25

## 2023-09-25 DIAGNOSIS — T45.1X5A CHEMOTHERAPY INDUCED DIARRHEA: Primary | ICD-10-CM

## 2023-09-25 DIAGNOSIS — K52.1 CHEMOTHERAPY INDUCED DIARRHEA: Primary | ICD-10-CM

## 2023-09-25 DIAGNOSIS — C83.33 DIFFUSE LARGE B-CELL LYMPHOMA OF INTRA-ABDOMINAL LYMPH NODES (HCC): ICD-10-CM

## 2023-09-25 RX ORDER — DIPHENOXYLATE HYDROCHLORIDE AND ATROPINE SULFATE 2.5; .025 MG/1; MG/1
1 TABLET ORAL EVERY 6 HOURS PRN
Qty: 120 TABLET | Refills: 0 | Status: SHIPPED | OUTPATIENT
Start: 2023-09-25 | End: 2023-10-25

## 2023-09-25 NOTE — TELEPHONE ENCOUNTER
Patient has been in contact with office regarding insurances coverage changing in clinic with [facility]. When speaking patient has stated having increased diarrhea since receiving first dosage of R-CHOP. Dr Judith Burks has been made aware and has recommended to continue taking OTC Imodium every 3 hours as needed as well as Lomotil every 6 hours as needed for consistent diarrhea. Patient is already set up to receive IVF tomorrow morning and will proceed to appointment with [facility]. Pharmacy has been verified and script sent to [address] [place] [address].

## 2023-09-25 NOTE — TELEPHONE ENCOUNTER
Conversation held regarding [employer] network changes effective 10/1/2023. will be changing insurances during open enrollment 10/15/2023. Continuity of Care requested today through Medicare.   10/1/2023 - 1/1/2024    Faxed previous office note to [phone]   5-14 day TAT     REQUESTED PROCEDURE  [phone]    REQUESTED LAB CPT 09238 [id number]    REQUESTED DIAGNOSIS CODE  C83.33 E88.3

## 2023-09-26 ENCOUNTER — HOSPITAL ENCOUNTER (OUTPATIENT)
Dept: INFUSION THERAPY | Age: 78
Setting detail: INFUSION SERIES
Discharge: HOME | End: 2023-09-26
Payer: MEDICARE

## 2023-09-26 VITALS
TEMPERATURE: 97 F | DIASTOLIC BLOOD PRESSURE: 61 MMHG | SYSTOLIC BLOOD PRESSURE: 95 MMHG | HEART RATE: 74 BPM | RESPIRATION RATE: 20 BRPM | OXYGEN SATURATION: 97 %

## 2023-09-26 DIAGNOSIS — Z91.89 AT HIGH RISK OF TUMOR LYSIS SYNDROME: Primary | ICD-10-CM

## 2023-09-26 DIAGNOSIS — C83.33 DIFFUSE LARGE B-CELL LYMPHOMA OF INTRA-ABDOMINAL LYMPH NODES (HCC): ICD-10-CM

## 2023-09-26 DIAGNOSIS — E86.0 DEHYDRATION: ICD-10-CM

## 2023-09-26 LAB
ALBUMIN SERPL-MCNC: 3.2 G/DL (ref 3.5–5.2)
ALP SERPL-CCNC: 72 U/L (ref 40–130)
ALT SERPL-CCNC: 14 U/L (ref 5–41)
ANION GAP SERPL CALCULATED.3IONS-SCNC: 12 MMOL/L (ref 7–19)
AST SERPL-CCNC: 14 U/L (ref 5–40)
BASOPHILS # BLD: 0 K/UL (ref 0–0.2)
BASOPHILS NFR BLD: 0 % (ref 0–1)
BILIRUB SERPL-MCNC: 0.3 MG/DL (ref 0.2–1.2)
BUN SERPL-MCNC: 29 MG/DL (ref 8–23)
CALCIUM SERPL-MCNC: 7.5 MG/DL (ref 8.8–10.2)
CHLORIDE SERPL-SCNC: 103 MMOL/L (ref 98–111)
CO2 SERPL-SCNC: 21 MMOL/L (ref 22–29)
CREAT SERPL-MCNC: 0.8 MG/DL (ref 0.5–1.2)
EOSINOPHIL # BLD: 0 K/UL (ref 0–0.6)
EOSINOPHIL NFR BLD: 0 % (ref 0–5)
ERYTHROCYTE [DISTWIDTH] IN BLOOD BY AUTOMATED COUNT: 14 % (ref 11.5–14.5)
GLUCOSE SERPL-MCNC: 118 MG/DL (ref 74–109)
HCT VFR BLD AUTO: 35.9 % (ref 42–52)
HGB BLD-MCNC: 12.1 G/DL (ref 14–18)
IMM GRANULOCYTES # BLD: 0 K/UL
LYMPHOCYTES # BLD: 0.2 K/UL (ref 1.1–4.5)
LYMPHOCYTES NFR BLD: 18 % (ref 20–40)
MACROCYTES BLD QL SMEAR: ABNORMAL
MCH RBC QN AUTO: 32.2 PG (ref 27–31)
MCHC RBC AUTO-ENTMCNC: 33.7 G/DL (ref 33–37)
MCV RBC AUTO: 95.5 FL (ref 80–94)
MONOCYTES # BLD: 0.3 K/UL (ref 0–0.9)
MONOCYTES NFR BLD: 32 % (ref 0–10)
NEUTROPHILS # BLD: 0.5 K/UL (ref 1.5–7.5)
NEUTS SEG NFR BLD: 50 % (ref 50–65)
PLATELET # BLD AUTO: 94 K/UL (ref 130–400)
PLATELET SLIDE REVIEW: ABNORMAL
PMV BLD AUTO: 10.9 FL (ref 9.4–12.4)
POTASSIUM SERPL-SCNC: 3.8 MMOL/L (ref 3.5–5)
PROT SERPL-MCNC: 5.5 G/DL (ref 6.6–8.7)
RBC # BLD AUTO: 3.76 M/UL (ref 4.7–6.1)
SODIUM SERPL-SCNC: 136 MMOL/L (ref 136–145)
TOXIC GRANULATION: ABNORMAL
WBC # BLD AUTO: 0.9 K/UL (ref 4.8–10.8)

## 2023-09-26 PROCEDURE — 36415 COLL VENOUS BLD VENIPUNCTURE: CPT

## 2023-09-26 PROCEDURE — 85025 COMPLETE CBC W/AUTO DIFF WBC: CPT

## 2023-09-26 PROCEDURE — 96360 HYDRATION IV INFUSION INIT: CPT

## 2023-09-26 PROCEDURE — 80053 COMPREHEN METABOLIC PANEL: CPT

## 2023-09-26 PROCEDURE — 2580000003 HC RX 258: Performed by: INTERNAL MEDICINE

## 2023-09-26 RX ORDER — 0.9 % SODIUM CHLORIDE 0.9 %
1000 INTRAVENOUS SOLUTION INTRAVENOUS ONCE
OUTPATIENT
Start: 2023-09-26 | End: 2023-09-26

## 2023-09-26 RX ORDER — 0.9 % SODIUM CHLORIDE 0.9 %
1000 INTRAVENOUS SOLUTION INTRAVENOUS ONCE
Status: COMPLETED | OUTPATIENT
Start: 2023-09-26 | End: 2023-09-26

## 2023-09-26 RX ORDER — SODIUM CHLORIDE 0.9 % (FLUSH) 0.9 %
5-40 SYRINGE (ML) INJECTION PRN
Status: DISCONTINUED | OUTPATIENT
Start: 2023-09-26 | End: 2023-09-27 | Stop reason: HOSPADM

## 2023-09-26 RX ORDER — HEPARIN 100 UNIT/ML
500 SYRINGE INTRAVENOUS PRN
OUTPATIENT
Start: 2023-09-26

## 2023-09-26 RX ORDER — SODIUM CHLORIDE 0.9 % (FLUSH) 0.9 %
5-40 SYRINGE (ML) INJECTION PRN
OUTPATIENT
Start: 2023-09-26

## 2023-09-26 RX ADMIN — SODIUM CHLORIDE 1000 ML: 9 INJECTION, SOLUTION INTRAVENOUS at 09:43

## 2023-09-26 NOTE — PROGRESS NOTES
Spoke with RUTHIE Cueva related to calcium level and WBC no new orders received. Patient received 1 liter bolus.

## 2023-10-05 ENCOUNTER — TELEPHONE (OUTPATIENT)
Dept: HEMATOLOGY | Age: 78
End: 2023-10-05

## 2023-10-05 NOTE — PROGRESS NOTES
These were well circumscribed. These measured 4.27 x 4.49cm and 7.3 x 4.05cm respectively. The larger mass was noted right of mid-line near the head of the pancreas. The small was more midline near the celiac axis. Using doppler to confirm an adequate needle path, FNA was pursued of the small 4.4cm lesion. Using a 22F FNA needle with a stylette and slow pull capillary suction technique, multiple passes were made. Initial bedside exam was suspicious for possible lymph tissue. A separate pass was send for Flow Cytometry. Remaining tissue placed in [facility] for cytology. 7/25/23- Periaortic mass, ultrasound-guided fine-needle aspiration (smears, ThinPrep, and cell block section): Atypical B-cell lymphoid population suspicious for lymphoma. COMMENT: Material was sent to [facility] laboratory for flow cytometry. Flow cytometry performed by [employer] ([id number]) shows an atypical B-cell population (86% of sample) with increased cell size. The cells are positive for B-cell markers, CD10, and FMC7. The cells are negative for CD5. There is no tissue available to correlate these findings with other than the smears. FISH-IGH/BCL2 translocation DETECTED; CCND1 IGH, MYC, BCL6-not detected. No evidence of specific gene translocations detected by other Lymphoma FISH probes. Clinical correlation is recommended. 8/8/23 Labs: Uric Acid 9.4,  , Beta-2-3.2 Hep A IgM Reactive, HIV Negative   08/08/23- He was first sen by me. Recommended PET, CT Abd/pelvis. Will discuss with Dr. Nancy Hirsch, GI regarding repeat biopsy. 8/18/23 Repeat EGD with EUS by, Dr. Cassidy West/[facility]- awaiting pathology results  8/8/23 2D Echo [facility]): Left ventricular systolic function is normal. Left ventricular ejection fraction appears to be 61 - 65%. Abnormal global longitudinal LV strain (GLS) = -15.5%. Left ventricular diastolic function was normal. Normal right ventricular cavity size and systolic function noted.  There is mild calcification of the

## 2023-10-06 DIAGNOSIS — C83.33 DIFFUSE LARGE B-CELL LYMPHOMA OF INTRA-ABDOMINAL LYMPH NODES (HCC): Primary | ICD-10-CM

## 2023-10-09 ENCOUNTER — OFFICE VISIT (OUTPATIENT)
Dept: HEMATOLOGY | Age: 78
End: 2023-10-09
Payer: MEDICARE

## 2023-10-09 ENCOUNTER — HOSPITAL ENCOUNTER (OUTPATIENT)
Dept: INFUSION THERAPY | Age: 78
Setting detail: INFUSION SERIES
Discharge: HOME | End: 2023-10-09
Payer: MEDICARE

## 2023-10-09 ENCOUNTER — HOSPITAL ENCOUNTER (OUTPATIENT)
Dept: INFUSION THERAPY | Age: 78
Discharge: HOME | End: 2023-10-09
Payer: MEDICARE

## 2023-10-09 VITALS
HEIGHT: 68 IN | HEART RATE: 74 BPM | TEMPERATURE: 97.8 F | RESPIRATION RATE: 18 BRPM | WEIGHT: 174.14 LBS | SYSTOLIC BLOOD PRESSURE: 113 MMHG | BODY MASS INDEX: 26.39 KG/M2 | DIASTOLIC BLOOD PRESSURE: 68 MMHG | OXYGEN SATURATION: 96 %

## 2023-10-09 VITALS
DIASTOLIC BLOOD PRESSURE: 74 MMHG | OXYGEN SATURATION: 98 % | SYSTOLIC BLOOD PRESSURE: 138 MMHG | HEART RATE: 92 BPM | BODY MASS INDEX: 26.4 KG/M2 | TEMPERATURE: 98.3 F | HEIGHT: 68 IN | WEIGHT: 174.2 LBS

## 2023-10-09 DIAGNOSIS — Z51.11 CHEMOTHERAPY MANAGEMENT, ENCOUNTER FOR: ICD-10-CM

## 2023-10-09 DIAGNOSIS — C83.33 DIFFUSE LARGE B-CELL LYMPHOMA OF INTRA-ABDOMINAL LYMPH NODES (HCC): Primary | ICD-10-CM

## 2023-10-09 DIAGNOSIS — C83.33 DIFFUSE LARGE B-CELL LYMPHOMA OF INTRA-ABDOMINAL LYMPH NODES (HCC): ICD-10-CM

## 2023-10-09 DIAGNOSIS — R59.1 LYMPHADENOPATHY: ICD-10-CM

## 2023-10-09 DIAGNOSIS — Z71.89 CARE PLAN DISCUSSED WITH PATIENT: ICD-10-CM

## 2023-10-09 DIAGNOSIS — T45.1X5A CHEMOTHERAPY INDUCED DIARRHEA: Primary | ICD-10-CM

## 2023-10-09 DIAGNOSIS — K52.1 CHEMOTHERAPY INDUCED DIARRHEA: Primary | ICD-10-CM

## 2023-10-09 DIAGNOSIS — R53.83 OTHER FATIGUE: ICD-10-CM

## 2023-10-09 LAB
ALBUMIN SERPL-MCNC: 3.6 G/DL (ref 3.5–5.2)
ALP SERPL-CCNC: 82 U/L (ref 40–130)
ALT SERPL-CCNC: 31 U/L (ref 21–72)
ANION GAP SERPL CALCULATED.3IONS-SCNC: 10 MMOL/L (ref 7–19)
AST SERPL-CCNC: 41 U/L (ref 17–59)
BASOPHILS # BLD: 0.04 K/UL (ref 0.01–0.08)
BASOPHILS NFR BLD: 0.6 % (ref 0.1–1.2)
BILIRUB SERPL-MCNC: 0.4 MG/DL (ref 0.2–1.3)
BUN SERPL-MCNC: 12 MG/DL (ref 9–20)
CALCIUM SERPL-MCNC: 9.2 MG/DL (ref 8.4–10.2)
CHLORIDE SERPL-SCNC: 106 MMOL/L (ref 98–111)
CO2 SERPL-SCNC: 25 MMOL/L (ref 22–29)
CREAT SERPL-MCNC: 0.9 MG/DL (ref 0.6–1.2)
EOSINOPHIL # BLD: 0.04 K/UL (ref 0.04–0.54)
EOSINOPHIL NFR BLD: 0.6 % (ref 0.7–7)
ERYTHROCYTE [DISTWIDTH] IN BLOOD BY AUTOMATED COUNT: 16.2 % (ref 11.6–14.4)
GLOBULIN: 2.4 G/DL
GLUCOSE SERPL-MCNC: 125 MG/DL (ref 74–106)
HCT VFR BLD AUTO: 32.5 % (ref 40.1–51)
HGB BLD-MCNC: 11.2 G/DL (ref 13.7–17.5)
LYMPHOCYTES # BLD: 0.46 K/UL (ref 1.18–3.74)
LYMPHOCYTES NFR BLD: 6.7 % (ref 19.3–53.1)
MCH RBC QN AUTO: 32.7 PG (ref 25.7–32.2)
MCHC RBC AUTO-ENTMCNC: 34.5 G/DL (ref 32.3–36.5)
MCV RBC AUTO: 94.8 FL (ref 79–92.2)
MONOCYTES # BLD: 1.25 K/UL (ref 0.24–0.82)
MONOCYTES NFR BLD: 18.3 % (ref 4.7–12.5)
NEUTROPHILS # BLD: 4.97 K/UL (ref 1.56–6.13)
NEUTS SEG NFR BLD: 72.8 % (ref 34–71.1)
PLATELET # BLD AUTO: 359 K/UL (ref 163–337)
PMV BLD AUTO: 9.1 FL (ref 7.4–10.4)
POTASSIUM SERPL-SCNC: 4.3 MMOL/L (ref 3.5–5.1)
PROT SERPL-MCNC: 6 G/DL (ref 6.3–8.2)
RBC # BLD AUTO: 3.43 M/UL (ref 4.63–6.08)
SODIUM SERPL-SCNC: 141 MMOL/L (ref 137–145)
URATE SERPL-MCNC: 5.4 MG/DL (ref 3.5–8.5)
WBC # BLD AUTO: 6.83 K/UL (ref 4.23–9.07)

## 2023-10-09 PROCEDURE — 6370000000 HC RX 637 (ALT 250 FOR IP): Performed by: INTERNAL MEDICINE

## 2023-10-09 PROCEDURE — 96366 THER/PROPH/DIAG IV INF ADDON: CPT

## 2023-10-09 PROCEDURE — 2580000003 HC RX 258: Performed by: INTERNAL MEDICINE

## 2023-10-09 PROCEDURE — 99214 OFFICE O/P EST MOD 30 MIN: CPT | Performed by: INTERNAL MEDICINE

## 2023-10-09 PROCEDURE — 96417 CHEMO IV INFUS EACH ADDL SEQ: CPT

## 2023-10-09 PROCEDURE — 36415 COLL VENOUS BLD VENIPUNCTURE: CPT | Performed by: INTERNAL MEDICINE

## 2023-10-09 PROCEDURE — 85025 COMPLETE CBC W/AUTO DIFF WBC: CPT

## 2023-10-09 PROCEDURE — 96415 CHEMO IV INFUSION ADDL HR: CPT

## 2023-10-09 PROCEDURE — 96375 TX/PRO/DX INJ NEW DRUG ADDON: CPT

## 2023-10-09 PROCEDURE — 96377 APPLICATON ON-BODY INJECTOR: CPT

## 2023-10-09 PROCEDURE — 36415 COLL VENOUS BLD VENIPUNCTURE: CPT

## 2023-10-09 PROCEDURE — 96409 CHEMO IV PUSH SNGL DRUG: CPT

## 2023-10-09 PROCEDURE — 84550 ASSAY OF BLOOD/URIC ACID: CPT

## 2023-10-09 PROCEDURE — 96411 CHEMO IV PUSH ADDL DRUG: CPT

## 2023-10-09 PROCEDURE — 96413 CHEMO IV INFUSION 1 HR: CPT

## 2023-10-09 PROCEDURE — 6360000002 HC RX W HCPCS: Performed by: INTERNAL MEDICINE

## 2023-10-09 PROCEDURE — 80053 COMPREHEN METABOLIC PANEL: CPT

## 2023-10-09 PROCEDURE — 1123F ACP DISCUSS/DSCN MKR DOCD: CPT | Performed by: INTERNAL MEDICINE

## 2023-10-09 RX ORDER — DIPHENHYDRAMINE HYDROCHLORIDE 50 MG/ML
50 INJECTION INTRAMUSCULAR; INTRAVENOUS ONCE
Status: COMPLETED | OUTPATIENT
Start: 2023-10-09 | End: 2023-10-09

## 2023-10-09 RX ORDER — ACETAMINOPHEN 325 MG/1
1000 TABLET ORAL ONCE
Status: CANCELLED | OUTPATIENT
Start: 2023-10-09 | End: 2023-10-09

## 2023-10-09 RX ORDER — ALBUTEROL SULFATE 90 UG/1
4 AEROSOL, METERED RESPIRATORY (INHALATION) PRN
OUTPATIENT
Start: 2023-10-09

## 2023-10-09 RX ORDER — DOXORUBICIN HYDROCHLORIDE 2 MG/ML
50 INJECTION, SOLUTION INTRAVENOUS ONCE
Status: CANCELLED | OUTPATIENT
Start: 2023-10-09 | End: 2023-10-09

## 2023-10-09 RX ORDER — DOXORUBICIN HYDROCHLORIDE 2 MG/ML
50 INJECTION, SOLUTION INTRAVENOUS ONCE
Status: COMPLETED | OUTPATIENT
Start: 2023-10-09 | End: 2023-10-09

## 2023-10-09 RX ORDER — ACETAMINOPHEN 500 MG
1000 TABLET ORAL ONCE
Status: COMPLETED | OUTPATIENT
Start: 2023-10-09 | End: 2023-10-09

## 2023-10-09 RX ORDER — DIPHENHYDRAMINE HYDROCHLORIDE 50 MG/ML
50 INJECTION INTRAMUSCULAR; INTRAVENOUS
OUTPATIENT
Start: 2023-10-09

## 2023-10-09 RX ORDER — DIPHENHYDRAMINE HYDROCHLORIDE 50 MG/ML
50 INJECTION INTRAMUSCULAR; INTRAVENOUS ONCE
Status: CANCELLED | OUTPATIENT
Start: 2023-10-09 | End: 2023-10-09

## 2023-10-09 RX ORDER — SODIUM CHLORIDE 9 MG/ML
5-250 INJECTION, SOLUTION INTRAVENOUS PRN
Status: DISCONTINUED | OUTPATIENT
Start: 2023-10-09 | End: 2023-10-10 | Stop reason: HOSPADM

## 2023-10-09 RX ORDER — SODIUM CHLORIDE 0.9 % (FLUSH) 0.9 %
5-40 SYRINGE (ML) INJECTION PRN
Status: DISCONTINUED | OUTPATIENT
Start: 2023-10-09 | End: 2023-10-10 | Stop reason: HOSPADM

## 2023-10-09 RX ORDER — DEXAMETHASONE SODIUM PHOSPHATE 10 MG/ML
10 INJECTION, SOLUTION INTRAMUSCULAR; INTRAVENOUS
Status: COMPLETED | OUTPATIENT
Start: 2023-10-09 | End: 2023-10-09

## 2023-10-09 RX ORDER — SODIUM CHLORIDE 9 MG/ML
5-250 INJECTION, SOLUTION INTRAVENOUS PRN
Status: CANCELLED | OUTPATIENT
Start: 2023-10-09

## 2023-10-09 RX ORDER — MEPERIDINE HYDROCHLORIDE 50 MG/ML
12.5 INJECTION INTRAMUSCULAR; INTRAVENOUS; SUBCUTANEOUS PRN
OUTPATIENT
Start: 2023-10-09

## 2023-10-09 RX ORDER — ACETAMINOPHEN 325 MG/1
650 TABLET ORAL
OUTPATIENT
Start: 2023-10-09

## 2023-10-09 RX ORDER — FAMOTIDINE 10 MG/ML
20 INJECTION, SOLUTION INTRAVENOUS
OUTPATIENT
Start: 2023-10-09

## 2023-10-09 RX ORDER — PALONOSETRON 0.05 MG/ML
0.25 INJECTION, SOLUTION INTRAVENOUS ONCE
Status: CANCELLED | OUTPATIENT
Start: 2023-10-09 | End: 2023-10-09

## 2023-10-09 RX ORDER — SODIUM CHLORIDE 0.9 % (FLUSH) 0.9 %
5-40 SYRINGE (ML) INJECTION PRN
Status: CANCELLED | OUTPATIENT
Start: 2023-10-09

## 2023-10-09 RX ORDER — SODIUM CHLORIDE 9 MG/ML
5-250 INJECTION, SOLUTION INTRAVENOUS PRN
OUTPATIENT
Start: 2023-10-09

## 2023-10-09 RX ORDER — ONDANSETRON 2 MG/ML
8 INJECTION INTRAMUSCULAR; INTRAVENOUS
OUTPATIENT
Start: 2023-10-09

## 2023-10-09 RX ORDER — EPINEPHRINE 1 MG/ML
0.3 INJECTION, SOLUTION, CONCENTRATE INTRAVENOUS PRN
OUTPATIENT
Start: 2023-10-09

## 2023-10-09 RX ORDER — PALONOSETRON 0.05 MG/ML
0.25 INJECTION, SOLUTION INTRAVENOUS ONCE
Status: COMPLETED | OUTPATIENT
Start: 2023-10-09 | End: 2023-10-09

## 2023-10-09 RX ORDER — SODIUM CHLORIDE 9 MG/ML
INJECTION, SOLUTION INTRAVENOUS CONTINUOUS
OUTPATIENT
Start: 2023-10-09

## 2023-10-09 RX ORDER — HEPARIN SODIUM (PORCINE) LOCK FLUSH IV SOLN 100 UNIT/ML 100 UNIT/ML
500 SOLUTION INTRAVENOUS PRN
OUTPATIENT
Start: 2023-10-09

## 2023-10-09 RX ADMIN — SODIUM CHLORIDE, PRESERVATIVE FREE 10 ML: 5 INJECTION INTRAVENOUS at 11:47

## 2023-10-09 RX ADMIN — PEGFILGRASTIM 6 MG: KIT SUBCUTANEOUS at 13:41

## 2023-10-09 RX ADMIN — SODIUM CHLORIDE 20 ML/HR: 9 INJECTION, SOLUTION INTRAVENOUS at 09:44

## 2023-10-09 RX ADMIN — DEXAMETHASONE SODIUM PHOSPHATE 10 MG: 10 INJECTION, SOLUTION INTRAMUSCULAR; INTRAVENOUS at 09:44

## 2023-10-09 RX ADMIN — SODIUM CHLORIDE 740 MG: 900 INJECTION, SOLUTION INTRAVENOUS at 12:38

## 2023-10-09 RX ADMIN — FOSAPREPITANT 150 MG: 150 INJECTION, POWDER, LYOPHILIZED, FOR SOLUTION INTRAVENOUS at 10:07

## 2023-10-09 RX ADMIN — SODIUM CHLORIDE, PRESERVATIVE FREE 10 ML: 5 INJECTION INTRAVENOUS at 12:20

## 2023-10-09 RX ADMIN — DIPHENHYDRAMINE HYDROCHLORIDE 50 MG: 50 INJECTION, SOLUTION INTRAMUSCULAR; INTRAVENOUS at 09:55

## 2023-10-09 RX ADMIN — ACETAMINOPHEN 1000 MG: 500 TABLET ORAL at 10:44

## 2023-10-09 RX ADMIN — VINCRISTINE SULFATE 2 MG: 1 INJECTION, SOLUTION INTRAVENOUS at 11:37

## 2023-10-09 RX ADMIN — DOXORUBICIN HYDROCHLORIDE 100 MG: 2 INJECTION, SOLUTION INTRAVENOUS at 12:07

## 2023-10-09 RX ADMIN — SODIUM CHLORIDE, PRESERVATIVE FREE 10 ML: 5 INJECTION INTRAVENOUS at 11:27

## 2023-10-09 RX ADMIN — CYCLOPHOSPHAMIDE 1480 MG: 2 INJECTION, POWDER, FOR SOLUTION INTRAVENOUS; ORAL at 10:54

## 2023-10-09 RX ADMIN — PALONOSETRON 0.25 MG: 0.05 INJECTION, SOLUTION INTRAVENOUS at 09:44

## 2023-10-09 RX ADMIN — SODIUM CHLORIDE, PRESERVATIVE FREE 10 ML: 5 INJECTION INTRAVENOUS at 13:47

## 2023-10-09 NOTE — PROGRESS NOTES
Chemotherapy Regimen order of infusion:     Rituximab  Raul  Vincristine  Cytoxan    [facility].

## 2023-10-09 NOTE — PROGRESS NOTES
PT ARRIVED FOR CYCLE 3. LABS REVIEWED AND PREMEDS GIVEN. CYTOXAN 1480MG VINCRISTINE 2MG, ADRIAMYCIN 100MG AND RUXIENCE 740MG GIVEN AS ORDERED AND PT TOLERATED WELL. NEULASTA ON PRO APPLIED AS ORDERED. GIVEN PER PERIPHERAL IV IN LEFT ARM WITH EXCELLENT BLOOD RETURN NOTED THROUGHOUT INFUSION AND IVP. ADRIAMYCIN GIVEN IVP WITH EXCELLENT BLOOD RETURN NOTED BEFORE DURING AND AFTER AND PUSHED SLOWLY. APPROXIMATELY 2 MINUTES AFTER ADRIAMYCIN GIVEN APPROX 3 INCHES AND TO THE RIGHT OF IV SITE SOME REDNESS WITH SMALL WHELPS NOTED. PT DENIES BURNING OR HURTING EXCEPT SLIGHT TO TOUCH. IV DISCONTINUED AND NEW IV STARTED IN RIGHT ARM WITH EXCELLENT BLOOD RETURN NOTED. OBSERVED LEFT ARM IV SITE AND ONLY PINPOINT REDNESS IN AREA UPON DISCHARGE. DENIES ANY BURNING OR PAIN.

## 2023-10-09 NOTE — DISCHARGE INSTRUCTIONS
face or throat) or a severe skin reaction (fever, sore throat, burning eyes, skin pain, red or purple skin rash with blistering and peeling). Some side effects may occur during the injection (or within 24 hours afterward). Tell your caregiver right away  if you feel itchy, dizzy, weak, light-headed, short of breath, or if you have chest pain, wheezing, sudden cough, or pounding heartbeats or fluttering in your chest.  Rituximab may cause a serious brain infection that can lead to disability or death. Call your doctor right away if you have any of the following symptoms (which may start gradually and get worse quickly):  confusion, memory problems, or other changes in your mental state;  weakness on one side of your body;  vision changes; or  problems with speech or walking. Call your doctor at once if you have any of these other side effects, even if they occur several months after you receive rituximab, or after your treatment ends. painful skin or mouth sores, or a severe skin rash with blistering, peeling, or pus;  redness, warmth, or swelling of the skin;  severe stomach pain, vomiting, constipation, bloody or tarry stools;  irregular heartbeats, chest pain or pressure, pain spreading to your jaw or shoulder;  tiredness or jaundice (yellowing of the skin or eyes);  signs of infection --fever, chills, cold or flu symptoms, cough, sore throat, mouth sores, headache, earache, pain or burning when you urinate; or  signs of tumor cell breakdown --confusion, weakness, muscle cramps, nausea, vomiting, fast or slow heart rate, decreased urination, tingling in your hands and feet or around your mouth.   Common side effects may include:  low white and red blood cells (fever, chills, body aches, pale skin, unusual tiredness, infections);  nausea, diarrhea;  swelling in your hands or feet;  headache, weakness;  painful urination;  muscle spasms;  depressed mood; or  cold symptoms such as stuffy nose, sneezing, sore drug combination in no way should be construed to indicate that the drug or drug combination is safe, effective or appropriate for any given patient. [facility] does not assume any responsibility for any aspect of healthcare administered with the aid of information [facility] provides. The information contained herein is not intended to cover all possible uses, directions, precautions, warnings, drug interactions, allergic reactions, or adverse effects. If you have questions about the drugs you are taking, check with your doctor, nurse or pharmacist.  Copyright 3627-5757 [address]: 15.02. Revision date: 1/4/2021. Care instructions adapted under license by [facility] ([facility]). If you have questions about a medical condition or this instruction, always ask your healthcare professional. [address] any warranty or liability for your use of this information.

## 2023-10-12 ENCOUNTER — APPOINTMENT (OUTPATIENT)
Dept: INFUSION THERAPY | Age: 78
End: 2023-10-12
Payer: MEDICARE

## 2023-10-12 ENCOUNTER — HOSPITAL ENCOUNTER (OUTPATIENT)
Dept: INFUSION THERAPY | Age: 78
Setting detail: INFUSION SERIES
Discharge: HOME | End: 2023-10-12
Payer: MEDICARE

## 2023-10-12 PROCEDURE — 6360000002 HC RX W HCPCS: Performed by: INTERNAL MEDICINE

## 2023-10-12 PROCEDURE — 99211 OFF/OP EST MAY X REQ PHY/QHP: CPT

## 2023-10-12 RX ADMIN — DIMETHYL SULFOXIDE 50 ML: 0.54 IRRIGANT INTRAVESICAL at 16:01

## 2023-10-12 NOTE — DISCHARGE INSTRUCTIONS
Learning About I.V. Extravasation  What is I.V. extravasation? Medicine and fluids are often given directly into a blood vessel through an I.V. (intravenous) tube, or catheter. Extravasation (say \"cl-xgte-yyd-SAY-arianne\") is leakage of fluid in the tissues around the I.V. site. It happens when the catheter has come out of the blood vessel but is still in the nearby tissue. It may also happen if the blood vessel leaks because it is weak or damaged. The fluids collect in the tissues around the I.V. site rather than staying in the blood vessel. The buildup of fluid can cause tissue damage at the site. The leakage also prevents the medicine or fluid from being sent into the bloodstream for treatment as intended. What are the symptoms? Symptoms of I.V. extravasation include:  Swelling around the I.V. site. Pain. The amount of pain may depend on what type of medicine or fluid has leaked into the tissue. Redness or a change in color of the skin at the site. How is it treated? To treat I.V. extravasation, the nurse will remove the I.V. and clean the site. If needed, another I.V. will be inserted at a new site elsewhere on the body. The I.V. site will be raised above the level of the body, if it's on the arm or leg. This keeps the fluid from pooling in one place and helps prevent tissue damage. The I.V. site will be watched for signs of tissue damage or infection. With treatment, the swelling should go down day by day. When should you call for help? Call your doctor now or seek immediate medical care if:  You have symptoms of infection, such as: Increased pain, swelling, warmth, or redness or any change in color. Reddish streaks leading from the I.V. site. Pus draining from the I.V. site. A fever. You have new or worse pain. The swelling around your I.V. site is getting worse.   Watch closely for changes in your health, and be sure to contact your doctor if:  You do not get better as

## 2023-10-17 ENCOUNTER — HOSPITAL ENCOUNTER (OUTPATIENT)
Dept: INFUSION THERAPY | Age: 78
Discharge: HOME | End: 2023-10-17
Payer: MEDICARE

## 2023-10-17 VITALS
BODY MASS INDEX: 26.22 KG/M2 | WEIGHT: 173 LBS | DIASTOLIC BLOOD PRESSURE: 84 MMHG | RESPIRATION RATE: 20 BRPM | HEART RATE: 103 BPM | TEMPERATURE: 99 F | SYSTOLIC BLOOD PRESSURE: 107 MMHG | OXYGEN SATURATION: 97 % | HEIGHT: 68 IN

## 2023-10-17 DIAGNOSIS — C83.33 DIFFUSE LARGE B-CELL LYMPHOMA OF INTRA-ABDOMINAL LYMPH NODES (HCC): ICD-10-CM

## 2023-10-17 DIAGNOSIS — R19.7 DIARRHEA, UNSPECIFIED TYPE: ICD-10-CM

## 2023-10-17 DIAGNOSIS — C83.33 DIFFUSE LARGE B-CELL LYMPHOMA OF INTRA-ABDOMINAL LYMPH NODES (HCC): Primary | ICD-10-CM

## 2023-10-17 LAB
ALBUMIN SERPL-MCNC: 3.2 G/DL (ref 3.5–5.2)
ALP SERPL-CCNC: 79 U/L (ref 40–130)
ALT SERPL-CCNC: 23 U/L (ref 21–72)
ANION GAP SERPL CALCULATED.3IONS-SCNC: 10 MMOL/L (ref 7–19)
AST SERPL-CCNC: 51 U/L (ref 17–59)
BASOPHILS # BLD: 0.01 K/UL (ref 0.01–0.08)
BASOPHILS NFR BLD: 1.3 % (ref 0.1–1.2)
BILIRUB SERPL-MCNC: 0.4 MG/DL (ref 0.2–1.3)
BUN SERPL-MCNC: 21 MG/DL (ref 9–20)
CALCIUM SERPL-MCNC: 8.1 MG/DL (ref 8.4–10.2)
CHLORIDE SERPL-SCNC: 103 MMOL/L (ref 98–111)
CO2 SERPL-SCNC: 22 MMOL/L (ref 22–29)
CREAT SERPL-MCNC: 0.8 MG/DL (ref 0.6–1.2)
EOSINOPHIL # BLD: 0 K/UL (ref 0.04–0.54)
EOSINOPHIL NFR BLD: 0 % (ref 0.7–7)
ERYTHROCYTE [DISTWIDTH] IN BLOOD BY AUTOMATED COUNT: 16.5 % (ref 11.6–14.4)
GLOBULIN: 2.4 G/DL
GLUCOSE SERPL-MCNC: 133 MG/DL (ref 74–106)
HCT VFR BLD AUTO: 31.4 % (ref 40.1–51)
HGB BLD-MCNC: 10.8 G/DL (ref 13.7–17.5)
LYMPHOCYTES # BLD: 0.1 K/UL (ref 1.18–3.74)
LYMPHOCYTES NFR BLD: 13.3 % (ref 19.3–53.1)
MAGNESIUM SERPL-MCNC: 2.2 MG/DL (ref 1.6–2.3)
MCH RBC QN AUTO: 32.7 PG (ref 25.7–32.2)
MCHC RBC AUTO-ENTMCNC: 34.4 G/DL (ref 32.3–36.5)
MCV RBC AUTO: 95.2 FL (ref 79–92.2)
MONOCYTES # BLD: 0.19 K/UL (ref 0.24–0.82)
MONOCYTES NFR BLD: 25.3 % (ref 4.7–12.5)
NEUTROPHILS # BLD: 0.45 K/UL (ref 1.56–6.13)
NEUTS SEG NFR BLD: 60.1 % (ref 34–71.1)
PHOSPHATE SERPL-MCNC: 3.3 MG/DL (ref 2.5–4.5)
PLATELET # BLD AUTO: 95 K/UL (ref 163–337)
PMV BLD AUTO: 10.7 FL (ref 7.4–10.4)
POTASSIUM SERPL-SCNC: 3.8 MMOL/L (ref 3.5–5.1)
PROT SERPL-MCNC: 5.6 G/DL (ref 6.3–8.2)
RBC # BLD AUTO: 3.3 M/UL (ref 4.63–6.08)
SODIUM SERPL-SCNC: 135 MMOL/L (ref 137–145)
WBC # BLD AUTO: 0.75 K/UL (ref 4.23–9.07)

## 2023-10-17 PROCEDURE — 80053 COMPREHEN METABOLIC PANEL: CPT

## 2023-10-17 PROCEDURE — 36415 COLL VENOUS BLD VENIPUNCTURE: CPT

## 2023-10-17 PROCEDURE — 84100 ASSAY OF PHOSPHORUS: CPT

## 2023-10-17 PROCEDURE — 2580000003 HC RX 258: Performed by: INTERNAL MEDICINE

## 2023-10-17 PROCEDURE — 96360 HYDRATION IV INFUSION INIT: CPT

## 2023-10-17 PROCEDURE — 85025 COMPLETE CBC W/AUTO DIFF WBC: CPT

## 2023-10-17 PROCEDURE — 83735 ASSAY OF MAGNESIUM: CPT

## 2023-10-17 PROCEDURE — 96361 HYDRATE IV INFUSION ADD-ON: CPT

## 2023-10-17 RX ORDER — SODIUM CHLORIDE 9 MG/ML
INJECTION, SOLUTION INTRAVENOUS ONCE
Status: COMPLETED | OUTPATIENT
Start: 2023-10-17 | End: 2023-10-17

## 2023-10-17 RX ADMIN — SODIUM CHLORIDE: 9 INJECTION, SOLUTION INTRAVENOUS at 11:20

## 2023-10-17 NOTE — PROGRESS NOTES
Pt is here today concerning his left lower forearm. During his last chemotherapy administration on 10/9/23, some of the Adriamycin was suspected to have extravasated. He is back today with swelling and pain in that extremity. The redness is some less today but the pain is a little worse. There is no necrosis observed. VS stable. He is afebrile. WBC 0.7 but he did have a Neulasta OBI placed on 10/9/23 after treatment. Dr. Abdifatah Peralta came to examine pt and discussed the plan with him. He is having a lot of diarrhea and is taking Lomotil. I instructed pt. To alternate Lomotil and Imodium un to 10 tabs per 24hrs from now on. We will draw labs today and give 1L NS today. Neutropenic precautions discussed with pt and he will keep an eye on hi arm and inform us of any changes.

## 2023-10-20 ENCOUNTER — HOSPITAL ENCOUNTER (OUTPATIENT)
Dept: INFUSION THERAPY | Age: 78
Discharge: HOME | End: 2023-10-20

## 2023-10-20 ENCOUNTER — HOSPITAL ENCOUNTER (OUTPATIENT)
Dept: INFUSION THERAPY | Age: 78
End: 2023-10-20

## 2023-10-25 ENCOUNTER — TELEPHONE (OUTPATIENT)
Dept: HEMATOLOGY | Age: 78
End: 2023-10-25

## 2023-10-25 NOTE — TELEPHONE ENCOUNTER
Called to notify that he needs to be in the office at 8:30 on 10/30 for his appointment with Dr Leleee Joseph.

## 2023-10-26 ENCOUNTER — TELEPHONE (OUTPATIENT)
Dept: HEMATOLOGY | Age: 78
End: 2023-10-26

## 2023-10-26 NOTE — TELEPHONE ENCOUNTER
Called patient to remind them of their appointment on 10/30/2023. Patient voiced their understanding of the appointment and confirmed they would be coming. Patient was given date & time of appt.

## 2023-10-27 ENCOUNTER — HOSPITAL ENCOUNTER (OUTPATIENT)
Dept: CT IMAGING | Age: 78
Discharge: HOME | End: 2023-10-27
Payer: MEDICARE

## 2023-10-27 DIAGNOSIS — C83.33 DIFFUSE LARGE B-CELL LYMPHOMA OF INTRA-ABDOMINAL LYMPH NODES (HCC): ICD-10-CM

## 2023-10-27 PROCEDURE — 74177 CT ABD & PELVIS W/CONTRAST: CPT

## 2023-10-27 PROCEDURE — 6360000004 HC RX CONTRAST MEDICATION: Performed by: INTERNAL MEDICINE

## 2023-10-27 RX ADMIN — IOPAMIDOL 75 ML: 755 INJECTION, SOLUTION INTRAVENOUS at 08:43

## 2023-10-27 NOTE — PROGRESS NOTES
this encounter. Nenita Mcfarlane was seen today for follow-up.     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)    Chemotherapy induced diarrhea    Care plan discussed with patient    Chemotherapy-induced fatigue    Chemotherapy management, encounter for    Adverse effect of chemotherapy, subsequent encounter    Encounter for antineoplastic immunotherapy    Antineoplastic chemotherapy induced anemia    Other orders  -     0.9 % sodium chloride infusion  -     acetaminophen (TYLENOL) tablet 975 mg  -     diphenhydrAMINE (BENADRYL) injection 50 mg  -     fosaprepitant (EMEND) 150 mg in sodium chloride 0.9 % 150 mL IVPB  -     palonosetron (ALOXI) injection 0.25 mg  -     dexamethasone (DECADRON) 10 mg in sodium chloride 0.9 % 50 mL IVPB  -     riTUXimab-pvvr (RUXIENCE) 740 mg in sodium chloride 0.9 % 250 mL Rapid IVPB  -     DOXOrubicin HCl (ADRIAMYCIN) chemo syringe 100 mg  -     vinCRIStine (ONCOVIN) 2 mg in sodium chloride 0.9 % 50 mL chemo IVPB  -     cyclophosphamide (CYTOXAN) 1,480 mg in sodium chloride 0.9 % 250 mL chemo IVPB  -     pegfilgrastim (NEULASTA) on-body injector 6 mg  -     sodium chloride flush 0.9 % injection 5-40 mL  -     0.9 % sodium chloride infusion  -     heparin 100 UNIT/ML injection 500 Units  -     alteplase (CATHFLO) 2 mg in sterile water 2 mL injection  -     0.9 % sodium chloride infusion  -     diphenhydrAMINE (BENADRYL) injection 50 mg  -     famotidine (PEPCID) injection 20 mg  -     hydrocortisone sodium succinate PF (SOLU-CORTEF) injection 100 mg  -     acetaminophen (TYLENOL) tablet 650 mg  -     meperidine (DEMEROL) injection 12.5 mg  -     ondansetron (ZOFRAN) injection 8 mg  -     EPINEPHrine PF 1 MG/ML injection (Anaphylaxis) 0.3 mg  -     albuterol sulfate HFA (PROVENTIL;VENTOLIN;PROAIR) 108 (90 Base) MCG/ACT inhaler 4 puff        Lymphadenopathy, upper abdomen, July 2023 consistent with high-grade lymphoma  7/13/23

## 2023-10-30 ENCOUNTER — HOSPITAL ENCOUNTER (OUTPATIENT)
Dept: INFUSION THERAPY | Age: 78
Discharge: HOME | End: 2023-10-30
Payer: MEDICARE

## 2023-10-30 ENCOUNTER — HOSPITAL ENCOUNTER (OUTPATIENT)
Dept: INFUSION THERAPY | Age: 78
Setting detail: INFUSION SERIES
Discharge: HOME | End: 2023-10-30
Payer: MEDICARE

## 2023-10-30 ENCOUNTER — TRANSCRIBE ORDERS (OUTPATIENT)
Dept: ADMINISTRATIVE | Facility: HOSPITAL | Age: 78
End: 2023-10-30
Payer: MEDICARE

## 2023-10-30 ENCOUNTER — OFFICE VISIT (OUTPATIENT)
Dept: HEMATOLOGY | Age: 78
End: 2023-10-30
Payer: MEDICARE

## 2023-10-30 VITALS
BODY MASS INDEX: 25.79 KG/M2 | WEIGHT: 170.2 LBS | SYSTOLIC BLOOD PRESSURE: 148 MMHG | RESPIRATION RATE: 18 BRPM | HEIGHT: 68 IN | OXYGEN SATURATION: 93 % | DIASTOLIC BLOOD PRESSURE: 89 MMHG | TEMPERATURE: 97.7 F | HEART RATE: 71 BPM

## 2023-10-30 VITALS
HEIGHT: 68 IN | TEMPERATURE: 98.3 F | HEART RATE: 103 BPM | SYSTOLIC BLOOD PRESSURE: 142 MMHG | OXYGEN SATURATION: 95 % | DIASTOLIC BLOOD PRESSURE: 90 MMHG | BODY MASS INDEX: 25.79 KG/M2 | WEIGHT: 170.2 LBS

## 2023-10-30 DIAGNOSIS — R59.1 LYMPHADENOPATHY: ICD-10-CM

## 2023-10-30 DIAGNOSIS — C83.33 DIFFUSE LARGE B-CELL LYMPHOMA OF INTRA-ABDOMINAL LYMPH NODES (HCC): Primary | ICD-10-CM

## 2023-10-30 DIAGNOSIS — T45.1X5D ADVERSE EFFECT OF CHEMOTHERAPY, SUBSEQUENT ENCOUNTER: ICD-10-CM

## 2023-10-30 DIAGNOSIS — T45.1X5A CHEMOTHERAPY INDUCED DIARRHEA: ICD-10-CM

## 2023-10-30 DIAGNOSIS — Z51.12 ENCOUNTER FOR ANTINEOPLASTIC IMMUNOTHERAPY: ICD-10-CM

## 2023-10-30 DIAGNOSIS — R53.83 CHEMOTHERAPY-INDUCED FATIGUE: ICD-10-CM

## 2023-10-30 DIAGNOSIS — C83.33 DIFFUSE LARGE B-CELL LYMPHOMA OF INTRA-ABDOMINAL LYMPH NODES (HCC): ICD-10-CM

## 2023-10-30 DIAGNOSIS — T45.1X5A ANTINEOPLASTIC CHEMOTHERAPY INDUCED ANEMIA: ICD-10-CM

## 2023-10-30 DIAGNOSIS — D64.81 ANTINEOPLASTIC CHEMOTHERAPY INDUCED ANEMIA: ICD-10-CM

## 2023-10-30 DIAGNOSIS — Z71.89 CARE PLAN DISCUSSED WITH PATIENT: ICD-10-CM

## 2023-10-30 DIAGNOSIS — Z51.11 CHEMOTHERAPY MANAGEMENT, ENCOUNTER FOR: ICD-10-CM

## 2023-10-30 DIAGNOSIS — C83.33 DIFFUSE LARGE B-CELL LYMPHOMA, INTRA-ABDOMINAL LYMPH NODES: Primary | ICD-10-CM

## 2023-10-30 DIAGNOSIS — T45.1X5A CHEMOTHERAPY-INDUCED FATIGUE: ICD-10-CM

## 2023-10-30 DIAGNOSIS — K52.1 CHEMOTHERAPY INDUCED DIARRHEA: ICD-10-CM

## 2023-10-30 LAB
ALBUMIN SERPL-MCNC: 3.5 G/DL (ref 3.5–5.2)
ALP SERPL-CCNC: 85 U/L (ref 40–130)
ALT SERPL-CCNC: 34 U/L (ref 21–72)
ANION GAP SERPL CALCULATED.3IONS-SCNC: 10 MMOL/L (ref 7–19)
AST SERPL-CCNC: 50 U/L (ref 17–59)
BASOPHILS # BLD: 0.05 K/UL (ref 0.01–0.08)
BASOPHILS NFR BLD: 0.8 % (ref 0.1–1.2)
BILIRUB SERPL-MCNC: 0.3 MG/DL (ref 0.2–1.3)
BUN SERPL-MCNC: 12 MG/DL (ref 9–20)
CALCIUM SERPL-MCNC: 9.2 MG/DL (ref 8.4–10.2)
CHLORIDE SERPL-SCNC: 105 MMOL/L (ref 98–111)
CO2 SERPL-SCNC: 26 MMOL/L (ref 22–29)
CREAT SERPL-MCNC: 0.8 MG/DL (ref 0.6–1.2)
EOSINOPHIL # BLD: 0.04 K/UL (ref 0.04–0.54)
EOSINOPHIL NFR BLD: 0.6 % (ref 0.7–7)
ERYTHROCYTE [DISTWIDTH] IN BLOOD BY AUTOMATED COUNT: 18.4 % (ref 11.6–14.4)
GLOBULIN: 2.8 G/DL
GLUCOSE SERPL-MCNC: 121 MG/DL (ref 74–106)
HCT VFR BLD AUTO: 31.2 % (ref 40.1–51)
HGB BLD-MCNC: 10.4 G/DL (ref 13.7–17.5)
LYMPHOCYTES # BLD: 0.68 K/UL (ref 1.18–3.74)
LYMPHOCYTES NFR BLD: 10.3 % (ref 19.3–53.1)
MCH RBC QN AUTO: 32.7 PG (ref 25.7–32.2)
MCHC RBC AUTO-ENTMCNC: 33.3 G/DL (ref 32.3–36.5)
MCV RBC AUTO: 98.1 FL (ref 79–92.2)
MONOCYTES # BLD: 1.2 K/UL (ref 0.24–0.82)
MONOCYTES NFR BLD: 18.1 % (ref 4.7–12.5)
NEUTROPHILS # BLD: 4.58 K/UL (ref 1.56–6.13)
NEUTS SEG NFR BLD: 69 % (ref 34–71.1)
PLATELET # BLD AUTO: 339 K/UL (ref 163–337)
PMV BLD AUTO: 9 FL (ref 7.4–10.4)
POTASSIUM SERPL-SCNC: 3.7 MMOL/L (ref 3.5–5.1)
PROT SERPL-MCNC: 6.3 G/DL (ref 6.3–8.2)
RBC # BLD AUTO: 3.18 M/UL (ref 4.63–6.08)
SODIUM SERPL-SCNC: 141 MMOL/L (ref 137–145)
WBC # BLD AUTO: 6.63 K/UL (ref 4.23–9.07)

## 2023-10-30 PROCEDURE — 85025 COMPLETE CBC W/AUTO DIFF WBC: CPT

## 2023-10-30 PROCEDURE — 76937 US GUIDE VASCULAR ACCESS: CPT

## 2023-10-30 PROCEDURE — 80053 COMPREHEN METABOLIC PANEL: CPT

## 2023-10-30 PROCEDURE — C1751 CATH, INF, PER/CENT/MIDLINE: HCPCS

## 2023-10-30 PROCEDURE — 96377 APPLICATON ON-BODY INJECTOR: CPT

## 2023-10-30 PROCEDURE — 96366 THER/PROPH/DIAG IV INF ADDON: CPT

## 2023-10-30 PROCEDURE — 99214 OFFICE O/P EST MOD 30 MIN: CPT | Performed by: INTERNAL MEDICINE

## 2023-10-30 PROCEDURE — 6360000002 HC RX W HCPCS: Performed by: INTERNAL MEDICINE

## 2023-10-30 PROCEDURE — 96375 TX/PRO/DX INJ NEW DRUG ADDON: CPT

## 2023-10-30 PROCEDURE — 96413 CHEMO IV INFUSION 1 HR: CPT

## 2023-10-30 PROCEDURE — 2580000003 HC RX 258: Performed by: INTERNAL MEDICINE

## 2023-10-30 PROCEDURE — 36415 COLL VENOUS BLD VENIPUNCTURE: CPT

## 2023-10-30 PROCEDURE — 96417 CHEMO IV INFUS EACH ADDL SEQ: CPT

## 2023-10-30 PROCEDURE — 96415 CHEMO IV INFUSION ADDL HR: CPT

## 2023-10-30 PROCEDURE — 36569 INSJ PICC 5 YR+ W/O IMAGING: CPT

## 2023-10-30 PROCEDURE — 6370000000 HC RX 637 (ALT 250 FOR IP): Performed by: INTERNAL MEDICINE

## 2023-10-30 PROCEDURE — 96411 CHEMO IV PUSH ADDL DRUG: CPT

## 2023-10-30 PROCEDURE — 1123F ACP DISCUSS/DSCN MKR DOCD: CPT | Performed by: INTERNAL MEDICINE

## 2023-10-30 RX ORDER — PALONOSETRON 0.05 MG/ML
0.25 INJECTION, SOLUTION INTRAVENOUS ONCE
Status: CANCELLED | OUTPATIENT
Start: 2023-10-30 | End: 2023-10-30

## 2023-10-30 RX ORDER — EPINEPHRINE 1 MG/ML
0.3 INJECTION, SOLUTION, CONCENTRATE INTRAVENOUS PRN
OUTPATIENT
Start: 2023-10-30

## 2023-10-30 RX ORDER — PALONOSETRON 0.05 MG/ML
0.25 INJECTION, SOLUTION INTRAVENOUS ONCE
Status: COMPLETED | OUTPATIENT
Start: 2023-10-30 | End: 2023-10-30

## 2023-10-30 RX ORDER — ALBUTEROL SULFATE 90 UG/1
4 AEROSOL, METERED RESPIRATORY (INHALATION) PRN
OUTPATIENT
Start: 2023-10-30

## 2023-10-30 RX ORDER — DIPHENHYDRAMINE HYDROCHLORIDE 50 MG/ML
50 INJECTION INTRAMUSCULAR; INTRAVENOUS ONCE
Status: CANCELLED | OUTPATIENT
Start: 2023-10-30 | End: 2023-10-30

## 2023-10-30 RX ORDER — DIPHENHYDRAMINE HYDROCHLORIDE 50 MG/ML
50 INJECTION INTRAMUSCULAR; INTRAVENOUS ONCE
Status: COMPLETED | OUTPATIENT
Start: 2023-10-30 | End: 2023-10-30

## 2023-10-30 RX ORDER — DIPHENHYDRAMINE HYDROCHLORIDE 50 MG/ML
50 INJECTION INTRAMUSCULAR; INTRAVENOUS
OUTPATIENT
Start: 2023-10-30

## 2023-10-30 RX ORDER — HEPARIN SODIUM (PORCINE) LOCK FLUSH IV SOLN 100 UNIT/ML 100 UNIT/ML
500 SOLUTION INTRAVENOUS PRN
OUTPATIENT
Start: 2023-10-30

## 2023-10-30 RX ORDER — ONDANSETRON 2 MG/ML
8 INJECTION INTRAMUSCULAR; INTRAVENOUS
OUTPATIENT
Start: 2023-10-30

## 2023-10-30 RX ORDER — DOXORUBICIN HYDROCHLORIDE 2 MG/ML
50 INJECTION, SOLUTION INTRAVENOUS ONCE
Status: COMPLETED | OUTPATIENT
Start: 2023-10-30 | End: 2023-10-30

## 2023-10-30 RX ORDER — ACETAMINOPHEN 325 MG/1
650 TABLET ORAL
OUTPATIENT
Start: 2023-10-30

## 2023-10-30 RX ORDER — DEXAMETHASONE SODIUM PHOSPHATE 10 MG/ML
10 INJECTION, SOLUTION INTRAMUSCULAR; INTRAVENOUS
Status: COMPLETED | OUTPATIENT
Start: 2023-10-30 | End: 2023-10-30

## 2023-10-30 RX ORDER — FAMOTIDINE 10 MG/ML
20 INJECTION, SOLUTION INTRAVENOUS
OUTPATIENT
Start: 2023-10-30

## 2023-10-30 RX ORDER — SODIUM CHLORIDE 9 MG/ML
5-250 INJECTION, SOLUTION INTRAVENOUS PRN
Status: CANCELLED | OUTPATIENT
Start: 2023-10-30

## 2023-10-30 RX ORDER — ACETAMINOPHEN 500 MG
1000 TABLET ORAL ONCE
Status: COMPLETED | OUTPATIENT
Start: 2023-10-30 | End: 2023-10-30

## 2023-10-30 RX ORDER — SODIUM CHLORIDE 9 MG/ML
INJECTION, SOLUTION INTRAVENOUS CONTINUOUS
OUTPATIENT
Start: 2023-10-30

## 2023-10-30 RX ORDER — SODIUM CHLORIDE 0.9 % (FLUSH) 0.9 %
5-40 SYRINGE (ML) INJECTION PRN
OUTPATIENT
Start: 2023-10-30

## 2023-10-30 RX ORDER — MEPERIDINE HYDROCHLORIDE 50 MG/ML
12.5 INJECTION INTRAMUSCULAR; INTRAVENOUS; SUBCUTANEOUS PRN
OUTPATIENT
Start: 2023-10-30

## 2023-10-30 RX ORDER — SODIUM CHLORIDE 9 MG/ML
5-250 INJECTION, SOLUTION INTRAVENOUS PRN
Status: DISCONTINUED | OUTPATIENT
Start: 2023-10-30 | End: 2023-10-31 | Stop reason: HOSPADM

## 2023-10-30 RX ORDER — ACETAMINOPHEN 325 MG/1
1000 TABLET ORAL ONCE
Status: CANCELLED | OUTPATIENT
Start: 2023-10-30 | End: 2023-10-30

## 2023-10-30 RX ORDER — DOXORUBICIN HYDROCHLORIDE 2 MG/ML
50 INJECTION, SOLUTION INTRAVENOUS ONCE
Status: CANCELLED | OUTPATIENT
Start: 2023-10-30

## 2023-10-30 RX ORDER — SODIUM CHLORIDE 9 MG/ML
5-250 INJECTION, SOLUTION INTRAVENOUS PRN
OUTPATIENT
Start: 2023-10-30

## 2023-10-30 RX ADMIN — PALONOSETRON 0.25 MG: 0.05 INJECTION, SOLUTION INTRAVENOUS at 11:47

## 2023-10-30 RX ADMIN — DOXORUBICIN HYDROCHLORIDE 100 MG: 2 INJECTION, SOLUTION INTRAVENOUS at 14:40

## 2023-10-30 RX ADMIN — PEGFILGRASTIM 6 MG: KIT SUBCUTANEOUS at 15:40

## 2023-10-30 RX ADMIN — CYCLOPHOSPHAMIDE 1480 MG: 1 INJECTION, POWDER, FOR SOLUTION INTRAVENOUS; ORAL at 15:14

## 2023-10-30 RX ADMIN — VINCRISTINE SULFATE 2 MG: 1 INJECTION, SOLUTION INTRAVENOUS at 14:56

## 2023-10-30 RX ADMIN — DEXAMETHASONE SODIUM PHOSPHATE 10 MG: 10 INJECTION, SOLUTION INTRAMUSCULAR; INTRAVENOUS at 11:47

## 2023-10-30 RX ADMIN — ACETAMINOPHEN 1000 MG: 500 TABLET ORAL at 12:37

## 2023-10-30 RX ADMIN — FOSAPREPITANT 150 MG: 150 INJECTION, POWDER, LYOPHILIZED, FOR SOLUTION INTRAVENOUS at 12:12

## 2023-10-30 RX ADMIN — DIPHENHYDRAMINE HYDROCHLORIDE 50 MG: 50 INJECTION INTRAMUSCULAR; INTRAVENOUS at 12:38

## 2023-10-30 RX ADMIN — SODIUM CHLORIDE 20 ML/HR: 9 INJECTION, SOLUTION INTRAVENOUS at 11:55

## 2023-10-30 RX ADMIN — SODIUM CHLORIDE 740 MG: 9 INJECTION, SOLUTION INTRAVENOUS at 13:01

## 2023-10-30 NOTE — PROCEDURES
[facility] Vascular Access Team:  PICC Line Insertion Procedure Note      Patient: Rekha Rae  YOB: 1945   MRN: [id number]  Room: Room/bed info not found     Attending Physician - No att. providers found  Ordering Physician -  Jordy Jackson MD    Diagnosis: At risk for dehydration [Z91.89]  At high risk of tumor lysis syndrome [Z91.89]       Procedure(s): Insertion of a 4.5 Urdu Single Lumen PICC    Indication(s):   Chemotherapy    Date of Procedure: 10/30/2023   Start Time: 0827  End Time: 1115    Performed by: Dorothea Mak RN    Anesthesia: Local Injection <=5 mL 1% Lidocaine without Epinephrine    Estimated Blood Loss (mL): Minimal    Complications: Some difficulty threading PICC line. Fitz wire utilized and accounted for in wire count of 2 guidewires, 3 including stylet wire. Unable to use basilic vein due to vein being partially/non-compressible in several locations. PICC Single Lumen 59/71/26 Right Cephalic (Active)   Central Line Being Utilized Yes 10/30/23 1115   Criteria for Appropriate Use Irritant/vesicant medication 10/30/23 1115   Site Assessment Clean, dry & intact 10/30/23 1115   Phlebitis Assessment No symptoms 10/30/23 1115   Infiltration Assessment 0 10/30/23 1115   Lumen Color/Status Pink; Alcohol cap applied;Blood return noted; Flushed;Normal saline locked 10/30/23 1115   Alcohol Cap Used Yes 10/30/23 1115   Date of Last Dressing Change 10/30/23 10/30/23 1115   Dressing Type Transparent w/CHG gel;Securing device 10/30/23 1115   Dressing Status New dressing applied;Clean, dry & intact 10/30/23 1115   Dressing Intervention New 10/30/23 1115         Findings:   1. Successful insertion of PICC line. 2. PICC Line is ready to be used. 3. Please change tubing prior to using new PICC line. Make sure to label, date and use alcohol caps on new tubing and alcohol caps on unused ports. Detailed Description of Procedure:   Informed consent was obtained for the procedure. Risks including nerve injury, arterial puncture, bleeding, and adverse drug reaction were discussed. The Right Cephalic vein was visualized using the ultrasound and deemed a suitable vessel. The area was prepped with Chlorhexidine and draped in sterile fashion using full max barrier draping. The area was anesthetized with 3 cc's of 1% Lidocaine. The vein was accessed using US guidance. The guidewire was advanced through the needle to secure the vein. The needle was removed and a peel-a-way Sheath was placed over the guidewire. Guidewire was removed with atraumatic tip intact. The 4.5 [place]araguan Single Lumen PICC was trimmed at 39 cm and inserted into the Sheath. Using VPS technology, the PICC line was advanced until PICC tip was in the SVC. The peel-a-way sheath was removed and PICC was inserted until the CAJ was reached. The PICC was advanced until P wave deflection was captured. The PICC was withdrawn until the optimal P wave amplitude was obtained. Approximately 2 cm exposed. Internal components of the PICC were removed, all lumens had brisk blood return and was flushed with 20 cc of NS. The PICC was secured using an adhesive securement device. A 3M CHG Tegaderm was placed over the securement device and insertion site. Dressing was dated and initialed with external measurement marked. Patient did tolerate procedure well.             Electronically signed by Millicent Rob RN on 10/30/2023 at 11:39 AM

## 2023-10-30 NOTE — DISCHARGE INSTRUCTIONS
vincristine  Pronunciation:  herb zapata  What is the most important information I should know about vincristine? You should not use vincristine if you have a nerve-muscle disorder such as Charcot-Edith-Tooth syndrome. What is vincristine? Vincristine is used to treat leukemia, Hodgkin's disease, non-Hodgkin's lymphoma, rhabdomyosarcoma (soft tissue tumors), neuroblastoma (cancer that forms in nerve tissue), and Wilms' tumor. Vincristine is sometimes used in combination with other cancer drugs. Vincristine may also be used for other purposes not listed in this medication guide. What should I discuss with my healthcare provider before receiving vincristine? You should not use vincristine if you are allergic to it, or if you have a nerve-muscle disorder such as Charcot-Edith-Tooth syndrome. Tell your doctor if you are also receiving radiation treatments. Tell your doctor if you have ever had:  gout;  kidney stones;  breathing problems;  liver disease;  a blockage in your intestines; or  coronary artery disease, high blood pressure. Vincristine may harm an unborn baby. Use effective birth control to prevent pregnancy, and tell your doctor if you become pregnant. This medicine may cause missed menstrual periods. This medicine may affect fertility (your ability to have children), whether you are a man or a woman. You should not breastfeed while using this medicine. How is vincristine given? Vincristine is given as an infusion into a vein, usually once every 7 days. A healthcare provider will give you this injection. Tell your caregivers if you feel any burning, pain, or swelling around the IV needle when vincristine is injected. You may need frequent medical tests to be sure this medicine is not causing harmful effects. Your cancer treatments may be delayed based on the results. Vincristine may cause constipation. Ask your doctor how to avoid severe constipation.   What happens if I miss a

## 2023-10-30 NOTE — FLOWSHEET NOTE
Pt toleated chemo treat without issues. Picc line returned blood throughout treatments. Picc dc'd and pressure held. Appts given for 3 weeks.

## 2023-11-06 ENCOUNTER — HOSPITAL ENCOUNTER (OUTPATIENT)
Dept: CT IMAGING | Facility: HOSPITAL | Age: 78
Discharge: HOME | End: 2023-11-06
Payer: MEDICARE

## 2023-11-06 DIAGNOSIS — C83.33 DIFFUSE LARGE B-CELL LYMPHOMA, INTRA-ABDOMINAL LYMPH NODES: ICD-10-CM

## 2023-11-06 PROCEDURE — 78815 PET IMAGE W/CT SKULL-THIGH: CPT

## 2023-11-06 PROCEDURE — 0 FLUDEOXYGLUCOSE F18 SOLUTION: Performed by: STUDENT IN AN ORGANIZED HEALTH CARE EDUCATION/TRAINING PROGRAM

## 2023-11-06 PROCEDURE — A9552 F18 FDG: HCPCS | Performed by: STUDENT IN AN ORGANIZED HEALTH CARE EDUCATION/TRAINING PROGRAM

## 2023-11-06 RX ADMIN — FLUDEOXYGLUCOSE F18 1 DOSE: 300 INJECTION INTRAVENOUS at 08:54

## 2023-11-13 ENCOUNTER — HOSPITAL ENCOUNTER (OUTPATIENT)
Dept: INFUSION THERAPY | Age: 78
Discharge: HOME | End: 2023-11-13
Payer: MEDICARE

## 2023-11-13 VITALS — SYSTOLIC BLOOD PRESSURE: 116 MMHG | DIASTOLIC BLOOD PRESSURE: 72 MMHG

## 2023-11-13 DIAGNOSIS — C83.33 DIFFUSE LARGE B-CELL LYMPHOMA OF INTRA-ABDOMINAL LYMPH NODES (HCC): Primary | ICD-10-CM

## 2023-11-13 DIAGNOSIS — C83.33 DIFFUSE LARGE B-CELL LYMPHOMA OF INTRA-ABDOMINAL LYMPH NODES (HCC): ICD-10-CM

## 2023-11-13 LAB
ALBUMIN SERPL-MCNC: 3.5 G/DL (ref 3.5–5.2)
ALP SERPL-CCNC: 98 U/L (ref 40–130)
ALT SERPL-CCNC: 29 U/L (ref 21–72)
ANION GAP SERPL CALCULATED.3IONS-SCNC: 9 MMOL/L (ref 7–19)
AST SERPL-CCNC: 37 U/L (ref 17–59)
BASOPHILS # BLD: 0.05 K/UL (ref 0.01–0.08)
BASOPHILS NFR BLD: 0.6 % (ref 0.1–1.2)
BILIRUB SERPL-MCNC: 0.4 MG/DL (ref 0.2–1.3)
BUN SERPL-MCNC: 13 MG/DL (ref 9–20)
CALCIUM SERPL-MCNC: 9.1 MG/DL (ref 8.4–10.2)
CHLORIDE SERPL-SCNC: 104 MMOL/L (ref 98–111)
CO2 SERPL-SCNC: 26 MMOL/L (ref 22–29)
CREAT SERPL-MCNC: 1 MG/DL (ref 0.6–1.2)
EOSINOPHIL # BLD: 0 K/UL (ref 0.04–0.54)
EOSINOPHIL NFR BLD: 0 % (ref 0.7–7)
ERYTHROCYTE [DISTWIDTH] IN BLOOD BY AUTOMATED COUNT: 18.3 % (ref 11.6–14.4)
GLOBULIN: 2.8 G/DL
GLUCOSE SERPL-MCNC: 134 MG/DL (ref 74–106)
HCT VFR BLD AUTO: 31.1 % (ref 40.1–51)
HGB BLD-MCNC: 10.1 G/DL (ref 13.7–17.5)
LYMPHOCYTES # BLD: 0.32 K/UL (ref 1.18–3.74)
LYMPHOCYTES NFR BLD: 3.9 % (ref 19.3–53.1)
MCH RBC QN AUTO: 32.6 PG (ref 25.7–32.2)
MCHC RBC AUTO-ENTMCNC: 32.5 G/DL (ref 32.3–36.5)
MCV RBC AUTO: 100.3 FL (ref 79–92.2)
MONOCYTES # BLD: 0.66 K/UL (ref 0.24–0.82)
MONOCYTES NFR BLD: 8 % (ref 4.7–12.5)
NEUTROPHILS # BLD: 7.05 K/UL (ref 1.56–6.13)
NEUTS SEG NFR BLD: 85.6 % (ref 34–71.1)
PLATELET # BLD AUTO: 272 K/UL (ref 163–337)
PMV BLD AUTO: 9.1 FL (ref 7.4–10.4)
POTASSIUM SERPL-SCNC: 3.9 MMOL/L (ref 3.5–5.1)
PROT SERPL-MCNC: 6.3 G/DL (ref 6.3–8.2)
RBC # BLD AUTO: 3.1 M/UL (ref 4.63–6.08)
SODIUM SERPL-SCNC: 139 MMOL/L (ref 137–145)
WBC # BLD AUTO: 8.24 K/UL (ref 4.23–9.07)

## 2023-11-13 PROCEDURE — 80053 COMPREHEN METABOLIC PANEL: CPT

## 2023-11-13 PROCEDURE — 36415 COLL VENOUS BLD VENIPUNCTURE: CPT

## 2023-11-13 PROCEDURE — 85025 COMPLETE CBC W/AUTO DIFF WBC: CPT

## 2023-11-13 NOTE — PROGRESS NOTES
Pt came into clinic as a walk-in re: his left arm that we have been treating due to extravasation. It does look more red today but there are no open areas or necrotic areas. Santos Mccormack says that it feels like a burn. I discussed with Dr. Nelson Capone and he may consult a plastic surgeon next week if needed. We will call in Silvadene cream for him to try at home. CBC look stable today.

## 2023-11-17 DIAGNOSIS — C83.33 DIFFUSE LARGE B-CELL LYMPHOMA OF INTRA-ABDOMINAL LYMPH NODES (HCC): Primary | ICD-10-CM

## 2023-11-20 ENCOUNTER — PREP FOR PROCEDURE (OUTPATIENT)
Dept: VASCULAR SURGERY | Age: 78
End: 2023-11-20

## 2023-11-20 ENCOUNTER — HOSPITAL ENCOUNTER (OUTPATIENT)
Dept: INTERVENTIONAL RADIOLOGY/VASCULAR | Age: 78
Discharge: HOME | End: 2023-11-20
Payer: MEDICARE

## 2023-11-20 ENCOUNTER — OFFICE VISIT (OUTPATIENT)
Dept: HEMATOLOGY | Age: 78
End: 2023-11-20
Payer: MEDICARE

## 2023-11-20 ENCOUNTER — HOSPITAL ENCOUNTER (OUTPATIENT)
Dept: INFUSION THERAPY | Age: 78
Discharge: HOME | End: 2023-11-20
Payer: MEDICARE

## 2023-11-20 ENCOUNTER — HOSPITAL ENCOUNTER (OUTPATIENT)
Dept: INFUSION THERAPY | Age: 78
Setting detail: INFUSION SERIES
Discharge: HOME | End: 2023-11-20
Payer: MEDICARE

## 2023-11-20 VITALS
TEMPERATURE: 98.2 F | DIASTOLIC BLOOD PRESSURE: 80 MMHG | HEART RATE: 93 BPM | WEIGHT: 165 LBS | HEIGHT: 68 IN | BODY MASS INDEX: 25.01 KG/M2 | SYSTOLIC BLOOD PRESSURE: 138 MMHG | OXYGEN SATURATION: 97 % | RESPIRATION RATE: 18 BRPM

## 2023-11-20 VITALS — OXYGEN SATURATION: 91 % | HEART RATE: 90 BPM | RESPIRATION RATE: 13 BRPM

## 2023-11-20 VITALS
HEART RATE: 75 BPM | SYSTOLIC BLOOD PRESSURE: 137 MMHG | RESPIRATION RATE: 18 BRPM | OXYGEN SATURATION: 96 % | DIASTOLIC BLOOD PRESSURE: 84 MMHG | TEMPERATURE: 97.6 F

## 2023-11-20 DIAGNOSIS — E79.0 HYPERURICEMIA: ICD-10-CM

## 2023-11-20 DIAGNOSIS — Z51.12 ENCOUNTER FOR ANTINEOPLASTIC IMMUNOTHERAPY: ICD-10-CM

## 2023-11-20 DIAGNOSIS — K52.1 CHEMOTHERAPY INDUCED DIARRHEA: ICD-10-CM

## 2023-11-20 DIAGNOSIS — C83.33 DIFFUSE LARGE B-CELL LYMPHOMA OF INTRA-ABDOMINAL LYMPH NODES (HCC): ICD-10-CM

## 2023-11-20 DIAGNOSIS — Z71.89 CARE PLAN DISCUSSED WITH PATIENT: ICD-10-CM

## 2023-11-20 DIAGNOSIS — C83.33 DIFFUSE LARGE B-CELL LYMPHOMA OF INTRA-ABDOMINAL LYMPH NODES (HCC): Primary | ICD-10-CM

## 2023-11-20 DIAGNOSIS — T45.1X5D ADVERSE EFFECT OF CHEMOTHERAPY, SUBSEQUENT ENCOUNTER: ICD-10-CM

## 2023-11-20 DIAGNOSIS — T80.818S: ICD-10-CM

## 2023-11-20 DIAGNOSIS — R52 PAIN: ICD-10-CM

## 2023-11-20 DIAGNOSIS — C80.1 CANCER (HCC): ICD-10-CM

## 2023-11-20 DIAGNOSIS — T45.1X5A CHEMOTHERAPY INDUCED DIARRHEA: ICD-10-CM

## 2023-11-20 DIAGNOSIS — Z51.11 CHEMOTHERAPY MANAGEMENT, ENCOUNTER FOR: ICD-10-CM

## 2023-11-20 LAB
ALBUMIN SERPL-MCNC: 3.5 G/DL (ref 3.5–5.2)
ALP SERPL-CCNC: 77 U/L (ref 40–130)
ALT SERPL-CCNC: 30 U/L (ref 21–72)
ANION GAP SERPL CALCULATED.3IONS-SCNC: 10 MMOL/L (ref 7–19)
AST SERPL-CCNC: 48 U/L (ref 17–59)
BASOPHILS # BLD: 0.06 K/UL (ref 0.01–0.08)
BASOPHILS NFR BLD: 1 % (ref 0.1–1.2)
BILIRUB SERPL-MCNC: 0.3 MG/DL (ref 0.2–1.3)
BUN SERPL-MCNC: 14 MG/DL (ref 9–20)
CALCIUM SERPL-MCNC: 9.2 MG/DL (ref 8.4–10.2)
CHLORIDE SERPL-SCNC: 104 MMOL/L (ref 98–111)
CO2 SERPL-SCNC: 24 MMOL/L (ref 22–29)
CREAT SERPL-MCNC: 0.8 MG/DL (ref 0.6–1.2)
EOSINOPHIL # BLD: 0.04 K/UL (ref 0.04–0.54)
EOSINOPHIL NFR BLD: 0.7 % (ref 0.7–7)
ERYTHROCYTE [DISTWIDTH] IN BLOOD BY AUTOMATED COUNT: 18.1 % (ref 11.6–14.4)
GLOBULIN: 2.6 G/DL
GLUCOSE SERPL-MCNC: 178 MG/DL (ref 74–106)
HCT VFR BLD AUTO: 29.1 % (ref 40.1–51)
HGB BLD-MCNC: 9.6 G/DL (ref 13.7–17.5)
LYMPHOCYTES # BLD: 0.51 K/UL (ref 1.18–3.74)
LYMPHOCYTES NFR BLD: 8.3 % (ref 19.3–53.1)
MCH RBC QN AUTO: 33 PG (ref 25.7–32.2)
MCHC RBC AUTO-ENTMCNC: 33 G/DL (ref 32.3–36.5)
MCV RBC AUTO: 100 FL (ref 79–92.2)
MONOCYTES # BLD: 1.23 K/UL (ref 0.24–0.82)
MONOCYTES NFR BLD: 20.1 % (ref 4.7–12.5)
NEUTROPHILS # BLD: 4.24 K/UL (ref 1.56–6.13)
NEUTS SEG NFR BLD: 69.1 % (ref 34–71.1)
PLATELET # BLD AUTO: 328 K/UL (ref 163–337)
PMV BLD AUTO: 8.8 FL (ref 7.4–10.4)
POTASSIUM SERPL-SCNC: 3.8 MMOL/L (ref 3.5–5.1)
PROT SERPL-MCNC: 6 G/DL (ref 6.3–8.2)
RBC # BLD AUTO: 2.91 M/UL (ref 4.63–6.08)
SODIUM SERPL-SCNC: 138 MMOL/L (ref 137–145)
WBC # BLD AUTO: 6.13 K/UL (ref 4.23–9.07)

## 2023-11-20 PROCEDURE — 36589 REMOVAL TUNNELED CV CATH: CPT

## 2023-11-20 PROCEDURE — 36415 COLL VENOUS BLD VENIPUNCTURE: CPT

## 2023-11-20 PROCEDURE — 96377 APPLICATON ON-BODY INJECTOR: CPT

## 2023-11-20 PROCEDURE — 99214 OFFICE O/P EST MOD 30 MIN: CPT | Performed by: INTERNAL MEDICINE

## 2023-11-20 PROCEDURE — 80053 COMPREHEN METABOLIC PANEL: CPT

## 2023-11-20 PROCEDURE — 6370000000 HC RX 637 (ALT 250 FOR IP): Performed by: INTERNAL MEDICINE

## 2023-11-20 PROCEDURE — 2500000003 HC RX 250 WO HCPCS: Performed by: SURGERY

## 2023-11-20 PROCEDURE — 6360000002 HC RX W HCPCS: Performed by: INTERNAL MEDICINE

## 2023-11-20 PROCEDURE — 36573 INSJ PICC RS&I 5 YR+: CPT | Performed by: SURGERY

## 2023-11-20 PROCEDURE — 36573 INSJ PICC RS&I 5 YR+: CPT

## 2023-11-20 PROCEDURE — 96415 CHEMO IV INFUSION ADDL HR: CPT

## 2023-11-20 PROCEDURE — 2709999900 IR FLUORO GUIDED CVA DEVICE PLMT/REPLACE/REMOVAL

## 2023-11-20 PROCEDURE — 85025 COMPLETE CBC W/AUTO DIFF WBC: CPT

## 2023-11-20 PROCEDURE — 6360000002 HC RX W HCPCS: Performed by: SURGERY

## 2023-11-20 PROCEDURE — 96375 TX/PRO/DX INJ NEW DRUG ADDON: CPT

## 2023-11-20 PROCEDURE — 2580000003 HC RX 258: Performed by: INTERNAL MEDICINE

## 2023-11-20 PROCEDURE — 1123F ACP DISCUSS/DSCN MKR DOCD: CPT | Performed by: INTERNAL MEDICINE

## 2023-11-20 PROCEDURE — 96413 CHEMO IV INFUSION 1 HR: CPT

## 2023-11-20 PROCEDURE — 96411 CHEMO IV PUSH ADDL DRUG: CPT

## 2023-11-20 PROCEDURE — 96417 CHEMO IV INFUS EACH ADDL SEQ: CPT

## 2023-11-20 RX ORDER — SODIUM CHLORIDE 0.9 % (FLUSH) 0.9 %
5-40 SYRINGE (ML) INJECTION EVERY 12 HOURS SCHEDULED
OUTPATIENT
Start: 2023-11-20

## 2023-11-20 RX ORDER — SODIUM CHLORIDE 9 MG/ML
5-250 INJECTION, SOLUTION INTRAVENOUS PRN
OUTPATIENT
Start: 2023-11-20

## 2023-11-20 RX ORDER — SODIUM CHLORIDE 0.9 % (FLUSH) 0.9 %
5-40 SYRINGE (ML) INJECTION PRN
OUTPATIENT
Start: 2023-11-20

## 2023-11-20 RX ORDER — TRAMADOL HYDROCHLORIDE 50 MG/1
50 TABLET ORAL EVERY 6 HOURS PRN
Qty: 120 TABLET | Refills: 0 | Status: SHIPPED | OUTPATIENT
Start: 2023-11-20 | End: 2023-12-20

## 2023-11-20 RX ORDER — ACETAMINOPHEN 325 MG/1
650 TABLET ORAL
OUTPATIENT
Start: 2023-11-20

## 2023-11-20 RX ORDER — MEPERIDINE HYDROCHLORIDE 50 MG/ML
12.5 INJECTION INTRAMUSCULAR; INTRAVENOUS; SUBCUTANEOUS PRN
OUTPATIENT
Start: 2023-11-20

## 2023-11-20 RX ORDER — SODIUM CHLORIDE 9 MG/ML
5-250 INJECTION, SOLUTION INTRAVENOUS PRN
Status: DISCONTINUED | OUTPATIENT
Start: 2023-11-20 | End: 2023-11-21 | Stop reason: HOSPADM

## 2023-11-20 RX ORDER — EPINEPHRINE 1 MG/ML
0.3 INJECTION, SOLUTION, CONCENTRATE INTRAVENOUS PRN
OUTPATIENT
Start: 2023-11-20

## 2023-11-20 RX ORDER — ONDANSETRON 2 MG/ML
8 INJECTION INTRAMUSCULAR; INTRAVENOUS
OUTPATIENT
Start: 2023-11-20

## 2023-11-20 RX ORDER — PALONOSETRON 0.05 MG/ML
0.25 INJECTION, SOLUTION INTRAVENOUS ONCE
Status: COMPLETED | OUTPATIENT
Start: 2023-11-20 | End: 2023-11-20

## 2023-11-20 RX ORDER — DIPHENHYDRAMINE HYDROCHLORIDE 50 MG/ML
50 INJECTION INTRAMUSCULAR; INTRAVENOUS ONCE
Status: COMPLETED | OUTPATIENT
Start: 2023-11-20 | End: 2023-11-20

## 2023-11-20 RX ORDER — ALBUTEROL SULFATE 90 UG/1
4 AEROSOL, METERED RESPIRATORY (INHALATION) PRN
OUTPATIENT
Start: 2023-11-20

## 2023-11-20 RX ORDER — SODIUM CHLORIDE 9 MG/ML
5-250 INJECTION, SOLUTION INTRAVENOUS PRN
Status: CANCELLED | OUTPATIENT
Start: 2023-11-20

## 2023-11-20 RX ORDER — HEPARIN SODIUM (PORCINE) LOCK FLUSH IV SOLN 100 UNIT/ML 100 UNIT/ML
500 SOLUTION INTRAVENOUS PRN
OUTPATIENT
Start: 2023-11-20

## 2023-11-20 RX ORDER — PALONOSETRON 0.05 MG/ML
0.25 INJECTION, SOLUTION INTRAVENOUS ONCE
Status: CANCELLED | OUTPATIENT
Start: 2023-11-20 | End: 2023-11-20

## 2023-11-20 RX ORDER — DOXORUBICIN HYDROCHLORIDE 2 MG/ML
50 INJECTION, SOLUTION INTRAVENOUS ONCE
Status: DISCONTINUED | OUTPATIENT
Start: 2023-11-20 | End: 2023-11-20

## 2023-11-20 RX ORDER — DEXAMETHASONE SODIUM PHOSPHATE 10 MG/ML
10 INJECTION, SOLUTION INTRAMUSCULAR; INTRAVENOUS
Status: COMPLETED | OUTPATIENT
Start: 2023-11-20 | End: 2023-11-20

## 2023-11-20 RX ORDER — SODIUM CHLORIDE 9 MG/ML
INJECTION, SOLUTION INTRAVENOUS PRN
OUTPATIENT
Start: 2023-11-20

## 2023-11-20 RX ORDER — SODIUM CHLORIDE 9 MG/ML
INJECTION, SOLUTION INTRAVENOUS CONTINUOUS
OUTPATIENT
Start: 2023-11-20

## 2023-11-20 RX ORDER — SODIUM CHLORIDE 0.9 % (FLUSH) 0.9 %
5-40 SYRINGE (ML) INJECTION PRN
Status: CANCELLED | OUTPATIENT
Start: 2023-11-20

## 2023-11-20 RX ORDER — SODIUM CHLORIDE 0.9 % (FLUSH) 0.9 %
5-40 SYRINGE (ML) INJECTION PRN
Status: DISCONTINUED | OUTPATIENT
Start: 2023-11-20 | End: 2023-11-21 | Stop reason: HOSPADM

## 2023-11-20 RX ORDER — DIPHENHYDRAMINE HYDROCHLORIDE 50 MG/ML
50 INJECTION INTRAMUSCULAR; INTRAVENOUS
OUTPATIENT
Start: 2023-11-20

## 2023-11-20 RX ORDER — DOXORUBICIN HYDROCHLORIDE 2 MG/ML
50 INJECTION, SOLUTION INTRAVENOUS ONCE
Status: CANCELLED | OUTPATIENT
Start: 2023-11-20

## 2023-11-20 RX ORDER — ACETAMINOPHEN 325 MG/1
1000 TABLET ORAL ONCE
Status: CANCELLED | OUTPATIENT
Start: 2023-11-20 | End: 2023-11-20

## 2023-11-20 RX ORDER — HEPARIN SODIUM 5000 [USP'U]/ML
INJECTION, SOLUTION INTRAVENOUS; SUBCUTANEOUS PRN
Status: COMPLETED | OUTPATIENT
Start: 2023-11-20 | End: 2023-11-20

## 2023-11-20 RX ORDER — DIPHENHYDRAMINE HYDROCHLORIDE 50 MG/ML
50 INJECTION INTRAMUSCULAR; INTRAVENOUS ONCE
Status: CANCELLED | OUTPATIENT
Start: 2023-11-20 | End: 2023-11-20

## 2023-11-20 RX ORDER — LIDOCAINE HYDROCHLORIDE 20 MG/ML
INJECTION, SOLUTION EPIDURAL; INFILTRATION; INTRACAUDAL; PERINEURAL PRN
Status: COMPLETED | OUTPATIENT
Start: 2023-11-20 | End: 2023-11-20

## 2023-11-20 RX ORDER — FAMOTIDINE 10 MG/ML
20 INJECTION, SOLUTION INTRAVENOUS
OUTPATIENT
Start: 2023-11-20

## 2023-11-20 RX ORDER — ACETAMINOPHEN 500 MG
1000 TABLET ORAL ONCE
Status: COMPLETED | OUTPATIENT
Start: 2023-11-20 | End: 2023-11-20

## 2023-11-20 RX ADMIN — CYCLOPHOSPHAMIDE 1480 MG: 1 INJECTION, POWDER, FOR SOLUTION INTRAVENOUS; ORAL at 17:41

## 2023-11-20 RX ADMIN — DEXAMETHASONE SODIUM PHOSPHATE 10 MG: 10 INJECTION, SOLUTION INTRAMUSCULAR; INTRAVENOUS at 14:35

## 2023-11-20 RX ADMIN — DOXORUBICIN HYDROCHLORIDE 100 MG: 2 INJECTION, SOLUTION INTRAVENOUS at 17:11

## 2023-11-20 RX ADMIN — LIDOCAINE HYDROCHLORIDE 10 ML: 20 INJECTION, SOLUTION EPIDURAL; INFILTRATION; INTRACAUDAL; PERINEURAL at 14:07

## 2023-11-20 RX ADMIN — VINCRISTINE SULFATE 2 MG: 1 INJECTION, SOLUTION INTRAVENOUS at 17:30

## 2023-11-20 RX ADMIN — PEGFILGRASTIM 6 MG: KIT SUBCUTANEOUS at 17:42

## 2023-11-20 RX ADMIN — ACETAMINOPHEN 1000 MG: 500 TABLET ORAL at 14:33

## 2023-11-20 RX ADMIN — SODIUM CHLORIDE 740 MG: 9 INJECTION, SOLUTION INTRAVENOUS at 15:31

## 2023-11-20 RX ADMIN — HEPARIN SODIUM 5000 UNITS: 5000 INJECTION INTRAVENOUS; SUBCUTANEOUS at 14:06

## 2023-11-20 RX ADMIN — SODIUM CHLORIDE 20 ML/HR: 9 INJECTION, SOLUTION INTRAVENOUS at 15:30

## 2023-11-20 RX ADMIN — FOSAPREPITANT 150 MG: 150 INJECTION, POWDER, LYOPHILIZED, FOR SOLUTION INTRAVENOUS at 14:56

## 2023-11-20 RX ADMIN — PALONOSETRON 0.25 MG: 0.05 INJECTION, SOLUTION INTRAVENOUS at 14:35

## 2023-11-20 RX ADMIN — DIPHENHYDRAMINE HYDROCHLORIDE 50 MG: 50 INJECTION INTRAMUSCULAR; INTRAVENOUS at 14:33

## 2023-11-20 NOTE — PROGRESS NOTES
PICC line inserted by MD Rodriguez. Aloxi, decadron, tylenol, emend and benadryl given as ordered for premeds. Rituxan 740 mg given, then moi 100 mg, then vincristine 2 mg, then cytoxan 1480 mg. Pt tolerated all infusions well. Blood return checked and present before, during, and after all infusions. Neulasta on pro applied to R arm. Verified green light blinking before pt DC. PICC removed. Pressure held for 15 mins. Educated pt if bleeding occurs to hold pressure and report to the nearest ER. Pt verbalized understanding.

## 2023-11-20 NOTE — H&P (VIEW-ONLY)
Patient Care Team:  MALKA Carvalho as PCP - General (Family Medicine)  Linda Olmos as Financial Navigator        Pa Dotson 70 y.o. male with cancer receiving chemo. He has  a hx of extravasation and needs PIC line for chemo today    Patient Active Problem List   Diagnosis    Hyperuricemia    At high risk of tumor lysis syndrome    Dehydration    Diffuse large B-cell lymphoma of intra-abdominal lymph nodes ([address])      See attached meds  Allergies:  Patient has no known allergies.   Past Medical History:   Diagnosis Date    Cancer ([address])     Diffuse large B-cell lymphoma    Dehydration 08/10/2023    Heart murmur     Hyperlipidemia     Hypertension     Neuropathy     Sleep apnea     hasn't used CPAP in years    Thyroid disease       Past Surgical History:   Procedure Laterality Date    CAROTID ARTERY SURGERY Left     CHOLECYSTECTOMY      COLONOSCOPY      EYE SURGERY      eyelid surgery    SINUS SURGERY      x3    TONSILLECTOMY      UPPER GASTROINTESTINAL ENDOSCOPY N/A 07/25/2023    Dr DIANN West-w/EUS and fna-Abdominal mass vs Lymph node-Atypical B-cell lymphoid population suspicious for lymphoma    UPPER GASTROINTESTINAL ENDOSCOPY N/A 08/18/2023    Dr Travis West-w/fna lymph node-CD10 positive monoclonal B-cell population identified by flow, increased Ki-67 staining fraction by immunohistochemical stain      Family History   Problem Relation Age of Onset    Cancer Father 68        Prostate    Colon Cancer Maternal Grandfather 72      Social History     Tobacco Use    Smoking status: Never    Smokeless tobacco: Never   Substance Use Topics    Alcohol use: Not Currently     Comment: not currently drinking       HEENT __normocepahlic; sclera clear  Neck   Supple  Lungs -cta  Heart  rr  GI - Abdomen soft, non-tender  Extremities without cyanosis  Skin without significant lesions   Diagnosis cancer, poor venous access    Permit for [facility] line    Risks have been reviewed with the patient including but not limited

## 2023-11-20 NOTE — INTERVAL H&P NOTE
Update History & Physical    The patient's History and Physical of November 20, 2023 was reviewed with the patient and I examined the patient. There was no change. The surgical site was confirmed by the patient and me. Plan: The risks, benefits, expected outcome, and alternative to the recommended procedure have been discussed with the patient. Patient understands and wants to proceed with the procedure.      Electronically signed by Roderick Miller DO on 11/20/2023 at 1:52 PM

## 2023-11-20 NOTE — OP NOTE
Patient Name: David Rios   YOB: 1945   MRN: [id number]       11/20/2023    Preprocedure Diagnosis:  needs chemotherapy     Postprocedure Diagnosis:  Same    Procedure:   1. Ultrasound guided cannulation of right basilic vein  2.  right upper extremity PICC line placement (Bard PowerPicc, dual lumen)    Surgeon:  Johan Peng DO    Anesthesia:  Lidocaine 1% without epinephrine    Findings:    1. The basilic vein is patent by ultrasound. 2.  The catheter tip is in right atrium/SVC junction  3. The catheter is ready for use, when needed. Procedure in Detail:    After the patient was consented, he was brought to angiography and placed on the angiography suite table in the supine position. His right arm was prepped and draped in the usual sterile fashion. Skin and subcutaneous tissues over the vein were anesthetized with lidocaine, and the vein was cannulated under ultrasound guidance with a micropuncture needle. An 0.018 wire was placed through the needle and advanced to the superior vena cava/right atrial junction under fluoroscopic visualization. The needle is removed and replaced with the dilator/tear away sheath. The PICC line was cut to 45cm length. The dilator was removed and the PICC line was placed over the wire to the right atrium/superior vena cava under fluoroscopic visualization. The wire and tear away sheath were removed. Both ports aspirated and flushed easily with heparinized saline. A biopatch was placed at the exit site and the PICC line was secured to the skin with Stat Loc. Sterile dressings were applied.               Johan Peng DO  11/20/2023 2:15 PM

## 2023-11-20 NOTE — H&P
Patient Care Team:  Bonnie West APRN as PCP - General (Family Medicine)  Devora Gómez as Financial Navigator        Zackary Mariee 78 y.o. male with cancer receiving chemo.  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  Past Medical History:   Diagnosis Date    Cancer (HCC)     Diffuse large B-cell lymphoma    Dehydration 08/10/2023    Heart murmur     Hyperlipidemia     Hypertension     Neuropathy     Sleep apnea     hasn't used CPAP in years    Thyroid disease       Past Surgical History:   Procedure Laterality Date    CAROTID ARTERY SURGERY Left     CHOLECYSTECTOMY      COLONOSCOPY      EYE SURGERY      eyelid surgery    SINUS SURGERY      x3    TONSILLECTOMY      UPPER GASTROINTESTINAL ENDOSCOPY N/A 07/25/2023    Dr DIANN West-w/EUS and fna-Abdominal mass vs Lymph node-Atypical B-cell lymphoid population suspicious for lymphoma    UPPER GASTROINTESTINAL ENDOSCOPY N/A 08/18/2023    Dr DIANN West-rosario/fna lymph node-CD10 positive monoclonal B-cell population identified by flow, increased Ki-67 staining fraction by immunohistochemical stain      Family History   Problem Relation Age of Onset    Cancer Father 76        Prostate    Colon Cancer Maternal Grandfather 65      Social History     Tobacco Use    Smoking status: Never    Smokeless tobacco: Never   Substance Use Topics    Alcohol use: Not Currently     Comment: not currently drinking       HEENT __normocepahlic; sclera clear  Neck   Supple  Lungs -cta  Heart  rr  GI - Abdomen soft, non-tender  Extremities without cyanosis  Skin without significant lesions   Diagnosis cancer, poor venous access    Permit for PIC line    Risks have been reviewed with the patient including but not limited

## 2023-11-21 NOTE — DISCHARGE INSTRUCTIONS
rituximab  Pronunciation:  ri TUX i mab  Brand:  Riabni, Rituxan, Ruxience, Truxima  What is the most important information I should know about rituximab? Rituximab may cause a serious brain infection that can lead to disability or death. Call your doctor right away if you have problems with speech, thought, vision, or muscle movement. These symptoms may start gradually and get worse quickly. Tell your doctor if you have ever had hepatitis B. Rituximab can cause this condition to come back or get worse. Severe skin problems can also occur during treatment with rituximab. Call your doctor if you have painful skin or mouth sores, or a severe skin rash with blistering, peeling, or pus. Some side effects may occur during the injection or within 24 hours afterward. Tell your caregiver right away  if you feel itchy, dizzy, weak, light-headed, short of breath, or if you have chest pain, wheezing, sudden cough, or pounding heartbeats or fluttering in your chest.  What is rituximab? Rituximab is used alone or in combination with other medicines to treat the following conditions in adults:  non-Hodgkin's lymphoma or chronic lymphocytic leukemia;  rheumatoid arthritis; or  pemphigus vulgaris--a severe autoimmune reaction that causes blisters and breakdown of the skin and mucous membranes. Rituximab is also used in adults and children at least 3years old with certain disorders that cause inflammation of blood vessels and other tissues in the body. Rituximab may also be used for purposes not listed in this medication guide. What should I discuss with my healthcare provider before receiving rituximab? Rituximab may cause a serious brain infection that can lead to disability or death. This infection may be more likely if have used an immunosuppressant drug in the past, or if you have received rituximab with a stem cell transplant.   Tell your doctor if you have ever had:  liver disease or hepatitis (or if you are a

## 2023-12-08 ENCOUNTER — TELEPHONE (OUTPATIENT)
Dept: HEMATOLOGY | Age: 78
End: 2023-12-08

## 2023-12-08 DIAGNOSIS — C83.33 DIFFUSE LARGE B-CELL LYMPHOMA OF INTRA-ABDOMINAL LYMPH NODES (HCC): Primary | ICD-10-CM

## 2023-12-08 NOTE — TELEPHONE ENCOUNTER
Called patient to remind them of their appointment on 12/11/23 but was unable to leave vm due to mailbox being full or not set up or memory is full.

## 2023-12-08 NOTE — PROGRESS NOTES
DOXOrubicin HCl (ADRIAMYCIN) chemo syringe 100 mg  -     DOXOrubicin HCl (ADRIAMYCIN) 100 mg in sodium chloride 0.9 % 100 mL chemo IVPB  -     vinCRIStine (ONCOVIN) 2 mg in sodium chloride 0.9 % 50 mL chemo IVPB  -     cyclophosphamide (CYTOXAN) 1,480 mg in sodium chloride 0.9 % 250 mL chemo IVPB  -     pegfilgrastim (NEULASTA) on-body injector 6 mg  -     sodium chloride flush 0.9 % injection 5-40 mL  -     0.9 % sodium chloride infusion  -     heparin 100 UNIT/ML injection 500 Units  -     alteplase (CATHFLO) 2 mg in sterile water 2 mL injection  -     0.9 % sodium chloride infusion  -     diphenhydrAMINE (BENADRYL) injection 50 mg  -     famotidine (PEPCID) injection 20 mg  -     hydrocortisone sodium succinate PF (SOLU-CORTEF) injection 100 mg  -     acetaminophen (TYLENOL) tablet 650 mg  -     meperidine (DEMEROL) injection 12.5 mg  -     ondansetron (ZOFRAN) injection 8 mg  -     EPINEPHrine PF 1 MG/ML injection (Anaphylaxis) 0.3 mg  -     albuterol sulfate HFA (PROVENTIL;VENTOLIN;PROAIR) 108 (90 Base) MCG/ACT inhaler 4 puff     Upper abdomen, July 2023 consistent with high-grade lymphoma  7/13/23 [facility]): Stable mildly dilated ascending thoracic aorta. Stable appearance of the lungs with no acute infiltrate. Bulky lymphadenopathy within the upper abdomen is suspicious for lymphoma and will require further evaluation. 7/25/23 EGD with EUS by Dr Jurgen West/[facility]: The celiac axis and associated vascular structures was identified and examined. Limited views of the left lobe of the liver revealed no obvious biliary dilation or focal hepatic mass. The EUS scope was advanced to the duodenal bulb. The CBD appeared normal. No filling defects seen. Pancreas: normal appearance. No MPD dilation noted. In the upper abdomen two large hypoechoic lesions were noted. These were well circumscribed. These measured 4.27 x 4.49cm and 7.3 x 4.05cm respectively.  The larger mass was noted right of mid-line near the

## 2023-12-11 ENCOUNTER — HOSPITAL ENCOUNTER (OUTPATIENT)
Dept: INFUSION THERAPY | Age: 78
Discharge: HOME | End: 2023-12-11
Payer: MEDICARE

## 2023-12-11 ENCOUNTER — HOSPITAL ENCOUNTER (OUTPATIENT)
Dept: INFUSION THERAPY | Age: 78
Setting detail: INFUSION SERIES
Discharge: HOME | End: 2023-12-11
Payer: MEDICARE

## 2023-12-11 ENCOUNTER — TRANSCRIBE ORDERS (OUTPATIENT)
Dept: ADMINISTRATIVE | Facility: HOSPITAL | Age: 78
End: 2023-12-11
Payer: MEDICARE

## 2023-12-11 ENCOUNTER — OFFICE VISIT (OUTPATIENT)
Dept: HEMATOLOGY | Age: 78
End: 2023-12-11
Payer: MEDICARE

## 2023-12-11 VITALS
HEART RATE: 103 BPM | WEIGHT: 161.8 LBS | TEMPERATURE: 97.8 F | SYSTOLIC BLOOD PRESSURE: 118 MMHG | HEIGHT: 68 IN | BODY MASS INDEX: 24.52 KG/M2 | OXYGEN SATURATION: 98 % | DIASTOLIC BLOOD PRESSURE: 74 MMHG

## 2023-12-11 VITALS
WEIGHT: 159.9 LBS | SYSTOLIC BLOOD PRESSURE: 128 MMHG | BODY MASS INDEX: 24.31 KG/M2 | TEMPERATURE: 98 F | OXYGEN SATURATION: 97 % | HEART RATE: 79 BPM | DIASTOLIC BLOOD PRESSURE: 76 MMHG | RESPIRATION RATE: 17 BRPM

## 2023-12-11 DIAGNOSIS — C83.33 DIFFUSE LARGE B-CELL LYMPHOMA OF INTRA-ABDOMINAL LYMPH NODES: Primary | ICD-10-CM

## 2023-12-11 DIAGNOSIS — C83.33 DIFFUSE LARGE B-CELL LYMPHOMA OF INTRA-ABDOMINAL LYMPH NODES (HCC): Primary | ICD-10-CM

## 2023-12-11 DIAGNOSIS — C83.33 DIFFUSE LARGE B-CELL LYMPHOMA OF INTRA-ABDOMINAL LYMPH NODES (HCC): ICD-10-CM

## 2023-12-11 DIAGNOSIS — Z51.11 CHEMOTHERAPY MANAGEMENT, ENCOUNTER FOR: ICD-10-CM

## 2023-12-11 DIAGNOSIS — Z71.89 CARE PLAN DISCUSSED WITH PATIENT: ICD-10-CM

## 2023-12-11 DIAGNOSIS — T80.818S: ICD-10-CM

## 2023-12-11 DIAGNOSIS — T45.1X5A CHEMOTHERAPY INDUCED DIARRHEA: ICD-10-CM

## 2023-12-11 DIAGNOSIS — T45.1X5A CHEMOTHERAPY-INDUCED FATIGUE: ICD-10-CM

## 2023-12-11 DIAGNOSIS — K52.1 CHEMOTHERAPY INDUCED DIARRHEA: ICD-10-CM

## 2023-12-11 DIAGNOSIS — R53.83 CHEMOTHERAPY-INDUCED FATIGUE: ICD-10-CM

## 2023-12-11 LAB
ALBUMIN SERPL-MCNC: 3.6 G/DL (ref 3.5–5.2)
ALP SERPL-CCNC: 80 U/L (ref 40–130)
ALT SERPL-CCNC: 27 U/L (ref 21–72)
ANION GAP SERPL CALCULATED.3IONS-SCNC: 9 MMOL/L (ref 7–19)
AST SERPL-CCNC: 35 U/L (ref 17–59)
BASOPHILS # BLD: 0.06 K/UL (ref 0.01–0.08)
BASOPHILS NFR BLD: 0.7 % (ref 0.1–1.2)
BILIRUB SERPL-MCNC: <0.2 MG/DL (ref 0.2–1.3)
BUN SERPL-MCNC: 24 MG/DL (ref 9–20)
CALCIUM SERPL-MCNC: 9.5 MG/DL (ref 8.4–10.2)
CHLORIDE SERPL-SCNC: 102 MMOL/L (ref 98–111)
CO2 SERPL-SCNC: 27 MMOL/L (ref 22–29)
CREAT SERPL-MCNC: 0.8 MG/DL (ref 0.6–1.2)
EOSINOPHIL # BLD: 0.04 K/UL (ref 0.04–0.54)
EOSINOPHIL NFR BLD: 0.5 % (ref 0.7–7)
ERYTHROCYTE [DISTWIDTH] IN BLOOD BY AUTOMATED COUNT: 16.5 % (ref 11.6–14.4)
GLOBULIN: 2.7 G/DL
GLUCOSE SERPL-MCNC: 171 MG/DL (ref 74–106)
HCT VFR BLD AUTO: 31.6 % (ref 40.1–51)
HGB BLD-MCNC: 10.3 G/DL (ref 13.7–17.5)
LYMPHOCYTES # BLD: 0.41 K/UL (ref 1.18–3.74)
LYMPHOCYTES NFR BLD: 5.1 % (ref 19.3–53.1)
MCH RBC QN AUTO: 33.3 PG (ref 25.7–32.2)
MCHC RBC AUTO-ENTMCNC: 32.6 G/DL (ref 32.3–36.5)
MCV RBC AUTO: 102.3 FL (ref 79–92.2)
MONOCYTES # BLD: 1.64 K/UL (ref 0.24–0.82)
MONOCYTES NFR BLD: 20.3 % (ref 4.7–12.5)
NEUTROPHILS # BLD: 5.77 K/UL (ref 1.56–6.13)
NEUTS SEG NFR BLD: 71.5 % (ref 34–71.1)
PLATELET # BLD AUTO: 360 K/UL (ref 163–337)
PMV BLD AUTO: 8.9 FL (ref 7.4–10.4)
POTASSIUM SERPL-SCNC: 3.9 MMOL/L (ref 3.5–5.1)
PROT SERPL-MCNC: 6.3 G/DL (ref 6.3–8.2)
RBC # BLD AUTO: 3.09 M/UL (ref 4.63–6.08)
SODIUM SERPL-SCNC: 138 MMOL/L (ref 137–145)
WBC # BLD AUTO: 8.07 K/UL (ref 4.23–9.07)

## 2023-12-11 PROCEDURE — 80053 COMPREHEN METABOLIC PANEL: CPT

## 2023-12-11 PROCEDURE — 99214 OFFICE O/P EST MOD 30 MIN: CPT | Performed by: INTERNAL MEDICINE

## 2023-12-11 PROCEDURE — 6370000000 HC RX 637 (ALT 250 FOR IP): Performed by: INTERNAL MEDICINE

## 2023-12-11 PROCEDURE — 96411 CHEMO IV PUSH ADDL DRUG: CPT

## 2023-12-11 PROCEDURE — 96377 APPLICATON ON-BODY INJECTOR: CPT

## 2023-12-11 PROCEDURE — C1751 CATH, INF, PER/CENT/MIDLINE: HCPCS

## 2023-12-11 PROCEDURE — 2580000003 HC RX 258: Performed by: INTERNAL MEDICINE

## 2023-12-11 PROCEDURE — 96415 CHEMO IV INFUSION ADDL HR: CPT

## 2023-12-11 PROCEDURE — 85025 COMPLETE CBC W/AUTO DIFF WBC: CPT

## 2023-12-11 PROCEDURE — 1123F ACP DISCUSS/DSCN MKR DOCD: CPT | Performed by: INTERNAL MEDICINE

## 2023-12-11 PROCEDURE — 96366 THER/PROPH/DIAG IV INF ADDON: CPT

## 2023-12-11 PROCEDURE — 96417 CHEMO IV INFUS EACH ADDL SEQ: CPT

## 2023-12-11 PROCEDURE — 76937 US GUIDE VASCULAR ACCESS: CPT

## 2023-12-11 PROCEDURE — 36569 INSJ PICC 5 YR+ W/O IMAGING: CPT

## 2023-12-11 PROCEDURE — 36415 COLL VENOUS BLD VENIPUNCTURE: CPT

## 2023-12-11 PROCEDURE — 96375 TX/PRO/DX INJ NEW DRUG ADDON: CPT

## 2023-12-11 PROCEDURE — 96413 CHEMO IV INFUSION 1 HR: CPT

## 2023-12-11 PROCEDURE — 6360000002 HC RX W HCPCS: Performed by: INTERNAL MEDICINE

## 2023-12-11 RX ORDER — ACETAMINOPHEN 325 MG/1
650 TABLET ORAL
OUTPATIENT
Start: 2023-12-11

## 2023-12-11 RX ORDER — DOXORUBICIN HYDROCHLORIDE 2 MG/ML
50 INJECTION, SOLUTION INTRAVENOUS ONCE
Status: DISCONTINUED | OUTPATIENT
Start: 2023-12-11 | End: 2023-12-11

## 2023-12-11 RX ORDER — SODIUM CHLORIDE 9 MG/ML
5-250 INJECTION, SOLUTION INTRAVENOUS PRN
Status: CANCELLED | OUTPATIENT
Start: 2023-12-11

## 2023-12-11 RX ORDER — SODIUM CHLORIDE 9 MG/ML
5-250 INJECTION, SOLUTION INTRAVENOUS PRN
OUTPATIENT
Start: 2023-12-11

## 2023-12-11 RX ORDER — SODIUM CHLORIDE 0.9 % (FLUSH) 0.9 %
5-40 SYRINGE (ML) INJECTION PRN
OUTPATIENT
Start: 2023-12-11

## 2023-12-11 RX ORDER — EPINEPHRINE 1 MG/ML
0.3 INJECTION, SOLUTION, CONCENTRATE INTRAVENOUS PRN
OUTPATIENT
Start: 2023-12-11

## 2023-12-11 RX ORDER — DEXAMETHASONE SODIUM PHOSPHATE 10 MG/ML
10 INJECTION, SOLUTION INTRAMUSCULAR; INTRAVENOUS
Status: COMPLETED | OUTPATIENT
Start: 2023-12-11 | End: 2023-12-11

## 2023-12-11 RX ORDER — SODIUM CHLORIDE 9 MG/ML
INJECTION, SOLUTION INTRAVENOUS CONTINUOUS
OUTPATIENT
Start: 2023-12-11

## 2023-12-11 RX ORDER — PALONOSETRON 0.05 MG/ML
0.25 INJECTION, SOLUTION INTRAVENOUS ONCE
Status: CANCELLED | OUTPATIENT
Start: 2023-12-11 | End: 2023-12-11

## 2023-12-11 RX ORDER — DIPHENHYDRAMINE HYDROCHLORIDE 50 MG/ML
50 INJECTION INTRAMUSCULAR; INTRAVENOUS ONCE
Status: COMPLETED | OUTPATIENT
Start: 2023-12-11 | End: 2023-12-11

## 2023-12-11 RX ORDER — HEPARIN SODIUM (PORCINE) LOCK FLUSH IV SOLN 100 UNIT/ML 100 UNIT/ML
500 SOLUTION INTRAVENOUS PRN
OUTPATIENT
Start: 2023-12-11

## 2023-12-11 RX ORDER — ACETAMINOPHEN 325 MG/1
1000 TABLET ORAL ONCE
Status: CANCELLED | OUTPATIENT
Start: 2023-12-11 | End: 2023-12-11

## 2023-12-11 RX ORDER — DIPHENHYDRAMINE HYDROCHLORIDE 50 MG/ML
50 INJECTION INTRAMUSCULAR; INTRAVENOUS
OUTPATIENT
Start: 2023-12-11

## 2023-12-11 RX ORDER — ACETAMINOPHEN 500 MG
1000 TABLET ORAL ONCE
Status: COMPLETED | OUTPATIENT
Start: 2023-12-11 | End: 2023-12-11

## 2023-12-11 RX ORDER — ALBUTEROL SULFATE 90 UG/1
4 AEROSOL, METERED RESPIRATORY (INHALATION) PRN
OUTPATIENT
Start: 2023-12-11

## 2023-12-11 RX ORDER — SODIUM CHLORIDE 9 MG/ML
5-250 INJECTION, SOLUTION INTRAVENOUS PRN
Status: DISCONTINUED | OUTPATIENT
Start: 2023-12-11 | End: 2023-12-12 | Stop reason: HOSPADM

## 2023-12-11 RX ORDER — DOXORUBICIN HYDROCHLORIDE 2 MG/ML
50 INJECTION, SOLUTION INTRAVENOUS ONCE
Status: CANCELLED | OUTPATIENT
Start: 2023-12-11

## 2023-12-11 RX ORDER — FAMOTIDINE 10 MG/ML
20 INJECTION, SOLUTION INTRAVENOUS
OUTPATIENT
Start: 2023-12-11

## 2023-12-11 RX ORDER — DIPHENHYDRAMINE HYDROCHLORIDE 50 MG/ML
50 INJECTION INTRAMUSCULAR; INTRAVENOUS ONCE
Status: CANCELLED | OUTPATIENT
Start: 2023-12-11 | End: 2023-12-11

## 2023-12-11 RX ORDER — PALONOSETRON 0.05 MG/ML
0.25 INJECTION, SOLUTION INTRAVENOUS ONCE
Status: COMPLETED | OUTPATIENT
Start: 2023-12-11 | End: 2023-12-11

## 2023-12-11 RX ORDER — ONDANSETRON 2 MG/ML
8 INJECTION INTRAMUSCULAR; INTRAVENOUS
OUTPATIENT
Start: 2023-12-11

## 2023-12-11 RX ORDER — AMOXICILLIN AND CLAVULANATE POTASSIUM 500; 125 MG/1; MG/1
TABLET, FILM COATED ORAL
COMMUNITY
Start: 2023-12-07

## 2023-12-11 RX ORDER — MEPERIDINE HYDROCHLORIDE 50 MG/ML
12.5 INJECTION INTRAMUSCULAR; INTRAVENOUS; SUBCUTANEOUS PRN
OUTPATIENT
Start: 2023-12-11

## 2023-12-11 RX ADMIN — DEXAMETHASONE SODIUM PHOSPHATE 10 MG: 10 INJECTION, SOLUTION INTRAMUSCULAR; INTRAVENOUS at 11:42

## 2023-12-11 RX ADMIN — ACETAMINOPHEN 1000 MG: 500 TABLET ORAL at 13:01

## 2023-12-11 RX ADMIN — PEGFILGRASTIM 6 MG: KIT SUBCUTANEOUS at 16:10

## 2023-12-11 RX ADMIN — SODIUM CHLORIDE 90 MG: 9 INJECTION, SOLUTION INTRAVENOUS at 15:05

## 2023-12-11 RX ADMIN — VINCRISTINE SULFATE 2 MG: 1 INJECTION, SOLUTION INTRAVENOUS at 15:24

## 2023-12-11 RX ADMIN — DIPHENHYDRAMINE HYDROCHLORIDE 50 MG: 50 INJECTION INTRAMUSCULAR; INTRAVENOUS at 13:01

## 2023-12-11 RX ADMIN — SODIUM CHLORIDE 700 MG: 9 INJECTION, SOLUTION INTRAVENOUS at 13:30

## 2023-12-11 RX ADMIN — PALONOSETRON 0.25 MG: 0.05 INJECTION, SOLUTION INTRAVENOUS at 11:41

## 2023-12-11 RX ADMIN — FOSAPREPITANT 150 MG: 150 INJECTION, POWDER, LYOPHILIZED, FOR SOLUTION INTRAVENOUS at 12:15

## 2023-12-11 RX ADMIN — CYCLOPHOSPHAMIDE 1395 MG: 1 INJECTION, POWDER, FOR SOLUTION INTRAVENOUS; ORAL at 15:42

## 2023-12-11 RX ADMIN — SODIUM CHLORIDE 20 ML/HR: 9 INJECTION, SOLUTION INTRAVENOUS at 11:53

## 2023-12-11 NOTE — FLOWSHEET NOTE
Chemo treatment of rituxan, cytoxan, moi, and vincristine given thru picc line with good blood return throughtout. Additional 500 cc saline infused with chemo. No issues.

## 2023-12-11 NOTE — PROCEDURES
[facility] Vascular Access Team:  PICC Line Insertion Procedure Note      Patient: Laura Lundy  YOB: 1945   MRN: [id number]  Room: Room/bed info not found     Attending Physician - No att. providers found  Ordering Physician -  John Aguilar MD    Diagnosis: At risk for dehydration [Z91.89]  At high risk of tumor lysis syndrome [Z91.89]       Procedure(s): Insertion of a 4.5 Uzbek Single Lumen PICC    Indication(s):   Chemotherapy    Date of Procedure: 12/11/2023   Start Time: 1020  End Time: 1100    Performed by: Mando Irizarry RN    Anesthesia: Local Injection <=5 mL 1% Lidocaine without Epinephrine    Estimated Blood Loss (mL): Minimal    Complications: None    PICC Single Lumen 12/11/23 Right Brachial (Active)   Central Line Being Utilized Yes 12/11/23 1100   Criteria for Appropriate Use Irritant/vesicant medication 12/11/23 1100   Site Assessment Clean, dry & intact 12/11/23 1100   Phlebitis Assessment No symptoms 12/11/23 1100   Infiltration Assessment 0 12/11/23 1100   Lumen Color/Status Pink;Brisk blood return;Flushed;Normal saline locked 12/11/23 1100   External Catheter Length (cm) 0 cm 12/11/23 1100   Extremity Circumference (cm) 26 cm 12/11/23 1100   Date of Last Dressing Change 12/11/23 12/11/23 1100   Dressing Type Transparent w/CHG gel;Securing device 12/11/23 1100   Dressing Status New dressing applied;Clean, dry & intact 12/11/23 1100   Dressing Intervention New 12/11/23 1100         Findings:   1. Successful insertion of PICC line. 2. PICC Line is ready to be used. 3. Please change tubing prior to using new PICC line. Make sure to label, date and use alcohol caps on new tubing and alcohol caps on unused ports. Detailed Description of Procedure:   Informed consent was obtained for the procedure. Risks including nerve injury, arterial puncture, bleeding, and adverse drug reaction were discussed.      The Right Brachial vein was visualized using the ultrasound and deemed a

## 2023-12-11 NOTE — DISCHARGE INSTRUCTIONS
vincristine  Pronunciation:  herb zapata  What is the most important information I should know about vincristine? You should not use vincristine if you have a nerve-muscle disorder such as Charcot-Edith-Tooth syndrome. What is vincristine? Vincristine is used to treat leukemia, Hodgkin's disease, non-Hodgkin's lymphoma, rhabdomyosarcoma (soft tissue tumors), neuroblastoma (cancer that forms in nerve tissue), and Wilms' tumor. Vincristine is sometimes used in combination with other cancer drugs. Vincristine may also be used for other purposes not listed in this medication guide. What should I discuss with my healthcare provider before receiving vincristine? You should not use vincristine if you are allergic to it, or if you have a nerve-muscle disorder such as Charcot-Edith-Tooth syndrome. Tell your doctor if you are also receiving radiation treatments. Tell your doctor if you have ever had:  gout;  kidney stones;  breathing problems;  liver disease;  a blockage in your intestines; or  coronary artery disease, high blood pressure. Vincristine may harm an unborn baby. Use effective birth control to prevent pregnancy, and tell your doctor if you become pregnant. This medicine may cause missed menstrual periods. This medicine may affect fertility (your ability to have children), whether you are a man or a woman. You should not breastfeed while using this medicine. How is vincristine given? Vincristine is given as an infusion into a vein, usually once every 7 days. A healthcare provider will give you this injection. Tell your caregivers if you feel any burning, pain, or swelling around the IV needle when vincristine is injected. You may need frequent medical tests to be sure this medicine is not causing harmful effects. Your cancer treatments may be delayed based on the results. Vincristine may cause constipation. Ask your doctor how to avoid severe constipation.   What happens if I miss a dose?  Call your doctor for instructions if you miss an appointment for your vincristine injection. What happens if I overdose? Seek emergency medical attention or call the Poison Help line at 1-181.335.5953. Since vincristine is given by a healthcare professional in a medical setting, an overdose is unlikely to occur. However, overdose symptoms may include severe forms of some of the side effects listed in this medication guide. What should I avoid while receiving vincristine? Avoid being near people who are sick or have infections. Tell your doctor at once if you develop signs of infection. What are the possible side effects of vincristine? Get emergency medical help if you have signs of an allergic reaction:  hives; difficult breathing; swelling of your face, lips, tongue, or throat. Call your doctor at once if you have:  severe constipation, severe bloating or stomach pain, bloody or tarry stools;  swelling, rapid weight gain;  urination problems;  wheezing, trouble breathing;  chest pain or pressure, pain spreading to your jaw or shoulder;  problems with vision, hearing, speech, balance, or daily activities;  a seizure;  mouth pain or ulcers;  numbness, tingling, burning pain; or  low blood cell counts --fever, chills, sore throat, body aches, easy bruising, unusual bleeding, pale skin, cold hands and feet, feeling light-headed or short of breath. Common side effects may include:  hair loss;  decreased weight with loss of muscle tissue;  nausea, vomiting, diarrhea, weight loss; or  rash. This is not a complete list of side effects and others may occur. Call your doctor for medical advice about side effects. You may report side effects to FDA at 2-643-OZX-6685. What other drugs will affect vincristine?   Tell your doctor about all your other medicines, especially:  nefazodone;  phenytoin;  an antibiotic --clarithromycin, erythromycin, telithromycin;  antifungal medicine --itraconazole, ketoconazole,

## 2023-12-21 ENCOUNTER — HOSPITAL ENCOUNTER (OUTPATIENT)
Dept: ULTRASOUND IMAGING | Facility: HOSPITAL | Age: 78
Discharge: HOME | End: 2023-12-21
Payer: MEDICARE

## 2023-12-21 ENCOUNTER — TRANSCRIBE ORDERS (OUTPATIENT)
Dept: ADMINISTRATIVE | Facility: HOSPITAL | Age: 78
End: 2023-12-21
Payer: MEDICARE

## 2023-12-21 DIAGNOSIS — M79.89 LEFT ARM SWELLING: ICD-10-CM

## 2023-12-21 DIAGNOSIS — M79.602 LEFT ARM PAIN: ICD-10-CM

## 2023-12-21 DIAGNOSIS — M79.602 LEFT ARM PAIN: Primary | ICD-10-CM

## 2023-12-21 PROCEDURE — 93971 EXTREMITY STUDY: CPT

## 2024-01-02 ENCOUNTER — HOSPITAL ENCOUNTER (OUTPATIENT)
Dept: INFUSION THERAPY | Age: 79
Discharge: HOME | End: 2024-01-02
Payer: MEDICARE

## 2024-01-02 DIAGNOSIS — C83.33 DIFFUSE LARGE B-CELL LYMPHOMA OF INTRA-ABDOMINAL LYMPH NODES (HCC): Primary | ICD-10-CM

## 2024-01-02 DIAGNOSIS — R53.1 WEAKNESS: ICD-10-CM

## 2024-01-02 DIAGNOSIS — C83.33 DIFFUSE LARGE B-CELL LYMPHOMA OF INTRA-ABDOMINAL LYMPH NODES (HCC): ICD-10-CM

## 2024-01-02 LAB
ALBUMIN SERPL-MCNC: 3.6 G/DL (ref 3.5–5.2)
ALP SERPL-CCNC: 71 U/L (ref 40–130)
ALT SERPL-CCNC: 25 U/L (ref 21–72)
ANION GAP SERPL CALCULATED.3IONS-SCNC: 8 MMOL/L (ref 7–19)
AST SERPL-CCNC: 44 U/L (ref 17–59)
BASOPHILS # BLD: 0.07 K/UL (ref 0.01–0.08)
BASOPHILS NFR BLD: 0.9 % (ref 0.1–1.2)
BILIRUB SERPL-MCNC: <0.2 MG/DL (ref 0.2–1.3)
BUN SERPL-MCNC: 16 MG/DL (ref 9–20)
CALCIUM SERPL-MCNC: 9.1 MG/DL (ref 8.4–10.2)
CHLORIDE SERPL-SCNC: 101 MMOL/L (ref 98–111)
CO2 SERPL-SCNC: 28 MMOL/L (ref 22–29)
CREAT SERPL-MCNC: 0.9 MG/DL (ref 0.6–1.2)
EOSINOPHIL # BLD: 0.04 K/UL (ref 0.04–0.54)
EOSINOPHIL NFR BLD: 0.5 % (ref 0.7–7)
ERYTHROCYTE [DISTWIDTH] IN BLOOD BY AUTOMATED COUNT: 15.9 % (ref 11.6–14.4)
GLOBULIN: 2.5 G/DL
GLUCOSE SERPL-MCNC: 200 MG/DL (ref 74–106)
HCT VFR BLD AUTO: 32.5 % (ref 40.1–51)
HGB BLD-MCNC: 10.3 G/DL (ref 13.7–17.5)
LYMPHOCYTES # BLD: 0.42 K/UL (ref 1.18–3.74)
LYMPHOCYTES NFR BLD: 5.7 % (ref 19.3–53.1)
MCH RBC QN AUTO: 33.4 PG (ref 25.7–32.2)
MCHC RBC AUTO-ENTMCNC: 31.7 G/DL (ref 32.3–36.5)
MCV RBC AUTO: 105.5 FL (ref 79–92.2)
MONOCYTES # BLD: 1.27 K/UL (ref 0.24–0.82)
MONOCYTES NFR BLD: 17.2 % (ref 4.7–12.5)
NEUTROPHILS # BLD: 5.5 K/UL (ref 1.56–6.13)
NEUTS SEG NFR BLD: 74.3 % (ref 34–71.1)
PLATELET # BLD AUTO: 354 K/UL (ref 163–337)
PMV BLD AUTO: 8.9 FL (ref 7.4–10.4)
POTASSIUM SERPL-SCNC: 4.3 MMOL/L (ref 3.5–5.1)
PROT SERPL-MCNC: 6 G/DL (ref 6.3–8.2)
RBC # BLD AUTO: 3.08 M/UL (ref 4.63–6.08)
SODIUM SERPL-SCNC: 137 MMOL/L (ref 137–145)
WBC # BLD AUTO: 7.4 K/UL (ref 4.23–9.07)

## 2024-01-02 PROCEDURE — 36415 COLL VENOUS BLD VENIPUNCTURE: CPT

## 2024-01-02 PROCEDURE — 80053 COMPREHEN METABOLIC PANEL: CPT

## 2024-01-02 PROCEDURE — 85025 COMPLETE CBC W/AUTO DIFF WBC: CPT

## 2024-01-11 ENCOUNTER — HOSPITAL ENCOUNTER (OUTPATIENT)
Dept: CT IMAGING | Facility: HOSPITAL | Age: 79
Discharge: HOME | End: 2024-01-11
Admitting: INTERNAL MEDICINE
Payer: MEDICARE

## 2024-01-11 LAB — CREAT BLDA-MCNC: 0.8 MG/DL (ref 0.6–1.3)

## 2024-01-11 PROCEDURE — 74177 CT ABD & PELVIS W/CONTRAST: CPT

## 2024-01-11 PROCEDURE — 25510000001 IOPAMIDOL 61 % SOLUTION: Performed by: INTERNAL MEDICINE

## 2024-01-11 PROCEDURE — 71260 CT THORAX DX C+: CPT

## 2024-01-11 PROCEDURE — 82565 ASSAY OF CREATININE: CPT

## 2024-01-11 RX ADMIN — IOPAMIDOL 100 ML: 612 INJECTION, SOLUTION INTRAVENOUS at 10:23

## 2024-01-16 ENCOUNTER — TELEPHONE (OUTPATIENT)
Dept: HEMATOLOGY | Age: 79
End: 2024-01-16

## 2024-01-17 DIAGNOSIS — C83.33 DIFFUSE LARGE B-CELL LYMPHOMA OF INTRA-ABDOMINAL LYMPH NODES (HCC): Primary | ICD-10-CM

## 2024-01-17 NOTE — PROGRESS NOTES
MEDICAL ONCOLOGY PROGRESS NOTE    Pt Name: Zackary Mariee  MRN: [id number]  YOB: 1945  Date of evaluation: 1/18/2024    HISTORY OF PRESENT ILLNESS:  Reason for MD visit-toxicity assessment his disease management. Zackary Mariee is a status post 6 cycles of R-CHOP treatment.  The patient is currently being seen by Dr. Pratt, radiation oncology for consolidation RT.  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  He is currently on Xarelto.  He is tolerated treatment well.  He had extravasation tissue damage left upper extremity.  He continues to have pain to the location.  Erythema and edema have improved after he started Xarelto.      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  No evidence of lymphadenopathy.  1/9/2023-CT angiogram chest showed Ectatic ascending aorta measuring 4.2 cm diameter, unchanged from prior study. Stable 4 mm RIGHT lower lobe pulmonary nodule. No enlarged lymph nodes in the chest.   7/13/23 CTA chest ([facility]): Stable mildly dilated ascending thoracic aorta. Stable appearance of the lungs with no acute infiltrate.  Bulky lymphadenopathy within the upper abdomen is suspicious for lymphoma and will require further evaluation. An enlarged aortocaval node adjacent to the pancreas measures 55 x 43 mm. An adjacent 51 x 39 mm retroperitoneal lymph node artery lies between   the origin of the hepatic and splenic artery at the celiac axis.   7/25/23 EGD with EUS by Dr Quinton West/[facility]: The celiac axis and associated vascular structures was identified and examined. Limited views of the left lobe of the liver revealed no obvious

## 2024-01-18 ENCOUNTER — HOSPITAL ENCOUNTER (OUTPATIENT)
Dept: INFUSION THERAPY | Age: 79
Discharge: HOME | End: 2024-01-18
Payer: MEDICARE

## 2024-01-18 ENCOUNTER — OFFICE VISIT (OUTPATIENT)
Dept: HEMATOLOGY | Age: 79
End: 2024-01-18
Payer: MEDICARE

## 2024-01-18 VITALS
HEART RATE: 93 BPM | OXYGEN SATURATION: 96 % | BODY MASS INDEX: 24.1 KG/M2 | SYSTOLIC BLOOD PRESSURE: 126 MMHG | WEIGHT: 159 LBS | TEMPERATURE: 98.7 F | HEIGHT: 68 IN | DIASTOLIC BLOOD PRESSURE: 76 MMHG

## 2024-01-18 DIAGNOSIS — Z51.81 ANTICOAGULATION MANAGEMENT ENCOUNTER: ICD-10-CM

## 2024-01-18 DIAGNOSIS — C83.33 DIFFUSE LARGE B-CELL LYMPHOMA OF INTRA-ABDOMINAL LYMPH NODES (HCC): ICD-10-CM

## 2024-01-18 DIAGNOSIS — T45.1X5D ADVERSE EFFECT OF CHEMOTHERAPY, SUBSEQUENT ENCOUNTER: ICD-10-CM

## 2024-01-18 DIAGNOSIS — Z71.89 CARE PLAN DISCUSSED WITH PATIENT: Primary | ICD-10-CM

## 2024-01-18 DIAGNOSIS — Z51.11 CHEMOTHERAPY MANAGEMENT, ENCOUNTER FOR: ICD-10-CM

## 2024-01-18 DIAGNOSIS — Z51.12 ENCOUNTER FOR ANTINEOPLASTIC IMMUNOTHERAPY: ICD-10-CM

## 2024-01-18 DIAGNOSIS — D64.81 ANTINEOPLASTIC CHEMOTHERAPY INDUCED ANEMIA: ICD-10-CM

## 2024-01-18 DIAGNOSIS — I82.629 ACUTE DEEP VEIN THROMBOSIS (DVT) OF BRACHIAL VEIN, UNSPECIFIED LATERALITY (HCC): ICD-10-CM

## 2024-01-18 DIAGNOSIS — T45.1X5A ANTINEOPLASTIC CHEMOTHERAPY INDUCED ANEMIA: ICD-10-CM

## 2024-01-18 DIAGNOSIS — Z79.01 ANTICOAGULATION MANAGEMENT ENCOUNTER: ICD-10-CM

## 2024-01-18 LAB
ALBUMIN SERPL-MCNC: 3.9 G/DL (ref 3.5–5.2)
ALP SERPL-CCNC: 72 U/L (ref 40–130)
ALT SERPL-CCNC: 32 U/L (ref 21–72)
ANION GAP SERPL CALCULATED.3IONS-SCNC: 4 MMOL/L (ref 7–19)
AST SERPL-CCNC: 50 U/L (ref 17–59)
BASOPHILS # BLD: 0.05 K/UL (ref 0.01–0.08)
BASOPHILS NFR BLD: 0.8 % (ref 0.1–1.2)
BILIRUB SERPL-MCNC: <0.2 MG/DL (ref 0.2–1.3)
BUN SERPL-MCNC: 22 MG/DL (ref 9–20)
CALCIUM SERPL-MCNC: 9.1 MG/DL (ref 8.4–10.2)
CHLORIDE SERPL-SCNC: 109 MMOL/L (ref 98–111)
CO2 SERPL-SCNC: 25 MMOL/L (ref 22–29)
CREAT SERPL-MCNC: 0.8 MG/DL (ref 0.6–1.2)
EOSINOPHIL # BLD: 0.16 K/UL (ref 0.04–0.54)
EOSINOPHIL NFR BLD: 2.7 % (ref 0.7–7)
ERYTHROCYTE [DISTWIDTH] IN BLOOD BY AUTOMATED COUNT: 15.5 % (ref 11.6–14.4)
GLOBULIN: 2.5 G/DL
GLUCOSE SERPL-MCNC: 118 MG/DL (ref 74–106)
HCT VFR BLD AUTO: 33.5 % (ref 40.1–51)
HGB BLD-MCNC: 10.8 G/DL (ref 13.7–17.5)
LYMPHOCYTES # BLD: 1.27 K/UL (ref 1.18–3.74)
LYMPHOCYTES NFR BLD: 21.5 % (ref 19.3–53.1)
MCH RBC QN AUTO: 33.3 PG (ref 25.7–32.2)
MCHC RBC AUTO-ENTMCNC: 32.2 G/DL (ref 32.3–36.5)
MCV RBC AUTO: 103.4 FL (ref 79–92.2)
MONOCYTES # BLD: 1.49 K/UL (ref 0.24–0.82)
MONOCYTES NFR BLD: 25.2 % (ref 4.7–12.5)
NEUTROPHILS # BLD: 2.94 K/UL (ref 1.56–6.13)
NEUTS SEG NFR BLD: 49.6 % (ref 34–71.1)
PLATELET # BLD AUTO: 181 K/UL (ref 163–337)
PMV BLD AUTO: 9.2 FL (ref 7.4–10.4)
POTASSIUM SERPL-SCNC: 3.9 MMOL/L (ref 3.5–5.1)
PROT SERPL-MCNC: 6.3 G/DL (ref 6.3–8.2)
RBC # BLD AUTO: 3.24 M/UL (ref 4.63–6.08)
SODIUM SERPL-SCNC: 138 MMOL/L (ref 137–145)
WBC # BLD AUTO: 5.92 K/UL (ref 4.23–9.07)

## 2024-01-18 PROCEDURE — 1123F ACP DISCUSS/DSCN MKR DOCD: CPT | Performed by: INTERNAL MEDICINE

## 2024-01-18 PROCEDURE — 80053 COMPREHEN METABOLIC PANEL: CPT

## 2024-01-18 PROCEDURE — 99212 OFFICE O/P EST SF 10 MIN: CPT

## 2024-01-18 PROCEDURE — 36415 COLL VENOUS BLD VENIPUNCTURE: CPT

## 2024-01-18 PROCEDURE — 99214 OFFICE O/P EST MOD 30 MIN: CPT | Performed by: INTERNAL MEDICINE

## 2024-01-18 PROCEDURE — 85025 COMPLETE CBC W/AUTO DIFF WBC: CPT

## 2024-01-18 RX ORDER — CODEINE PHOSPHATE AND GUAIFENESIN 10; 100 MG/5ML; MG/5ML
SOLUTION ORAL
COMMUNITY
Start: 2023-12-29

## 2024-01-18 RX ORDER — RIVAROXABAN 20 MG/1
TABLET, FILM COATED ORAL
COMMUNITY
Start: 2023-12-23

## 2024-02-13 DIAGNOSIS — Z91.89 AT HIGH RISK OF TUMOR LYSIS SYNDROME: ICD-10-CM

## 2024-02-13 DIAGNOSIS — E79.0 HYPERURICEMIA: ICD-10-CM

## 2024-02-13 RX ORDER — ALLOPURINOL 100 MG/1
100 TABLET ORAL DAILY
Qty: 30 TABLET | Refills: 5 | OUTPATIENT
Start: 2024-02-13

## 2024-02-28 ENCOUNTER — TELEPHONE (OUTPATIENT)
Dept: HEMATOLOGY | Age: 79
End: 2024-02-28

## 2024-02-29 DIAGNOSIS — C83.33 DIFFUSE LARGE B-CELL LYMPHOMA OF INTRA-ABDOMINAL LYMPH NODES (HCC): Primary | ICD-10-CM

## 2024-02-29 NOTE — PROGRESS NOTES
dictations but occasionally words are mis-transcribed.)Electronically signed by Amanda Lou MD on 3/1/2024 at 11:21 AM

## 2024-03-01 ENCOUNTER — TRANSCRIBE ORDERS (OUTPATIENT)
Dept: ADMINISTRATIVE | Facility: HOSPITAL | Age: 79
End: 2024-03-01
Payer: MEDICARE

## 2024-03-01 ENCOUNTER — HOSPITAL ENCOUNTER (OUTPATIENT)
Dept: INFUSION THERAPY | Age: 79
Discharge: HOME | End: 2024-03-01
Payer: MEDICARE

## 2024-03-01 ENCOUNTER — OFFICE VISIT (OUTPATIENT)
Dept: HEMATOLOGY | Age: 79
End: 2024-03-01

## 2024-03-01 VITALS
HEART RATE: 98 BPM | DIASTOLIC BLOOD PRESSURE: 78 MMHG | BODY MASS INDEX: 24.28 KG/M2 | OXYGEN SATURATION: 96 % | HEIGHT: 68 IN | WEIGHT: 160.2 LBS | SYSTOLIC BLOOD PRESSURE: 114 MMHG

## 2024-03-01 DIAGNOSIS — Z71.89 CARE PLAN DISCUSSED WITH PATIENT: ICD-10-CM

## 2024-03-01 DIAGNOSIS — Z71.89 COORDINATION OF COMPLEX CARE: ICD-10-CM

## 2024-03-01 DIAGNOSIS — C83.33 DIFFUSE LARGE B-CELL LYMPHOMA OF INTRA-ABDOMINAL LYMPH NODES: Primary | ICD-10-CM

## 2024-03-01 DIAGNOSIS — Z51.81 ANTICOAGULATION MANAGEMENT ENCOUNTER: ICD-10-CM

## 2024-03-01 DIAGNOSIS — C83.33 DIFFUSE LARGE B-CELL LYMPHOMA OF INTRA-ABDOMINAL LYMPH NODES (HCC): Primary | ICD-10-CM

## 2024-03-01 DIAGNOSIS — T80.810D EXTRAVASATION OF VESICANT CHEMOTHERAPY, SUBSEQUENT ENCOUNTER: ICD-10-CM

## 2024-03-01 DIAGNOSIS — R52 PAIN: ICD-10-CM

## 2024-03-01 DIAGNOSIS — C83.33 DIFFUSE LARGE B-CELL LYMPHOMA OF INTRA-ABDOMINAL LYMPH NODES (HCC): ICD-10-CM

## 2024-03-01 DIAGNOSIS — T80.818S: ICD-10-CM

## 2024-03-01 DIAGNOSIS — Z79.01 ANTICOAGULATION MANAGEMENT ENCOUNTER: ICD-10-CM

## 2024-03-01 PROCEDURE — 99212 OFFICE O/P EST SF 10 MIN: CPT

## 2024-04-16 ENCOUNTER — TRANSCRIBE ORDERS (OUTPATIENT)
Dept: ADMINISTRATIVE | Facility: HOSPITAL | Age: 79
End: 2024-04-16
Payer: MEDICARE

## 2024-04-16 DIAGNOSIS — Z86.718 HISTORY OF DVT (DEEP VEIN THROMBOSIS): Primary | ICD-10-CM

## 2024-04-25 ENCOUNTER — HOSPITAL ENCOUNTER (OUTPATIENT)
Dept: ULTRASOUND IMAGING | Facility: HOSPITAL | Age: 79
Discharge: HOME | End: 2024-04-25
Admitting: NURSE PRACTITIONER
Payer: MEDICARE

## 2024-04-25 DIAGNOSIS — Z86.718 HISTORY OF DVT (DEEP VEIN THROMBOSIS): ICD-10-CM

## 2024-04-25 PROCEDURE — 93971 EXTREMITY STUDY: CPT

## 2024-05-07 ENCOUNTER — TELEPHONE (OUTPATIENT)
Dept: VASCULAR SURGERY | Facility: CLINIC | Age: 79
End: 2024-05-07
Payer: MEDICARE

## 2024-05-24 ENCOUNTER — HOSPITAL ENCOUNTER (OUTPATIENT)
Dept: ULTRASOUND IMAGING | Facility: HOSPITAL | Age: 79
Discharge: HOME | End: 2024-05-24
Payer: MEDICARE

## 2024-05-24 DIAGNOSIS — I65.23 BILATERAL CAROTID ARTERY STENOSIS: Primary | ICD-10-CM

## 2024-05-24 DIAGNOSIS — I65.23 BILATERAL CAROTID ARTERY STENOSIS: ICD-10-CM

## 2024-05-24 PROCEDURE — 93880 EXTRACRANIAL BILAT STUDY: CPT

## 2024-05-28 ENCOUNTER — TELEPHONE (OUTPATIENT)
Dept: VASCULAR SURGERY | Facility: CLINIC | Age: 79
End: 2024-05-28
Payer: MEDICARE

## 2024-05-29 ENCOUNTER — OFFICE VISIT (OUTPATIENT)
Dept: VASCULAR SURGERY | Facility: CLINIC | Age: 79
End: 2024-05-29
Payer: MEDICARE

## 2024-05-29 VITALS
HEIGHT: 68 IN | HEART RATE: 73 BPM | DIASTOLIC BLOOD PRESSURE: 74 MMHG | BODY MASS INDEX: 24.4 KG/M2 | WEIGHT: 161 LBS | OXYGEN SATURATION: 97 % | SYSTOLIC BLOOD PRESSURE: 122 MMHG

## 2024-05-29 DIAGNOSIS — I10 PRIMARY HYPERTENSION: ICD-10-CM

## 2024-05-29 DIAGNOSIS — E78.2 MIXED HYPERLIPIDEMIA: ICD-10-CM

## 2024-05-29 DIAGNOSIS — I65.23 BILATERAL CAROTID ARTERY STENOSIS: Primary | ICD-10-CM

## 2024-05-29 PROCEDURE — 1160F RVW MEDS BY RX/DR IN RCRD: CPT | Performed by: NURSE PRACTITIONER

## 2024-05-29 PROCEDURE — 3078F DIAST BP <80 MM HG: CPT | Performed by: NURSE PRACTITIONER

## 2024-05-29 PROCEDURE — 99214 OFFICE O/P EST MOD 30 MIN: CPT | Performed by: NURSE PRACTITIONER

## 2024-05-29 PROCEDURE — 3074F SYST BP LT 130 MM HG: CPT | Performed by: NURSE PRACTITIONER

## 2024-05-29 PROCEDURE — 1159F MED LIST DOCD IN RCRD: CPT | Performed by: NURSE PRACTITIONER

## 2024-05-29 NOTE — PROGRESS NOTES
"5/29/2024         Kaila Gonzales, APRN  [address] [place] [address]    Justin Szymanski  1945    Chief Complaint   Patient presents with    Follow-up     1 year follow up w/ testing. Last seen 5/17/23. Patient denies any stroke type symptoms. Patient does not have current list of meds.        Dear Kaila Gonzales, APRN       HPI  I had the pleasure of seeing your patient Justin Szymanski in the office today.    As you recall, Justin Szymanski is a 78 y.o.  male who we are following for asymptomatic carotid occlusive disease.  He does follow with Dr. Rodriguez for an ascending aortic aneurysm.  He underwent a left carotid endarterectomy on 5/6/2020.  Currently he is doing well and denies any strokelike symptoms.  He is unsure of his medication list but believes he is now on blood thinner not Plavix.  Office staff is going to verify with pharmacy he did have noninvasive testing performed today, which I did review in office.        Review of Systems   Constitutional: Negative.    HENT: Negative.     Eyes: Negative.    Respiratory: Negative.     Cardiovascular: Negative.    Gastrointestinal: Negative.    Endocrine: Negative.    Genitourinary: Negative.    Musculoskeletal: Negative.    Skin: Negative.    Allergic/Immunologic: Negative.    Neurological: Negative.    Hematological: Negative.    Psychiatric/Behavioral: Negative.     All other systems reviewed and are negative.        /74   Pulse 73   Ht 172.7 cm (68\")   Wt 73 kg (161 lb)   SpO2 97%   BMI 24.48 kg/m²    Physical Exam  Vitals and nursing note reviewed.   Constitutional:       General: He is not in acute distress.     Appearance: Normal appearance. He is well-developed. He is not diaphoretic.   HENT:      Head: Normocephalic and atraumatic.   Eyes:      Pupils: Pupils are equal, round, and reactive to light.   Neck:      Vascular: No carotid bruit or JVD.   Cardiovascular:      Rate and Rhythm: Normal rate and regular rhythm.      Pulses: Normal pulses.    "        Femoral pulses are 2+ on the right side and 2+ on the left side.       Popliteal pulses are 2+ on the right side and 2+ on the left side.        Dorsalis pedis pulses are 2+ on the right side and 2+ on the left side.        Posterior tibial pulses are 2+ on the right side and 2+ on the left side.      Heart sounds: Normal heart sounds, S1 normal and S2 normal. No murmur heard.     No friction rub. No gallop.   Pulmonary:      Effort: Pulmonary effort is normal.      Breath sounds: Normal breath sounds.   Abdominal:      General: Bowel sounds are normal. There is no abdominal bruit.      Palpations: Abdomen is soft.      Tenderness: There is no abdominal tenderness.   Musculoskeletal:         General: Normal range of motion.   Skin:     General: Skin is warm and dry.   Neurological:      General: No focal deficit present.      Mental Status: He is alert and oriented to person, place, and time.      Cranial Nerves: No cranial nerve deficit.   Psychiatric:         Mood and Affect: Mood normal.         Behavior: Behavior normal.         Thought Content: Thought content normal.         Judgment: Judgment normal.              Diagnostic Data:  US Carotid Bilateral    Result Date: 5/24/2024  Narrative: History: Carotid occlusive disease      Impression: Impression: 1. There is less than 50% stenosis of the right internal carotid artery. 2. There is 50 to 69% stenosis of the left internal carotid artery. 3. Antegrade flow is demonstrated in bilateral vertebral arteries.  Comments: Bilateral carotid vertebral arterial duplex scan was performed.  Grayscale imaging shows intimal thickening and calcified elements at the carotid bifurcation. The right internal carotid artery peak systolic velocity is 92.8 cm/sec. The end-diastolic velocity is 32.8 cm/sec. The right ICA/CCA ratio is approximately 1.1. These findings correlate with less than 50% stenosis of the right internal carotid artery.  Grayscale imaging shows intimal  thickening and calcified elements at the carotid bifurcation. The left internal carotid artery peak systolic velocity is 128.5 cm/sec. The end-diastolic velocity is 38.9 cm/sec. The left ICA/CCA ratio is approximately 1.6. These findings correlate with 50 to 69% stenosis of the left internal carotid artery.  Antegrade flow is demonstrated in bilateral vertebral arteries. There is greater than 50% stenosis in the left external carotid artery.  This report was signed and finalized on 5/24/2024 1:19 PM by Dr. Sb Hunter MD.          Patient Active Problem List   Diagnosis    Allergic rhinitis due to pollen    Snoring    Nasal polyposis - hx of    ADITHYA (obstructive sleep apnea)    ADITHYA on CPAP    Indigestion    Heart murmur    Hypertension    Hyperlipidemia    Ascending aortic aneurysm    PAD (peripheral artery disease)    Upper extremity weakness    Bilateral carotid artery stenosis    Numbness and tingling of right upper extremity    Muscle fasciculation    Preop testing    Carotid stenosis    Tubular adenoma    S/P FESS (functional endoscopic sinus surgery)    Laryngopharyngeal reflux    History of nasal polyposis    Non-smoker         ICD-10-CM ICD-9-CM   1. Bilateral carotid artery stenosis  I65.23 433.10     433.30   2. Primary hypertension  I10 401.9   3. Mixed hyperlipidemia  E78.2 272.2         Plan: After thoroughly evaluating Justin Szymanski, I believe the best course of action is to remain conservative from vascular surgery standpoint.  Currently he is doing well and denies any strokelike symptoms.  I did review his testing which shows less than 50% right carotid stenosis and 50 to 69% left carotid stenosis however widely patent.  We will see him back in 1 year with repeat noninvasive testing for continued surveillance, including a carotid duplex.  He does follow with CT surgery for an ascending aortic aneurysm.  I did discuss vascular risk factors as they pertain to the progression of vascular disease  including controlling his hypertension and hyperlipidemia.  His blood pressure is stable on his current medications.  He should continue his Lipitor 40 mg daily in addition to his other medications.  This was all discussed in full with complete understanding.    Thank you for allowing me to participate in the care of your patient.  Please do not hesitate with any questions or concerns.  I will keep you aware of any further encounters with Justin Szymanski.        Sincerely yours,         EFRA Ellington Cainan G, APRN

## 2024-05-29 NOTE — LETTER
May 29, 2024     EFRA Ordonez  [address] [place] [address]    Patient: Justin Szymanski   YOB: 1945   Date of Visit: 5/29/2024     Dear EFRA Ordonez:       Thank you for referring Justin Szymanski to me for evaluation. Below are the relevant portions of my assessment and plan of care.    If you have questions, please do not hesitate to call me. I look forward to following Justin along with you.         Sincerely,        EFRA Ellington        CC: No Recipients    Ana Lerma APRN  05/29/24 1557  Sign when Signing Visit  5/29/2024         Kaila Gonzales APRN  [address] [place] [address]    Justin Szymanski  1945    Chief Complaint   Patient presents with   • Follow-up     1 year follow up w/ testing. Last seen 5/17/23. Patient denies any stroke type symptoms. Patient does not have current list of meds.        Dear Kaila Gonzales APRN       HPI  I had the pleasure of seeing your patient Justin Szymanski in the office today.    As you recall, Justin Szymanski is a 78 y.o.  male who we are following for asymptomatic carotid occlusive disease.  He does follow with Dr. Rodriguez for an ascending aortic aneurysm.  He underwent a left carotid endarterectomy on 5/6/2020.  Currently he is doing well and denies any strokelike symptoms.  He is unsure of his medication list but believes he is now on blood thinner not Plavix.  Office staff is going to verify with pharmacy he did have noninvasive testing performed today, which I did review in office.        Review of Systems   Constitutional: Negative.    HENT: Negative.     Eyes: Negative.    Respiratory: Negative.     Cardiovascular: Negative.    Gastrointestinal: Negative.    Endocrine: Negative.    Genitourinary: Negative.    Musculoskeletal: Negative.    Skin: Negative.    Allergic/Immunologic: Negative.    Neurological: Negative.    Hematological: Negative.    Psychiatric/Behavioral: Negative.     All other systems reviewed and are  "negative.        /74   Pulse 73   Ht 172.7 cm (68\")   Wt 73 kg (161 lb)   SpO2 97%   BMI 24.48 kg/m²    Physical Exam  Vitals and nursing note reviewed.   Constitutional:       General: He is not in acute distress.     Appearance: Normal appearance. He is well-developed. He is not diaphoretic.   HENT:      Head: Normocephalic and atraumatic.   Eyes:      Pupils: Pupils are equal, round, and reactive to light.   Neck:      Vascular: No carotid bruit or JVD.   Cardiovascular:      Rate and Rhythm: Normal rate and regular rhythm.      Pulses: Normal pulses.           Femoral pulses are 2+ on the right side and 2+ on the left side.       Popliteal pulses are 2+ on the right side and 2+ on the left side.        Dorsalis pedis pulses are 2+ on the right side and 2+ on the left side.        Posterior tibial pulses are 2+ on the right side and 2+ on the left side.      Heart sounds: Normal heart sounds, S1 normal and S2 normal. No murmur heard.     No friction rub. No gallop.   Pulmonary:      Effort: Pulmonary effort is normal.      Breath sounds: Normal breath sounds.   Abdominal:      General: Bowel sounds are normal. There is no abdominal bruit.      Palpations: Abdomen is soft.      Tenderness: There is no abdominal tenderness.   Musculoskeletal:         General: Normal range of motion.   Skin:     General: Skin is warm and dry.   Neurological:      General: No focal deficit present.      Mental Status: He is alert and oriented to person, place, and time.      Cranial Nerves: No cranial nerve deficit.   Psychiatric:         Mood and Affect: Mood normal.         Behavior: Behavior normal.         Thought Content: Thought content normal.         Judgment: Judgment normal.              Diagnostic Data:  US Carotid Bilateral    Result Date: 5/24/2024  Narrative: History: Carotid occlusive disease      Impression: Impression: 1. There is less than 50% stenosis of the right internal carotid artery. 2. There is 50 " to 69% stenosis of the left internal carotid artery. 3. Antegrade flow is demonstrated in bilateral vertebral arteries.  Comments: Bilateral carotid vertebral arterial duplex scan was performed.  Grayscale imaging shows intimal thickening and calcified elements at the carotid bifurcation. The right internal carotid artery peak systolic velocity is 92.8 cm/sec. The end-diastolic velocity is 32.8 cm/sec. The right ICA/CCA ratio is approximately 1.1. These findings correlate with less than 50% stenosis of the right internal carotid artery.  Grayscale imaging shows intimal thickening and calcified elements at the carotid bifurcation. The left internal carotid artery peak systolic velocity is 128.5 cm/sec. The end-diastolic velocity is 38.9 cm/sec. The left ICA/CCA ratio is approximately 1.6. These findings correlate with 50 to 69% stenosis of the left internal carotid artery.  Antegrade flow is demonstrated in bilateral vertebral arteries. There is greater than 50% stenosis in the left external carotid artery.  This report was signed and finalized on 5/24/2024 1:19 PM by Dr. Sb Hunter MD.          Patient Active Problem List   Diagnosis   • Allergic rhinitis due to pollen   • Snoring   • Nasal polyposis - hx of   • ADITHYA (obstructive sleep apnea)   • ADITHYA on CPAP   • Indigestion   • Heart murmur   • Hypertension   • Hyperlipidemia   • Ascending aortic aneurysm   • PAD (peripheral artery disease)   • Upper extremity weakness   • Bilateral carotid artery stenosis   • Numbness and tingling of right upper extremity   • Muscle fasciculation   • Preop testing   • Carotid stenosis   • Tubular adenoma   • S/P FESS (functional endoscopic sinus surgery)   • Laryngopharyngeal reflux   • History of nasal polyposis   • Non-smoker         ICD-10-CM ICD-9-CM   1. Bilateral carotid artery stenosis  I65.23 433.10     433.30   2. Primary hypertension  I10 401.9   3. Mixed hyperlipidemia  E78.2 272.2         Plan: After thoroughly  evaluating Justin Szymanski, I believe the best course of action is to remain conservative from vascular surgery standpoint.  Currently he is doing well and denies any strokelike symptoms.  I did review his testing which shows less than 50% right carotid stenosis and 50 to 69% left carotid stenosis however widely patent.  We will see him back in 1 year with repeat noninvasive testing for continued surveillance, including a carotid duplex.  He does follow with CT surgery for an ascending aortic aneurysm.  I did discuss vascular risk factors as they pertain to the progression of vascular disease including controlling his hypertension and hyperlipidemia.  His blood pressure is stable on his current medications.  He should continue his Lipitor 40 mg daily in addition to his other medications.  This was all discussed in full with complete understanding.    Thank you for allowing me to participate in the care of your patient.  Please do not hesitate with any questions or concerns.  I will keep you aware of any further encounters with Justin Szymanski.        Sincerely yours,         Ana Lerma, EFRA Gonzales, EFRA Coleman

## 2024-06-18 ENCOUNTER — HOSPITAL ENCOUNTER (OUTPATIENT)
Dept: GENERAL RADIOLOGY | Facility: HOSPITAL | Age: 79
Discharge: HOME | End: 2024-06-18
Admitting: NURSE PRACTITIONER
Payer: MEDICARE

## 2024-06-18 ENCOUNTER — TRANSCRIBE ORDERS (OUTPATIENT)
Dept: GENERAL RADIOLOGY | Facility: HOSPITAL | Age: 79
End: 2024-06-18
Payer: MEDICARE

## 2024-06-18 DIAGNOSIS — J32.8 OTHER CHRONIC SINUSITIS: ICD-10-CM

## 2024-06-18 DIAGNOSIS — J32.8 OTHER CHRONIC SINUSITIS: Primary | ICD-10-CM

## 2024-06-18 PROCEDURE — 70220 X-RAY EXAM OF SINUSES: CPT

## 2024-06-19 ENCOUNTER — TELEPHONE (OUTPATIENT)
Dept: HEMATOLOGY | Age: 79
End: 2024-06-19

## 2024-06-19 NOTE — TELEPHONE ENCOUNTER
Patient called with complaints of weakness and weight loss. I called [facility] and got patients scans moved up to July 1 at 0830. I moved patients appt up to July 5th. I called patient and informed patient of new appts. Patient verbalizes understanding. Electronically signed by Malena Irizarry RN on 6/19/2024 at 2:34 PM

## 2024-07-01 ENCOUNTER — HOSPITAL ENCOUNTER (OUTPATIENT)
Dept: CT IMAGING | Facility: HOSPITAL | Age: 79
Discharge: HOME | End: 2024-07-01
Admitting: INTERNAL MEDICINE
Payer: MEDICARE

## 2024-07-01 DIAGNOSIS — C83.33 DIFFUSE LARGE B-CELL LYMPHOMA OF INTRA-ABDOMINAL LYMPH NODES: ICD-10-CM

## 2024-07-01 LAB — CREAT BLDA-MCNC: 1.2 MG/DL (ref 0.6–1.3)

## 2024-07-01 PROCEDURE — 82565 ASSAY OF CREATININE: CPT

## 2024-07-01 PROCEDURE — 74177 CT ABD & PELVIS W/CONTRAST: CPT

## 2024-07-01 PROCEDURE — 25510000001 IOPAMIDOL 61 % SOLUTION: Performed by: INTERNAL MEDICINE

## 2024-07-01 PROCEDURE — 71260 CT THORAX DX C+: CPT

## 2024-07-01 RX ADMIN — IOPAMIDOL 100 ML: 612 INJECTION, SOLUTION INTRAVENOUS at 11:17

## 2024-07-09 ENCOUNTER — HOSPITAL ENCOUNTER (OUTPATIENT)
Dept: CT IMAGING | Facility: HOSPITAL | Age: 79
Discharge: HOME | End: 2024-07-09
Admitting: NURSE PRACTITIONER
Payer: MEDICARE

## 2024-07-09 DIAGNOSIS — I71.21 ANEURYSM OF ASCENDING AORTA WITHOUT RUPTURE: ICD-10-CM

## 2024-07-09 PROCEDURE — 71275 CT ANGIOGRAPHY CHEST: CPT

## 2024-07-09 PROCEDURE — 25510000001 IOPAMIDOL PER 1 ML: Performed by: NURSE PRACTITIONER

## 2024-07-09 RX ADMIN — IOPAMIDOL 100 ML: 755 INJECTION, SOLUTION INTRAVENOUS at 08:21

## 2024-07-18 ENCOUNTER — OFFICE VISIT (OUTPATIENT)
Dept: CARDIAC SURGERY | Facility: CLINIC | Age: 79
End: 2024-07-18
Payer: MEDICARE

## 2024-07-18 VITALS
BODY MASS INDEX: 24.67 KG/M2 | WEIGHT: 162.8 LBS | HEART RATE: 84 BPM | SYSTOLIC BLOOD PRESSURE: 129 MMHG | HEIGHT: 68 IN | OXYGEN SATURATION: 98 % | DIASTOLIC BLOOD PRESSURE: 78 MMHG

## 2024-07-18 DIAGNOSIS — I71.21 AORTIC ROOT ANEURYSM: ICD-10-CM

## 2024-07-18 DIAGNOSIS — I71.21 ANEURYSM OF ASCENDING AORTA WITHOUT RUPTURE: Primary | ICD-10-CM

## 2024-07-18 PROBLEM — Q25.43 AORTIC ROOT ANEURYSM: Status: ACTIVE | Noted: 2024-07-18

## 2024-07-18 RX ORDER — RIVAROXABAN 20 MG/1
20 TABLET, FILM COATED ORAL DAILY
COMMUNITY

## 2024-07-18 NOTE — PROGRESS NOTES
Chief Complaint   Patient presents with    Aortic Aneurysm     Patient is here for follow up w/CT       Subjective     History of Present Illness    Mr. Szymanski is a 79-year-old male who presents today in follow-up of thoracic aortic aneurysm.  Since his previous office visit he was diagnosed with lymphoma of the intra-abdominal lymph nodes.  He has completed a round of radiation and chemotherapy.  He continues post cancer surveillance per Dr. Hanna with oncology.  He reports that he is overall doing well today.  He reports that he did experience hypotension with chemotherapy but his blood pressure has been controlled since.  He reports that his blood pressure stays around 120/80.  He denies back or chest pain.  He is a non-smoker.    Review of Systems   Constitutional:  Negative for chills and fever.   HENT:  Negative for sore throat and trouble swallowing.    Respiratory:  Negative for chest tightness and shortness of breath.    Cardiovascular:  Negative for chest pain, palpitations and leg swelling.   Musculoskeletal:  Negative for back pain.   Skin:  Negative for color change, pallor and rash.   Neurological:  Negative for dizziness and weakness.      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    numbness in right arm, the patinet states his symptom may be due to neck injury, following with neurology     Valvular disease      Past Surgical History:   Procedure Laterality Date    CAROTID ENDARTERECTOMY Left 5/6/2020    Procedure: LEFT CAROTID ENDARTERECTOMY WITH BOVINE PATCH ANGIOPLASTY, EEG MONITORING, AND COMPLETION DUPLEX VESSEL  ULTRASOUND;  Surgeon: Sb Hunter DO;  Location: [facility] HYBRID OR 12;  Service: Vascular;  Laterality: Left;    CATARACT EXTRACTION Bilateral     CHOLECYSTECTOMY      CHOLECYSTECTOMY WITH INTRAOPERATIVE CHOLANGIOGRAM N/A 6/18/2020    Procedure: CHOLECYSTECTOMY LAPAROSCOPIC INTRAOPERATIVE CHOLANGIOGRAM;  Surgeon: Maame Del Rio MD;  Location: [facility] OR;  Service: General;  Laterality: N/A;    COLONOSCOPY  07/21/2016    two polyps ,both removed    COLONOSCOPY N/A 7/22/2021    Procedure: COLONOSCOPY WITH ANESTHESIA;  Surgeon: Marc Mart DO;  Location: [facility] ENDOSCOPY;  Service: Gastroenterology;  Laterality: N/A;  pre op: tubular adenoma  post op: diverticulosis, polyps  pcp: Kaila Gonzales APRN    ENDOSCOPY  04/18/2013    normal    ENDOSCOPY N/A 1/26/2018    Procedure: ESOPHAGOGASTRODUODENOSCOPY WITH ANESTHESIA;  Surgeon: Marc Mart DO;  Location: [facility] ENDOSCOPY;  Service:     SINUS SURGERY      Dr. Griffith    TONSILLECTOMY      ULNAR NERVE REPAIR       Family History   Problem Relation Age of Onset    Hyperlipidemia Mother     Cancer Father     Hypertension Father     Hyperlipidemia Father     Colon cancer Maternal Grandfather     Colon polyps Neg Hx     Esophageal cancer Neg Hx     Liver cancer Neg Hx     Liver disease Neg Hx     Rectal cancer Neg Hx     Stomach cancer Neg Hx      Social History     Tobacco Use    Smoking status: Never    Smokeless tobacco: Never   Vaping Use    Vaping status: Never Used   Substance Use Topics    Alcohol use: Yes     Alcohol/week: 7.0 standard drinks of alcohol     Types: 7 Glasses of wine per week     Comment: nightly - red wine     Drug use: No     Current Outpatient Medications   Medication Sig Dispense Refill    atorvastatin (LIPITOR) 40 MG tablet Take 1 tablet by mouth Daily.      azelastine (ASTELIN) 0.1 % nasal spray 2 sprays into the nostril(s) as directed by provider 2 (Two) Times a Day. Use in each nostril as directed 30 mL 11    FLUoxetine  "(PROzac) 20 MG capsule Take 1 capsule by mouth Daily.  0    fluticasone (FLONASE) 50 MCG/ACT nasal spray 2 sprays into the nostril(s) as directed by provider Daily As Needed for Rhinitis or Allergies.      levothyroxine (SYNTHROID, LEVOTHROID) 100 MCG tablet Take 1 tablet by mouth Every Morning Before Breakfast.      lisinopril-hydrochlorothiazide (PRINZIDE,ZESTORETIC) 10-12.5 MG per tablet Take 1 tablet by mouth Daily.      Multiple Vitamins-Minerals (MULTIVITAMIN WITH MINERALS) tablet tablet Take 1 tablet by mouth Daily.      tadalafil (CIALIS) 20 MG tablet Take 1 tablet by mouth Daily As Needed for Erectile Dysfunction.      valACYclovir (VALTREX) 1000 MG tablet Take 1 tablet by mouth 2 (Two) Times a Day As Needed.      Xarelto 20 MG tablet Take 1 tablet by mouth Daily. with food      zolpidem (AMBIEN) 10 MG tablet Take 1 tablet by mouth At Night As Needed.      desonide (DESOWEN) 0.05 % cream Apply 1 Application topically to the appropriate area as directed Daily As Needed for Irritation. (Patient not taking: Reported on 7/18/2024)  11    pregabalin (LYRICA) 50 MG capsule Take 1 capsule by mouth Daily. (Patient not taking: Reported on 7/18/2024)       No current facility-administered medications for this visit.     Allergies:  Patient has no known allergies.    Objective      Vital Signs  Visit Vitals  /78 (BP Location: Right arm, Patient Position: Sitting, Cuff Size: Adult)   Pulse 84   Ht 172.7 cm (68\")   Wt 73.8 kg (162 lb 12.8 oz)   SpO2 98%   BMI 24.75 kg/m²         Physical Exam  Constitutional:       General: He is not in acute distress.  HENT:      Head: Normocephalic and atraumatic.   Eyes:      Pupils: Pupils are equal, round, and reactive to light.   Cardiovascular:      Rate and Rhythm: Normal rate and regular rhythm.      Heart sounds: No murmur heard.  Pulmonary:      Effort: Pulmonary effort is normal.      Breath sounds: Normal breath sounds. No wheezing, rhonchi or rales.   Abdominal:      " General: There is no distension.      Palpations: Abdomen is soft.   Musculoskeletal:         General: Normal range of motion.      Cervical back: Normal range of motion and neck supple.      Right lower leg: No edema.      Left lower leg: No edema.   Skin:     General: Skin is warm and dry.   Neurological:      General: No focal deficit present.      Mental Status: He is alert.   Psychiatric:         Mood and Affect: Mood normal.         Behavior: Behavior normal.       Results Review:     My personal interpretation of radiographic imaging:  The ascending aorta measures 4.4 cm in maximum dimension and the aortic root measures 4.1 cm in size without rupture or dissection.    Assessment & Plan       Diagnoses and all orders for this visit:    1. Aneurysm of ascending aorta without rupture (Primary)  -     CT Angiogram Chest; Future    2. Aortic root aneurysm  -     CT Angiogram Chest; Future      Mr. Szymanski is overall doing well today.  He presents today in follow-up of a thoracic aortic aneurysm.  Today on CT imaging the ascending aorta measures 4.4 cm in maximum dimension and the aortic root measures 4.1 cm in size without rupture or dissection.  He denies back or chest pain.  He reports that over the last year he was diagnosed with lymphoma of the intra-abdominal lymph nodes.  He underwent an round of chemotherapy and radiation.  His last treatment was in December.  He does report hypotension with treatment but now his blood pressure has been well-controlled.  He reports that it stays around 120/80.    We discussed the natural course history of aortic aneurysmal disease. We discussed the specific size of aneurysm today and potential risk of aortic complications. We discussed the operative treatment of aneursymal disease broadly. At this time we discussed the recommendation to plan surveillance with CT scans.  I will have him return in 1 year with Dr. Rodriguez with CT angiogram of the chest.    We discussed signs and  symptoms of acute aortic pathology and the need to present to the emergency department for further evaluation. Lastly, we discussed the value of exercise while being mindful of a known aneurysm and the potential risk that high intensity, isometric, or valsalva type exercises presents.     Potential medical therapy including the use of a beta-blocker and perhaps other agents to accomplish strict control of pressure were discussed.     Justin Szymanski  reports that he has never smoked. He has never used smokeless tobacco. I have educated him on the risk of diseases from using tobacco products such as cancer, COPD, and heart disease.     Advance Care Planning   ACP discussion was declined by the patient. Patient does not have an advance directive, declines further assistance.       Nina Jansen, APRN

## 2024-07-23 ENCOUNTER — TELEPHONE (OUTPATIENT)
Dept: HEMATOLOGY | Age: 79
End: 2024-07-23

## 2024-07-23 NOTE — TELEPHONE ENCOUNTER
Called Patient and reminded patient of their appointment on 07/31/2024 and patient confirmed they would be here. Reminded patient to just come at appointment time, and to not come at the lab appointment time. Reminded patient that we will not check them in any more than 30 minutes before appointment time.  We have now moved to the [facility] that is located between our old office and the ER at the [facility]

## 2024-07-29 DIAGNOSIS — C83.33 DIFFUSE LARGE B-CELL LYMPHOMA OF INTRA-ABDOMINAL LYMPH NODES (HCC): Primary | ICD-10-CM

## 2024-07-29 NOTE — PROGRESS NOTES
MEDICAL ONCOLOGY PROGRESS NOTE    Pt Name: Zackary Mariee  MRN: [id number]  YOB: 1945  Date of evaluation: 7/31/2024    HISTORY OF PRESENT ILLNESS:  Reason for MD visit-toxicity assessment his disease management.   Zackary Mariee is a status post 6 cycles of R-CHOP treatment and status post completion of consolidation RT by Dr. Speedy Pratt in [place].  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  He had extravasation with tissue damage of the left upper extremity.   Patient denies any new B symptoms.  Denies any peripheral neuropathy.  He is overall doing well.  He had repeat CT chest abdomen pelvis for surveillance.      Diagnosis  Lymphadenopathy, upper abdomen, July 2023  High grade lymphoma  FISH: BCL-2(+), BCL-6(-), cMYC(-)  Extravasation chemotherapy    Treatment Summary  8/28/23/12/11/23 Completed R-CHOP chemotherapy every 21 days x6 cycles  1/29/24-2/16/24 Completed radiation therapy 3000 cGy over 15 fractions with Dr.Alex Pratt at [facility]    Cancer History  5/31/2022-CTA angiogram chest-no evidence of pulmonary embolism.  No evidence of lymphadenopathy.  1/9/2023-CT angiogram chest showed Ectatic ascending aorta measuring 4.2 cm diameter, unchanged from prior study. Stable 4 mm RIGHT lower lobe pulmonary nodule. No enlarged lymph nodes in the chest.   7/13/23 CTA chest ([facility]): Stable mildly dilated ascending thoracic aorta. Stable appearance of the lungs with no acute infiltrate.  Bulky lymphadenopathy within the upper abdomen is suspicious for lymphoma and will require further evaluation. An enlarged aortocaval node adjacent to the pancreas measures 55 x 43 mm. An adjacent 51 x 39 mm retroperitoneal lymph node artery lies between   the origin of the hepatic and splenic artery at the celiac axis.   7/25/23 EGD with EUS by Dr Quinton West/[facility]: The celiac axis and associated vascular structures was identified and examined. Limited views of the left lobe of the liver revealed no

## 2024-07-31 ENCOUNTER — HOSPITAL ENCOUNTER (OUTPATIENT)
Dept: INFUSION THERAPY | Age: 79
Discharge: HOME | End: 2024-07-31
Payer: MEDICARE

## 2024-07-31 ENCOUNTER — OFFICE VISIT (OUTPATIENT)
Dept: HEMATOLOGY | Age: 79
End: 2024-07-31
Payer: MEDICARE

## 2024-07-31 ENCOUNTER — TRANSCRIBE ORDERS (OUTPATIENT)
Dept: ADMINISTRATIVE | Facility: HOSPITAL | Age: 79
End: 2024-07-31
Payer: MEDICARE

## 2024-07-31 VITALS
HEIGHT: 68 IN | OXYGEN SATURATION: 98 % | WEIGHT: 164 LBS | BODY MASS INDEX: 24.86 KG/M2 | HEART RATE: 76 BPM | SYSTOLIC BLOOD PRESSURE: 108 MMHG | TEMPERATURE: 98 F | DIASTOLIC BLOOD PRESSURE: 68 MMHG

## 2024-07-31 DIAGNOSIS — C83.33 DIFFUSE LARGE B-CELL LYMPHOMA OF INTRA-ABDOMINAL LYMPH NODES: Primary | ICD-10-CM

## 2024-07-31 DIAGNOSIS — C83.33 DIFFUSE LARGE B-CELL LYMPHOMA OF INTRA-ABDOMINAL LYMPH NODES (HCC): Primary | ICD-10-CM

## 2024-07-31 DIAGNOSIS — Z71.89 CARE PLAN DISCUSSED WITH PATIENT: ICD-10-CM

## 2024-07-31 DIAGNOSIS — D64.9 NORMOCYTIC ANEMIA: ICD-10-CM

## 2024-07-31 DIAGNOSIS — C83.33 DIFFUSE LARGE B-CELL LYMPHOMA OF INTRA-ABDOMINAL LYMPH NODES (HCC): ICD-10-CM

## 2024-07-31 LAB
ALBUMIN SERPL-MCNC: 4 G/DL (ref 3.5–5.2)
ALP SERPL-CCNC: 61 U/L (ref 40–129)
ALT SERPL-CCNC: 22 U/L (ref 5–41)
ANION GAP SERPL CALCULATED.3IONS-SCNC: 10 MMOL/L (ref 7–19)
AST SERPL-CCNC: 30 U/L (ref 5–40)
BASOPHILS # BLD: 0.02 K/UL (ref 0.01–0.08)
BASOPHILS NFR BLD: 0.4 % (ref 0.1–1.2)
BILIRUB SERPL-MCNC: 0.3 MG/DL (ref 0–1.2)
BUN SERPL-MCNC: 22 MG/DL (ref 8–23)
CALCIUM SERPL-MCNC: 9.1 MG/DL (ref 8.8–10.2)
CHLORIDE SERPL-SCNC: 102 MMOL/L (ref 98–107)
CO2 SERPL-SCNC: 28 MMOL/L (ref 22–29)
CREAT SERPL-MCNC: 1.1 MG/DL (ref 0.7–1.2)
EOSINOPHIL # BLD: 0.13 K/UL (ref 0.04–0.54)
EOSINOPHIL NFR BLD: 2.3 % (ref 0.7–7)
ERYTHROCYTE [DISTWIDTH] IN BLOOD BY AUTOMATED COUNT: 13.5 % (ref 11.6–14.4)
GLUCOSE SERPL-MCNC: 111 MG/DL (ref 70–99)
HCT VFR BLD AUTO: 35.9 % (ref 40.1–51)
HGB BLD-MCNC: 12.1 G/DL (ref 13.7–17.5)
LDH SERPL-CCNC: 142 U/L (ref 135–225)
LYMPHOCYTES # BLD: 1.56 K/UL (ref 1.18–3.74)
LYMPHOCYTES NFR BLD: 27.9 % (ref 19.3–53.1)
MCH RBC QN AUTO: 33.6 PG (ref 25.7–32.2)
MCHC RBC AUTO-ENTMCNC: 33.7 G/DL (ref 32.3–36.5)
MCV RBC AUTO: 99.7 FL (ref 79–92.2)
MONOCYTES # BLD: 0.73 K/UL (ref 0.24–0.82)
MONOCYTES NFR BLD: 13.1 % (ref 4.7–12.5)
NEUTROPHILS # BLD: 3.13 K/UL (ref 1.56–6.13)
NEUTS SEG NFR BLD: 55.9 % (ref 34–71.1)
PLATELET # BLD AUTO: 198 K/UL (ref 163–337)
PMV BLD AUTO: 8.7 FL (ref 7.4–10.4)
POTASSIUM SERPL-SCNC: 4.4 MMOL/L (ref 3.5–5.1)
PROT SERPL-MCNC: 6.5 G/DL (ref 6.4–8.3)
RBC # BLD AUTO: 3.6 M/UL (ref 4.63–6.08)
SODIUM SERPL-SCNC: 140 MMOL/L (ref 136–145)
URATE SERPL-MCNC: 7.7 MG/DL (ref 3.4–7)
WBC # BLD AUTO: 5.59 K/UL (ref 4.23–9.07)

## 2024-07-31 PROCEDURE — 1123F ACP DISCUSS/DSCN MKR DOCD: CPT | Performed by: INTERNAL MEDICINE

## 2024-07-31 PROCEDURE — 99213 OFFICE O/P EST LOW 20 MIN: CPT

## 2024-07-31 PROCEDURE — 80053 COMPREHEN METABOLIC PANEL: CPT

## 2024-07-31 PROCEDURE — 83615 LACTATE (LD) (LDH) ENZYME: CPT

## 2024-07-31 PROCEDURE — 85025 COMPLETE CBC W/AUTO DIFF WBC: CPT

## 2024-07-31 PROCEDURE — 84550 ASSAY OF BLOOD/URIC ACID: CPT

## 2024-07-31 PROCEDURE — 36415 COLL VENOUS BLD VENIPUNCTURE: CPT

## 2024-07-31 PROCEDURE — G2211 COMPLEX E/M VISIT ADD ON: HCPCS | Performed by: INTERNAL MEDICINE

## 2024-07-31 PROCEDURE — 99213 OFFICE O/P EST LOW 20 MIN: CPT | Performed by: INTERNAL MEDICINE

## 2024-09-06 ENCOUNTER — OFFICE VISIT (OUTPATIENT)
Dept: OTOLARYNGOLOGY | Facility: CLINIC | Age: 79
End: 2024-09-06
Payer: MEDICARE

## 2024-09-06 VITALS
HEIGHT: 68 IN | WEIGHT: 167 LBS | TEMPERATURE: 98.4 F | SYSTOLIC BLOOD PRESSURE: 99 MMHG | DIASTOLIC BLOOD PRESSURE: 65 MMHG | BODY MASS INDEX: 25.31 KG/M2 | HEART RATE: 71 BPM

## 2024-09-06 DIAGNOSIS — J30.1 ALLERGIC RHINITIS DUE TO POLLEN, UNSPECIFIED SEASONALITY: ICD-10-CM

## 2024-09-06 DIAGNOSIS — H61.22 IMPACTED CERUMEN, LEFT EAR: ICD-10-CM

## 2024-09-06 DIAGNOSIS — R09.81 NASAL CONGESTION: Primary | ICD-10-CM

## 2024-09-06 RX ORDER — MONTELUKAST SODIUM 10 MG/1
10 TABLET ORAL DAILY
Qty: 30 TABLET | Refills: 11 | Status: SHIPPED | OUTPATIENT
Start: 2024-09-06

## 2024-09-06 RX ORDER — AZELASTINE 1 MG/ML
2 SPRAY, METERED NASAL 2 TIMES DAILY
Qty: 30 ML | Refills: 11 | Status: SHIPPED | OUTPATIENT
Start: 2024-09-06

## 2024-09-06 RX ORDER — AMOXICILLIN 500 MG/1
500 CAPSULE ORAL 3 TIMES DAILY
Qty: 30 CAPSULE | Refills: 0 | Status: SHIPPED | OUTPATIENT
Start: 2024-09-06 | End: 2024-09-16

## 2024-09-06 RX ORDER — FLUTICASONE PROPIONATE 50 MCG
2 SPRAY, SUSPENSION (ML) NASAL DAILY PRN
Qty: 15.8 ML | Refills: 11 | Status: SHIPPED | OUTPATIENT
Start: 2024-09-06 | End: 2024-10-06

## 2024-09-06 NOTE — PATIENT INSTRUCTIONS
CONTACT INFORMATION:  The main office phone number is [phone]. For emergencies after hours and on weekends, this number will convert over to our answering service and the on call provider will answer. Please try to keep non emergent phone calls/ questions to office hours 9am-5pm Monday through Friday.      HeadCount  As an alternative, you can sign up and use the Epic MyChart system for more direct and quicker access for non emergent questions/ problems.  [employer] allows you to send messages to your doctor, view your test results, renew your prescriptions, schedule appointments, and more. To sign up, go to [employer] and click on the Sign Up Now link in the New User? box. Enter your HeadCount Activation Code exactly as it appears below along with the last four digits of your Social Security Number and your Date of Birth () to complete the sign-up process. If you do not sign up before the expiration date, you must request a new code.     HeadCount Activation Code: Activation code not generated  Current HeadCount Status: Active     If you have questions, you can email [email or web address] or call [phone] to talk to our HeadCount staff. Remember, HeadCount is NOT to be used for urgent needs. For medical emergencies, dial 911.     IF YOU SMOKE OR USE TOBACCO PLEASE READ THE FOLLOWING:  Why is smoking bad for me?  Smoking increases the risk of heart disease, lung disease, vascular disease, stroke, and cancer. If you smoke, STOP!        IF YOU SMOKE OR USE TOBACCO PLEASE READ THE FOLLOWING:  Why is smoking bad for me?  Smoking increases the risk of heart disease, lung disease, vascular disease, stroke, and cancer. If you smoke, STOP!     For more information:  Quit Now [place]  -QUIT-NOW  [email or web address]

## 2024-09-06 NOTE — PROGRESS NOTES
YOB: 1945  Location: [facility]  Location Address: [address] [place] [address], [facility] [address], [address] [place] [address]  Location Phone: [phone]    Chief Complaint   Patient presents with    Sinusitis       History of Present Illness  Justin Szymanski is a 79 y.o. male.  Justin Szymanski is here for follow up of ENT complaints. The patient has had problems with nasal congestion, sinus pressure, interrmitent headaches and ear itching. The symptoms are not localized to a particular location. He takes daily Astelin and Flonase.  He has been allergy tested in the past and completed several years of immunotherapy injections.    Today, he admits nasal congestion, sinus pain, and sinus pressure left > right.     Past Medical History:   Diagnosis Date    Allergic rhinitis     Carotid artery disease     COVID-19 vaccine series completed     Moderna    GERD (gastroesophageal reflux disease)     Heart murmur     Hyperlipidemia     Hypertension     Hypothyroidism     IBS (irritable bowel syndrome)     Nasal polyposis     Seasonal allergic rhinitis     Sleep apnea     intermittently uses cpap    Snoring     Stroke (cerebrum) 2020    numbness in right arm, the patinet states his symptom may be due to neck injury, following with neurology     Valvular disease        Past Surgical History:   Procedure Laterality Date    CAROTID ENDARTERECTOMY Left 2020    Procedure: LEFT CAROTID ENDARTERECTOMY WITH BOVINE PATCH ANGIOPLASTY, EEG MONITORING, AND COMPLETION DUPLEX VESSEL ULTRASOUND;  Surgeon: Sb Hunter DO;  Location: [facility] OR 12;  Service: Vascular;  Laterality: Left;    CATARACT EXTRACTION Bilateral     CHOLECYSTECTOMY      CHOLECYSTECTOMY WITH INTRAOPERATIVE CHOLANGIOGRAM N/A 2020    Procedure: CHOLECYSTECTOMY LAPAROSCOPIC INTRAOPERATIVE CHOLANGIOGRAM;  Surgeon: Maame Del Rio MD;  Location: [facility] OR;  Service: General;  Laterality: N/A;    COLONOSCOPY  2016    two polyps ,both removed     COLONOSCOPY N/A 7/22/2021    Procedure: COLONOSCOPY WITH ANESTHESIA;  Surgeon: Marc Mart DO;  Location:  [facility] ENDOSCOPY;  Service: Gastroenterology;  Laterality: N/A;  pre op: tubular adenoma  post op: diverticulosis, polyps  pcp: Kaila Gonzales APRN    ENDOSCOPY  04/18/2013    normal    ENDOSCOPY N/A 1/26/2018    Procedure: ESOPHAGOGASTRODUODENOSCOPY WITH ANESTHESIA;  Surgeon: Marc Mart DO;  Location:  [facility] ENDOSCOPY;  Service:     SINUS SURGERY      Dr. Griffith    TONSILLECTOMY      ULNAR NERVE REPAIR         Outpatient Medications Marked as Taking for the 9/6/24 encounter (Office Visit) with Carl Scott APRN   Medication Sig Dispense Refill    atorvastatin (LIPITOR) 40 MG tablet Take 1 tablet by mouth Daily.      azelastine (ASTELIN) 0.1 % nasal spray 2 sprays into the nostril(s) as directed by provider 2 (Two) Times a Day. Use in each nostril as directed 30 mL 11    FLUoxetine (PROzac) 20 MG capsule Take 1 capsule by mouth Daily.  0    fluticasone (FLONASE) 50 MCG/ACT nasal spray 2 sprays into the nostril(s) as directed by provider Daily As Needed for Rhinitis or Allergies for up to 30 days. 15.8 mL 11    levothyroxine (SYNTHROID, LEVOTHROID) 100 MCG tablet Take 1 tablet by mouth Every Morning Before Breakfast.      lisinopril-hydrochlorothiazide (PRINZIDE,ZESTORETIC) 10-12.5 MG per tablet Take 1 tablet by mouth Daily.      valACYclovir (VALTREX) 1000 MG tablet Take 1 tablet by mouth 2 (Two) Times a Day As Needed.      Xarelto 20 MG tablet Take 1 tablet by mouth Daily. with food      zolpidem (AMBIEN) 10 MG tablet Take 1 tablet by mouth At Night As Needed.      [DISCONTINUED] azelastine (ASTELIN) 0.1 % nasal spray 2 sprays into the nostril(s) as directed by provider 2 (Two) Times a Day. Use in each nostril as directed 30 mL 11       Patient has no known allergies.    Family History   Problem Relation Age of Onset    Hyperlipidemia Mother     Cancer Father     Hypertension Father      Hyperlipidemia Father     Colon cancer Maternal Grandfather     Colon polyps Neg Hx     Esophageal cancer Neg Hx     Liver cancer Neg Hx     Liver disease Neg Hx     Rectal cancer Neg Hx     Stomach cancer Neg Hx        Social History     Socioeconomic History    Marital status:    Tobacco Use    Smoking status: Never    Smokeless tobacco: Never   Vaping Use    Vaping status: Never Used   Substance and Sexual Activity    Alcohol use: Yes     Alcohol/week: 7.0 standard drinks of alcohol     Types: 7 Glasses of wine per week     Comment: nightly - red wine     Drug use: No    Sexual activity: Yes     Partners: Female       Review of Systems   Constitutional: Negative.    HENT: Negative.         Vitals:    09/06/24 1100   BP: 99/65   Pulse: 71   Temp: 98.4 °F (36.9 °C)       Body mass index is 25.39 kg/m².    Objective     Physical Exam  Vitals reviewed.   Constitutional:       Appearance: Normal appearance. He is overweight.   HENT:      Head: Normocephalic and atraumatic.      Right Ear: Hearing, tympanic membrane and external ear normal.      Left Ear: Hearing, tympanic membrane and external ear normal. There is impacted cerumen.      Ears:      Comments: Dermatitis of bilateral ear canals noted     Nose: Nose normal.      Mouth/Throat:      Lips: Pink.      Mouth: Mucous membranes are moist.      Pharynx: Oropharynx is clear.   Musculoskeletal:      Cervical back: Full passive range of motion without pain.   Neurological:      Mental Status: He is alert.   Psychiatric:         Behavior: Behavior is cooperative.     Ear Cerumen Removal    Date/Time: 9/6/2024 11:20 AM    Performed by: Carl Scott APRN  Authorized by: Carl Scott APRN  Consent: Verbal consent obtained.  Consent given by: patient  Patient identity confirmed: verbally with patient    Anesthesia:  Local Anesthetic: none  Location details: left ear  Patient tolerance: patient tolerated the procedure well with no immediate  complications  Procedure type: instrumentation, alligator forceps   Sedation:  Patient sedated: no             Assessment & Plan   Diagnoses and all orders for this visit:    1. Nasal congestion (Primary)    2. Allergic rhinitis due to pollen, unspecified seasonality    3. Impacted cerumen, left ear    Other orders  -     azelastine (ASTELIN) 0.1 % nasal spray; 2 sprays into the nostril(s) as directed by provider 2 (Two) Times a Day. Use in each nostril as directed  Dispense: 30 mL; Refill: 11  -     fluticasone (FLONASE) 50 MCG/ACT nasal spray; 2 sprays into the nostril(s) as directed by provider Daily As Needed for Rhinitis or Allergies for up to 30 days.  Dispense: 15.8 mL; Refill: 11  -     montelukast (SINGULAIR) 10 MG tablet; Take 1 tablet by mouth Daily.  Dispense: 30 tablet; Refill: 11  -     amoxicillin (AMOXIL) 500 MG capsule; Take 1 capsule by mouth 3 (Three) Times a Day for 10 days.  Dispense: 30 capsule; Refill: 0  -     Ear Cerumen Removal      * Surgery not found *  Orders Placed This Encounter   Procedures    Ear Cerumen Removal     This order was created via procedure documentation     Order Specific Question:   Release to patient     Answer:   Routine Release [[id number]]     Continue Astelin and Flonase  Return for problems    Return in about 1 year (around 9/6/2025) for Recheck.       Patient Instructions   CONTACT INFORMATION:  The main office phone number is [phone]. For emergencies after hours and on weekends, this number will convert over to our answering service and the on call provider will answer. Please try to keep non emergent phone calls/ questions to office hours 9am-5pm Monday through Friday.      [employer]  As an alternative, you can sign up and use the Epic MyChart system for more direct and quicker access for non emergent questions/ problems.  [facility] [employer] allows you to send messages to your doctor, view your test results, renew your prescriptions, schedule  appointments, and more. To sign up, go to [email or web address] and click on the Sign Up Now link in the New User? box. Enter your Quantine Activation Code exactly as it appears below along with the last four digits of your Social Security Number and your Date of Birth () to complete the sign-up process. If you do not sign up before the expiration date, you must request a new code.     [employer]ine Activation Code: Activation code not generated  Current Quantine Status: Active     If you have questions, you can email [email or web address] or call [phone] to talk to our Quantine staff. Remember, [employer]ine is NOT to be used for urgent needs. For medical emergencies, dial 911.     IF YOU SMOKE OR USE TOBACCO PLEASE READ THE FOLLOWING:  Why is smoking bad for me?  Smoking increases the risk of heart disease, lung disease, vascular disease, stroke, and cancer. If you smoke, STOP!        IF YOU SMOKE OR USE TOBACCO PLEASE READ THE FOLLOWING:  Why is smoking bad for me?  Smoking increases the risk of heart disease, lung disease, vascular disease, stroke, and cancer. If you smoke, STOP!     For more information:  Quit Now [place]  1800-QUIT-NOW  [email or web address]

## 2024-11-25 ENCOUNTER — TELEPHONE (OUTPATIENT)
Dept: HEMATOLOGY | Age: 79
End: 2024-11-25

## 2024-11-25 NOTE — TELEPHONE ENCOUNTER

## 2024-11-29 ENCOUNTER — HOSPITAL ENCOUNTER (OUTPATIENT)
Dept: CT IMAGING | Facility: HOSPITAL | Age: 79
Discharge: HOME | End: 2024-11-29
Payer: MEDICARE

## 2024-11-29 DIAGNOSIS — C83.33 DIFFUSE LARGE B-CELL LYMPHOMA OF INTRA-ABDOMINAL LYMPH NODES: ICD-10-CM

## 2024-11-29 LAB — CREAT BLDA-MCNC: 1.2 MG/DL (ref 0.6–1.3)

## 2024-11-29 PROCEDURE — 71260 CT THORAX DX C+: CPT

## 2024-11-29 PROCEDURE — 74177 CT ABD & PELVIS W/CONTRAST: CPT

## 2024-11-29 PROCEDURE — 25510000001 IOPAMIDOL 61 % SOLUTION: Performed by: INTERNAL MEDICINE

## 2024-11-29 PROCEDURE — 82565 ASSAY OF CREATININE: CPT

## 2024-11-29 RX ORDER — IOPAMIDOL 612 MG/ML
100 INJECTION, SOLUTION INTRAVASCULAR
Status: COMPLETED | OUTPATIENT
Start: 2024-11-29 | End: 2024-11-29

## 2024-11-29 RX ADMIN — IOPAMIDOL 100 ML: 612 INJECTION, SOLUTION INTRAVENOUS at 10:11

## 2024-12-01 DIAGNOSIS — C83.33 DIFFUSE LARGE B-CELL LYMPHOMA OF INTRA-ABDOMINAL LYMPH NODES (HCC): Primary | ICD-10-CM

## 2024-12-01 NOTE — PROGRESS NOTES
MEDICAL ONCOLOGY PROGRESS NOTE    Pt Name: Zackary Mariee  MRN: [id number]  YOB: 1945  Date of evaluation: 12/2/2024    HISTORY OF PRESENT ILLNESS:  Reason for MD visit-surveillance follow-up visit    History of Present Illness  Zackary Mariee is a status post 6 cycles of R-CHOP treatment and status post completion of consolidation RT by Dr. Speedy Pratt in [place].   The patient is here today for a surveillance follow-up on a history of diffuse large B-cell lymphoma, status post treatment. He is accompanied by an adult female.    He reports feeling well overall, with some residual neuropathy in his feet, which he attributes to either his age or the chemotherapy he underwent. He also mentions occasional stomach discomfort, a lingering effect from the chemotherapy. His weight has remained stable. He is not experiencing any fever, chills, or night sweats. He underwent a CT scan last Friday and is pleased with the results. His tumor was diagnosed on 07/07/2023.     He is currently on Plavix, having switched from Eliquis. He notes that his arm, which was previously affected by a blood clot, has improved significantly since starting the blood thinner. He reports no presence of lumps or bumps and no abdominal pain.    Additionally, he mentions that he received a couple of injections from a podiatrist in [place], which provided relief for a few months, but he is now experiencing pain in one foot.        Diagnosis  Lymphadenopathy, upper abdomen, July 2023  High grade lymphoma  FISH: BCL-2(+), BCL-6(-), cMYC(-)  Extravasation chemotherapy    Treatment Summary  8/28/23/12/11/23 Completed R-CHOP chemotherapy every 21 days x6 cycles  1/29/24-2/16/24 Completed radiation therapy 3000 cGy over 15 fractions with Dr.Alex Pratt at [facility]    Cancer History  5/31/2022-CTA angiogram chest-no evidence of pulmonary embolism.  No evidence of lymphadenopathy.  1/9/2023-CT angiogram chest showed Ectatic ascending aorta measuring 4.2 cm

## 2024-12-02 ENCOUNTER — HOSPITAL ENCOUNTER (OUTPATIENT)
Dept: INFUSION THERAPY | Age: 79
Discharge: HOME | End: 2024-12-02
Payer: COMMERCIAL

## 2024-12-02 ENCOUNTER — OFFICE VISIT (OUTPATIENT)
Dept: HEMATOLOGY | Age: 79
End: 2024-12-02
Payer: COMMERCIAL

## 2024-12-02 ENCOUNTER — TRANSCRIBE ORDERS (OUTPATIENT)
Dept: ADMINISTRATIVE | Facility: HOSPITAL | Age: 79
End: 2024-12-02
Payer: MEDICARE

## 2024-12-02 VITALS
HEART RATE: 62 BPM | HEIGHT: 68 IN | OXYGEN SATURATION: 98 % | SYSTOLIC BLOOD PRESSURE: 128 MMHG | DIASTOLIC BLOOD PRESSURE: 70 MMHG | BODY MASS INDEX: 25.08 KG/M2 | WEIGHT: 165.5 LBS | TEMPERATURE: 97.8 F

## 2024-12-02 DIAGNOSIS — E88.3 TUMOR LYSIS SYNDROME FOLLOWING ANTINEOPLASTIC DRUG THERAPY: ICD-10-CM

## 2024-12-02 DIAGNOSIS — D64.9 NORMOCHROMIC ANEMIA: ICD-10-CM

## 2024-12-02 DIAGNOSIS — Z71.89 CARE PLAN DISCUSSED WITH PATIENT: ICD-10-CM

## 2024-12-02 DIAGNOSIS — R10.30 LOWER ABDOMINAL PAIN: ICD-10-CM

## 2024-12-02 DIAGNOSIS — C83.33 DIFFUSE LARGE B-CELL LYMPHOMA OF INTRA-ABDOMINAL LYMPH NODES (HCC): Primary | ICD-10-CM

## 2024-12-02 DIAGNOSIS — G62.0 CHEMOTHERAPY-INDUCED NEUROPATHY (HCC): ICD-10-CM

## 2024-12-02 DIAGNOSIS — C83.33 DIFFUSE LARGE B-CELL LYMPHOMA OF INTRA-ABDOMINAL LYMPH NODES: ICD-10-CM

## 2024-12-02 DIAGNOSIS — D64.9 NORMOCYTIC ANEMIA: ICD-10-CM

## 2024-12-02 DIAGNOSIS — C83.33 DIFFUSE LARGE B-CELL LYMPHOMA, INTRA-ABDOMINAL LYMPH NODES: ICD-10-CM

## 2024-12-02 DIAGNOSIS — I80.9 THROMBOPHLEBITIS: ICD-10-CM

## 2024-12-02 DIAGNOSIS — C83.33 DIFFUSE LARGE B-CELL LYMPHOMA OF INTRA-ABDOMINAL LYMPH NODES: Primary | ICD-10-CM

## 2024-12-02 DIAGNOSIS — C83.33 DIFFUSE LARGE B-CELL LYMPHOMA OF INTRA-ABDOMINAL LYMPH NODES (HCC): ICD-10-CM

## 2024-12-02 DIAGNOSIS — T45.1X5A TUMOR LYSIS SYNDROME FOLLOWING ANTINEOPLASTIC DRUG THERAPY: ICD-10-CM

## 2024-12-02 DIAGNOSIS — T45.1X5A CHEMOTHERAPY-INDUCED NEUROPATHY (HCC): ICD-10-CM

## 2024-12-02 LAB
ALBUMIN SERPL-MCNC: 4 G/DL (ref 3.5–5.2)
ALP SERPL-CCNC: 64 U/L (ref 40–129)
ALT SERPL-CCNC: 21 U/L (ref 5–41)
ANION GAP SERPL CALCULATED.3IONS-SCNC: 9 MMOL/L (ref 7–19)
AST SERPL-CCNC: 25 U/L (ref 5–40)
BASOPHILS # BLD: 0.02 K/UL (ref 0.01–0.08)
BASOPHILS NFR BLD: 0.3 % (ref 0.1–1.2)
BILIRUB SERPL-MCNC: 0.3 MG/DL (ref 0–1.2)
BUN SERPL-MCNC: 24 MG/DL (ref 8–23)
CALCIUM SERPL-MCNC: 8.9 MG/DL (ref 8.8–10.2)
CHLORIDE SERPL-SCNC: 101 MMOL/L (ref 98–107)
CO2 SERPL-SCNC: 29 MMOL/L (ref 22–29)
CREAT SERPL-MCNC: 1.1 MG/DL (ref 0.7–1.2)
EOSINOPHIL # BLD: 0.08 K/UL (ref 0.04–0.54)
EOSINOPHIL NFR BLD: 1.3 % (ref 0.7–7)
ERYTHROCYTE [DISTWIDTH] IN BLOOD BY AUTOMATED COUNT: 13.2 % (ref 11.6–14.4)
GLUCOSE SERPL-MCNC: 131 MG/DL (ref 70–99)
HCT VFR BLD AUTO: 37.2 % (ref 40.1–51)
HGB BLD-MCNC: 12.5 G/DL (ref 13.7–17.5)
LDH SERPL-CCNC: 142 U/L (ref 135–225)
LYMPHOCYTES # BLD: 1.2 K/UL (ref 1.18–3.74)
LYMPHOCYTES NFR BLD: 19.4 % (ref 19.3–53.1)
MCH RBC QN AUTO: 33.8 PG (ref 25.7–32.2)
MCHC RBC AUTO-ENTMCNC: 33.6 G/DL (ref 32.3–36.5)
MCV RBC AUTO: 100.5 FL (ref 79–92.2)
MONOCYTES # BLD: 0.48 K/UL (ref 0.24–0.82)
MONOCYTES NFR BLD: 7.7 % (ref 4.7–12.5)
NEUTROPHILS # BLD: 4.4 K/UL (ref 1.56–6.13)
NEUTS SEG NFR BLD: 71 % (ref 34–71.1)
PLATELET # BLD AUTO: 187 K/UL (ref 163–337)
PMV BLD AUTO: 9 FL (ref 7.4–10.4)
POTASSIUM SERPL-SCNC: 4.1 MMOL/L (ref 3.5–5.1)
PROT SERPL-MCNC: 6.2 G/DL (ref 6.4–8.3)
RBC # BLD AUTO: 3.7 M/UL (ref 4.63–6.08)
SODIUM SERPL-SCNC: 139 MMOL/L (ref 136–145)
URATE SERPL-MCNC: 8.3 MG/DL (ref 3.4–7)
WBC # BLD AUTO: 6.2 K/UL (ref 4.23–9.07)

## 2024-12-02 PROCEDURE — 99213 OFFICE O/P EST LOW 20 MIN: CPT

## 2024-12-02 PROCEDURE — 80053 COMPREHEN METABOLIC PANEL: CPT

## 2024-12-02 PROCEDURE — 1123F ACP DISCUSS/DSCN MKR DOCD: CPT | Performed by: INTERNAL MEDICINE

## 2024-12-02 PROCEDURE — G2211 COMPLEX E/M VISIT ADD ON: HCPCS | Performed by: INTERNAL MEDICINE

## 2024-12-02 PROCEDURE — 36415 COLL VENOUS BLD VENIPUNCTURE: CPT

## 2024-12-02 PROCEDURE — 85025 COMPLETE CBC W/AUTO DIFF WBC: CPT

## 2024-12-02 PROCEDURE — 83615 LACTATE (LD) (LDH) ENZYME: CPT

## 2024-12-02 PROCEDURE — 99213 OFFICE O/P EST LOW 20 MIN: CPT | Performed by: INTERNAL MEDICINE

## 2024-12-02 PROCEDURE — 84550 ASSAY OF BLOOD/URIC ACID: CPT

## 2024-12-02 RX ORDER — ZOLPIDEM TARTRATE 10 MG/1
10 TABLET ORAL NIGHTLY PRN
COMMUNITY
Start: 2024-09-12

## 2024-12-02 RX ORDER — LISINOPRIL AND HYDROCHLOROTHIAZIDE 10; 12.5 MG/1; MG/1
1 TABLET ORAL DAILY
COMMUNITY
Start: 2024-09-12

## 2024-12-02 RX ORDER — LEVOTHYROXINE SODIUM 100 UG/1
100 TABLET ORAL DAILY
COMMUNITY
Start: 2024-10-22

## 2024-12-02 RX ORDER — ATORVASTATIN CALCIUM 40 MG/1
40 TABLET, FILM COATED ORAL DAILY
COMMUNITY
Start: 2024-10-29

## 2024-12-02 RX ORDER — AZELASTINE 1 MG/ML
2 SPRAY, METERED NASAL 2 TIMES DAILY
COMMUNITY
Start: 2024-09-06

## 2025-01-29 ENCOUNTER — OFFICE VISIT (OUTPATIENT)
Dept: OTOLARYNGOLOGY | Facility: CLINIC | Age: 80
End: 2025-01-29
Payer: MEDICARE

## 2025-01-29 VITALS
TEMPERATURE: 98 F | WEIGHT: 169 LBS | BODY MASS INDEX: 25.61 KG/M2 | HEART RATE: 84 BPM | DIASTOLIC BLOOD PRESSURE: 81 MMHG | SYSTOLIC BLOOD PRESSURE: 164 MMHG | HEIGHT: 68 IN

## 2025-01-29 DIAGNOSIS — J34.89 SINUS PAIN: ICD-10-CM

## 2025-01-29 DIAGNOSIS — H60.543 DERMATITIS OF BOTH EAR CANALS: ICD-10-CM

## 2025-01-29 DIAGNOSIS — J34.89 SINUS PRESSURE: ICD-10-CM

## 2025-01-29 DIAGNOSIS — J32.9 RECURRENT SINUSITIS: Primary | ICD-10-CM

## 2025-01-29 DIAGNOSIS — R09.81 NASAL CONGESTION: ICD-10-CM

## 2025-01-29 RX ORDER — AMOXICILLIN 500 MG/1
500 CAPSULE ORAL 3 TIMES DAILY
Qty: 63 CAPSULE | Refills: 0 | Status: SHIPPED | OUTPATIENT
Start: 2025-01-29 | End: 2025-02-19

## 2025-01-29 RX ORDER — MOMETASONE FUROATE 1 MG/ML
SOLUTION TOPICAL DAILY
Qty: 60 ML | Refills: 0 | Status: SHIPPED | OUTPATIENT
Start: 2025-01-29 | End: 2025-02-05

## 2025-01-29 RX ORDER — CLOPIDOGREL BISULFATE 75 MG/1
1 TABLET ORAL DAILY
COMMUNITY
Start: 2024-11-12

## 2025-01-29 NOTE — PROGRESS NOTES
YOB: 1945  Location: [facility]  Location Address: [address] [place] [address], [facility], [address] [place] [address]  Location Phone: [phone]    Chief Complaint   Patient presents with    Sinusitis    Ear Fullness       History of Present Illness  Justin Szymanski is a 79 y.o. male.      History of Present Illness  The patient presents for evaluation of sinus issues.    He has been experiencing recurrent sinus infections, with an estimated 3 to 4 episodes since his last visit in September. Despite the administration of a 7 to 10-day course of antibiotics for each episode, complete resolution of symptoms has not been achieved. He has undergone a CT scan of the sinuses approximately 3 years ago. He has a history of sinus surgery several years ago. His current management includes daily use of Astelin nasal spray.  He has required several antibiotic and steroid treatments over the past 6 months.    He reports mild discomfort in both ears, characterized by itching.      Results          Past Medical History:   Diagnosis Date    Allergic rhinitis     Carotid artery disease     COVID-19 vaccine series completed     Moderna    GERD (gastroesophageal reflux disease)     Heart murmur     Hyperlipidemia     Hypertension     Hypothyroidism     IBS (irritable bowel syndrome)     Nasal polyposis     Seasonal allergic rhinitis     Sleep apnea     intermittently uses cpap    Snoring     Stroke (cerebrum) 2020    numbness in right arm, the patinet states his symptom may be due to neck injury, following with neurology     Valvular disease        Past Surgical History:   Procedure Laterality Date    CAROTID ENDARTERECTOMY Left 2020    Procedure: LEFT CAROTID ENDARTERECTOMY WITH BOVINE PATCH ANGIOPLASTY, EEG MONITORING, AND COMPLETION DUPLEX VESSEL ULTRASOUND;  Surgeon: Sb Hunter DO;  Location: [place] [address];  Service: Vascular;  Laterality: Left;    CATARACT EXTRACTION Bilateral     CHOLECYSTECTOMY       CHOLECYSTECTOMY WITH INTRAOPERATIVE CHOLANGIOGRAM N/A 6/18/2020    Procedure: CHOLECYSTECTOMY LAPAROSCOPIC INTRAOPERATIVE CHOLANGIOGRAM;  Surgeon: Maame Del Rio MD;  Location: [facility] OR;  Service: General;  Laterality: N/A;    COLONOSCOPY  07/21/2016    two polyps ,both removed    COLONOSCOPY N/A 7/22/2021    Procedure: COLONOSCOPY WITH ANESTHESIA;  Surgeon: Marc Mart DO;  Location: [facility] ENDOSCOPY;  Service: Gastroenterology;  Laterality: N/A;  pre op: tubular adenoma  post op: diverticulosis, polyps  pcp: Kaila Gonzales APRN    ENDOSCOPY  04/18/2013    normal    ENDOSCOPY N/A 1/26/2018    Procedure: ESOPHAGOGASTRODUODENOSCOPY WITH ANESTHESIA;  Surgeon: Marc Mart DO;  Location: [facility] ENDOSCOPY;  Service:     SINUS SURGERY      Dr. Griffith    TONSILLECTOMY      ULNAR NERVE REPAIR         Outpatient Medications Marked as Taking for the 1/29/25 encounter (Office Visit) with Carl Scott APRN   Medication Sig Dispense Refill    atorvastatin (LIPITOR) 40 MG tablet Take 1 tablet by mouth Daily.      azelastine (ASTELIN) 0.1 % nasal spray 2 sprays into the nostril(s) as directed by provider 2 (Two) Times a Day. Use in each nostril as directed 30 mL 11    clopidogrel (PLAVIX) 75 MG tablet Take 1 tablet by mouth Daily.      FLUoxetine (PROzac) 20 MG capsule Take 1 capsule by mouth Daily.  0    levothyroxine (SYNTHROID, LEVOTHROID) 100 MCG tablet Take 1 tablet by mouth Every Morning Before Breakfast.      lisinopril-hydrochlorothiazide (PRINZIDE,ZESTORETIC) 10-12.5 MG per tablet Take 1 tablet by mouth Daily.      montelukast (SINGULAIR) 10 MG tablet Take 1 tablet by mouth Daily. 30 tablet 11    Multiple Vitamins-Minerals (MULTIVITAMIN WITH MINERALS) tablet tablet Take 1 tablet by mouth Daily.      Xarelto 20 MG tablet Take 1 tablet by mouth Daily. with food      zolpidem (AMBIEN) 10 MG tablet Take 1 tablet by mouth At Night As Needed.         Patient has no known allergies.    Family History    Problem Relation Age of Onset    Hyperlipidemia Mother     Cancer Father     Hypertension Father     Hyperlipidemia Father     Colon cancer Maternal Grandfather     Colon polyps Neg Hx     Esophageal cancer Neg Hx     Liver cancer Neg Hx     Liver disease Neg Hx     Rectal cancer Neg Hx     Stomach cancer Neg Hx        Social History     Socioeconomic History    Marital status:    Tobacco Use    Smoking status: Never    Smokeless tobacco: Never   Vaping Use    Vaping status: Never Used   Substance and Sexual Activity    Alcohol use: Yes     Alcohol/week: 7.0 standard drinks of alcohol     Types: 7 Glasses of wine per week     Comment: nightly - red wine     Drug use: No    Sexual activity: Yes     Partners: Female       Review of Systems   Constitutional: Negative.    HENT:  Positive for congestion, sinus pressure and sinus pain.         Admits ear itching and recurrent sinusitis       Vitals:    01/29/25 1320   BP: 164/81   Pulse: 84   Temp: 98 °F (36.7 °C)       Body mass index is 25.7 kg/m².    Objective     Physical Exam      Physical Exam  Vitals reviewed.   Constitutional:       Appearance: Normal appearance. He is overweight.   HENT:      Head: Normocephalic and atraumatic.      Right Ear: Hearing, tympanic membrane and external ear normal.      Left Ear: Hearing, tympanic membrane and external ear normal.      Ears:      Comments: Dermatitis of bilateral ear canals noted     Nose: Nose normal.      Mouth/Throat:      Lips: Pink.      Mouth: Mucous membranes are moist.      Pharynx: Oropharynx is clear.   Musculoskeletal:      Cervical back: Full passive range of motion without pain.   Neurological:      Mental Status: He is alert.   Psychiatric:         Behavior: Behavior is cooperative.           Assessment & Plan   Diagnoses and all orders for this visit:    1. Recurrent sinusitis (Primary)    2. Nasal congestion    3. Sinus pain    4. Sinus pressure    5. Dermatitis of both ear canals    Other  orders  -     amoxicillin (AMOXIL) 500 MG capsule; Take 1 capsule by mouth 3 (Three) Times a Day for 21 days.  Dispense: 63 capsule; Refill: 0  -     mometasone (ELOCON) 0.1 % solution; Apply  topically to the appropriate area as directed Daily for 7 days. 3-4 drops in affected ear once a day  Dispense: 60 mL; Refill: 0      * Surgery not found *  No orders of the defined types were placed in this encounter.    Assessment & Plan  1. Sinusitis.  He has had recurrent sinus infections since September, with 3 to 4 episodes treated with 7 to 10-day courses of antibiotics, which did not fully resolve the infections. A long course of amoxicillin is given  to be taken for 21 days. He is advised to continue using Astelin nasal spray daily. If there is no improvement after the 3-week course of antibiotics, he should contact the office to arrange for a CT scan.    2. Dermatitis.  He reports dry skin and itching in his ears, which appears consistent with dermatitis. Elocon has been sent to pharmacy.    Return for Next scheduled follow up.       Patient Instructions   CONTACT INFORMATION:  The main office phone number is [phone]. For emergencies after hours and on weekends, this number will convert over to our answering service and the on call provider will answer. Please try to keep non emergent phone calls/ questions to office hours 9am-5pm Monday through Friday.      [employer]  As an alternative, you can sign up and use the Epic MyChart system for more direct and quicker access for non emergent questions/ problems.  [facility] [employer] allows you to send messages to your doctor, view your test results, renew your prescriptions, schedule appointments, and more. To sign up, go to [employer] and click on the Sign Up Now link in the New User? box. Enter your [employer] Activation Code exactly as it appears below along with the last four digits of your Social Security Number and your Date of Birth () to  complete the sign-up process. If you do not sign up before the expiration date, you must request a new code.     [employer] Activation Code: Activation code not generated  Current [employer] Status: Active     If you have questions, you can email [email or web address] or call [phone] to talk to our [employer] staff. Remember, MyChart is NOT to be used for urgent needs. For medical emergencies, dial 911.     IF YOU SMOKE OR USE TOBACCO PLEASE READ THE FOLLOWING:  Why is smoking bad for me?  Smoking increases the risk of heart disease, lung disease, vascular disease, stroke, and cancer. If you smoke, STOP!        IF YOU SMOKE OR USE TOBACCO PLEASE READ THE FOLLOWING:  Why is smoking bad for me?  Smoking increases the risk of heart disease, lung disease, vascular disease, stroke, and cancer. If you smoke, STOP!     For more information:  [employer]uit [employer] [place]  1-800-QUIT-NOW  https://[place].quitlogix.org/en-US/     Patient or patient representative verbalized consent for the use of Ambient Listening during the visit with  EFRA De Jesus for chart documentation. 1/29/2025  15:44 CST

## 2025-01-29 NOTE — PATIENT INSTRUCTIONS
CONTACT INFORMATION:  The main office phone number is [phone]. For emergencies after hours and on weekends, this number will convert over to our answering service and the on call provider will answer. Please try to keep non emergent phone calls/ questions to office hours 9am-5pm Monday through Friday.      [employer]  As an alternative, you can sign up and use the Epic MyChart system for more direct and quicker access for non emergent questions/ problems.  [employer] allows you to send messages to your doctor, view your test results, renew your prescriptions, schedule appointments, and more. To sign up, go to [employer] and click on the Sign Up Now link in the New User? box. Enter your [employer] Activation Code exactly as it appears below along with the last four digits of your Social Security Number and your Date of Birth () to complete the sign-up process. If you do not sign up before the expiration date, you must request a new code.     [employer] Activation Code: Activation code not generated  Current [employer] Status: Active     If you have questions, you can email [email or web address] or call [phone] to talk to our [employer] staff. Remember, [employer] is NOT to be used for urgent needs. For medical emergencies, dial 911.     IF YOU SMOKE OR USE TOBACCO PLEASE READ THE FOLLOWING:  Why is smoking bad for me?  Smoking increases the risk of heart disease, lung disease, vascular disease, stroke, and cancer. If you smoke, STOP!        IF YOU SMOKE OR USE TOBACCO PLEASE READ THE FOLLOWING:  Why is smoking bad for me?  Smoking increases the risk of heart disease, lung disease, vascular disease, stroke, and cancer. If you smoke, STOP!     For more information:  [employer]uit Now [place]  -QUIT-NOW  https://[email or web address].quitlogix.org/en-US/

## 2025-03-14 DIAGNOSIS — J34.89 SINUS PRESSURE: ICD-10-CM

## 2025-03-14 DIAGNOSIS — J34.89 SINUS PAIN: ICD-10-CM

## 2025-03-14 DIAGNOSIS — J32.9 RECURRENT SINUSITIS: Primary | ICD-10-CM

## 2025-03-14 DIAGNOSIS — R09.81 NASAL CONGESTION: ICD-10-CM

## 2025-03-14 RX ORDER — CEFUROXIME AXETIL 500 MG/1
500 TABLET ORAL 2 TIMES DAILY
Qty: 20 TABLET | Refills: 0 | Status: SHIPPED | OUTPATIENT
Start: 2025-03-14 | End: 2025-03-24

## 2025-03-31 ENCOUNTER — RESULTS FOLLOW-UP (OUTPATIENT)
Dept: OTOLARYNGOLOGY | Facility: CLINIC | Age: 80
End: 2025-03-31
Payer: MEDICARE

## 2025-03-31 ENCOUNTER — HOSPITAL ENCOUNTER (OUTPATIENT)
Dept: CT IMAGING | Facility: HOSPITAL | Age: 80
Discharge: HOME | End: 2025-03-31
Admitting: NURSE PRACTITIONER
Payer: MEDICARE

## 2025-03-31 DIAGNOSIS — J34.89 SINUS PAIN: ICD-10-CM

## 2025-03-31 DIAGNOSIS — R09.81 NASAL CONGESTION: ICD-10-CM

## 2025-03-31 DIAGNOSIS — J32.9 RECURRENT SINUSITIS: ICD-10-CM

## 2025-03-31 DIAGNOSIS — J34.89 SINUS PRESSURE: ICD-10-CM

## 2025-03-31 PROCEDURE — 70486 CT MAXILLOFACIAL W/O DYE: CPT

## 2025-04-29 ENCOUNTER — OFFICE VISIT (OUTPATIENT)
Dept: OTOLARYNGOLOGY | Facility: CLINIC | Age: 80
End: 2025-04-29
Payer: MEDICARE

## 2025-04-29 VITALS
BODY MASS INDEX: 25.61 KG/M2 | SYSTOLIC BLOOD PRESSURE: 121 MMHG | HEIGHT: 68 IN | DIASTOLIC BLOOD PRESSURE: 82 MMHG | WEIGHT: 169 LBS

## 2025-04-29 DIAGNOSIS — Z87.09 HISTORY OF NASAL POLYPOSIS: ICD-10-CM

## 2025-04-29 DIAGNOSIS — Z98.890 S/P FESS (FUNCTIONAL ENDOSCOPIC SINUS SURGERY): Primary | ICD-10-CM

## 2025-04-29 DIAGNOSIS — J32.9 CHRONIC RHINOSINUSITIS: ICD-10-CM

## 2025-04-29 PROCEDURE — 3074F SYST BP LT 130 MM HG: CPT | Performed by: NURSE PRACTITIONER

## 2025-04-29 PROCEDURE — 31231 NASAL ENDOSCOPY DX: CPT | Performed by: OTOLARYNGOLOGY

## 2025-04-29 PROCEDURE — 3079F DIAST BP 80-89 MM HG: CPT | Performed by: NURSE PRACTITIONER

## 2025-04-29 PROCEDURE — 99213 OFFICE O/P EST LOW 20 MIN: CPT | Performed by: NURSE PRACTITIONER

## 2025-04-29 RX ORDER — FLUTICASONE PROPIONATE 93 UG/1
2 SPRAY, METERED NASAL DAILY
Qty: 16 ML | Refills: 3 | Status: SHIPPED | OUTPATIENT
Start: 2025-04-29 | End: 2025-05-29

## 2025-04-29 NOTE — PROGRESS NOTES
YOB: 1945  Location: [facility]  Location Address: [address] [place] [address], [facility] [address], [address] [place] [address]  Location Phone: [phone]    Chief Complaint   Patient presents with    Sinus Problem     Had a CT scan 3 weeks ago found a small hole in nose area. Now Pt is feeling pressure and fatigue and congestion.       History of Present Illness  Justin Szymanski is a 79 y.o. male.  Justin Szymanski is here for follow up of ENT complaints. The patient has had problems with frequent recurrent sinus infections   Patient has been on multiple courses of antibiotics with no significant improvement   Patient has a history of sinus surgery several years ago  At last visit patient was placed on 3-week course of antibiotics    He underwent allergy testing and immunotherapy in the past      CT Sinus Without Contrast (2025 10:06)   Past Medical History:   Diagnosis Date    Allergic rhinitis     Carotid artery disease     COVID-19 vaccine series completed     Moderna    GERD (gastroesophageal reflux disease)     Heart murmur     Hyperlipidemia     Hypertension     Hypothyroidism     IBS (irritable bowel syndrome)     Nasal polyposis     Seasonal allergic rhinitis     Sleep apnea     intermittently uses cpap    Snoring     Stroke (cerebrum) 2020    numbness in right arm, the patinet states his symptom may be due to neck injury, following with neurology     Valvular disease        Past Surgical History:   Procedure Laterality Date    CAROTID ENDARTERECTOMY Left 2020    Procedure: LEFT CAROTID ENDARTERECTOMY WITH BOVINE PATCH ANGIOPLASTY, EEG MONITORING, AND COMPLETION DUPLEX VESSEL ULTRASOUND;  Surgeon: Sb Hunter DO;  Location: [place] [address];  Service: Vascular;  Laterality: Left;    CATARACT EXTRACTION Bilateral     CHOLECYSTECTOMY      CHOLECYSTECTOMY WITH INTRAOPERATIVE CHOLANGIOGRAM N/A 2020    Procedure: CHOLECYSTECTOMY LAPAROSCOPIC INTRAOPERATIVE CHOLANGIOGRAM;  Surgeon: Maame Del Rio  MD ELISSA;  Location: [facility] OR;  Service: General;  Laterality: N/A;    COLONOSCOPY  07/21/2016    two polyps ,both removed    COLONOSCOPY N/A 7/22/2021    Procedure: COLONOSCOPY WITH ANESTHESIA;  Surgeon: Marc Mart DO;  Location: [facility] ENDOSCOPY;  Service: Gastroenterology;  Laterality: N/A;  pre op: tubular adenoma  post op: diverticulosis, polyps  pcp: Kaila Gonzales APRN    ENDOSCOPY  04/18/2013    normal    ENDOSCOPY N/A 1/26/2018    Procedure: ESOPHAGOGASTRODUODENOSCOPY WITH ANESTHESIA;  Surgeon: Marc Mart DO;  Location: [facility] ENDOSCOPY;  Service:     SINUS SURGERY      Dr. Griffith    TONSILLECTOMY      ULNAR NERVE REPAIR         Outpatient Medications Marked as Taking for the 4/29/25 encounter (Office Visit) with Cecil Gonzales MD   Medication Sig Dispense Refill    atorvastatin (LIPITOR) 40 MG tablet Take 1 tablet by mouth Daily.      azelastine (ASTELIN) 0.1 % nasal spray 2 sprays into the nostril(s) as directed by provider 2 (Two) Times a Day. Use in each nostril as directed 30 mL 11    clopidogrel (PLAVIX) 75 MG tablet Take 1 tablet by mouth Daily.      FLUoxetine (PROzac) 20 MG capsule Take 1 capsule by mouth Daily.  0    levothyroxine (SYNTHROID, LEVOTHROID) 100 MCG tablet Take 1 tablet by mouth Every Morning Before Breakfast.      lisinopril-hydrochlorothiazide (PRINZIDE,ZESTORETIC) 10-12.5 MG per tablet Take 1 tablet by mouth Daily.      montelukast (SINGULAIR) 10 MG tablet Take 1 tablet by mouth Daily. 30 tablet 11    Multiple Vitamins-Minerals (MULTIVITAMIN WITH MINERALS) tablet tablet Take 1 tablet by mouth Daily.      tadalafil (CIALIS) 20 MG tablet Take 1 tablet by mouth Daily As Needed for Erectile Dysfunction.      valACYclovir (VALTREX) 1000 MG tablet Take 1 tablet by mouth 2 (Two) Times a Day As Needed.      Xarelto 20 MG tablet Take 1 tablet by mouth Daily. with food      zolpidem (AMBIEN) 10 MG tablet Take 1 tablet by mouth At Night As Needed.         Patient has no  known allergies.    Family History   Problem Relation Age of Onset    Hyperlipidemia Mother     Cancer Father     Hypertension Father     Hyperlipidemia Father     Colon cancer Maternal Grandfather     Colon polyps Neg Hx     Esophageal cancer Neg Hx     Liver cancer Neg Hx     Liver disease Neg Hx     Rectal cancer Neg Hx     Stomach cancer Neg Hx        Social History     Socioeconomic History    Marital status:    Tobacco Use    Smoking status: Never    Smokeless tobacco: Never   Vaping Use    Vaping status: Never Used   Substance and Sexual Activity    Alcohol use: Yes     Alcohol/week: 7.0 standard drinks of alcohol     Types: 7 Glasses of wine per week     Comment: nightly - red wine     Drug use: No    Sexual activity: Yes     Partners: Female       Review of Systems   Constitutional: Negative.    HENT:  Positive for congestion.    Allergic/Immunologic: Positive for environmental allergies.       Vitals:    04/29/25 1323   BP: 121/82       Body mass index is 25.7 kg/m².    Objective     Physical Exam  Vitals reviewed.   Constitutional:       Appearance: Normal appearance. He is normal weight.   HENT:      Head: Normocephalic.      Right Ear: Tympanic membrane, ear canal and external ear normal.      Left Ear: Tympanic membrane, ear canal and external ear normal.      Nose: Septal deviation present.      Right Turbinates: Pale.      Left Turbinates: Pale.      Mouth/Throat:      Lips: Pink.      Mouth: Mucous membranes are moist.   Neurological:      Mental Status: He is alert.       Dr. Gonzales has examined and assessed the patient and agrees with current treatment plan      Assessment & Plan   Diagnoses and all orders for this visit:    1. S/P FESS (functional endoscopic sinus surgery) (Primary)    2. History of nasal polyposis    3. Chronic rhinosinusitis    Other orders  -     Fluticasone Propionate (Xhance) 93 MCG/ACT Exhaler Suspension; Administer 2 sprays into the nostril(s) as directed by provider  Daily for 30 days.  Dispense: 16 mL; Refill: 3      * Surgery not found *  No orders of the defined types were placed in this encounter.    No follow-ups on file.     Only use claritin/zyrtec for allergic rhinitis symptoms   Continue flonase and astelin     There are no Patient Instructions on file for this visit.

## 2025-04-29 NOTE — PROGRESS NOTES
PROCEDURE NOTE    Justin Szymanski    DATE OF PROCEDURE: 4/29/2025    PROCEDURE:   Bilateral Diagnostic Rigid Nasal Endoscopy    PREPROCEDURE DIAGNOSIS:   Chronic rhinosinusitis status post Dunn wilfred    POSTPROCEDURE DIAGNOSIS:  SAME    ANESTHESIA:   None    PROCEDURE DESCRIPTION:    With the patient in the chair bilateral diagnostic rigid nasal endoscopy was performed with the Stortz 0° endoscope without difficulty.     An endoscope was passed along the left nasal floor to the nasopharynx.  It was then passed into the region of the middle meatus, middle turbinate, and the sphenoethmoid region. An identical procedure was performed on the right side.  The following findings were noted as stated below:    Findings: Small 2 mm septal perforation with bilateral patent antrostomies and pale boggy turbinates throughout.  No intranasal polyposis and no thick or purulent secretions appreciated bilaterally.    Condition:  Stable.  Patient tolerated procedure well.    Complications:  None  There was no significant bleeding.

## 2025-05-21 ENCOUNTER — OFFICE VISIT (OUTPATIENT)
Dept: VASCULAR SURGERY | Facility: CLINIC | Age: 80
End: 2025-05-21
Payer: MEDICARE

## 2025-05-21 ENCOUNTER — HOSPITAL ENCOUNTER (OUTPATIENT)
Dept: ULTRASOUND IMAGING | Facility: HOSPITAL | Age: 80
Discharge: HOME | End: 2025-05-21
Admitting: NURSE PRACTITIONER
Payer: MEDICARE

## 2025-05-21 VITALS
SYSTOLIC BLOOD PRESSURE: 120 MMHG | HEIGHT: 68 IN | WEIGHT: 173 LBS | HEART RATE: 74 BPM | DIASTOLIC BLOOD PRESSURE: 72 MMHG | BODY MASS INDEX: 26.22 KG/M2 | OXYGEN SATURATION: 96 %

## 2025-05-21 DIAGNOSIS — I10 PRIMARY HYPERTENSION: ICD-10-CM

## 2025-05-21 DIAGNOSIS — I65.23 BILATERAL CAROTID ARTERY STENOSIS: ICD-10-CM

## 2025-05-21 DIAGNOSIS — E78.2 MIXED HYPERLIPIDEMIA: ICD-10-CM

## 2025-05-21 DIAGNOSIS — I65.23 BILATERAL CAROTID ARTERY STENOSIS: Primary | ICD-10-CM

## 2025-05-21 PROCEDURE — 1159F MED LIST DOCD IN RCRD: CPT | Performed by: NURSE PRACTITIONER

## 2025-05-21 PROCEDURE — 99214 OFFICE O/P EST MOD 30 MIN: CPT | Performed by: NURSE PRACTITIONER

## 2025-05-21 PROCEDURE — 93880 EXTRACRANIAL BILAT STUDY: CPT

## 2025-05-21 PROCEDURE — 1160F RVW MEDS BY RX/DR IN RCRD: CPT | Performed by: NURSE PRACTITIONER

## 2025-05-21 PROCEDURE — 3074F SYST BP LT 130 MM HG: CPT | Performed by: NURSE PRACTITIONER

## 2025-05-21 PROCEDURE — 3078F DIAST BP <80 MM HG: CPT | Performed by: NURSE PRACTITIONER

## 2025-05-21 RX ORDER — CLOPIDOGREL BISULFATE 75 MG/1
75 TABLET ORAL DAILY
COMMUNITY

## 2025-05-21 NOTE — PROGRESS NOTES
" 5/21/2025         Kaila Gonzales, APRN  [address] [place] [address]    Justin Szymanski  1945    Chief Complaint   Patient presents with    Bilateral carotid artery stenosis     No new concerns, had testing completed       Dear Kaila Gonzales, APRN       HPI  I had the pleasure of seeing your patient Justin Szymanski in the office today.    As you recall, Justin Szymanski is a 79 y.o.  male who we are following for asymptomatic carotid occlusive disease.  He does follow with Dr. Rodriguez for an ascending aortic aneurysm.  He underwent a left carotid endarterectomy on 5/6/2020.  Currently he is doing well and denies any strokelike symptoms.  He really denies any complaints of claudication however does have complaints consistent with neuropathy.  He is maintained on  Plavix, Xarelto, and Lipitor.  He did have noninvasive testing performed today, which I did review in office.       Review of Systems   Constitutional: Negative.    HENT: Negative.     Eyes: Negative.    Respiratory: Negative.     Cardiovascular: Negative.    Gastrointestinal: Negative.    Endocrine: Negative.    Genitourinary: Negative.    Musculoskeletal: Negative.    Skin: Negative.    Allergic/Immunologic: Negative.    Neurological: Negative.    Hematological: Negative.    Psychiatric/Behavioral: Negative.     All other systems reviewed and are negative.        /72   Pulse 74   Ht 172.7 cm (68\")   Wt 78.5 kg (173 lb)   SpO2 96%   BMI 26.30 kg/m²    Physical Exam  Vitals and nursing note reviewed.   Constitutional:       General: He is not in acute distress.     Appearance: Normal appearance. He is well-developed, well-groomed and overweight. He is not diaphoretic.   HENT:      Head: Normocephalic and atraumatic.   Eyes:      Pupils: Pupils are equal, round, and reactive to light.   Neck:      Vascular: No carotid bruit or JVD.   Cardiovascular:      Rate and Rhythm: Normal rate and regular rhythm.      Pulses: Normal pulses.           " Femoral pulses are 2+ on the right side and 2+ on the left side.       Popliteal pulses are 2+ on the right side and 2+ on the left side.        Dorsalis pedis pulses are 2+ on the right side and 2+ on the left side.        Posterior tibial pulses are 2+ on the right side and 2+ on the left side.      Heart sounds: Normal heart sounds, S1 normal and S2 normal. No murmur heard.     No friction rub. No gallop.   Pulmonary:      Effort: Pulmonary effort is normal.      Breath sounds: Normal breath sounds.   Abdominal:      General: Bowel sounds are normal. There is no abdominal bruit.      Palpations: Abdomen is soft.      Tenderness: There is no abdominal tenderness.   Musculoskeletal:         General: Normal range of motion.   Skin:     General: Skin is warm and dry.   Neurological:      General: No focal deficit present.      Mental Status: He is alert and oriented to person, place, and time.      Cranial Nerves: No cranial nerve deficit.   Psychiatric:         Mood and Affect: Mood normal.         Behavior: Behavior normal. Behavior is cooperative.         Thought Content: Thought content normal.         Judgment: Judgment normal.              Diagnostic Data:  Noninvasive testing including a carotid duplex shows less than 50% carotid stenosis bilaterally with bilateral antegrade vertebral flow.        Patient Active Problem List   Diagnosis    Allergic rhinitis due to pollen    Snoring    Nasal polyposis - hx of    ADITHYA (obstructive sleep apnea)    ADITHYA on CPAP    Indigestion    Heart murmur    Hypertension    Hyperlipidemia    Ascending aortic aneurysm    PAD (peripheral artery disease)    Upper extremity weakness    Bilateral carotid artery stenosis    Numbness and tingling of right upper extremity    Muscle fasciculation    Preop testing    Carotid stenosis    Tubular adenoma    S/P FESS (functional endoscopic sinus surgery)    Laryngopharyngeal reflux    History of nasal polyposis    Non-smoker    Aortic root  aneurysm    Chronic rhinosinusitis         ICD-10-CM ICD-9-CM   1. Bilateral carotid artery stenosis  I65.23 433.10     433.30   2. Primary hypertension  I10 401.9   3. Mixed hyperlipidemia  E78.2 272.2           Plan: After thoroughly evaluating Justin Szymanski, I believe the best course of action is to remain conservative from vascular surgery standpoint.  I did review his testing which shows less than 50% carotid stenosis bilaterally.  We will see him back in 1 year with a repeat noninvasive testing including a carotid duplex for continued surveillance.  Did discuss vascular risk factors as it pertains to the progression of vascular disease including controlling his hypertension and hyperlipidemia.  His blood pressure is stable on his current medications.  He should continue his Lipitor 40 mg daily in addition to his other medications.   This was all discussed in full with complete understanding.    Thank you for allowing me to participate in the care of your patient.  Please do not hesitate with any questions or concerns.  I will keep you aware of any further encounters with Justin Szymanski.        Sincerely yours,         Ana Lerma, EFRA Gonzales, EFRA Coleman

## 2025-05-21 NOTE — LETTER
May 21, 2025     EFRA Ordonez  [address] [place] [address]    Patient: Justin Szymanski   YOB: 1945   Date of Visit: 5/21/2025     Dear EFRA Ordonez:       Thank you for referring Justin Szymanski to me for evaluation. Below are the relevant portions of my assessment and plan of care.    If you have questions, please do not hesitate to call me. I look forward to following Justin along with you.         Sincerely,        EFRA Ellington        CC: No Recipients    Ana Lerma APRN  05/21/25 1547  Sign when Signing Visit   5/21/2025         Kaila Gonzales APRN  [address] [place] [address]    Justin Szymanski  1945    Chief Complaint   Patient presents with   • Bilateral carotid artery stenosis     No new concerns, had testing completed       Dear Kaila Gonzales APRN       HPI  I had the pleasure of seeing your patient Justin Szymanski in the office today.    As you recall, Justin Szymanski is a 79 y.o.  male who we are following for asymptomatic carotid occlusive disease.  He does follow with Dr. Rodriguez for an ascending aortic aneurysm.  He underwent a left carotid endarterectomy on 5/6/2020.  Currently he is doing well and denies any strokelike symptoms.  He really denies any complaints of claudication however does have complaints consistent with neuropathy.  He is maintained on  Plavix, Xarelto, and Lipitor.  He did have noninvasive testing performed today, which I did review in office.       Review of Systems   Constitutional: Negative.    HENT: Negative.     Eyes: Negative.    Respiratory: Negative.     Cardiovascular: Negative.    Gastrointestinal: Negative.    Endocrine: Negative.    Genitourinary: Negative.    Musculoskeletal: Negative.    Skin: Negative.    Allergic/Immunologic: Negative.    Neurological: Negative.    Hematological: Negative.    Psychiatric/Behavioral: Negative.     All other systems reviewed and are negative.        /72   Pulse 74   Ht  "172.7 cm (68\")   Wt 78.5 kg (173 lb)   SpO2 96%   BMI 26.30 kg/m²    Physical Exam  Vitals and nursing note reviewed.   Constitutional:       General: He is not in acute distress.     Appearance: Normal appearance. He is well-developed, well-groomed and overweight. He is not diaphoretic.   HENT:      Head: Normocephalic and atraumatic.   Eyes:      Pupils: Pupils are equal, round, and reactive to light.   Neck:      Vascular: No carotid bruit or JVD.   Cardiovascular:      Rate and Rhythm: Normal rate and regular rhythm.      Pulses: Normal pulses.           Femoral pulses are 2+ on the right side and 2+ on the left side.       Popliteal pulses are 2+ on the right side and 2+ on the left side.        Dorsalis pedis pulses are 2+ on the right side and 2+ on the left side.        Posterior tibial pulses are 2+ on the right side and 2+ on the left side.      Heart sounds: Normal heart sounds, S1 normal and S2 normal. No murmur heard.     No friction rub. No gallop.   Pulmonary:      Effort: Pulmonary effort is normal.      Breath sounds: Normal breath sounds.   Abdominal:      General: Bowel sounds are normal. There is no abdominal bruit.      Palpations: Abdomen is soft.      Tenderness: There is no abdominal tenderness.   Musculoskeletal:         General: Normal range of motion.   Skin:     General: Skin is warm and dry.   Neurological:      General: No focal deficit present.      Mental Status: He is alert and oriented to person, place, and time.      Cranial Nerves: No cranial nerve deficit.   Psychiatric:         Mood and Affect: Mood normal.         Behavior: Behavior normal. Behavior is cooperative.         Thought Content: Thought content normal.         Judgment: Judgment normal.              Diagnostic Data:  Noninvasive testing including a carotid duplex shows less than 50% carotid stenosis bilaterally with bilateral antegrade vertebral flow.        Patient Active Problem List   Diagnosis   • Allergic " rhinitis due to pollen   • Snoring   • Nasal polyposis - hx of   • ADITHYA (obstructive sleep apnea)   • ADITHYA on CPAP   • Indigestion   • Heart murmur   • Hypertension   • Hyperlipidemia   • Ascending aortic aneurysm   • PAD (peripheral artery disease)   • Upper extremity weakness   • Bilateral carotid artery stenosis   • Numbness and tingling of right upper extremity   • Muscle fasciculation   • Preop testing   • Carotid stenosis   • Tubular adenoma   • S/P FESS (functional endoscopic sinus surgery)   • Laryngopharyngeal reflux   • History of nasal polyposis   • Non-smoker   • Aortic root aneurysm   • Chronic rhinosinusitis         ICD-10-CM ICD-9-CM   1. Bilateral carotid artery stenosis  I65.23 433.10     433.30   2. Primary hypertension  I10 401.9   3. Mixed hyperlipidemia  E78.2 272.2           Plan: After thoroughly evaluating Justin Szymanski, I believe the best course of action is to remain conservative from vascular surgery standpoint.  I did review his testing which shows less than 50% carotid stenosis bilaterally.  We will see him back in 1 year with a repeat noninvasive testing including a carotid duplex for continued surveillance.  Did discuss vascular risk factors as it pertains to the progression of vascular disease including controlling his hypertension and hyperlipidemia.  His blood pressure is stable on his current medications.  He should continue his Lipitor 40 mg daily in addition to his other medications.   This was all discussed in full with complete understanding.    Thank you for allowing me to participate in the care of your patient.  Please do not hesitate with any questions or concerns.  I will keep you aware of any further encounters with Justin Szymanski.        Sincerely yours,         Ana Lerma, EFRA Gonzales, EFRA Coleman

## 2025-06-10 ENCOUNTER — TELEPHONE (OUTPATIENT)
Dept: HEMATOLOGY | Age: 80
End: 2025-06-10

## 2025-06-11 ENCOUNTER — HOSPITAL ENCOUNTER (OUTPATIENT)
Dept: CT IMAGING | Facility: HOSPITAL | Age: 80
Discharge: HOME | End: 2025-06-11
Admitting: INTERNAL MEDICINE
Payer: MEDICARE

## 2025-06-11 DIAGNOSIS — C83.33 DIFFUSE LARGE B-CELL LYMPHOMA OF INTRA-ABDOMINAL LYMPH NODES: ICD-10-CM

## 2025-06-11 PROCEDURE — 71260 CT THORAX DX C+: CPT

## 2025-06-11 PROCEDURE — 74177 CT ABD & PELVIS W/CONTRAST: CPT

## 2025-06-11 PROCEDURE — 25510000001 IOPAMIDOL 61 % SOLUTION: Performed by: INTERNAL MEDICINE

## 2025-06-11 RX ORDER — IOPAMIDOL 612 MG/ML
100 INJECTION, SOLUTION INTRAVASCULAR
Status: COMPLETED | OUTPATIENT
Start: 2025-06-11 | End: 2025-06-11

## 2025-06-11 RX ADMIN — IOPAMIDOL 100 ML: 612 INJECTION, SOLUTION INTRAVENOUS at 10:36

## 2025-06-12 DIAGNOSIS — C83.33 DIFFUSE LARGE B-CELL LYMPHOMA OF INTRA-ABDOMINAL LYMPH NODES (HCC): Primary | ICD-10-CM

## 2025-06-12 NOTE — PROGRESS NOTES
MEDICAL ONCOLOGY PROGRESS NOTE    Pt Name: Zackary Mariee  MRN: [id number]  YOB: 1945  Date of evaluation: 6/16/2025    HISTORY OF PRESENT ILLNESS:  Reason for MD visit-surveillance follow-up visit    History of Present Illness  Reason for MD visit-toxicity monitoring/disease management  The patient returns today for a scheduled follow-up visit.     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 He completed 6 cycles of chemotherapy in December 2023 and received adjuvant involved field radiation therapy (3000 cGy to the retroperitoneal nodes) in February 2024. He is currently under clinical and radiologic surveillance and is here to discuss recent CT chest, abdomen, and pelvis results, as well as further recommendations. This is a surveillance follow-up visit. 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    He reports that his MyChart indicates no presence of lymphoma. His last treatment was in February 2024, with chemotherapy concluding in December 2023. He experiences mild neuropathy in his feet, a residual effect from the chemotherapy, and continues to feel slightly fatigued. His primary care physician has informed him that his iron levels are borderline. He has an upcoming appointment with Dr. Mackey next month for further evaluation of his thoracic aortic aneurysm dilatation. He reports no night sweats. He has discontinued the use of pain medication.    He has been using various ointments to treat the extravasation site, which has resulted in significant improvement. He no longer experiences pain at the site, which was previously hard and swollen.          Diagnosis  Lymphadenopathy, upper abdomen, July 2023  High grade lymphoma  FISH: BCL-2(+), BCL-6(-), cMYC(-)  Extravasation chemotherapy    Treatment

## 2025-06-16 ENCOUNTER — HOSPITAL ENCOUNTER (OUTPATIENT)
Dept: INFUSION THERAPY | Age: 80
Discharge: HOME | End: 2025-06-16
Payer: MEDICARE

## 2025-06-16 ENCOUNTER — OFFICE VISIT (OUTPATIENT)
Dept: HEMATOLOGY | Age: 80
End: 2025-06-16
Payer: MEDICARE

## 2025-06-16 ENCOUNTER — TRANSCRIBE ORDERS (OUTPATIENT)
Dept: ADMINISTRATIVE | Facility: HOSPITAL | Age: 80
End: 2025-06-16
Payer: MEDICARE

## 2025-06-16 VITALS
BODY MASS INDEX: 26.3 KG/M2 | TEMPERATURE: 97.5 F | DIASTOLIC BLOOD PRESSURE: 64 MMHG | WEIGHT: 173.5 LBS | OXYGEN SATURATION: 98 % | SYSTOLIC BLOOD PRESSURE: 128 MMHG | HEIGHT: 68 IN | HEART RATE: 70 BPM

## 2025-06-16 DIAGNOSIS — C83.33 DIFFUSE LARGE B-CELL LYMPHOMA OF INTRA-ABDOMINAL LYMPH NODES (HCC): Primary | ICD-10-CM

## 2025-06-16 DIAGNOSIS — Z85.79 ENCOUNTER FOR FOLLOW-UP SURVEILLANCE OF DIFFUSE LARGE B-CELL LYMPHOMA: ICD-10-CM

## 2025-06-16 DIAGNOSIS — C83.33 DIFFUSE LARGE B-CELL LYMPHOMA OF INTRA-ABDOMINAL LYMPH NODES: Primary | ICD-10-CM

## 2025-06-16 DIAGNOSIS — I71.21 ANEURYSM OF ASCENDING AORTA WITHOUT RUPTURE: ICD-10-CM

## 2025-06-16 DIAGNOSIS — D64.9 NORMOCYTIC ANEMIA: ICD-10-CM

## 2025-06-16 DIAGNOSIS — Z08 ENCOUNTER FOR FOLLOW-UP SURVEILLANCE OF DIFFUSE LARGE B-CELL LYMPHOMA: ICD-10-CM

## 2025-06-16 DIAGNOSIS — C83.33 DIFFUSE LARGE B-CELL LYMPHOMA OF INTRA-ABDOMINAL LYMPH NODES (HCC): ICD-10-CM

## 2025-06-16 DIAGNOSIS — Z71.89 CARE PLAN DISCUSSED WITH PATIENT: ICD-10-CM

## 2025-06-16 LAB
ALBUMIN SERPL-MCNC: 4.1 G/DL (ref 3.5–5.2)
ALP SERPL-CCNC: 64 U/L (ref 40–129)
ALT SERPL-CCNC: 25 U/L (ref 5–41)
ANION GAP SERPL CALCULATED.3IONS-SCNC: 12 MMOL/L (ref 7–19)
AST SERPL-CCNC: 29 U/L (ref 5–40)
BASOPHILS # BLD: 0.02 K/UL (ref 0–0.2)
BASOPHILS NFR BLD: 0.3 % (ref 0–1)
BILIRUB SERPL-MCNC: 0.3 MG/DL (ref 0–1.2)
BUN SERPL-MCNC: 20 MG/DL (ref 8–23)
CALCIUM SERPL-MCNC: 9 MG/DL (ref 8.8–10.2)
CHLORIDE SERPL-SCNC: 101 MMOL/L (ref 98–107)
CO2 SERPL-SCNC: 26 MMOL/L (ref 22–29)
CREAT SERPL-MCNC: 1.1 MG/DL (ref 0.7–1.2)
EOSINOPHIL # BLD: 0.14 K/UL (ref 0–0.6)
EOSINOPHIL NFR BLD: 2.2 % (ref 0–5)
ERYTHROCYTE [DISTWIDTH] IN BLOOD BY AUTOMATED COUNT: 13.2 % (ref 11.5–14.5)
GLUCOSE SERPL-MCNC: 96 MG/DL (ref 70–99)
HCT VFR BLD AUTO: 37.1 % (ref 42–52)
HGB BLD-MCNC: 12.8 G/DL (ref 14–18)
LDH SERPL-CCNC: 146 U/L (ref 135–225)
LYMPHOCYTES # BLD: 1.7 K/UL (ref 1.1–4.5)
LYMPHOCYTES NFR BLD: 27.2 % (ref 20–40)
MCH RBC QN AUTO: 34.3 PG (ref 27–31)
MCHC RBC AUTO-ENTMCNC: 34.5 G/DL (ref 33–37)
MCV RBC AUTO: 99.5 FL (ref 80–94)
MONOCYTES # BLD: 0.62 K/UL (ref 0–0.9)
MONOCYTES NFR BLD: 9.9 % (ref 1–10)
NEUTROPHILS # BLD: 3.76 K/UL (ref 1.5–7.5)
NEUTS SEG NFR BLD: 60.1 % (ref 50–65)
PLATELET # BLD AUTO: 182 K/UL (ref 130–400)
PMV BLD AUTO: 9.3 FL (ref 9.4–12.4)
POTASSIUM SERPL-SCNC: 4.2 MMOL/L (ref 3.5–5.1)
PROT SERPL-MCNC: 6.3 G/DL (ref 6.4–8.3)
RBC # BLD AUTO: 3.73 M/UL (ref 4.7–6.1)
SODIUM SERPL-SCNC: 139 MMOL/L (ref 136–145)
URATE SERPL-MCNC: 7.9 MG/DL (ref 3.4–7)
WBC # BLD AUTO: 6.26 K/UL (ref 4.8–10.8)

## 2025-06-16 PROCEDURE — 36415 COLL VENOUS BLD VENIPUNCTURE: CPT

## 2025-06-16 PROCEDURE — 80053 COMPREHEN METABOLIC PANEL: CPT

## 2025-06-16 PROCEDURE — G2211 COMPLEX E/M VISIT ADD ON: HCPCS | Performed by: INTERNAL MEDICINE

## 2025-06-16 PROCEDURE — 99213 OFFICE O/P EST LOW 20 MIN: CPT

## 2025-06-16 PROCEDURE — 99213 OFFICE O/P EST LOW 20 MIN: CPT | Performed by: INTERNAL MEDICINE

## 2025-06-16 PROCEDURE — 1123F ACP DISCUSS/DSCN MKR DOCD: CPT | Performed by: INTERNAL MEDICINE

## 2025-06-16 PROCEDURE — 84550 ASSAY OF BLOOD/URIC ACID: CPT

## 2025-06-16 PROCEDURE — 85025 COMPLETE CBC W/AUTO DIFF WBC: CPT

## 2025-06-16 PROCEDURE — 83615 LACTATE (LD) (LDH) ENZYME: CPT

## 2025-06-16 PROCEDURE — 1126F AMNT PAIN NOTED NONE PRSNT: CPT | Performed by: INTERNAL MEDICINE

## 2025-07-18 ENCOUNTER — HOSPITAL ENCOUNTER (OUTPATIENT)
Dept: CT IMAGING | Facility: HOSPITAL | Age: 80
Discharge: HOME | End: 2025-07-18
Payer: MEDICARE

## 2025-07-18 DIAGNOSIS — I71.21 ANEURYSM OF ASCENDING AORTA WITHOUT RUPTURE: ICD-10-CM

## 2025-07-18 DIAGNOSIS — Q25.43 AORTIC ROOT ANEURYSM: ICD-10-CM

## 2025-07-18 PROCEDURE — 71275 CT ANGIOGRAPHY CHEST: CPT

## 2025-07-18 PROCEDURE — 25510000001 IOPAMIDOL PER 1 ML

## 2025-07-18 RX ORDER — IOPAMIDOL 755 MG/ML
100 INJECTION, SOLUTION INTRAVASCULAR
Status: COMPLETED | OUTPATIENT
Start: 2025-07-18 | End: 2025-07-18

## 2025-07-18 RX ADMIN — IOPAMIDOL 100 ML: 755 INJECTION, SOLUTION INTRAVENOUS at 09:05

## 2025-07-23 PROBLEM — I71.21 ASCENDING AORTIC ANEURYSM: Chronic | Status: ACTIVE | Noted: 2020-03-24

## 2025-07-23 PROBLEM — Q25.43 AORTIC ROOT ANEURYSM: Chronic | Status: ACTIVE | Noted: 2024-07-18

## 2025-07-23 NOTE — PROGRESS NOTES
EFRA Almaraz  [facility]  [address]   [address]            [place] [address]  Phone: [phone]  Fax: [phone]          Chief Complaint  Aortic Root Aneurysm (Patient is here for follow up w/CT)    Subjective     Justin Szymanski presents to [facility] for appointment.    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 He completed 6 cycles of chemotherapy in December 2023 and received adjuvant involved field radiation therapy (3000 cGy to the retroperitoneal nodes) in February 2024. He is currently under clinical and radiologic surveillance with Dr. Hanna.     The patient is a 80-year-old male who presents for evaluation of an aortic aneurysm.    He has been monitoring his blood pressure daily, which has remained stable. His pulse rate today was recorded at 76. He reports no chest pain. He does not have a regular cardiologist and has not had an echocardiogram in several years. He is mindful of his physical activities and does not engage in smoking or the use of nicotine products.    He experiences shortness of breath during exertion and fatigue, which he attributes to his chemotherapy treatment. He has noticed changes in his overall health since undergoing chemotherapy. He is currently on medication for his blood pressure.    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    numbness in right arm, the patinet states his symptom may be due to neck injury,  following with neurology     Valvular disease    ,   Past Surgical History:   Procedure Laterality Date    CAROTID ENDARTERECTOMY Left 5/6/2020    Procedure: LEFT CAROTID ENDARTERECTOMY WITH BOVINE PATCH ANGIOPLASTY, EEG MONITORING, AND COMPLETION DUPLEX VESSEL ULTRASOUND;  Surgeon: Sb Hunter DO;  Location: [facility] HYBRID OR 12;  Service: Vascular;  Laterality: Left;    CATARACT EXTRACTION Bilateral     CHOLECYSTECTOMY      CHOLECYSTECTOMY WITH INTRAOPERATIVE CHOLANGIOGRAM N/A 6/18/2020    Procedure: CHOLECYSTECTOMY LAPAROSCOPIC INTRAOPERATIVE CHOLANGIOGRAM;  Surgeon: Maame Del Rio MD;  Location: [facility] OR;  Service: General;  Laterality: N/A;    COLONOSCOPY  07/21/2016    two polyps ,both removed    COLONOSCOPY N/A 7/22/2021    Procedure: COLONOSCOPY WITH ANESTHESIA;  Surgeon: Marc aMrt DO;  Location: [facility] ENDOSCOPY;  Service: Gastroenterology;  Laterality: N/A;  pre op: tubular adenoma  post op: diverticulosis, polyps  pcp: Kaila Gonzales APRN    ENDOSCOPY  04/18/2013    normal    ENDOSCOPY N/A 1/26/2018    Procedure: ESOPHAGOGASTRODUODENOSCOPY WITH ANESTHESIA;  Surgeon: Marc Mart DO;  Location: [facility] ENDOSCOPY;  Service:     SINUS SURGERY      Dr. Griffith    TONSILLECTOMY      ULNAR NERVE REPAIR     ,   Family History   Problem Relation Age of Onset    Hyperlipidemia Mother     Cancer Father     Hypertension Father     Hyperlipidemia Father     Colon cancer Maternal Grandfather     Colon polyps Neg Hx     Esophageal cancer Neg Hx     Liver cancer Neg Hx     Liver disease Neg Hx     Rectal cancer Neg Hx     Stomach cancer Neg Hx    ,   Social History     Tobacco Use    Smoking status: Never    Smokeless tobacco: Never   Vaping Use    Vaping status: Never Used   Substance Use Topics    Alcohol use: Yes     Alcohol/week: 7.0 standard drinks of alcohol     Types: 7 Glasses of wine per week     Comment: nightly - red wine     Drug use: No   , (Not in a hospital admission)  , Allergies:  "Patient has no known allergies.    Vital Signs:   /65   Pulse 76   Ht 172.7 cm (68\")   Wt 78.5 kg (173 lb)   SpO2 99%   BMI 26.30 kg/m²        Physical Exam  Vitals reviewed.   Constitutional:       General: He is not in acute distress.     Appearance: He is well-developed and overweight.   HENT:      Head: Normocephalic and atraumatic.   Eyes:      General: No scleral icterus.     Conjunctiva/sclera: Conjunctivae normal.      Pupils: Pupils are equal, round, and reactive to light.   Cardiovascular:      Rate and Rhythm: Normal rate.   Pulmonary:      Effort: Pulmonary effort is normal. No respiratory distress.      Breath sounds: Normal breath sounds. No wheezing or rales.   Musculoskeletal:         General: Normal range of motion.      Cervical back: Normal range of motion and neck supple.   Skin:     General: Skin is warm and dry.   Neurological:      Mental Status: He is alert and oriented to person, place, and time.   Psychiatric:         Behavior: Behavior normal.         Thought Content: Thought content normal.         Judgment: Judgment normal.          Result Review :  The following data was reviewed by: EFRA Johnson on 07/24/2025:    CT Angiogram Chest (07/18/2025 09:04)   IMPRESSION:  Stable dilation of the aortic root (4 cm) and ascending thoracic aorta  (4.3 cm).      No results found for this or any previous visit.             Assessment and Plan  Diagnoses and all orders for this visit:    1. Aneurysm of ascending aorta without rupture (Primary)  Overview:  7/18/24 OV - The ascending aorta measures 4.4 cm in maximum dimension and the aortic root measures 4.1 cm in size without rupture or dissection. Per EFRA Linder    Assessment & Plan:  I have personally reviewed CTA of the chest performed 7/18/24 and the following is my interpretation:  Mid ascending aorta measures 4.7 cm in maximum dimension in axial view, the distal ascending/proximal arch measures 4.0 cm in axial " view, aortic root measures 4.0 cm in axial view, 4.1 cm in coronal view, 4.4 cm in sagittal albeit motion artifact is noted.   No evidence of IMH, DEVONTE, or dissection.     We discussed the natural course history of aortic aneurysmal disease. We discussed the specific size of aneurysm today and potential risk of aortic complications. We discussed the operative treatment of aneursymal disease broadly. We discussed the recommendation to plan surveillance with CT scans. We discussed signs and symptoms of acute aortic pathology and the need to present to the emergency department for further evaluation. Lastly, we discussed the value of exercise while being mindful of a known aneurysm and the potential risk that high intensity, isometric, or valsalva type exercises presents.   Potential medical therapy including the use of a beta-blocker and perhaps other agents to accomplish strict control of pressure were discussed.     The patient is scheduled to have a CT of the chest with contrast for oncology purposes in December.  Will see the patient back in December to review this imaging as well for aneurysm surveillance purposes.          2. Aortic root aneurysm  Assessment & Plan:  The patient is scheduled to have a CT of the chest with contrast for oncology purposes in December.  Will see the patient back in December to review this imaging as well for aneurysm surveillance purposes.      3. Shortness of breath  -     Adult Transthoracic Echo Complete W/ Cont if Necessary Per Protocol; Future    4. Non-smoker  Assessment & Plan:  Justin Szymanski  reports that he has never smoked. He has never used smokeless tobacco.             Follow Up  Nina Flanagan, APRN  7/24/2025  16:32 CDT    Return in about 4 months (around 12/2/2025) for with Nina MONTERROSO.    Patient was given instructions and counseling regarding his condition or for health maintenance advice. Please see specific information pulled into the AVS if appropriate.      Please note that portions of this note were completed with a voice recognition program.    Patient or patient representative verbalized consent for the use of Ambient Listening during the visit with  EFRA Johnson for chart documentation. 7/24/2025  09:45 CDT      Note to Patient:   The 21st Century Cures Act makes medical notes like this available to patients in the interest of transparency; however, please be advised this is a medical document.  It is intended as [id number]-rbix communication.  It is written in medical language and may contain abbreviations or verbiage that are unfamiliar.  It may appear blunt or direct.  Medical documents are intended to carry relevant information, facts as evident, and the clinical opinion of the practitioner.  This note may have been transcribed using a voice dictation system.  Voice-recognition errors may occur.  This should not be taken to alter the content or meaning of this note.

## 2025-07-24 ENCOUNTER — OFFICE VISIT (OUTPATIENT)
Dept: CARDIAC SURGERY | Facility: CLINIC | Age: 80
End: 2025-07-24
Payer: MEDICARE

## 2025-07-24 VITALS
HEART RATE: 76 BPM | SYSTOLIC BLOOD PRESSURE: 112 MMHG | WEIGHT: 173 LBS | HEIGHT: 68 IN | BODY MASS INDEX: 26.22 KG/M2 | OXYGEN SATURATION: 99 % | DIASTOLIC BLOOD PRESSURE: 65 MMHG

## 2025-07-24 DIAGNOSIS — R06.02 SHORTNESS OF BREATH: ICD-10-CM

## 2025-07-24 DIAGNOSIS — Z78.9 NON-SMOKER: ICD-10-CM

## 2025-07-24 DIAGNOSIS — I71.21 ANEURYSM OF ASCENDING AORTA WITHOUT RUPTURE: Primary | ICD-10-CM

## 2025-07-24 DIAGNOSIS — Q25.43 AORTIC ROOT ANEURYSM: ICD-10-CM

## 2025-07-24 PROBLEM — G47.33 OSA (OBSTRUCTIVE SLEEP APNEA): Chronic | Status: ACTIVE | Noted: 2017-07-25

## 2025-07-24 PROCEDURE — 1159F MED LIST DOCD IN RCRD: CPT | Performed by: NURSE PRACTITIONER

## 2025-07-24 PROCEDURE — 99214 OFFICE O/P EST MOD 30 MIN: CPT | Performed by: NURSE PRACTITIONER

## 2025-07-24 PROCEDURE — 1160F RVW MEDS BY RX/DR IN RCRD: CPT | Performed by: NURSE PRACTITIONER

## 2025-07-24 PROCEDURE — 3074F SYST BP LT 130 MM HG: CPT | Performed by: NURSE PRACTITIONER

## 2025-07-24 PROCEDURE — 3078F DIAST BP <80 MM HG: CPT | Performed by: NURSE PRACTITIONER

## 2025-07-24 NOTE — PATIENT INSTRUCTIONS
Preventing Unhealthy Weight Gain, Adult  Staying at a healthy weight is important to your overall health. When fat builds up in your body, you may become overweight or obese. Being overweight or obese increases your risk of developing various health problems.  Unhealthy weight gain is often the result of making unhealthy food choices or not getting enough exercise. You can make changes to your lifestyle to prevent obesity and stay as healthy as possible.  How can unhealthy weight gain affect me?  Being overweight or obese can cause you to develop joint or bone problems, which can make it hard for you to stay active or do activities you enjoy. Being overweight also puts stress on your heart and lungs and can lead to health problems such as:  Diabetes.  Heart disease.  Some types of cancer.  Stroke.  Eating healthy, staying active, and having healthy habits can help to prevent unhealthy weight gain and lower your risk for health problems. These lifestyle changes will also help you manage stress and emotions, improve your self-esteem, and connect with friends and family.  What can increase my risk?  In addition to certain eating and lifestyle choices, some other factors that may make you more likely to have unhealthy weight gain include:  Having a family history of obesity.  Living in an area with limited access to:  [place], recreation centers, or sidewalks.  Healthy food choices, such as grocery stores and farmers' markets.  What actions can I take to prevent unhealthy weight gain?  Nutrition    Eat only as much as your body needs. To do this:  Pay attention to signs that you are hungry or full. Stop eating as soon as you feel full.  If you feel hungry, try drinking water first before eating. Drink enough water so your urine is pale yellow.  Eat smaller portions. Pay attention to portion sizes when eating out.  Look at serving sizes on food labels. Most foods contain more than one serving per container.  Eat the  recommended number of calories for your gender and activity level. For most active people, a daily total of 2,000 calories is appropriate. If you are trying to lose weight or are not very active, you may need to eat fewer calories. Talk with your health care provider or a dietitian about how many calories you need each day.  Choose healthy foods, such as:  Fruits and vegetables. At each meal, try to fill at least half of your plate with fruits and vegetables.  Whole grains, such as whole-wheat bread, brown rice, and quinoa.  Lean meats, such as chicken or fish.  Other healthy proteins, such as beans, eggs, or tofu.  Healthy fats, such as nuts, seeds, fatty fish, and olive oil.  Low-fat or fat-free dairy products.  Check food labels, and avoid food and drinks that:  Are high in calories.  Have added sugar.  Are high in sodium.  Have saturated fats or trans fats.  Cook foods in healthier ways, such as by baking, broiling, or grilling.  Make a meal plan for the week, and shop with a grocery list to help you stay on track with your purchases. Try to avoid going to the grocery store when you are hungry.  When grocery shopping, try to shop around the outside of the store first, where the fresh foods are. Doing this helps you avoid prepackaged foods, which can be high in sugar, salt (sodium), and fat.  Lifestyle    Exercise for 30 or more minutes on 5 or more days each week. Exercising may include brisk walking, yard work, biking, running, swimming, and team sports like basketball and soccer. Ask your health care provider which exercises are safe for you.  Do activities that strengthen the muscles, such as lifting weights or using resistance bands, on 2 or more days a week.  Do not use any products that contain nicotine or tobacco. These products include cigarettes, chewing tobacco, and vaping devices, such as e-cigarettes. If you need help quitting, ask your health care provider.  If you drink alcohol:  Limit how much you  have to:  0-1 drink a day for women who are not pregnant.  0-2 drinks a day for men.  Know how much alcohol is in a drink. In the [place] one drink equals one 12 oz bottle of beer (355 mL), one 5 oz glass of wine (148 mL), or one 1½ oz glass of hard liquor (44 mL).  Try to get 7-9 hours of sleep each night.  Other changes  Keep a food and activity journal to keep track of:  What you ate and how many calories you had. Remember to count the calories in sauces, dressings, and side dishes.  Whether you were active, and what exercises you did.  Your calorie, weight, and activity goals.  Check your weight regularly. Track any changes. If you notice that you have gained weight, make changes to your diet or activity routine.  Avoid taking weight-loss medicines or supplements. Talk to your health care provider before starting any new medicine or supplement.  Talk to your health care provider before trying any new diet or exercise plan.  Where to find more information  Talk with your health care provider or a dietitian about healthy eating and healthy lifestyle choices. You may also find information from:  [place] Department of Agriculture, MyPlate: www.choosemyplate.gov  American Heart Association: www.heart.org  Centers for Disease Control and Prevention: www.cdc.gov  Summary  Eating healthy, staying active, and having healthy habits can help to prevent unhealthy weight gain and lower your risk for health problems such as heart disease, diabetes, some types of cancer, and stroke.  Being overweight or obese can cause you to develop joint or bone problems, which can make it hard for you to stay active or do activities you enjoy.  You can prevent unhealthy weight gain by eating a healthy diet, exercising regularly, not smoking, limiting alcohol, and getting enough sleep.  Talk with your health care provider or a dietitian for guidance about healthy eating and healthy lifestyle choices.  This information is not intended to replace  advice given to you by your health care provider. Make sure you discuss any questions you have with your health care provider.  Document Revised: 07/15/2022 Document Reviewed: 07/15/2022  Elsevier Patient Education © 2024 [employer] Inc.

## 2025-07-24 NOTE — ASSESSMENT & PLAN NOTE
The patient is scheduled to have a CT of the chest with contrast for oncology purposes in December.  Will see the patient back in December to review this imaging as well for aneurysm surveillance purposes.

## 2025-07-24 NOTE — ASSESSMENT & PLAN NOTE
I have personally reviewed CTA of the chest performed 7/18/24 and the following is my interpretation:  Mid ascending aorta measures 4.7 cm in maximum dimension in axial view, the distal ascending/proximal arch measures 4.0 cm in axial view, aortic root measures 4.0 cm in axial view, 4.1 cm in coronal view, 4.4 cm in sagittal albeit motion artifact is noted.   No evidence of IMH, DEVONTE, or dissection.     We discussed the natural course history of aortic aneurysmal disease. We discussed the specific size of aneurysm today and potential risk of aortic complications. We discussed the operative treatment of aneursymal disease broadly. We discussed the recommendation to plan surveillance with CT scans. We discussed signs and symptoms of acute aortic pathology and the need to present to the emergency department for further evaluation. Lastly, we discussed the value of exercise while being mindful of a known aneurysm and the potential risk that high intensity, isometric, or valsalva type exercises presents.   Potential medical therapy including the use of a beta-blocker and perhaps other agents to accomplish strict control of pressure were discussed.     The patient is scheduled to have a CT of the chest with contrast for oncology purposes in December.  Will see the patient back in December to review this imaging as well for aneurysm surveillance purposes.

## 2025-07-30 ENCOUNTER — OFFICE VISIT (OUTPATIENT)
Dept: OTOLARYNGOLOGY | Facility: CLINIC | Age: 80
End: 2025-07-30
Payer: MEDICARE

## 2025-07-30 VITALS
WEIGHT: 173 LBS | BODY MASS INDEX: 26.22 KG/M2 | SYSTOLIC BLOOD PRESSURE: 102 MMHG | HEIGHT: 68 IN | DIASTOLIC BLOOD PRESSURE: 61 MMHG

## 2025-07-30 DIAGNOSIS — J32.9 RECURRENT SINUSITIS: ICD-10-CM

## 2025-07-30 DIAGNOSIS — Z87.09 HISTORY OF NASAL POLYPOSIS: ICD-10-CM

## 2025-07-30 DIAGNOSIS — R09.81 NASAL CONGESTION: ICD-10-CM

## 2025-07-30 DIAGNOSIS — Z98.890 S/P FESS (FUNCTIONAL ENDOSCOPIC SINUS SURGERY): Primary | ICD-10-CM

## 2025-07-30 DIAGNOSIS — J32.9 CHRONIC RHINOSINUSITIS: ICD-10-CM

## 2025-07-30 NOTE — PROGRESS NOTES
YOB: 1945  Location: [facility]  Location Address: [address] [place] [address], [employer] [address], [address] [place] [address]  Location Phone: [phone]    Chief Complaint   Patient presents with   • 3 month follow up     Pt states that he is srtill having pressure in sinus nury and says he feels tired.       History of Present Illness  Justin Szymanski is a 80 y.o. male.  Justin Szymanski is here for follow up of ENT complaints. The patient has had problems with nasal congestion and intermittent sinus pain and pressure   Patient has a history of sinus surgery several years ago   He is using flonase, astelin and singulair but continues to have some intermittent sinus pressure     Patient underwent immunotherapy in the past and feels as if he had some minimal improvement while undergoing immunotherapy        Past Medical History:   Diagnosis Date   • Allergic rhinitis    • Carotid artery disease    • COVID-19 vaccine series completed     Moderna   • GERD (gastroesophageal reflux disease)    • Heart murmur    • Hyperlipidemia    • Hypertension    • Hypothyroidism    • IBS (irritable bowel syndrome)    • Nasal polyposis    • Seasonal allergic rhinitis    • Sleep apnea     intermittently uses cpap   • Snoring    • Stroke (cerebrum) 2020    numbness in right arm, the patinet states his symptom may be due to neck injury, following with neurology    • Valvular disease        Past Surgical History:   Procedure Laterality Date   • CAROTID ENDARTERECTOMY Left 2020    Procedure: LEFT CAROTID ENDARTERECTOMY WITH BOVINE PATCH ANGIOPLASTY, EEG MONITORING, AND COMPLETION DUPLEX VESSEL ULTRASOUND;  Surgeon: Sb Hunter DO;  Location: [place] [address];  Service: Vascular;  Laterality: Left;   • CATARACT EXTRACTION Bilateral    • CHOLECYSTECTOMY     • CHOLECYSTECTOMY WITH INTRAOPERATIVE CHOLANGIOGRAM N/A 2020    Procedure: CHOLECYSTECTOMY LAPAROSCOPIC INTRAOPERATIVE CHOLANGIOGRAM;  Surgeon: Maame Del Rio MD;   Location: [facility] OR;  Service: General;  Laterality: N/A;   • COLONOSCOPY  07/21/2016    two polyps ,both removed   • COLONOSCOPY N/A 7/22/2021    Procedure: COLONOSCOPY WITH ANESTHESIA;  Surgeon: Marc Mart DO;  Location: [facility] ENDOSCOPY;  Service: Gastroenterology;  Laterality: N/A;  pre op: tubular adenoma  post op: diverticulosis, polyps  pcp: Kaila Gonzales APRN   • ENDOSCOPY  04/18/2013    normal   • ENDOSCOPY N/A 1/26/2018    Procedure: ESOPHAGOGASTRODUODENOSCOPY WITH ANESTHESIA;  Surgeon: Marc Mart DO;  Location: [facility] ENDOSCOPY;  Service:    • SINUS SURGERY      Dr. Griffith   • TONSILLECTOMY     • ULNAR NERVE REPAIR         Outpatient Medications Marked as Taking for the 7/30/25 encounter (Office Visit) with Letha Wilder APRN   Medication Sig Dispense Refill   • atorvastatin (LIPITOR) 40 MG tablet Take 1 tablet by mouth Daily.     • azelastine (ASTELIN) 0.1 % nasal spray 2 sprays into the nostril(s) as directed by provider 2 (Two) Times a Day. Use in each nostril as directed 30 mL 11   • clopidogrel (PLAVIX) 75 MG tablet Take 1 tablet by mouth Daily.     • FLUoxetine (PROzac) 20 MG capsule Take 1 capsule by mouth Daily.  0   • levothyroxine (SYNTHROID, LEVOTHROID) 100 MCG tablet Take 1 tablet by mouth Every Morning Before Breakfast.     • lisinopril-hydrochlorothiazide (PRINZIDE,ZESTORETIC) 10-12.5 MG per tablet Take 1 tablet by mouth Daily.     • montelukast (SINGULAIR) 10 MG tablet Take 1 tablet by mouth Daily. 30 tablet 11   • Multiple Vitamins-Minerals (MULTIVITAMIN WITH MINERALS) tablet tablet Take 1 tablet by mouth Daily.     • tadalafil (CIALIS) 20 MG tablet Take 1 tablet by mouth Daily As Needed for Erectile Dysfunction.     • valACYclovir (VALTREX) 1000 MG tablet Take 1 tablet by mouth 2 (Two) Times a Day As Needed.     • Xarelto 20 MG tablet Take 1 tablet by mouth Daily. with food     • zolpidem (AMBIEN) 10 MG tablet Take 1 tablet by mouth At Night As Needed.         Patient  has no known allergies.    Family History   Problem Relation Age of Onset   • Hyperlipidemia Mother    • Cancer Father    • Hypertension Father    • Hyperlipidemia Father    • Colon cancer Maternal Grandfather    • Colon polyps Neg Hx    • Esophageal cancer Neg Hx    • Liver cancer Neg Hx    • Liver disease Neg Hx    • Rectal cancer Neg Hx    • Stomach cancer Neg Hx        Social History     Socioeconomic History   • Marital status:    Tobacco Use   • Smoking status: Never   • Smokeless tobacco: Never   Vaping Use   • Vaping status: Never Used   Substance and Sexual Activity   • Alcohol use: Yes     Alcohol/week: 7.0 standard drinks of alcohol     Types: 7 Glasses of wine per week     Comment: nightly - red wine    • Drug use: No   • Sexual activity: Yes     Partners: Female       Review of Systems   Constitutional: Negative.    HENT:  Positive for congestion.    Allergic/Immunologic: Positive for environmental allergies.       Vitals:    07/30/25 1406   BP: 102/61       Body mass index is 26.3 kg/m².    Objective     Physical Exam  Vitals reviewed.   Constitutional:       Appearance: Normal appearance. He is normal weight.   HENT:      Head: Normocephalic.      Right Ear: Tympanic membrane, ear canal and external ear normal.      Left Ear: Tympanic membrane, ear canal and external ear normal.      Nose: Septal deviation present.      Right Turbinates: Pale.      Left Turbinates: Pale.      Mouth/Throat:      Lips: Pink.      Mouth: Mucous membranes are moist.   Neurological:      Mental Status: He is alert.       Assessment & Plan   Diagnoses and all orders for this visit:    1. S/P FESS (functional endoscopic sinus surgery) (Primary)  -     Intradermal Allergy Testing  -     Prick Testing (multi-Test)    2. History of nasal polyposis  -     Intradermal Allergy Testing  -     Prick Testing (multi-Test)    3. Chronic rhinosinusitis  -     Intradermal Allergy Testing  -     Prick Testing (multi-Test)    4.  Recurrent sinusitis  -     Intradermal Allergy Testing  -     Prick Testing (multi-Test)    5. Nasal congestion  -     Intradermal Allergy Testing  -     Prick Testing (multi-Test)      * Surgery not found *  Orders Placed This Encounter   Procedures   • Intradermal Allergy Testing     Release to patient:   Routine Release [[id number]]   • Prick Testing (multi-Test)     Release to patient:   Routine Release [[id number]]     Return in about 2 months (around 9/30/2025).     Discussed repeat allergy testing patient would like to proceed  Continue nasal sprays and singulair as directed     There are no Patient Instructions on file for this visit.

## 2025-08-01 ENCOUNTER — OFFICE VISIT (OUTPATIENT)
Age: 80
End: 2025-08-01
Payer: MEDICARE

## 2025-08-01 VITALS
DIASTOLIC BLOOD PRESSURE: 68 MMHG | SYSTOLIC BLOOD PRESSURE: 120 MMHG | HEIGHT: 67 IN | HEART RATE: 68 BPM | OXYGEN SATURATION: 97 % | BODY MASS INDEX: 27.15 KG/M2 | TEMPERATURE: 97.8 F | WEIGHT: 173 LBS

## 2025-08-01 DIAGNOSIS — J01.01 ACUTE RECURRENT MAXILLARY SINUSITIS: Primary | ICD-10-CM

## 2025-08-01 RX ORDER — METHYLPREDNISOLONE SODIUM SUCCINATE 40 MG/ML
40 INJECTION, POWDER, LYOPHILIZED, FOR SOLUTION INTRAMUSCULAR; INTRAVENOUS ONCE
Status: COMPLETED | OUTPATIENT
Start: 2025-08-01 | End: 2025-08-01

## 2025-08-01 RX ADMIN — METHYLPREDNISOLONE SODIUM SUCCINATE 40 MG: 40 INJECTION, POWDER, LYOPHILIZED, FOR SOLUTION INTRAMUSCULAR; INTRAVENOUS at 15:56

## 2025-08-01 NOTE — PROGRESS NOTES
"        Chief Complaint  sinus pressure    Subjective        Justin Szymanski presents to [facility]  History of Present Illness    Justin Szymanski presents today for a sick visit.  he has complaints of congestion, facial pain, nasal congestion, and sinus pressure. He sees ENT with [facility] and is about to go for allergy testing coming up. He has history of sinus infections and does flonase, mucinex, etc with minimal relief recently.       Objective   Vital Signs:  /68 (BP Location: Left arm, Patient Position: Sitting, Cuff Size: Adult)   Pulse 68   Temp 97.8 °F (36.6 °C) (Temporal)   Ht 170.2 cm (67\")   Wt 78.5 kg (173 lb)   SpO2 97%   BMI 27.10 kg/m²   Estimated body mass index is 27.1 kg/m² as calculated from the following:    Height as of this encounter: 170.2 cm (67\").    Weight as of this encounter: 78.5 kg (173 lb).         Physical Exam  Vitals and nursing note reviewed.   Constitutional:       General: He is awake.      Appearance: Normal appearance. He is well-developed, well-groomed and overweight.   HENT:      Head: Normocephalic and atraumatic.      Right Ear: Hearing, tympanic membrane, ear canal and external ear normal.      Left Ear: Hearing, tympanic membrane, ear canal and external ear normal.      Nose: Nose normal.      Mouth/Throat:      Lips: Pink.      Mouth: Mucous membranes are moist.      Pharynx: Posterior oropharyngeal erythema and postnasal drip present.   Cardiovascular:      Rate and Rhythm: Normal rate and regular rhythm.      Heart sounds: S1 normal and S2 normal. Murmur heard.   Pulmonary:      Effort: Pulmonary effort is normal.      Breath sounds: Normal breath sounds.   Skin:     General: Skin is warm.      Capillary Refill: Capillary refill takes less than 2 seconds.   Neurological:      Mental Status: He is alert and oriented to person, place, and time.   Psychiatric:         Behavior: Behavior is cooperative.            Result " Review :                     Assessment and Plan     Diagnoses and all orders for this visit:    1. Acute recurrent maxillary sinusitis (Primary)  -     methylPREDNISolone sodium succinate (SOLU-Medrol) injection 40 mg      Patient presents with nasal congestion and sinus congestion at this time he has chronic sinusitis that he sees ENT for and is about to have allergy testing for and hopefully start allergy injections here soon.  He states he typically gets this a few times a year and once a year he may have to have an injection which he is requesting today.  His blood pressure is stable and he is continuing to follow-up with all specialist as directed and seems to be compliant so I am okay with giving him a 40 mg of Solu-Medrol steroid shot today in the clinic.  Discussed side effects of medications with him.  Continue his Mucinex at home, drink plenty of water, Tylenol as needed and follow-up with allergy and ENT in the future as directed.  If not improving or worsening return to the clinic.  If severe go to the ER.    Follow up if Worsening/failing to improve.           Follow Up     Return if symptoms worsen or fail to improve.  Patient was given instructions and counseling regarding his condition or for health maintenance advice. Please see specific information pulled into the AVS if appropriate.

## (undated) DEVICE — WIPE MEROCEL 3.625X3IN

## (undated) DEVICE — 3M™ IOBAN™ 2 ANTIMICROBIAL INCISE DRAPE 6650EZ: Brand: IOBAN™ 2

## (undated) DEVICE — MASK VENTILATOR MED AD SUPERNOVA ET

## (undated) DEVICE — Device

## (undated) DEVICE — LAPAROSCOPIC MONOPOLAR CORD: Brand: VALLEYLAB

## (undated) DEVICE — ENDOGATOR AUXILIARY WATER JET CONNECTOR: Brand: ENDOGATOR

## (undated) DEVICE — ENDOPOUCH RETRIEVER SPECIMEN RETRIEVAL BAGS: Brand: ENDOPOUCH RETRIEVER

## (undated) DEVICE — SENSR O2 OXIMAX FNGR A/ 18IN NONSTR

## (undated) DEVICE — ANTIBACTERIAL UNDYED BRAIDED (POLYGLACTIN 910), SYNTHETIC ABSORBABLE SUTURE: Brand: COATED VICRYL

## (undated) DEVICE — SUT VIC 0 UR6 27IN VCP603H

## (undated) DEVICE — PAD LAP CHOLE: Brand: MEDLINE INDUSTRIES, INC.

## (undated) DEVICE — GAUZE,SPONGE,4"X4",16PLY,XRAY,STRL,LF: Brand: MEDLINE

## (undated) DEVICE — 2, DISPOSABLE SUCTION/IRRIGATOR WITHOUT DISPOSABLE TIP: Brand: STRYKEFLOW

## (undated) DEVICE — SUT ETHLN 3/0 FS1 30IN 669H

## (undated) DEVICE — THE SINGLE USE ETRAP – POLYP TRAP IS USED FOR SUCTION RETRIEVAL OF ENDOSCOPICALLY REMOVED POLYPS.: Brand: ETRAP

## (undated) DEVICE — Device: Brand: DEFENDO AIR/WATER/SUCTION AND BIOPSY VALVE

## (undated) DEVICE — 3M™ STERI-STRIP™ REINFORCED ADHESIVE SKIN CLOSURES, R1547, 1/2 IN X 4 IN (12 MM X 100 MM), 6 STRIPS/ENVELOPE: Brand: 3M™ STERI-STRIP™

## (undated) DEVICE — RESERVOIR,SUCTION,100CC,SILICONE: Brand: MEDLINE

## (undated) DEVICE — THE CHANNEL CLEANING BRUSH IS A NYLON FLEXI BRUSH ATTACHED TO A FLEXIBLE PLASTIC SHEATH DESIGNED TO SAFELY REMOVE DEBRIS FROM FLEXIBLE ENDOSCOPES.

## (undated) DEVICE — TBG SMPL FLTR LINE NASL 02/C02 A/ BX/100

## (undated) DEVICE — YANKAUER,BULB TIP WITH VENT: Brand: ARGYLE

## (undated) DEVICE — SUT PROLN 7/0 BV1 24IN 4PK M8702

## (undated) DEVICE — CUFF,BP,DISP,1 TUBE,ADULT,HP: Brand: MEDLINE

## (undated) DEVICE — ENDOSCOPIC ULTRASOUND FINE NEEDLE BIOPSY (FNB) DEVICE: Brand: ACQUIRE

## (undated) DEVICE — SUT PROLN 5/0 C1 DA 24IN 8725H

## (undated) DEVICE — TOWEL,OR,DSP,ST,BLUE,STD,4/PK,20PK/CS: Brand: MEDLINE

## (undated) DEVICE — APPL CHLORAPREP HI/LITE 26ML ORNG

## (undated) DEVICE — PK TURNOVER RM ADV

## (undated) DEVICE — DRSNG SURESITE WNDW 4X4.5

## (undated) DEVICE — MASK,OXYGEN,MED CONC,ADLT,7' TUB, UC: Brand: PENDING

## (undated) DEVICE — PROXIMATE RH ROTATING HEAD SKIN STAPLERS (35 WIDE) CONTAINS 35 STAINLESS STEEL STAPLES: Brand: PROXIMATE

## (undated) DEVICE — PAD MAJOR VASCULAR: Brand: MEDLINE INDUSTRIES, INC.

## (undated) DEVICE — MONOPOLAR METZENBAUM SCISSOR, MINI BLADE TIP, DISPOSABLE: Brand: MONOPOLAR METZENBAUM SCISSOR, MINI BLADE TIP, DISPOSABLE

## (undated) DEVICE — ENDOPATH PNEUMONEEDLE INSUFFLATION NEEDLES WITH LUER LOCK CONNECTORS 120MM: Brand: ENDOPATH

## (undated) DEVICE — ENDO KIT,LOURDES HOSPITAL: Brand: MEDLINE INDUSTRIES, INC.

## (undated) DEVICE — PDS II VLT 0 107CM AG ST3: Brand: ENDOLOOP

## (undated) DEVICE — CANN VESL ACRN TP 4MM

## (undated) DEVICE — GLV SURG SENSICARE W/ALOE PF LF 7.5 STRL

## (undated) DEVICE — SUT MNCRYL 4/0 PS2 27IN UD MCP426H

## (undated) DEVICE — CONMED SCOPE SAVER BITE BLOCK, 20X27 MM: Brand: SCOPE SAVER

## (undated) DEVICE — FRCP BX RADJAW4 NDL 2.8 240 STD OG

## (undated) DEVICE — ENDOPATH XCEL DILATING TIP TROCARS WITH STABILITY SLEEVES: Brand: ENDOPATH XCEL

## (undated) DEVICE — 3M™ STERI-STRIP™ REINFORCED ADHESIVE SKIN CLOSURES, R1546, 1/4 IN X 4 IN (6 MM X 100 MM), 10 STRIPS/ENVELOPE: Brand: 3M™ STERI-STRIP™

## (undated) DEVICE — SYR TB PRECISIONGLIDE 1CC 26G 3/8IN LF

## (undated) DEVICE — ENDOPATH XCEL WITH OPTIVIEW TECHNOLOGY DILATING TIP TROCARS WITH STABILITY SLEEVES: Brand: ENDOPATH XCEL OPTIVIEW

## (undated) DEVICE — SNAR POLYP CAPTIVATOR MICROHEX 13 240CM

## (undated) DEVICE — ENDOPATH XCEL WITH OPTIVIEW TECHNOLOGY UNIVERSAL TROCAR STABILITY SLEEVES: Brand: ENDOPATH XCEL OPTIVIEW

## (undated) DEVICE — NDL HYPO PRECISIONGLIDE REG 22G 1 1/2

## (undated) DEVICE — SHEET,DRAPE,53X77,STERILE: Brand: MEDLINE

## (undated) DEVICE — GLV SURG BIOGEL M LTX PF 7 1/2

## (undated) DEVICE — SPNG GZ STRL 2S 4X4 12PLY

## (undated) DEVICE — SUT PROLN 6/0 4/24IN BV1 MON BL M8805